# Patient Record
Sex: FEMALE | Race: WHITE | NOT HISPANIC OR LATINO | Employment: OTHER | URBAN - METROPOLITAN AREA
[De-identification: names, ages, dates, MRNs, and addresses within clinical notes are randomized per-mention and may not be internally consistent; named-entity substitution may affect disease eponyms.]

---

## 2020-06-26 ENCOUNTER — APPOINTMENT (OUTPATIENT)
Dept: RADIOLOGY | Facility: MEDICAL CENTER | Age: 85
DRG: 377 | End: 2020-06-26
Attending: HOSPITALIST
Payer: MEDICARE

## 2020-06-26 ENCOUNTER — APPOINTMENT (OUTPATIENT)
Dept: RADIOLOGY | Facility: MEDICAL CENTER | Age: 85
DRG: 377 | End: 2020-06-26
Attending: NURSE PRACTITIONER
Payer: MEDICARE

## 2020-06-26 ENCOUNTER — HOSPITAL ENCOUNTER (INPATIENT)
Facility: MEDICAL CENTER | Age: 85
LOS: 13 days | DRG: 377 | End: 2020-07-09
Attending: EMERGENCY MEDICINE | Admitting: HOSPITALIST
Payer: MEDICARE

## 2020-06-26 DIAGNOSIS — Z99.2 ESRD ON DIALYSIS (HCC): ICD-10-CM

## 2020-06-26 DIAGNOSIS — D64.9 ANEMIA, UNSPECIFIED TYPE: ICD-10-CM

## 2020-06-26 DIAGNOSIS — G45.8 ACUTE CEREBROVASCULAR INSUFFICIENCY WITH TRANSIENT FOCAL NEUROLOGICAL SIGNS AND SYMPTOMS: ICD-10-CM

## 2020-06-26 DIAGNOSIS — K92.1 MELENA: ICD-10-CM

## 2020-06-26 DIAGNOSIS — K92.2 UPPER GI BLEED: ICD-10-CM

## 2020-06-26 DIAGNOSIS — N19 UREMIA: ICD-10-CM

## 2020-06-26 DIAGNOSIS — N18.6 ESRD ON DIALYSIS (HCC): ICD-10-CM

## 2020-06-26 PROBLEM — I73.9 PERIPHERAL ARTERY DISEASE (HCC): Status: ACTIVE | Noted: 2020-06-26

## 2020-06-26 PROBLEM — E11.29 TYPE 2 DIABETES MELLITUS WITH KIDNEY COMPLICATION, WITHOUT LONG-TERM CURRENT USE OF INSULIN (HCC): Status: ACTIVE | Noted: 2020-06-26

## 2020-06-26 PROBLEM — S06.5XAA SUBDURAL HEMATOMA (HCC): Status: ACTIVE | Noted: 2020-06-26

## 2020-06-26 PROBLEM — S81.801A LEG WOUND, RIGHT: Status: ACTIVE | Noted: 2020-06-26

## 2020-06-26 PROBLEM — I10 PRIMARY HYPERTENSION: Status: ACTIVE | Noted: 2020-06-26

## 2020-06-26 PROBLEM — D62 ACUTE BLOOD LOSS ANEMIA: Status: ACTIVE | Noted: 2020-06-26

## 2020-06-26 LAB
ABO + RH BLD: NORMAL
ABO GROUP BLD: NORMAL
ALBUMIN SERPL BCP-MCNC: 2.5 G/DL (ref 3.2–4.9)
ALBUMIN/GLOB SERPL: 0.7 G/DL
ALP SERPL-CCNC: 41 U/L (ref 30–99)
ALT SERPL-CCNC: 13 U/L (ref 2–50)
ANION GAP SERPL CALC-SCNC: 18 MMOL/L (ref 7–16)
ANISOCYTOSIS BLD QL SMEAR: ABNORMAL
APTT PPP: 28 SEC (ref 24.7–36)
AST SERPL-CCNC: 17 U/L (ref 12–45)
BARCODED ABORH UBTYP: 600
BARCODED ABORH UBTYP: 6200
BARCODED ABORH UBTYP: 9500
BARCODED PRD CODE UBPRD: NORMAL
BARCODED UNIT NUM UBUNT: NORMAL
BASOPHILS # BLD AUTO: 0.2 % (ref 0–1.8)
BASOPHILS # BLD: 0.03 K/UL (ref 0–0.12)
BILIRUB SERPL-MCNC: 0.2 MG/DL (ref 0.1–1.5)
BLD GP AB SCN SERPL QL: NORMAL
BUN SERPL-MCNC: 114 MG/DL (ref 8–22)
CALCIUM SERPL-MCNC: 7.2 MG/DL (ref 8.5–10.5)
CHLORIDE SERPL-SCNC: 99 MMOL/L (ref 96–112)
CO2 SERPL-SCNC: 22 MMOL/L (ref 20–33)
COMMENT 1642: NORMAL
COMPONENT P 8504P: NORMAL
COMPONENT R 8504R: NORMAL
CREAT SERPL-MCNC: 6.93 MG/DL (ref 0.5–1.4)
DACRYOCYTES BLD QL SMEAR: NORMAL
EKG IMPRESSION: NORMAL
EOSINOPHIL # BLD AUTO: 0.08 K/UL (ref 0–0.51)
EOSINOPHIL NFR BLD: 0.6 % (ref 0–6.9)
ERYTHROCYTE [DISTWIDTH] IN BLOOD BY AUTOMATED COUNT: 57.6 FL (ref 35.9–50)
GLOBULIN SER CALC-MCNC: 3.6 G/DL (ref 1.9–3.5)
GLUCOSE SERPL-MCNC: 124 MG/DL (ref 65–99)
HBV SURFACE AB SERPL IA-ACNC: <3.5 MIU/ML (ref 0–10)
HBV SURFACE AG SER QL: NORMAL
HCT VFR BLD AUTO: 18.6 % (ref 37–47)
HGB BLD-MCNC: 5.5 G/DL (ref 12–16)
HGB BLD-MCNC: 7.2 G/DL (ref 12–16)
IMM GRANULOCYTES # BLD AUTO: 0.12 K/UL (ref 0–0.11)
IMM GRANULOCYTES NFR BLD AUTO: 0.9 % (ref 0–0.9)
INR PPP: 1.1 (ref 0.87–1.13)
LYMPHOCYTES # BLD AUTO: 2.37 K/UL (ref 1–4.8)
LYMPHOCYTES NFR BLD: 18.6 % (ref 22–41)
MCH RBC QN AUTO: 27 PG (ref 27–33)
MCHC RBC AUTO-ENTMCNC: 29.6 G/DL (ref 33.6–35)
MCV RBC AUTO: 91.2 FL (ref 81.4–97.8)
MICROCYTES BLD QL SMEAR: ABNORMAL
MONOCYTES # BLD AUTO: 0.85 K/UL (ref 0–0.85)
MONOCYTES NFR BLD AUTO: 6.7 % (ref 0–13.4)
MORPHOLOGY BLD-IMP: NORMAL
NEUTROPHILS # BLD AUTO: 9.32 K/UL (ref 2–7.15)
NEUTROPHILS NFR BLD: 73 % (ref 44–72)
NRBC # BLD AUTO: 0 K/UL
NRBC BLD-RTO: 0 /100 WBC
OVALOCYTES BLD QL SMEAR: NORMAL
PHOSPHATE SERPL-MCNC: 4.6 MG/DL (ref 2.5–4.5)
PLATELET # BLD AUTO: 280 K/UL (ref 164–446)
PLATELET BLD QL SMEAR: NORMAL
PMV BLD AUTO: 9.8 FL (ref 9–12.9)
POIKILOCYTOSIS BLD QL SMEAR: NORMAL
POLYCHROMASIA BLD QL SMEAR: NORMAL
POTASSIUM SERPL-SCNC: 5.3 MMOL/L (ref 3.6–5.5)
PRODUCT TYPE UPROD: NORMAL
PROT SERPL-MCNC: 6.1 G/DL (ref 6–8.2)
PROTHROMBIN TIME: 14.6 SEC (ref 12–14.6)
RBC # BLD AUTO: 2.04 M/UL (ref 4.2–5.4)
RBC BLD AUTO: PRESENT
RH BLD: NORMAL
ROULEAUX BLD QL SMEAR: SLIGHT
SODIUM SERPL-SCNC: 139 MMOL/L (ref 135–145)
UNIT STATUS USTAT: NORMAL
WBC # BLD AUTO: 12.8 K/UL (ref 4.8–10.8)

## 2020-06-26 PROCEDURE — 86850 RBC ANTIBODY SCREEN: CPT

## 2020-06-26 PROCEDURE — 700105 HCHG RX REV CODE 258: Performed by: EMERGENCY MEDICINE

## 2020-06-26 PROCEDURE — 85610 PROTHROMBIN TIME: CPT

## 2020-06-26 PROCEDURE — C9113 INJ PANTOPRAZOLE SODIUM, VIA: HCPCS | Performed by: EMERGENCY MEDICINE

## 2020-06-26 PROCEDURE — 700111 HCHG RX REV CODE 636 W/ 250 OVERRIDE (IP): Performed by: HOSPITALIST

## 2020-06-26 PROCEDURE — 30233R1 TRANSFUSION OF NONAUTOLOGOUS PLATELETS INTO PERIPHERAL VEIN, PERCUTANEOUS APPROACH: ICD-10-PCS | Performed by: HOSPITALIST

## 2020-06-26 PROCEDURE — 70450 CT HEAD/BRAIN W/O DYE: CPT

## 2020-06-26 PROCEDURE — 30233N1 TRANSFUSION OF NONAUTOLOGOUS RED BLOOD CELLS INTO PERIPHERAL VEIN, PERCUTANEOUS APPROACH: ICD-10-PCS | Performed by: HOSPITALIST

## 2020-06-26 PROCEDURE — 87340 HEPATITIS B SURFACE AG IA: CPT

## 2020-06-26 PROCEDURE — C9113 INJ PANTOPRAZOLE SODIUM, VIA: HCPCS | Performed by: HOSPITALIST

## 2020-06-26 PROCEDURE — 86901 BLOOD TYPING SEROLOGIC RH(D): CPT

## 2020-06-26 PROCEDURE — 700111 HCHG RX REV CODE 636 W/ 250 OVERRIDE (IP): Performed by: INTERNAL MEDICINE

## 2020-06-26 PROCEDURE — 86923 COMPATIBILITY TEST ELECTRIC: CPT

## 2020-06-26 PROCEDURE — 96365 THER/PROPH/DIAG IV INF INIT: CPT

## 2020-06-26 PROCEDURE — 770022 HCHG ROOM/CARE - ICU (200)

## 2020-06-26 PROCEDURE — 700111 HCHG RX REV CODE 636 W/ 250 OVERRIDE (IP): Performed by: EMERGENCY MEDICINE

## 2020-06-26 PROCEDURE — 85730 THROMBOPLASTIN TIME PARTIAL: CPT

## 2020-06-26 PROCEDURE — 36430 TRANSFUSION BLD/BLD COMPNT: CPT

## 2020-06-26 PROCEDURE — 84100 ASSAY OF PHOSPHORUS: CPT

## 2020-06-26 PROCEDURE — 99291 CRITICAL CARE FIRST HOUR: CPT | Performed by: HOSPITALIST

## 2020-06-26 PROCEDURE — 94760 N-INVAS EAR/PLS OXIMETRY 1: CPT

## 2020-06-26 PROCEDURE — 86900 BLOOD TYPING SEROLOGIC ABO: CPT

## 2020-06-26 PROCEDURE — 85025 COMPLETE CBC W/AUTO DIFF WBC: CPT

## 2020-06-26 PROCEDURE — 86706 HEP B SURFACE ANTIBODY: CPT

## 2020-06-26 PROCEDURE — P9016 RBC LEUKOCYTES REDUCED: HCPCS

## 2020-06-26 PROCEDURE — 96375 TX/PRO/DX INJ NEW DRUG ADDON: CPT

## 2020-06-26 PROCEDURE — 99221 1ST HOSP IP/OBS SF/LOW 40: CPT | Performed by: NURSE PRACTITIONER

## 2020-06-26 PROCEDURE — 99291 CRITICAL CARE FIRST HOUR: CPT | Performed by: INTERNAL MEDICINE

## 2020-06-26 PROCEDURE — 99285 EMERGENCY DEPT VISIT HI MDM: CPT

## 2020-06-26 PROCEDURE — 90935 HEMODIALYSIS ONE EVALUATION: CPT

## 2020-06-26 PROCEDURE — 80053 COMPREHEN METABOLIC PANEL: CPT

## 2020-06-26 PROCEDURE — P9034 PLATELETS, PHERESIS: HCPCS

## 2020-06-26 PROCEDURE — 93005 ELECTROCARDIOGRAM TRACING: CPT | Performed by: EMERGENCY MEDICINE

## 2020-06-26 PROCEDURE — 700105 HCHG RX REV CODE 258: Performed by: HOSPITALIST

## 2020-06-26 PROCEDURE — 85018 HEMOGLOBIN: CPT

## 2020-06-26 PROCEDURE — 5A1D70Z PERFORMANCE OF URINARY FILTRATION, INTERMITTENT, LESS THAN 6 HOURS PER DAY: ICD-10-PCS | Performed by: INTERNAL MEDICINE

## 2020-06-26 PROCEDURE — 71045 X-RAY EXAM CHEST 1 VIEW: CPT

## 2020-06-26 RX ORDER — HEPARIN SODIUM 1000 [USP'U]/ML
3200 INJECTION, SOLUTION INTRAVENOUS; SUBCUTANEOUS
Status: DISCONTINUED | OUTPATIENT
Start: 2020-06-26 | End: 2020-07-07 | Stop reason: DRUGHIGH

## 2020-06-26 RX ORDER — ALPRAZOLAM 0.5 MG/1
0.5 TABLET ORAL 3 TIMES DAILY PRN
COMMUNITY

## 2020-06-26 RX ORDER — HYDRALAZINE HYDROCHLORIDE 20 MG/ML
10-20 INJECTION INTRAMUSCULAR; INTRAVENOUS EVERY 4 HOURS PRN
Status: DISCONTINUED | OUTPATIENT
Start: 2020-06-26 | End: 2020-07-09 | Stop reason: HOSPADM

## 2020-06-26 RX ORDER — ONDANSETRON 4 MG/1
4 TABLET, ORALLY DISINTEGRATING ORAL EVERY 4 HOURS PRN
Status: DISCONTINUED | OUTPATIENT
Start: 2020-06-26 | End: 2020-07-09 | Stop reason: HOSPADM

## 2020-06-26 RX ORDER — SODIUM CHLORIDE 9 MG/ML
INJECTION, SOLUTION INTRAVENOUS CONTINUOUS
Status: DISPENSED | OUTPATIENT
Start: 2020-06-26 | End: 2020-06-27

## 2020-06-26 RX ORDER — HYDROMORPHONE HYDROCHLORIDE 1 MG/ML
.5-1 INJECTION, SOLUTION INTRAMUSCULAR; INTRAVENOUS; SUBCUTANEOUS EVERY 4 HOURS PRN
Status: DISCONTINUED | OUTPATIENT
Start: 2020-06-26 | End: 2020-06-28

## 2020-06-26 RX ORDER — CIPROFLOXACIN 250 MG/1
250 TABLET, FILM COATED ORAL 2 TIMES DAILY
Status: ON HOLD | COMMUNITY
End: 2020-07-09

## 2020-06-26 RX ORDER — ALPRAZOLAM 0.25 MG/1
0.5 TABLET ORAL 3 TIMES DAILY PRN
Status: DISCONTINUED | OUTPATIENT
Start: 2020-06-26 | End: 2020-06-26

## 2020-06-26 RX ORDER — DEXTROSE MONOHYDRATE 25 G/50ML
50 INJECTION, SOLUTION INTRAVENOUS
Status: DISCONTINUED | OUTPATIENT
Start: 2020-06-26 | End: 2020-06-29

## 2020-06-26 RX ORDER — LORAZEPAM 2 MG/ML
.25-.5 INJECTION INTRAMUSCULAR EVERY 4 HOURS PRN
Status: DISCONTINUED | OUTPATIENT
Start: 2020-06-26 | End: 2020-07-09 | Stop reason: HOSPADM

## 2020-06-26 RX ORDER — LORAZEPAM 2 MG/ML
0.5 INJECTION INTRAMUSCULAR ONCE
Status: COMPLETED | OUTPATIENT
Start: 2020-06-26 | End: 2020-06-26

## 2020-06-26 RX ORDER — ONDANSETRON 2 MG/ML
4 INJECTION INTRAMUSCULAR; INTRAVENOUS EVERY 4 HOURS PRN
Status: DISCONTINUED | OUTPATIENT
Start: 2020-06-26 | End: 2020-07-09 | Stop reason: HOSPADM

## 2020-06-26 RX ORDER — ATORVASTATIN CALCIUM 40 MG/1
40 TABLET, FILM COATED ORAL DAILY
COMMUNITY

## 2020-06-26 RX ORDER — AMLODIPINE BESYLATE 5 MG/1
5 TABLET ORAL 2 TIMES DAILY
COMMUNITY

## 2020-06-26 RX ORDER — ACETAMINOPHEN 500 MG
500-1000 TABLET ORAL EVERY 6 HOURS PRN
COMMUNITY

## 2020-06-26 RX ORDER — CLOPIDOGREL BISULFATE 75 MG/1
75 TABLET ORAL DAILY
COMMUNITY

## 2020-06-26 RX ORDER — FERRIC CITRATE 210 MG/1
210 TABLET, COATED ORAL
COMMUNITY

## 2020-06-26 RX ORDER — HYDRALAZINE HYDROCHLORIDE 20 MG/ML
10 INJECTION INTRAMUSCULAR; INTRAVENOUS EVERY 4 HOURS PRN
Status: DISCONTINUED | OUTPATIENT
Start: 2020-06-26 | End: 2020-06-26

## 2020-06-26 RX ADMIN — SODIUM CHLORIDE 30 ML: 9 INJECTION, SOLUTION INTRAVENOUS at 14:47

## 2020-06-26 RX ADMIN — HYDROMORPHONE HYDROCHLORIDE 0.5 MG: 1 INJECTION, SOLUTION INTRAMUSCULAR; INTRAVENOUS; SUBCUTANEOUS at 22:52

## 2020-06-26 RX ADMIN — SODIUM CHLORIDE 80 MG: 9 INJECTION, SOLUTION INTRAVENOUS at 12:23

## 2020-06-26 RX ADMIN — LORAZEPAM 0.5 MG: 2 INJECTION INTRAMUSCULAR; INTRAVENOUS at 11:43

## 2020-06-26 RX ADMIN — LORAZEPAM 0.5 MG: 2 INJECTION INTRAMUSCULAR; INTRAVENOUS at 20:07

## 2020-06-26 RX ADMIN — SODIUM CHLORIDE 8 MG/HR: 9 INJECTION, SOLUTION INTRAVENOUS at 12:52

## 2020-06-26 RX ADMIN — HEPARIN SODIUM 3200 UNITS: 1000 INJECTION, SOLUTION INTRAVENOUS; SUBCUTANEOUS at 21:10

## 2020-06-26 ASSESSMENT — ENCOUNTER SYMPTOMS
STRIDOR: 0
MYALGIAS: 0
EYE REDNESS: 0
HALLUCINATIONS: 0
DIZZINESS: 0
CONSTIPATION: 0
TREMORS: 0
COUGH: 0
FLANK PAIN: 0
SPEECH CHANGE: 1
DIAPHORESIS: 0
HEADACHES: 1
PHOTOPHOBIA: 0
FEVER: 0
SHORTNESS OF BREATH: 0
SPUTUM PRODUCTION: 0
SINUS PAIN: 0
DIARRHEA: 0
TINGLING: 0
ABDOMINAL PAIN: 0
BACK PAIN: 0
DOUBLE VISION: 0
NAUSEA: 0
HEADACHES: 0
EYE DISCHARGE: 0
PND: 0
HEMOPTYSIS: 0
EYE PAIN: 0
WEAKNESS: 1
NECK PAIN: 0
BRUISES/BLEEDS EASILY: 0
CLAUDICATION: 0
PALPITATIONS: 0
HEARTBURN: 0
SORE THROAT: 0
CHILLS: 0
FOCAL WEAKNESS: 1
VOMITING: 0
POLYDIPSIA: 0
DEPRESSION: 0
FALLS: 1
DIZZINESS: 1
NERVOUS/ANXIOUS: 0
BLURRED VISION: 0
WHEEZING: 0
ORTHOPNEA: 0
WEAKNESS: 0
BLOOD IN STOOL: 1

## 2020-06-26 ASSESSMENT — COGNITIVE AND FUNCTIONAL STATUS - GENERAL
WALKING IN HOSPITAL ROOM: A LOT
DRESSING REGULAR UPPER BODY CLOTHING: A LOT
TOILETING: A LOT
DRESSING REGULAR LOWER BODY CLOTHING: A LOT
SUGGESTED CMS G CODE MODIFIER DAILY ACTIVITY: CK
MOVING TO AND FROM BED TO CHAIR: A LOT
MOVING FROM LYING ON BACK TO SITTING ON SIDE OF FLAT BED: A LOT
PERSONAL GROOMING: A LITTLE
TURNING FROM BACK TO SIDE WHILE IN FLAT BAD: A LOT
HELP NEEDED FOR BATHING: A LOT
EATING MEALS: A LITTLE
STANDING UP FROM CHAIR USING ARMS: A LOT
MOBILITY SCORE: 12
DAILY ACTIVITIY SCORE: 14
SUGGESTED CMS G CODE MODIFIER MOBILITY: CL
CLIMB 3 TO 5 STEPS WITH RAILING: A LOT

## 2020-06-26 ASSESSMENT — LIFESTYLE VARIABLES
TOTAL SCORE: 0
AVERAGE NUMBER OF DAYS PER WEEK YOU HAVE A DRINK CONTAINING ALCOHOL: 0
CONSUMPTION TOTAL: NEGATIVE
EVER HAD A DRINK FIRST THING IN THE MORNING TO STEADY YOUR NERVES TO GET RID OF A HANGOVER: NO
HAVE PEOPLE ANNOYED YOU BY CRITICIZING YOUR DRINKING: NO
HAVE YOU EVER FELT YOU SHOULD CUT DOWN ON YOUR DRINKING: NO
ON A TYPICAL DAY WHEN YOU DRINK ALCOHOL HOW MANY DRINKS DO YOU HAVE: 0
DO YOU DRINK ALCOHOL: NO
EVER_SMOKED: NEVER
TOTAL SCORE: 0
HOW MANY TIMES IN THE PAST YEAR HAVE YOU HAD 5 OR MORE DRINKS IN A DAY: 0
TOTAL SCORE: 0
EVER FELT BAD OR GUILTY ABOUT YOUR DRINKING: NO
SUBSTANCE_ABUSE: 0

## 2020-06-26 ASSESSMENT — PATIENT HEALTH QUESTIONNAIRE - PHQ9
1. LITTLE INTEREST OR PLEASURE IN DOING THINGS: NOT AT ALL
2. FEELING DOWN, DEPRESSED, IRRITABLE, OR HOPELESS: NOT AT ALL
2. FEELING DOWN, DEPRESSED, IRRITABLE, OR HOPELESS: NOT AT ALL
1. LITTLE INTEREST OR PLEASURE IN DOING THINGS: NOT AT ALL
SUM OF ALL RESPONSES TO PHQ9 QUESTIONS 1 AND 2: 0
SUM OF ALL RESPONSES TO PHQ9 QUESTIONS 1 AND 2: 0

## 2020-06-26 NOTE — ED NOTES
Med rec completed per pt's family   Allergies reviewed    Pt is on a 10 day course of Cipro that started 4-5 days ago

## 2020-06-26 NOTE — ED NOTES
5.5hgb 18.6 hct critical results called from lab. Provider Leann aware. Awaiting additional orders

## 2020-06-26 NOTE — DISCHARGE PLANNING
Outpatient Dialysis Note    Patient is currently traveling from [place] and during this visit she is currently receiving HD at:     [facility]   [address] [place] [address]    Schedule: Monday, Wednesday, Friday   Time: 3:00pm     Spoke with Simran at facility who confirmed.      Permanent HD Clinic in [place]- Confirmed patient is active at:    [facility]  [address], [address]  [place] [address]    Schedule: Monday, Wednesday, Friday   Time: 3:00pm     Spoke with Adriana at facility who confirmed.    Forwarded records for review.    Betsy Keith- Dialysis Coordinator  [facility] # [phone]

## 2020-06-26 NOTE — PROGRESS NOTES
Triage officer progress note    Discussed with Dr. Noriega, 87 years old female visiting from [place] city, complaining of melena for the last 2 weeks, patient has history of end-stage renal disease on hemodialysis Monday Wednesday Friday, patient started develop weakness she was unable to walk, her hemoglobin was 5.5, patient became having slurred speech and left-sided droop code stroke was called, patient placed in Trendelenburg which improved, patient to start receiving 1 unit of unmatched red blood cells continued by 1 more unit of matched red blood cells, patient will require ICU admission for closer monitoring, GI is being consulted by ER physician as well as critical care and nephrology, hospitalist group called for admission.  Dr. Suarez will be admitting he has been notified and agree  Neurology also was called due to stroke alert, stat CT head is recommended.

## 2020-06-26 NOTE — CARE PLAN
Problem: Communication  Goal: The ability to communicate needs accurately and effectively will improve  Outcome: PROGRESSING AS EXPECTED     Problem: Safety  Goal: Will remain free from falls  Outcome: PROGRESSING AS EXPECTED  Intervention: Implement fall precautions  Flowsheets  Taken 6/26/2020 1400  Environmental Precautions:   Treaded Slipper Socks on Patient   Personal Belongings, Wastebasket, Call Bell etc. in Easy Reach   Transferred to Stronger Side   Report Given to Other Health Care Providers Regarding Fall Risk   Bed in Low Position   Communication Sign for Patients & Families   Mobility Assessed & Appropriate Sign Placed  Taken 6/26/2020 1549  Chair/Bed Strip Alarm: Yes - Alarm On     Problem: Venous Thromboembolism (VTW)/Deep Vein Thrombosis (DVT) Prevention:  Goal: Patient will participate in Venous Thrombosis (VTE)/Deep Vein Thrombosis (DVT)Prevention Measures  Outcome: PROGRESSING AS EXPECTED  Intervention: Ensure patient wears graduated elastic stockings (YVONNE hose) and/or SCDs, if ordered, when in bed or chair (Remove at least once per shift for skin check)  Flowsheets (Taken 6/26/2020 1633)  Mechanical Prophylaxis: SCDs, Sequential Compression Device

## 2020-06-26 NOTE — ED NOTES
Spoke with neurology NP Emilee they are not coming and pt is not a candidate of TPA. Pt is in trendelenburg and symptoms have resolved.

## 2020-06-26 NOTE — PROGRESS NOTES
"Brief Neurology Note-- Stroke Alert  Date of Service 6/26/20    87-year old female with PMHx ESRD on HD who presented to [facility] 6/26/20 for a chief complaint of melanic stools x 2 weeks; on arrival here Hemoglobin 5.5. Awaiting emergent transfusion. At appx 1100, patient nurse witnessed patient to suddenly \"stop speaking\" and with question of some facial droop. Stroke Alert was thus called at 1107. Patient was placed in trendelenburg position, and symptoms are currently improving (**per phone call with patient's nurse Rachel; I did not see the patient in person).  Patient IS NOT a candidate for IV tPA given concern for active bleeding/hemoglobin 5.5 as well as given exceedingly low suspicion for stroke, high suspicion for hemodynamic instability/hypoperfusion given low hemoglobin. Recommend STAT CT head to formally rule out acute intracranial hemorrhage. As was discussed with patient's nurse Rachel, if symptoms do not continue to improve, recommend MRI Brain and formal neurology consultation. Will defer all other medical management to ED/hospitalist at this time.     The plan of care above has been discussed with Dr. Solis. Please call with questions.    HOWARD Coelho.      "

## 2020-06-26 NOTE — PROGRESS NOTES
Assumed care of pt and report received from previous RN. Clarified with Dr Suarez that pt is not going to ICU. VSS, pt aaox4, granddaughter at bedside. Pt brought up with this RN and all belongings on zoll.     1348 Dr Suarez notified of CT results and also that patient is requesting to be DNR/DNI.

## 2020-06-26 NOTE — CONSULTS
"[facility] -  CONSULTATION NOTE               Author: Marlene Nelson M.D. Date & Time: 6/26/2020  2:57 PM       REASON FOR CONSULTATION:   - Inpatient hemodialysis management.    CHIEF COMPLAINT:   -  \"I don't feel too good\"    HISTORY OF PRESENT ILLNESS:    86 yo F PMH ESRD MWF iHD, HTN, anemia of CKD, CKD-MBD, and HLD who presents to ED with CC as above.  She is visiting the [facility] from [place] and due to current pandemic has not been able to go back home and continued to stay in this area with family.  She has OP dialysis at [facility] and has been compliant.  She was doing well until about two weeks ago when she started to notice dark stools.  She was doing ok until about 3 days ago when she also noted orthostatic symptoms upon standing.  It resolved and then today it came back and she had some slurred speech and left arm numbness so she was brought in for evaluation.  She has had a prior stroke and the numbness and slurred speech have been present off and on as a result of that so initially she though nothing of it.  Neurology was consulted and they did not feel that this was CVA related and more hemodynamic related.  Labs in ED showed Hgb 5.5 and serum labs showed  and K+ 5.3.  She was given pRBCs and admitted to ICU.  CT of head showed an acute on chronic epidural bleed with mild midline shift as well.  She was on plavix at home.  No recent F/C/N/V/CP/SOB.  No hematochezia, hematemesis.  No HA, visual changes, or abdominal pain.      REVIEW OF SYSTEMS:    Review of Systems   Constitutional: Positive for malaise/fatigue. Negative for fever.   HENT: Negative for ear pain and sore throat.    Eyes: Negative for pain and redness.   Respiratory: Negative for cough and hemoptysis.    Cardiovascular: Negative for chest pain and leg swelling.   Gastrointestinal: Positive for melena. Negative for abdominal pain and nausea.   Musculoskeletal: Negative for joint pain and myalgias. " "  Skin: Negative for itching and rash.   Neurological: Positive for weakness. Negative for dizziness and headaches.   Endo/Heme/Allergies: Negative for polydipsia. Does not bruise/bleed easily.   Psychiatric/Behavioral: Negative for depression and hallucinations.   All other systems reviewed and are negative.      PAST MEDICAL HISTORY:   - ESRD MWF iHD  - HTN  - Anemia of CKD  - CKD-MBD  - HLD  - CAD  - CVA  - Chronic epidural hematoma  - PAD    PAST SURGICAL HISTORY:   - AVF creation    FAMILY HISTORY:   - Reviewed and non contributory to current illness    SOCIAL HISTORY:   - No tobacco  - No EtOH  - No illicits    HOME MEDICATIONS:   - Reviewed and documented in chart    LABORATORY STUDIES:   - Reviewed and documented in chart    ALLERGIES:  Lexapro    VS:  /82   Pulse 93   Temp 36.6 °C (97.9 °F) (Temporal)   Resp 17   Ht 1.499 m (4' 11\")   Wt 77.6 kg (171 lb)   SpO2 95%   BMI 34.54 kg/m²   Physical Exam  Vitals signs and nursing note reviewed.   Constitutional:       General: She is not in acute distress.     Appearance: She is ill-appearing.   HENT:      Head: Normocephalic and atraumatic.      Right Ear: External ear normal.      Left Ear: External ear normal.      Nose: Nose normal. No congestion.      Mouth/Throat:      Mouth: Mucous membranes are moist.      Pharynx: No oropharyngeal exudate.   Eyes:      General:         Right eye: No discharge.         Left eye: No discharge.      Extraocular Movements: Extraocular movements intact.   Neck:      Musculoskeletal: Neck supple. No muscular tenderness.   Cardiovascular:      Rate and Rhythm: Normal rate and regular rhythm.      Heart sounds: Normal heart sounds.   Pulmonary:      Effort: Pulmonary effort is normal.      Breath sounds: Decreased breath sounds present.   Chest:      Chest wall: No tenderness.   Abdominal:      General: Bowel sounds are normal. There is no distension.      Palpations: Abdomen is soft.   Musculoskeletal:         " General: No swelling or tenderness.   Skin:     General: Skin is warm and dry.      Findings: No rash.   Neurological:      General: No focal deficit present.      Mental Status: She is alert. Mental status is at baseline.      Motor: Weakness present.   Psychiatric:         Mood and Affect: Mood normal.         Behavior: Behavior normal.     LABS:  Recent Results (from the past 24 hour(s))   CBC WITH DIFFERENTIAL    Collection Time: 06/26/20 10:25 AM   Result Value Ref Range    WBC 12.8 (H) 4.8 - 10.8 K/uL    RBC 2.04 (L) 4.20 - 5.40 M/uL    Hemoglobin 5.5 (LL) 12.0 - 16.0 g/dL    Hematocrit 18.6 (L) 37.0 - 47.0 %    MCV 91.2 81.4 - 97.8 fL    MCH 27.0 27.0 - 33.0 pg    MCHC 29.6 (L) 33.6 - 35.0 g/dL    RDW 57.6 (H) 35.9 - 50.0 fL    Platelet Count 280 164 - 446 K/uL    MPV 9.8 9.0 - 12.9 fL    Neutrophils-Polys 73.00 (H) 44.00 - 72.00 %    Lymphocytes 18.60 (L) 22.00 - 41.00 %    Monocytes 6.70 0.00 - 13.40 %    Eosinophils 0.60 0.00 - 6.90 %    Basophils 0.20 0.00 - 1.80 %    Immature Granulocytes 0.90 0.00 - 0.90 %    Nucleated RBC 0.00 /100 WBC    Neutrophils (Absolute) 9.32 (H) 2.00 - 7.15 K/uL    Lymphs (Absolute) 2.37 1.00 - 4.80 K/uL    Monos (Absolute) 0.85 0.00 - 0.85 K/uL    Eos (Absolute) 0.08 0.00 - 0.51 K/uL    Baso (Absolute) 0.03 0.00 - 0.12 K/uL    Immature Granulocytes (abs) 0.12 (H) 0.00 - 0.11 K/uL    NRBC (Absolute) 0.00 K/uL    Anisocytosis 1+     Microcytosis 1+    COMP METABOLIC PANEL    Collection Time: 06/26/20 10:25 AM   Result Value Ref Range    Sodium 139 135 - 145 mmol/L    Potassium 5.3 3.6 - 5.5 mmol/L    Chloride 99 96 - 112 mmol/L    Co2 22 20 - 33 mmol/L    Anion Gap 18.0 (H) 7.0 - 16.0    Glucose 124 (H) 65 - 99 mg/dL    Bun 114 (HH) 8 - 22 mg/dL    Creatinine 6.93 (HH) 0.50 - 1.40 mg/dL    Calcium 7.2 (L) 8.5 - 10.5 mg/dL    AST(SGOT) 17 12 - 45 U/L    ALT(SGPT) 13 2 - 50 U/L    Alkaline Phosphatase 41 30 - 99 U/L    Total Bilirubin 0.2 0.1 - 1.5 mg/dL    Albumin 2.5 (L) 3.2 -  4.9 g/dL    Total Protein 6.1 6.0 - 8.2 g/dL    Globulin 3.6 (H) 1.9 - 3.5 g/dL    A-G Ratio 0.7 g/dL   PROTHROMBIN TIME (INR)    Collection Time: 20 10:25 AM   Result Value Ref Range    PT 14.6 12.0 - 14.6 sec    INR 1.10 0.87 - 1.13   APTT    Collection Time: 20 10:25 AM   Result Value Ref Range    APTT 28.0 24.7 - 36.0 sec   ESTIMATED GFR    Collection Time: 20 10:25 AM   Result Value Ref Range    GFR If  7 (A) >60 mL/min/1.73 m 2    GFR If Non African American 6 (A) >60 mL/min/1.73 m 2   ABO Rh Confirm    Collection Time: 20 10:25 AM   Result Value Ref Range    ABO Rh Confirm A NEG    PHOSPHORUS    Collection Time: 20 10:25 AM   Result Value Ref Range    Phosphorus 4.6 (H) 2.5 - 4.5 mg/dL   PERIPHERAL SMEAR REVIEW    Collection Time: 20 10:25 AM   Result Value Ref Range    Peripheral Smear Review see below    PLATELET ESTIMATE    Collection Time: 20 10:25 AM   Result Value Ref Range    Plt Estimation Normal    MORPHOLOGY    Collection Time: 20 10:25 AM   Result Value Ref Range    RBC Morphology Present     Polychromia 1+     Poikilocytosis 1+     Ovalocytes 1+     Tear Drop Cells 1+     Rouleaux Slight    DIFFERENTIAL COMMENT    Collection Time: 20 10:25 AM   Result Value Ref Range    Comments-Diff see below    EKG    Collection Time: 20 10:28 AM   Result Value Ref Range    Report       [facility] Emergency Dept.    Test Date:  2020  Pt Name:    BECKIE BRITTON               Department: ER  MRN:        [id number]                      Room:        10  Gender:     Female                       Technician: [id number]  :        1932-10-26                   Requested By:DELILAH RAZO  Order #:    [id number]                    Mauricio MD: DELILAH RAZO MD    Measurements  Intervals                                Axis  Rate:       90                           P:          60  TN:         148                          QRS:         22  QRSD:       82                           T:          173  QT:         352  QTc:        431    Interpretive Statements  SINUS RHYTHM  NONSPECIFIC T ABNORMALITIES, LATERAL LEADS  No previous ECG available for comparison  Electronically Signed On 6- 11:48:08 PDT by DELILAH RAZO MD     COD - Adult (Type and Screen)    Collection Time: 06/26/20 10:49 AM   Result Value Ref Range    ABO Grouping Only A     Rh Grouping Only NEG     Antibody Screen-Cod NEG     Component R       R99                 Red Cells, LR       [id number]   issued       06/26/20   14:41      Product Type R99     Dispense Status issued     Unit Number (Barcoded) [id number]     Product Code (Barcoded) G4932U87     Blood Type (Barcoded) 0600    UN-XM'D RBC    Collection Time: 06/26/20 11:12 AM   Result Value Ref Range    Component R       R99                 Red Cells, LR       [id number]   issued       06/26/20   11:13      Product Type R99     Dispense Status issued     Unit Number (Barcoded) [id number]     Product Code (Barcoded) I2328G31     Blood Type (Barcoded) 9500        (click the triangle to expand results)    IMAGING:  DX-CHEST-PORTABLE (1 VIEW)   Final Result      No acute cardiopulmonary abnormality.      CT-HEAD W/O   Final Result      1. Small amount of acute hemorrhage in a chronic 2.1 cm right epidural collection, along the right frontoparietal convexity. Associated 2 mm right to left midline shift.   2. Minimal subacute subdural hemorrhage anterior to the right epidural collection.   3. Hyperdensity in the temporal horn of the right lateral ventricle may represent small amount of intraventricular hemorrhage.   4. No CT evidence of acute infarct.      MR-BRAIN-W/O    (Results Pending)   MR-MRA HEAD-W/O    (Results Pending)   MR-MRA NECK-W/O    (Results Pending)       IMPRESSION:  - ESRD    * Etiology likely 2/2 HTN    * AVF (+thrill/bruit)    * Visitor at [facility], lives in [facility] [place]    *  Etiology unclear, could be related to anti-platelet drug or other cause    * GI on board  - Hyperkalemia    * mild  - Acute epidural hematoma    * Superimposed on chronic epidural bleed, with a small midline shift    * Neuro following  - HTN    * Goal BP < 140/90    * Stable  - Anemia of CKD    * Goal Hgb 10-11    * Stable  - CKD-MBD    * Managed at HD unit  - HLD  - CAD    PLAN:  - 3 hour iHD today  - No to minimal UF  - 145 dialysate sodium to try to leave her SNa at high-normal range  - No dietary protein restrictions  - Dose all meds per ESRD  - Monitor for additional iHD as needed  - Transfuse as needed to maintain Hgb > 7    All prior notes form other doctors and RN staff were reviewed from admission to current day to help me make my clinical decisions    Thank you for the consultation!

## 2020-06-26 NOTE — ED TRIAGE NOTES
Pt arrived via EMS in [place] visiting son for last 3 weeks. Pt has a home health nurse that comes 2 times a week. HHN states she has had black tarry stool for a week. Pt is now unable to ambulate from weakness and has additional complaints of dizziness. States she could with a walker 2 days ago. Pt has hx of DMII gets dialysis MWF and is due today at 1530. Pt alert pwd calm. From [facility]  No past medical history on file.

## 2020-06-26 NOTE — ED PROVIDER NOTES
"ED Provider Note    Scribed for Dez Noriega M.D. by Kaya Waldrop. 6/26/2020  10:27 AM    Primary care provider: None noted.  Means of arrival: EMS  History obtained from: Patient  History limited by: None    CHIEF COMPLAINT  Chief Complaint   Patient presents with   • Melena   • Weakness       HPI  Corine Dela Cruz is a 87 y.o. female, with a history of ESRD on dialysis, who presents to the Emergency Department for melena onset 2 weeks ago. Patient states she thinks she has had bloody stools almost every day since onset. She additionally endorses difficulty walking secondary to generalized weakness and left leg pain. No alleviating or exacerbating factors noted at this time. No additional pain or symptoms noted.     REVIEW OF SYSTEMS  Pertinent positives include melena and generalized weakness and left leg pain.   Pertinent negatives include: No additional pain or symptoms noted at this time.     All other systems reviewed and negative. See HPI for further details.     PAST MEDICAL HISTORY    ESRD on dialysis and venous insufficiency    SURGICAL HISTORY  patient denies any surgical history    SOCIAL HISTORY  None noted.    FAMILY HISTORY  None noted.     CURRENT MEDICATIONS  Patient is unsure of current medications.     ALLERGIES  Allergies   Allergen Reactions   • Lexapro      Pt states she doesn't know what happens when she takes lexapro         PHYSICAL EXAM  VITAL SIGNS: /56   Pulse 88   Temp 36.4 °C (97.6 °F) (Temporal)   Resp 16   Ht 1.499 m (4' 11\")   Wt 77.6 kg (171 lb)   SpO2 98%   BMI 34.54 kg/m²     Nursing note and vitals reviewed.  Constitutional: Well-developed and well-nourished.  Mild distress, somewhat anxious.  HENT: Head is normocephalic and atraumatic. Oropharynx is clear and moist without exudate or erythema.   Eyes: Right pupil equal round and reactive. Pale, palpebral conjunctivae. Chronic left corneal abnormality.  Cardiovascular: Normal rate and regular rhythm. No " murmur heard. Normal radial pulses.  Pulmonary/Chest: Breath sounds normal. No wheezes or rales.   Abdominal: Soft and non-tender. No distention.  Copious melena present on exam.  Obese.  Unable to elicit any abdominal tenderness.  Musculoskeletal: Extremities exhibit normal range of motion without edema or tenderness.   Neurological: Awake, alert and oriented to person, place, and time. No focal deficits noted.  Skin: Skin is warm and dry. No rash.  Wound dressings to bilateral lower extremities.  Psychiatric: Normal mood and affect. Appropriate for clinical situation.      DIAGNOSTIC STUDIES / PROCEDURES    EKG Interpretation  Interpreted by me as below    LABS  Results for orders placed or performed during the hospital encounter of 06/26/20   COMP METABOLIC PANEL   Result Value Ref Range    Sodium 139 135 - 145 mmol/L    Potassium 5.3 3.6 - 5.5 mmol/L    Chloride 99 96 - 112 mmol/L    Co2 22 20 - 33 mmol/L    Anion Gap 18.0 (H) 7.0 - 16.0    Glucose 124 (H) 65 - 99 mg/dL    Bun 114 (HH) 8 - 22 mg/dL    Creatinine 6.93 (HH) 0.50 - 1.40 mg/dL    Calcium 7.2 (L) 8.5 - 10.5 mg/dL    AST(SGOT) 17 12 - 45 U/L    ALT(SGPT) 13 2 - 50 U/L    Alkaline Phosphatase 41 30 - 99 U/L    Total Bilirubin 0.2 0.1 - 1.5 mg/dL    Albumin 2.5 (L) 3.2 - 4.9 g/dL    Total Protein 6.1 6.0 - 8.2 g/dL    Globulin 3.6 (H) 1.9 - 3.5 g/dL    A-G Ratio 0.7 g/dL   PROTHROMBIN TIME (INR)   Result Value Ref Range    PT 14.6 12.0 - 14.6 sec    INR 1.10 0.87 - 1.13   APTT   Result Value Ref Range    APTT 28.0 24.7 - 36.0 sec   ESTIMATED GFR   Result Value Ref Range    GFR If  7 (A) >60 mL/min/1.73 m 2    GFR If Non African American 6 (A) >60 mL/min/1.73 m 2   UN-XM'D RBC   Result Value Ref Range    Component R       R99                 Red Cells, LR       [id number]   issued       06/26/20   11:13      Product Type R99     Dispense Status issued     Unit Number (Barcoded) [id number]     Product Code (Barcoded) G4329A35      Blood Type (Barcoded) 9500    EKG   Result Value Ref Range    Report       [facility] Emergency Dept.    Test Date:  2020  Pt Name:    BECKIE BRITTON               Department: ER  MRN:        [id number]                      Room:       RD 10  Gender:     Female                       Technician: [id number]  :        1932-10-26                   Requested By:DELILAH RAZO  Order #:    [id number]                    Reading MD:    Measurements  Intervals                                Axis  Rate:       90                           P:          60  MA:         148                          QRS:        22  QRSD:       82                           T:          173  QT:         352  QTc:        431    Interpretive Statements  SINUS RHYTHM  NONSPECIFIC T ABNORMALITIES, LATERAL LEADS  No previous ECG available for comparison        All labs reviewed by me.    RADIOLOGY  CT-HEAD W/O    (Results Pending)     The radiologist's interpretation of all radiological studies have been reviewed by me.    COURSE & MEDICAL DECISION MAKING  Nursing notes, VS, PMSFHx reviewed in chart.     No past medical records available to review.     10:27 AM - Patient seen and examined at bedside. I informed the patient the need for labs and radiology to rule out any emergent processes. Currently awaiting results before deciding if intervention is necessary. Patient verbalizes understanding and agreement to this plan of care. Ordered CBC with diff, CMP, PT/INR, APTT, COD, estimated GFR, and EKG to evaluate her symptoms. The differential diagnoses include but are not limited to: Presents today with apparent upper GI bleed. I suspect she will be anemic given her examination.     10:50 AM - Reviewed lab and radiology results at this time, as seen above.      10:57 AM Paged GI and hospitalist.      11:06 AM - Emergently called to patients bedside at this time by nurse for concerns of active stroke. Release for red blood cells ordered.   Patient has strokelike symptoms.  Had some facial droop and weakness.  However when placed on supplemental oxygen and placed in Trendelenburg the symptoms resolved.  Likely due to cerebral hypoperfusion due to her anemia.  Patient will be transfused with a unit of uncrossed matched red blood cells.    11:15 AM - I discussed the patient's case and the above findings with Dr. Dominguez (Hospitalist) who agreed to evaluate patient for hospitalization.     11:16 AM - Paged intensivist and nephrology.      11:20 AM - I discussed the patient's case and the above findings with Dr. Heck () who agreed to consult on the patient.      11:43 AM  - I discussed the patient's case and the above findings with Dr. Rizvi (Pulmonary) who will consult.     11:43 AM  I discussed the patient's case and the above findings with Dr. Heck () who will consult.     I have explained to the patient the risks and benefits of transfusion of blood products.  This includes, as appropriate, the risk of mild allergic reaction, hemolytic reaction, transfusion-associated lung injury, febrile reactions, circulatory or iron overload, and infection.    We discussed possible alternatives and their risks, including directed donation, autologous transfusion, and no transfusion, including IV or oral iron supplementation, as appropriate.  I believe the patient understands the risks and benefits and was able to express understanding.     Patient presents today with melena.  She is having an upper GI bleed.  She has significant anemia with her hemoglobin of 5.5.  On arrival the patient had a large amount of melena present on exam.  I suspect her true hemoglobin is substantially lower.  In the emergency department the patient developed acute neurologic symptoms that resolved with supplemental oxygen and positioning.  Feel likely due to cerebral hypoperfusion.  Patient was emergently treated with packed red blood cells.    CRITICAL CARE  I provided critical  care services, which included medication orders, frequent reevaluations of the patient's condition and response to treatment, ordering and reviewing test results, and discussing the case with various consultants.  The critical care time associated with the care of the patient was 50 minutes. Review chart for interventions. This time is exclusive of any other billable procedures.       DISPOSITION:  Patient will be hospitalized by Dr. Dominguez in critical condition.     FINAL IMPRESSION  1. Upper GI bleed    2. Melena    3. ESRD on dialysis (HCC)    4. Uremia    5. Anemia, unspecified type    6. Acute cerebrovascular insufficiency with transient focal neurological signs and symptoms       The critical care time associated with the care of the patient was 50 minutes.      Kaya FUENTES (Shayyibroma), am scribing for, and in the presence of, Dez Noriega M.D..    Electronically signed by: Kaya Waldrop (Jesus), 6/26/2020    Dez FUENTES M.D. personally performed the services described in this documentation, as scribed by Kaya Waldrop in my presence, and it is both accurate and complete. C.    The note accurately reflects work and decisions made by me.  Dez Noriega M.D.  6/26/2020  11:50 AM

## 2020-06-26 NOTE — H&P
Hospital Medicine History & Physical Note    Date of Service  6/26/2020    Primary Care Physician  No primary care provider on file.    Code Status  DNAR/DNI    Chief Complaint  Chief Complaint   Patient presents with   • Melena   • Weakness       History of Presenting Illness  87 y.o. female who presented 6/26/2020 with past medical history of stroke, end-stage renal disease dialysis Monday Wednesday Friday, peripheral artery disease who comes into the emergency room for having melena.  Patient does not know when the melena exactly started but states that is been present for the past week.  She just came from [place] 3 weeks prior and states that she is having generalized weakness since then.  States that she is been falling for the past 2 weeks.  She started developing dizziness especially when standing 3 days ago.  She also complained about slurred speech, left arm numbness which were her old stroke symptoms.  Patient recently had a angiogram and was placed on aspirin and Plavix for her peripheral artery disease.  Patient also has chronic wounds on her lower extremity that she's on ciprofloxacin.   EKG interpreted by me found normal sinus rhythm, poor R wave progression, ST depressions in the lateral leads  Review of Systems  Review of Systems   Constitutional: Negative for chills, diaphoresis, fever and malaise/fatigue.   HENT: Negative for congestion, ear discharge, ear pain, hearing loss, nosebleeds, sinus pain, sore throat and tinnitus.    Eyes: Negative for blurred vision, double vision, photophobia and pain.   Respiratory: Negative for cough, hemoptysis, sputum production, shortness of breath, wheezing and stridor.    Cardiovascular: Negative for chest pain, palpitations, orthopnea, claudication, leg swelling and PND.   Gastrointestinal: Positive for blood in stool and melena. Negative for abdominal pain, constipation, diarrhea, heartburn, nausea and vomiting.   Genitourinary: Negative for dysuria, flank  pain, frequency, hematuria and urgency.   Musculoskeletal: Positive for falls. Negative for back pain, joint pain, myalgias and neck pain.   Skin: Negative for itching and rash.   Neurological: Positive for dizziness. Negative for tingling, tremors, weakness and headaches.   Endo/Heme/Allergies: Negative for environmental allergies and polydipsia. Does not bruise/bleed easily.   Psychiatric/Behavioral: Negative for depression, hallucinations, substance abuse and suicidal ideas.       Past Medical History  Medical history of diabetes    Surgical History  Surgical history reviewed and not pertinent    Family History  Family history reviewed and not pertinent    Social History   reports that she has never smoked. She has never used smokeless tobacco.    Allergies  Allergies   Allergen Reactions   • Lexapro      Pt states she doesn't know what happens when she takes lexapro         Medications  None       Physical Exam  Temp:  [36.4 °C (97.6 °F)-37 °C (98.6 °F)] 36.9 °C (98.4 °F)  Pulse:  [9-105] 103  Resp:  [11-49] 17  BP: (103-159)/(45-83) 113/56  SpO2:  [94 %-100 %] 95 %    Physical Exam  Vitals signs and nursing note reviewed.   Constitutional:       General: She is not in acute distress.     Appearance: Normal appearance. She is not ill-appearing, toxic-appearing or diaphoretic.   HENT:      Head: Normocephalic and atraumatic.      Nose: No congestion or rhinorrhea.      Mouth/Throat:      Pharynx: No oropharyngeal exudate or posterior oropharyngeal erythema.   Eyes:      General: No scleral icterus.  Neck:      Musculoskeletal: No neck rigidity or muscular tenderness.      Vascular: No carotid bruit.   Cardiovascular:      Rate and Rhythm: Normal rate and regular rhythm.      Pulses: Normal pulses.      Heart sounds: Normal heart sounds. No murmur. No friction rub. No gallop.    Pulmonary:      Effort: Pulmonary effort is normal. No respiratory distress.      Breath sounds: Normal breath sounds. No stridor. No  wheezing or rhonchi.   Abdominal:      General: Abdomen is flat. There is no distension.      Palpations: There is no mass.      Tenderness: There is no abdominal tenderness. There is no left CVA tenderness, guarding or rebound.      Hernia: No hernia is present.   Musculoskeletal: Normal range of motion.         General: No swelling.      Right lower leg: No edema.      Left lower leg: No edema.   Lymphadenopathy:      Cervical: No cervical adenopathy.   Skin:     General: Skin is warm and dry.      Capillary Refill: Capillary refill takes less than 2 seconds.      Coloration: Skin is not jaundiced or pale.      Findings: No bruising or erythema.   Neurological:      Mental Status: She is alert.         Laboratory:  Recent Labs     06/26/20  1025 06/26/20  1900   WBC 12.8*  --    RBC 2.04*  --    HEMOGLOBIN 5.5* 7.2*   HEMATOCRIT 18.6*  --    MCV 91.2  --    MCH 27.0  --    MCHC 29.6*  --    RDW 57.6*  --    PLATELETCT 280  --    MPV 9.8  --      Recent Labs     06/26/20  1025   SODIUM 139   POTASSIUM 5.3   CHLORIDE 99   CO2 22   GLUCOSE 124*   *   CREATININE 6.93*   CALCIUM 7.2*     Recent Labs     06/26/20  1025   ALTSGPT 13   ASTSGOT 17   ALKPHOSPHAT 41   TBILIRUBIN 0.2   GLUCOSE 124*     Recent Labs     06/26/20  1025   APTT 28.0   INR 1.10     No results for input(s): NTPROBNP in the last 72 hours.      No results for input(s): TROPONINT in the last 72 hours.    Imaging:  DX-CHEST-PORTABLE (1 VIEW)   Final Result      No acute cardiopulmonary abnormality.      CT-HEAD W/O   Final Result      1. Small amount of acute hemorrhage in a chronic 2.1 cm right epidural collection, along the right frontoparietal convexity. Associated 2 mm right to left midline shift.   2. Minimal subacute subdural hemorrhage anterior to the right epidural collection.   3. Hyperdensity in the temporal horn of the right lateral ventricle may represent small amount of intraventricular hemorrhage.   4. No CT evidence of acute  infarct.      US-CAROTID DOPPLER BILAT    (Results Pending)   MR-BRAIN-W/O    (Results Pending)   MR-MRA HEAD-W/O    (Results Pending)   EC-ECHOCARDIOGRAM COMPLETE W/O CONT    (Results Pending)         Assessment/Plan:    * Acute GI bleeding  Assessment & Plan  With melena, likely upper GI source  Continuous cardiac monitoring  Patient has been started on IV fluid hydration with lactated ringer  Clear liquid diet  Patient is started on IV Protonix  Monitor H&H every 8 hours, transfuse for hemoglobin less than 7, status post 3 units of transfusion, 1 unit platelets  Coagulation studies within normal limits  We will consult GI in the morning for endoscopic evaluation      Epidural hemorrhage (HCC)  Assessment & Plan  2.3 cm with 2 mm shift  Neurosurgery consulted and recommended no intervention  Neurology consulted and will order MRI of the brain with MRA  Every 4 neurochecks in agreement with neurology and neurosurgery  Keep blood pressure less than 140    Leg wounds  Assessment & Plan  Wound culture sent  On ciprofloxacin    Peripheral artery disease (HCC)  Assessment & Plan  Hold aspirin and Plavix    Type 2 diabetes mellitus with kidney complication, without long-term current use of insulin ([facility])  Assessment & Plan  Start on insulin sliding scale with serial Accu-Checks  Check hemoglobin A1c  Hypoglycemic protocol in place        Primary hypertension  Assessment & Plan  Hold home hypertensive medications in setting of GI bleed  IV PRN medications for blood pressure greater than 150    ESRD (end stage renal disease) (HCC)  Assessment & Plan  Dialysis Monday Wednesday Friday,   nephrology is consulted  Monitor BMP and assess response  Avoid IV contrast/nephrotoxins/NSAIDs  Dose adjust meds for decreased GFR        Acute blood loss anemia  Assessment & Plan  Given 3 units of RBCs, 1 unit of platelets      The patient is critically ill, with high chance of deterioration into heart failure, MI, stroke, and eventually  death if left untreated. The care that has been undertaken is medically  complex. I spent 55 minutes of critical care time, including managing medical   issues, coordination of care, not including doing procedures, with no overlap in critical  care time.

## 2020-06-26 NOTE — CONSULTS
"Chief Complaint   Patient presents with   • Melena   • Weakness       Problem List Items Addressed This Visit     None      Visit Diagnoses     Upper GI bleed        Melena        ESRD on dialysis (HCC)        Uremia        Anemia, unspecified type        Acute cerebrovascular insufficiency with transient focal neurological signs and symptoms        Relevant Medications    amLODIPine (NORVASC) 5 MG Tab    atorvastatin (LIPITOR) 40 MG Tab    hydrALAZINE (APRESOLINE) injection 10 mg      Neurology Consultation     History of present illness:  This is an 87-year old female with PMhx significant for ESRD on HD Monday/Wed/Fri, associated uremia, currently here visiting from [place], who presented to [facility] on 6/26/20 for a chief complaint of a 2-week history of melanic stools and 1-2 day history of LUE weakness. HPI provided by patient's granddaughter at bedside. Sometime around the time she arrived, the patient was started on Plavix, for unclear reasons (?recently had an angiogram of some sort and was started on this for peripheral vascular disease, reportedly). Shortly thereafter, the patient began to complaint of mild lightheadedness, generalized weakness; also with dark tarry stools. Yesterday, patient's granddaughter noted that patient was having mild difficulty using her LUE. Symptoms persisted throughout the day. This morning, patient was working with PT/OT and she was again note to have LUE weakness, mildly worse today. Granddaughter (whom is a nurse) also visualized melanic stools, and decided to bring patient to ED. At time of presentation here, patient was found to have a hemoglobin of 5.5.; emergently transfused in ED. At appx 1100 today (on day of presentation), patient nurse witnessed patient to suddenly \"stop speaking\" and with question of some facial droop. Stroke Alert was thus called at 1107. Patient was placed in trendelenburg position, and symptoms began resolving. Patient then had STAT " CT head that revealed Right posterior/parietal region epidural fluid collection, a chronic finding; with associated mild acute hemorrhage within fluid collection. Mild associated Right to Left MLS (2.1 cm), no significant mass effect. Neurosurgery reportedly reviewed imaging; no surgical intervention indicated at this time.   Currently, patient is sitting up in bed; awake and alert; oriented and appropriate. Denies headache, admits to mild LUE weakness with mild associated numbness. She currently denies dizziness, problem with vision, speech or swallowing; no recent trauma, however patient admits to numerous falls over the past year, does not recall when most recent fall was.     Neurology has been consulted by Dr. Rosales Suarez to further evaluate the findings noted above.     Past medical history:   As noted above.     Past surgical history:   Non contributory.     Family history:   Non contributory.    Social history:   Social History     Socioeconomic History   • Marital status: Unknown     Spouse name: Not on file   • Number of children: Not on file   • Years of education: Not on file   • Highest education level: Not on file   Occupational History   • Not on file   Social Needs   • Financial resource strain: Not on file   • Food insecurity     Worry: Not on file     Inability: Not on file   • Transportation needs     Medical: Not on file     Non-medical: Not on file   Tobacco Use   • Smoking status: Not on file   Substance and Sexual Activity   • Alcohol use: Not on file   • Drug use: Not on file   • Sexual activity: Not on file   Lifestyle   • Physical activity     Days per week: Not on file     Minutes per session: Not on file   • Stress: Not on file   Relationships   • Social connections     Talks on phone: Not on file     Gets together: Not on file     Attends Anabaptism service: Not on file     Active member of club or organization: Not on file     Attends meetings of clubs or organizations: Not on file      Relationship status: Not on file   • Intimate partner violence     Fear of current or ex partner: Not on file     Emotionally abused: Not on file     Physically abused: Not on file     Forced sexual activity: Not on file   Other Topics Concern   • Not on file   Social History Narrative   • Not on file       Current medications:   Current Facility-Administered Medications   Medication Dose   • ondansetron (ZOFRAN) syringe/vial injection 4 mg  4 mg   • ondansetron (ZOFRAN ODT) dispertab 4 mg  4 mg   • pantoprazole (PROTONIX) 80 mg in  mL Infusion  8 mg/hr   • NS infusion     • hydrALAZINE (APRESOLINE) injection 10 mg  10 mg   • ALPRAZolam (XANAX) tablet 0.5 mg  0.5 mg       Medication Allergy:  Allergies   Allergen Reactions   • Lexapro      Pt states she doesn't know what happens when she takes lexapro         Review of systems:   Constitutional: denies fever, night sweats, weight loss.   Eyes: denies acute vision change, eye pain or secretion.   Ears, Nose, Mouth, Throat: denies nasal secretion, nasal bleeding, difficulty swallowing, hearing loss, tinnitus, vertigo, ear pain, acute dental problems, oral ulcers or lesions.   Endocrine: denies recent weight changes, heat or cold intolerance, polyuria, polydypsia, polyphagia,abnormal hair growth.  Cardiovascular: denies new onset of chest pain, palpitations, syncope, or dyspnea of exertion.  Pulmonary: denies shortness of breath, new onset of cough, hemoptysis, wheezing, chest pain or flu-like symptoms.   GI: As noted above; denies nausea, vomiting, diarrhea, GI bleeding, change in appetite, or abdominal pain  : denies dysuria, urinary incontinence, hematuria.  Heme/oncology: denies history of easy bruising or bleeding. No history of cancer, DVTor PE.  Allergy/immunology: denies hives/urticaria, or itching.   Dermatologic: denies new rash, or new skin lesions.  Musculoskeletal:denies joint swelling or pain, muscle pain, neck and back pain.   Neurologic: As  noted above.   Psychiatric: denies symptoms of depression, anxiety, hallucinations, mood swings or changes, suicidal or homicidal thoughts.     Physical examination:   Vitals:    06/26/20 1300 06/26/20 1351 06/26/20 1500 06/26/20 1515   BP: 121/59 140/82 138/83 119/45   Pulse: 95 93 92 83   Resp: 16 17 16 16   Temp:  36.6 °C (97.9 °F) 36.5 °C (97.7 °F) 36.7 °C (98.1 °F)   TempSrc:  Temporal Temporal Temporal   SpO2: 97% 95% 97% 96%   Weight:       Height:         General: Patient in no acute distress, pleasant and cooperative.  HEENT: Normocephalic, no signs of acute trauma.   Neck: supple, no meningeal signs or carotid bruits. There is normal range of motion. No tenderness on exam.   Chest: clear to auscultation. No cough.   CV: RRR, no murmurs.   Skin: no signs of acute rashes or trauma.   Musculoskeletal: joints exhibit full range of motion, without any pain to palpation. There are no signs of joint or muscle swelling. There is no tenderness to deep palpation of muscles.   Psychiatric: No hallucinatory behavior.       NEUROLOGICAL EXAM:   Mental status, orientation: Awake, alert and fully oriented.   Speech and language: speech is clear and fluent. The patient is able to name, repeat and comprehend.   Memory: There is intact recollection of recent and remote events.   Cranial nerve exam: Pupils are 3-4 mm bilaterally and equally reactive to light. Visual fields are intact by confrontation. There is no nystagmus on primary or secondary gaze. Intact full EOM in all directions of gaze. Face appears symmetric. Sensation in the face is intact to light touch. Uvula is midline. Palate elevates symmetrically. Tongue is midline and without any signs of tongue biting or fasciculations. Sternocleidomastoid muscles exhibit is normal strength bilaterally. Shoulder shrug is intact bilaterally.   Motor exam: Strength is 5/5 in RUE, 4/5 to LUE; 3+/5 to BLE. Tone is normal. No abnormal movements were seen on exam.   Sensory exam  Decreased sensation to light touch to LUE; otheriwse reveals normal sense of light touch and pinprick in all extremities.   Deep tendon reflexes:  2+ throughout. Plantar responses are flexor. There is no clonus.   Coordination: shows a normal finger-nose-finger. Normal rapidly alternating movements.   Gait: Not assessed at this time as patient is a fall risk.         ANCILLARY DATA REVIEWED:     Lab Data Review:  Recent Results (from the past 24 hour(s))   CBC WITH DIFFERENTIAL    Collection Time: 06/26/20 10:25 AM   Result Value Ref Range    WBC 12.8 (H) 4.8 - 10.8 K/uL    RBC 2.04 (L) 4.20 - 5.40 M/uL    Hemoglobin 5.5 (LL) 12.0 - 16.0 g/dL    Hematocrit 18.6 (L) 37.0 - 47.0 %    MCV 91.2 81.4 - 97.8 fL    MCH 27.0 27.0 - 33.0 pg    MCHC 29.6 (L) 33.6 - 35.0 g/dL    RDW 57.6 (H) 35.9 - 50.0 fL    Platelet Count 280 164 - 446 K/uL    MPV 9.8 9.0 - 12.9 fL    Neutrophils-Polys 73.00 (H) 44.00 - 72.00 %    Lymphocytes 18.60 (L) 22.00 - 41.00 %    Monocytes 6.70 0.00 - 13.40 %    Eosinophils 0.60 0.00 - 6.90 %    Basophils 0.20 0.00 - 1.80 %    Immature Granulocytes 0.90 0.00 - 0.90 %    Nucleated RBC 0.00 /100 WBC    Neutrophils (Absolute) 9.32 (H) 2.00 - 7.15 K/uL    Lymphs (Absolute) 2.37 1.00 - 4.80 K/uL    Monos (Absolute) 0.85 0.00 - 0.85 K/uL    Eos (Absolute) 0.08 0.00 - 0.51 K/uL    Baso (Absolute) 0.03 0.00 - 0.12 K/uL    Immature Granulocytes (abs) 0.12 (H) 0.00 - 0.11 K/uL    NRBC (Absolute) 0.00 K/uL    Anisocytosis 1+     Microcytosis 1+    COMP METABOLIC PANEL    Collection Time: 06/26/20 10:25 AM   Result Value Ref Range    Sodium 139 135 - 145 mmol/L    Potassium 5.3 3.6 - 5.5 mmol/L    Chloride 99 96 - 112 mmol/L    Co2 22 20 - 33 mmol/L    Anion Gap 18.0 (H) 7.0 - 16.0    Glucose 124 (H) 65 - 99 mg/dL    Bun 114 (HH) 8 - 22 mg/dL    Creatinine 6.93 (HH) 0.50 - 1.40 mg/dL    Calcium 7.2 (L) 8.5 - 10.5 mg/dL    AST(SGOT) 17 12 - 45 U/L    ALT(SGPT) 13 2 - 50 U/L    Alkaline Phosphatase 41 30 - 99 U/L     Total Bilirubin 0.2 0.1 - 1.5 mg/dL    Albumin 2.5 (L) 3.2 - 4.9 g/dL    Total Protein 6.1 6.0 - 8.2 g/dL    Globulin 3.6 (H) 1.9 - 3.5 g/dL    A-G Ratio 0.7 g/dL   PROTHROMBIN TIME (INR)    Collection Time: 20 10:25 AM   Result Value Ref Range    PT 14.6 12.0 - 14.6 sec    INR 1.10 0.87 - 1.13   APTT    Collection Time: 20 10:25 AM   Result Value Ref Range    APTT 28.0 24.7 - 36.0 sec   ESTIMATED GFR    Collection Time: 20 10:25 AM   Result Value Ref Range    GFR If  7 (A) >60 mL/min/1.73 m 2    GFR If Non African American 6 (A) >60 mL/min/1.73 m 2   ABO Rh Confirm    Collection Time: 20 10:25 AM   Result Value Ref Range    ABO Rh Confirm A NEG    PHOSPHORUS    Collection Time: 20 10:25 AM   Result Value Ref Range    Phosphorus 4.6 (H) 2.5 - 4.5 mg/dL   PERIPHERAL SMEAR REVIEW    Collection Time: 20 10:25 AM   Result Value Ref Range    Peripheral Smear Review see below    PLATELET ESTIMATE    Collection Time: 20 10:25 AM   Result Value Ref Range    Plt Estimation Normal    MORPHOLOGY    Collection Time: 20 10:25 AM   Result Value Ref Range    RBC Morphology Present     Polychromia 1+     Poikilocytosis 1+     Ovalocytes 1+     Tear Drop Cells 1+     Rouleaux Slight    DIFFERENTIAL COMMENT    Collection Time: 20 10:25 AM   Result Value Ref Range    Comments-Diff see below    EKG    Collection Time: 20 10:28 AM   Result Value Ref Range    Report       [facility] Emergency Dept[facility]    Test Date:  2020  Pt Name:    BECKIE BRITTON               Department: ER  MRN:        [id number]                      Room:       RD 10  Gender:     Female                       Technician: [id number]  :        1932-10-26                   Requested By:DELILAH RAZO  Order #:    [id number]                    Reading MD: DELILAH RAZO MD    Measurements  Intervals                                Axis  Rate:       90                            P:          60  SD:         148                          QRS:        22  QRSD:       82                           T:          173  QT:         352  QTc:        431    Interpretive Statements  SINUS RHYTHM  NONSPECIFIC T ABNORMALITIES, LATERAL LEADS  No previous ECG available for comparison  Electronically Signed On 6- 11:48:08 PDT by DELILAH RAZO MD     COD - Adult (Type and Screen)    Collection Time: 06/26/20 10:49 AM   Result Value Ref Range    ABO Grouping Only A     Rh Grouping Only NEG     Antibody Screen-Cod NEG     Component R       R99                 Red Cells, LR       [id number]   issued       06/26/20   14:41      Product Type R99     Dispense Status issued     Unit Number (Barcoded) [id number]     Product Code (Barcoded) D5712D08     Blood Type (Barcoded) 0600    UN-XM'D RBC    Collection Time: 06/26/20 11:12 AM   Result Value Ref Range    Component R       R99                 Red Cells, LR       [id number]   issued       06/26/20   11:13      Product Type R99     Dispense Status issued     Unit Number (Barcoded) [id number]     Product Code (Barcoded) B8135Q20     Blood Type (Barcoded) 9500        Labs reviewed by me.       Imaging reviewed by me:     DX-CHEST-PORTABLE (1 VIEW)   Final Result      No acute cardiopulmonary abnormality.      CT-HEAD W/O   Final Result      1. Small amount of acute hemorrhage in a chronic 2.1 cm right epidural collection, along the right frontoparietal convexity. Associated 2 mm right to left midline shift.   2. Minimal subacute subdural hemorrhage anterior to the right epidural collection.   3. Hyperdensity in the temporal horn of the right lateral ventricle may represent small amount of intraventricular hemorrhage.   4. No CT evidence of acute infarct.            ASSESSMENT AND PLAN:  87-year old female with PMhx significant for ESRD on HD Monday/Wed/Fri, associated uremia, currently here visiting from [place], who presented to [facility]  " on 6/26/20 for a chief complaint of a 2-week history of melanic stools and 1-2 day history of LUE weakness; at time of presentation here found to have hemoglobin 5.5, emergently transfused; while in ED patient's nurse witnessed patient to suddenly \"stop speaking\" and with question of some facial droop; STAT CT head revealed Right posterior/parietal region epidural fluid collection, a chronic finding; with associated mild acute hemorrhage within fluid collection. Mild associated Right to Left MLS (2.1 cm), no significant mass effect. Neurosurgery reportedly reviewed imaging; no surgical intervention indicated at this time. Patient currently sitting up in bed; awake/alert/oriented; only notable deficit LUE weakness. Will recommend q4h and PRN neuro assessment, with SBP < 150; low threshold for repeat CT head if symptoms worsen/decline in exam.     Additional Recommendations/Plan:     -q4h and PRN neuro assessment. VS per nursing/unit protocol. SBP <150. Antihypertensives per primary team.   -Avoid all antithrombotics/anticoagulation/blood thinners at this time.   -Telemetry; currently SR. Screen for arrhythmia. Obtain TTE.   -Will obtain MRI Brain wo contrast, MRA head to rule out vascular etiology of event. BL carotid doppler also ordered, pending.   -Maintain strict euthermia, euglycemia, eunamtremia, euvolemia. Current Na is 139.   -PT/OT/SLP eval and treat.   -Will defer all other medical management to primary team and to nephrology, who has also been consulted. Per discussion with Dr. Nelson, recommend gentle dialysis, if possible avoid hyponatremia. Patient also planned for endoscopy per GI; OK to proceed with this from a neurological perspective.   -DVT PPX: SCDs.     The plan of care above has been discussed with Dr. Solis.     Emilee Alvarez, A.P.R.N.  [facility]      "

## 2020-06-26 NOTE — PROGRESS NOTES
Patient presented with sudden left facial droop and left arm flaccidy. Dr Suarez stat paged and rapid response called.

## 2020-06-26 NOTE — ED NOTES
RN to room, noted pt was slurring her words and had left sided facial droop. Stroke alert called, Dr Noriega informed and came to room. Pt put in Trendelenburg for perfusion.

## 2020-06-27 ENCOUNTER — APPOINTMENT (OUTPATIENT)
Dept: RADIOLOGY | Facility: MEDICAL CENTER | Age: 85
DRG: 377 | End: 2020-06-27
Attending: NURSE PRACTITIONER
Payer: MEDICARE

## 2020-06-27 ENCOUNTER — APPOINTMENT (OUTPATIENT)
Dept: CARDIOLOGY | Facility: MEDICAL CENTER | Age: 85
DRG: 377 | End: 2020-06-27
Attending: INTERNAL MEDICINE
Payer: MEDICARE

## 2020-06-27 ENCOUNTER — ANESTHESIA (OUTPATIENT)
Dept: SURGERY | Facility: MEDICAL CENTER | Age: 85
DRG: 377 | End: 2020-06-27
Payer: MEDICARE

## 2020-06-27 ENCOUNTER — ANESTHESIA EVENT (OUTPATIENT)
Dept: SURGERY | Facility: MEDICAL CENTER | Age: 85
DRG: 377 | End: 2020-06-27
Payer: MEDICARE

## 2020-06-27 PROBLEM — S06.4XAA EPIDURAL HEMORRHAGE (HCC): Status: ACTIVE | Noted: 2020-06-26

## 2020-06-27 LAB
ALBUMIN SERPL BCP-MCNC: 2.4 G/DL (ref 3.2–4.9)
ALBUMIN/GLOB SERPL: 0.9 G/DL
ALP SERPL-CCNC: 38 U/L (ref 30–99)
ALT SERPL-CCNC: 11 U/L (ref 2–50)
ANION GAP SERPL CALC-SCNC: 13 MMOL/L (ref 7–16)
AST SERPL-CCNC: 24 U/L (ref 12–45)
BASOPHILS # BLD AUTO: 0.4 % (ref 0–1.8)
BASOPHILS # BLD: 0.04 K/UL (ref 0–0.12)
BILIRUB SERPL-MCNC: 0.2 MG/DL (ref 0.1–1.5)
BUN SERPL-MCNC: 60 MG/DL (ref 8–22)
CALCIUM SERPL-MCNC: 7 MG/DL (ref 8.5–10.5)
CHLORIDE SERPL-SCNC: 107 MMOL/L (ref 96–112)
CHOLEST SERPL-MCNC: 102 MG/DL (ref 100–199)
CO2 SERPL-SCNC: 21 MMOL/L (ref 20–33)
COVID ORDER STATUS COVID19: NORMAL
CREAT SERPL-MCNC: 3.59 MG/DL (ref 0.5–1.4)
EOSINOPHIL # BLD AUTO: 0.04 K/UL (ref 0–0.51)
EOSINOPHIL NFR BLD: 0.4 % (ref 0–6.9)
ERYTHROCYTE [DISTWIDTH] IN BLOOD BY AUTOMATED COUNT: 50.1 FL (ref 35.9–50)
GLOBULIN SER CALC-MCNC: 2.7 G/DL (ref 1.9–3.5)
GLUCOSE BLD-MCNC: 108 MG/DL (ref 65–99)
GLUCOSE BLD-MCNC: 124 MG/DL (ref 65–99)
GLUCOSE BLD-MCNC: 145 MG/DL (ref 65–99)
GLUCOSE SERPL-MCNC: 130 MG/DL (ref 65–99)
HCT VFR BLD AUTO: 18.7 % (ref 37–47)
HDLC SERPL-MCNC: 25 MG/DL
HGB BLD-MCNC: 6 G/DL (ref 12–16)
HGB BLD-MCNC: 6.3 G/DL (ref 12–16)
HGB BLD-MCNC: 7.4 G/DL (ref 12–16)
HGB BLD-MCNC: 7.4 G/DL (ref 12–16)
HGB BLD-MCNC: 8.6 G/DL (ref 12–16)
IMM GRANULOCYTES # BLD AUTO: 0.1 K/UL (ref 0–0.11)
IMM GRANULOCYTES NFR BLD AUTO: 0.9 % (ref 0–0.9)
LDLC SERPL CALC-MCNC: 50 MG/DL
LV EJECT FRACT MOD 2C 99903: 64.39
LV EJECT FRACT MOD 4C 99902: 61.31
LV EJECT FRACT MOD BP 99901: 62.4
LYMPHOCYTES # BLD AUTO: 1.46 K/UL (ref 1–4.8)
LYMPHOCYTES NFR BLD: 13.7 % (ref 22–41)
MAGNESIUM SERPL-MCNC: 1.8 MG/DL (ref 1.5–2.5)
MCH RBC QN AUTO: 27.8 PG (ref 27–33)
MCHC RBC AUTO-ENTMCNC: 32.1 G/DL (ref 33.6–35)
MCV RBC AUTO: 86.8 FL (ref 81.4–97.8)
MONOCYTES # BLD AUTO: 0.73 K/UL (ref 0–0.85)
MONOCYTES NFR BLD AUTO: 6.8 % (ref 0–13.4)
NEUTROPHILS # BLD AUTO: 8.3 K/UL (ref 2–7.15)
NEUTROPHILS NFR BLD: 77.8 % (ref 44–72)
NRBC # BLD AUTO: 0 K/UL
NRBC BLD-RTO: 0 /100 WBC
PLATELET # BLD AUTO: 248 K/UL (ref 164–446)
PMV BLD AUTO: 10 FL (ref 9–12.9)
POTASSIUM SERPL-SCNC: 4.3 MMOL/L (ref 3.6–5.5)
PROT SERPL-MCNC: 5.1 G/DL (ref 6–8.2)
RBC # BLD AUTO: 2.12 M/UL (ref 4.2–5.4)
SARS-COV-2 RNA RESP QL NAA+PROBE: NOTDETECTED
SODIUM SERPL-SCNC: 141 MMOL/L (ref 135–145)
SPECIMEN SOURCE: NORMAL
TRIGL SERPL-MCNC: 137 MG/DL (ref 0–149)
WBC # BLD AUTO: 10.7 K/UL (ref 4.8–10.8)

## 2020-06-27 PROCEDURE — 87186 SC STD MICRODIL/AGAR DIL: CPT | Mod: 91

## 2020-06-27 PROCEDURE — 87205 SMEAR GRAM STAIN: CPT

## 2020-06-27 PROCEDURE — A9270 NON-COVERED ITEM OR SERVICE: HCPCS

## 2020-06-27 PROCEDURE — 70551 MRI BRAIN STEM W/O DYE: CPT

## 2020-06-27 PROCEDURE — 80061 LIPID PANEL: CPT

## 2020-06-27 PROCEDURE — 700102 HCHG RX REV CODE 250 W/ 637 OVERRIDE(OP)

## 2020-06-27 PROCEDURE — 700111 HCHG RX REV CODE 636 W/ 250 OVERRIDE (IP): Performed by: ANESTHESIOLOGY

## 2020-06-27 PROCEDURE — 99232 SBSQ HOSP IP/OBS MODERATE 35: CPT | Performed by: NURSE PRACTITIONER

## 2020-06-27 PROCEDURE — 700105 HCHG RX REV CODE 258: Performed by: INTERNAL MEDICINE

## 2020-06-27 PROCEDURE — 700105 HCHG RX REV CODE 258: Performed by: ANESTHESIOLOGY

## 2020-06-27 PROCEDURE — 92610 EVALUATE SWALLOWING FUNCTION: CPT

## 2020-06-27 PROCEDURE — 700117 HCHG RX CONTRAST REV CODE 255: Performed by: INTERNAL MEDICINE

## 2020-06-27 PROCEDURE — 93306 TTE W/DOPPLER COMPLETE: CPT

## 2020-06-27 PROCEDURE — 160035 HCHG PACU - 1ST 60 MINS PHASE I: Performed by: INTERNAL MEDICINE

## 2020-06-27 PROCEDURE — 700111 HCHG RX REV CODE 636 W/ 250 OVERRIDE (IP): Performed by: INTERNAL MEDICINE

## 2020-06-27 PROCEDURE — P9016 RBC LEUKOCYTES REDUCED: HCPCS

## 2020-06-27 PROCEDURE — 0DJ08ZZ INSPECTION OF UPPER INTESTINAL TRACT, VIA NATURAL OR ARTIFICIAL OPENING ENDOSCOPIC: ICD-10-PCS | Performed by: INTERNAL MEDICINE

## 2020-06-27 PROCEDURE — 87070 CULTURE OTHR SPECIMN AEROBIC: CPT

## 2020-06-27 PROCEDURE — C9113 INJ PANTOPRAZOLE SODIUM, VIA: HCPCS | Performed by: HOSPITALIST

## 2020-06-27 PROCEDURE — 85018 HEMOGLOBIN: CPT | Mod: 91

## 2020-06-27 PROCEDURE — 70544 MR ANGIOGRAPHY HEAD W/O DYE: CPT

## 2020-06-27 PROCEDURE — 86923 COMPATIBILITY TEST ELECTRIC: CPT | Mod: 91

## 2020-06-27 PROCEDURE — 700105 HCHG RX REV CODE 258: Performed by: HOSPITALIST

## 2020-06-27 PROCEDURE — 770022 HCHG ROOM/CARE - ICU (200)

## 2020-06-27 PROCEDURE — 83735 ASSAY OF MAGNESIUM: CPT

## 2020-06-27 PROCEDURE — 99233 SBSQ HOSP IP/OBS HIGH 50: CPT | Performed by: INTERNAL MEDICINE

## 2020-06-27 PROCEDURE — 700102 HCHG RX REV CODE 250 W/ 637 OVERRIDE(OP): Performed by: INTERNAL MEDICINE

## 2020-06-27 PROCEDURE — 700101 HCHG RX REV CODE 250: Performed by: INTERNAL MEDICINE

## 2020-06-27 PROCEDURE — 87077 CULTURE AEROBIC IDENTIFY: CPT

## 2020-06-27 PROCEDURE — 36430 TRANSFUSION BLD/BLD COMPNT: CPT

## 2020-06-27 PROCEDURE — 160202 HCHG ENDO MINUTES - 1ST 30 MINS LEVEL 3: Performed by: INTERNAL MEDICINE

## 2020-06-27 PROCEDURE — 160048 HCHG OR STATISTICAL LEVEL 1-5: Performed by: INTERNAL MEDICINE

## 2020-06-27 PROCEDURE — 80053 COMPREHEN METABOLIC PANEL: CPT

## 2020-06-27 PROCEDURE — 83036 HEMOGLOBIN GLYCOSYLATED A1C: CPT

## 2020-06-27 PROCEDURE — 93880 EXTRACRANIAL BILAT STUDY: CPT

## 2020-06-27 PROCEDURE — 700111 HCHG RX REV CODE 636 W/ 250 OVERRIDE (IP)

## 2020-06-27 PROCEDURE — 700111 HCHG RX REV CODE 636 W/ 250 OVERRIDE (IP): Performed by: HOSPITALIST

## 2020-06-27 PROCEDURE — 82962 GLUCOSE BLOOD TEST: CPT | Mod: 91

## 2020-06-27 PROCEDURE — 700101 HCHG RX REV CODE 250: Performed by: ANESTHESIOLOGY

## 2020-06-27 PROCEDURE — C9803 HOPD COVID-19 SPEC COLLECT: HCPCS | Performed by: INTERNAL MEDICINE

## 2020-06-27 PROCEDURE — U0004 COV-19 TEST NON-CDC HGH THRU: HCPCS

## 2020-06-27 PROCEDURE — 160002 HCHG RECOVERY MINUTES (STAT): Performed by: INTERNAL MEDICINE

## 2020-06-27 PROCEDURE — 160009 HCHG ANES TIME/MIN: Performed by: INTERNAL MEDICINE

## 2020-06-27 PROCEDURE — 93306 TTE W/DOPPLER COMPLETE: CPT | Mod: 26 | Performed by: INTERNAL MEDICINE

## 2020-06-27 PROCEDURE — 85025 COMPLETE CBC W/AUTO DIFF WBC: CPT

## 2020-06-27 PROCEDURE — 500066 HCHG BITE BLOCK, ECT: Performed by: INTERNAL MEDICINE

## 2020-06-27 RX ORDER — ONDANSETRON 2 MG/ML
4 INJECTION INTRAMUSCULAR; INTRAVENOUS
Status: DISCONTINUED | OUTPATIENT
Start: 2020-06-27 | End: 2020-06-27 | Stop reason: HOSPADM

## 2020-06-27 RX ORDER — SODIUM CHLORIDE 9 MG/ML
INJECTION, SOLUTION INTRAVENOUS CONTINUOUS
Status: ACTIVE | OUTPATIENT
Start: 2020-06-27 | End: 2020-06-28

## 2020-06-27 RX ORDER — SODIUM CHLORIDE 9 MG/ML
INJECTION, SOLUTION INTRAVENOUS CONTINUOUS
Status: ACTIVE | OUTPATIENT
Start: 2020-06-27 | End: 2020-06-27

## 2020-06-27 RX ORDER — HYDRALAZINE HYDROCHLORIDE 20 MG/ML
5 INJECTION INTRAMUSCULAR; INTRAVENOUS
Status: DISCONTINUED | OUTPATIENT
Start: 2020-06-27 | End: 2020-06-27 | Stop reason: HOSPADM

## 2020-06-27 RX ORDER — METOPROLOL TARTRATE 1 MG/ML
1 INJECTION, SOLUTION INTRAVENOUS
Status: DISCONTINUED | OUTPATIENT
Start: 2020-06-27 | End: 2020-06-27 | Stop reason: HOSPADM

## 2020-06-27 RX ORDER — SODIUM CHLORIDE, SODIUM LACTATE, POTASSIUM CHLORIDE, CALCIUM CHLORIDE 600; 310; 30; 20 MG/100ML; MG/100ML; MG/100ML; MG/100ML
INJECTION, SOLUTION INTRAVENOUS CONTINUOUS
Status: DISCONTINUED | OUTPATIENT
Start: 2020-06-27 | End: 2020-06-27 | Stop reason: HOSPADM

## 2020-06-27 RX ORDER — OXYCODONE HCL 5 MG/5 ML
5 SOLUTION, ORAL ORAL
Status: COMPLETED | OUTPATIENT
Start: 2020-06-27 | End: 2020-06-27

## 2020-06-27 RX ORDER — OXYCODONE HCL 5 MG/5 ML
SOLUTION, ORAL ORAL
Status: COMPLETED
Start: 2020-06-27 | End: 2020-06-27

## 2020-06-27 RX ORDER — SODIUM CHLORIDE 9 MG/ML
INJECTION, SOLUTION INTRAVENOUS
Status: DISCONTINUED | OUTPATIENT
Start: 2020-06-27 | End: 2020-06-27 | Stop reason: SURG

## 2020-06-27 RX ORDER — LABETALOL HYDROCHLORIDE 5 MG/ML
5 INJECTION, SOLUTION INTRAVENOUS
Status: DISCONTINUED | OUTPATIENT
Start: 2020-06-27 | End: 2020-06-27 | Stop reason: HOSPADM

## 2020-06-27 RX ORDER — OXYCODONE HCL 5 MG/5 ML
10 SOLUTION, ORAL ORAL
Status: COMPLETED | OUTPATIENT
Start: 2020-06-27 | End: 2020-06-27

## 2020-06-27 RX ORDER — LIDOCAINE HYDROCHLORIDE 20 MG/ML
INJECTION, SOLUTION EPIDURAL; INFILTRATION; INTRACAUDAL; PERINEURAL PRN
Status: DISCONTINUED | OUTPATIENT
Start: 2020-06-27 | End: 2020-06-27 | Stop reason: SURG

## 2020-06-27 RX ORDER — HALOPERIDOL 5 MG/ML
1 INJECTION INTRAMUSCULAR
Status: DISCONTINUED | OUTPATIENT
Start: 2020-06-27 | End: 2020-06-27 | Stop reason: HOSPADM

## 2020-06-27 RX ADMIN — FENTANYL CITRATE 50 MCG: 50 INJECTION INTRAMUSCULAR; INTRAVENOUS at 13:08

## 2020-06-27 RX ADMIN — OXYCODONE HYDROCHLORIDE 10 MG: 5 SOLUTION ORAL at 13:08

## 2020-06-27 RX ADMIN — SODIUM CHLORIDE: 9 INJECTION, SOLUTION INTRAVENOUS at 12:35

## 2020-06-27 RX ADMIN — SODIUM CHLORIDE 8 MG/HR: 9 INJECTION, SOLUTION INTRAVENOUS at 00:06

## 2020-06-27 RX ADMIN — LORAZEPAM 0.5 MG: 2 INJECTION INTRAMUSCULAR; INTRAVENOUS at 17:30

## 2020-06-27 RX ADMIN — HYDROMORPHONE HYDROCHLORIDE 1 MG: 1 INJECTION, SOLUTION INTRAMUSCULAR; INTRAVENOUS; SUBCUTANEOUS at 02:52

## 2020-06-27 RX ADMIN — SODIUM CHLORIDE 8 MG/HR: 9 INJECTION, SOLUTION INTRAVENOUS at 12:12

## 2020-06-27 RX ADMIN — LIDOCAINE HYDROCHLORIDE 80 MG: 20 INJECTION, SOLUTION EPIDURAL; INFILTRATION; INTRACAUDAL at 12:38

## 2020-06-27 RX ADMIN — HUMAN ALBUMIN MICROSPHERES AND PERFLUTREN 3 ML: 10; .22 INJECTION, SOLUTION INTRAVENOUS at 17:17

## 2020-06-27 RX ADMIN — Medication 10 MG: at 13:08

## 2020-06-27 RX ADMIN — FENTANYL CITRATE 25 MCG: 50 INJECTION INTRAMUSCULAR; INTRAVENOUS at 12:38

## 2020-06-27 RX ADMIN — LORAZEPAM 0.5 MG: 2 INJECTION INTRAMUSCULAR; INTRAVENOUS at 23:25

## 2020-06-27 RX ADMIN — FENTANYL CITRATE 50 MCG: 50 INJECTION INTRAMUSCULAR; INTRAVENOUS at 13:10

## 2020-06-27 RX ADMIN — SODIUM CHLORIDE: 9 INJECTION, SOLUTION INTRAVENOUS at 23:37

## 2020-06-27 RX ADMIN — HYDROMORPHONE HYDROCHLORIDE 1 MG: 1 INJECTION, SOLUTION INTRAMUSCULAR; INTRAVENOUS; SUBCUTANEOUS at 10:53

## 2020-06-27 RX ADMIN — HYDROMORPHONE HYDROCHLORIDE 1 MG: 1 INJECTION, SOLUTION INTRAMUSCULAR; INTRAVENOUS; SUBCUTANEOUS at 20:12

## 2020-06-27 RX ADMIN — PROPOFOL 40 MG: 10 INJECTION, EMULSION INTRAVENOUS at 12:38

## 2020-06-27 RX ADMIN — POLYETHYLENE GLYCOL 3350, SODIUM SULFATE ANHYDROUS, SODIUM BICARBONATE, SODIUM CHLORIDE, POTASSIUM CHLORIDE 4 L: 236; 22.74; 6.74; 5.86; 2.97 POWDER, FOR SOLUTION ORAL at 19:45

## 2020-06-27 RX ADMIN — LORAZEPAM 0.5 MG: 2 INJECTION INTRAMUSCULAR; INTRAVENOUS at 00:38

## 2020-06-27 ASSESSMENT — ENCOUNTER SYMPTOMS
COUGH: 0
RESPIRATORY NEGATIVE: 1
CONSTITUTIONAL NEGATIVE: 1
ABDOMINAL PAIN: 0
BLOOD IN STOOL: 1
SORE THROAT: 0
NAUSEA: 0
HEADACHES: 0
DOUBLE VISION: 0
VOMITING: 0
HEMOPTYSIS: 0
CHILLS: 0
BLURRED VISION: 0
FEVER: 0
WEAKNESS: 1
DIZZINESS: 0
PSYCHIATRIC NEGATIVE: 1
EYE PAIN: 0

## 2020-06-27 ASSESSMENT — FIBROSIS 4 INDEX: FIB4 SCORE: 2.54

## 2020-06-27 ASSESSMENT — PAIN SCALES - GENERAL: PAIN_LEVEL: 4

## 2020-06-27 NOTE — CONSULTS
PULMONARY AND CRITICAL CARE MEDICINE CONSULTATION    Date of Consultation:  6/26/2020    Requesting Physician:  Noe Allison MD    Consulting Physician:  Juwan Lyle MD    Reason for Consultation: Critical care management in lady with acute gastrointestinal hemorrhage, acute blood loss anemia and acute strokelike symptoms.    Chief Complaint: Melena, left-sided weakness    History of Present Illness:    I was kindly asked to see and evaluate Corine Dela Cruz, a 87 y.o. female for evaluation and management of the above problem.    This lady has a history of ESRD on thrice weekly HD, primary hypertension, type 2 diabetes mellitus and PAD.  She has been taking aspirin and Plavix.  She resides in [place].  She has been in the [facility] for 3 weeks to visit family.  Apparently for the last 2 weeks she has had dark melanotic stools.  She has had progressive weakness and shortness of breath.  She has been compliant with hemodialysis while in the [facility].  She presented to the emergency department with complaints of her melena and was found to have significant acute blood loss anemia with a hemoglobin of 5.5.  While in the emergency department she began to experience left facial droop and left-sided weakness.  A code stroke was called.  Imaging revealed a small amount of acute hemorrhage and a chronic 2.1 cm right epidural collection along the right frontoparietal convexity.  There was 2 mm of right to left midline shift and a minimal subacute subdural hemorrhage anterior to the right epidural collection.  Dr. Jacobson evaluated the patient from neurosurgery.  His impression was that she had a chronic right-sided subdural hematoma with some mass-effect and no surgery was indicated at this time.  She was also seen by neurology.  They recommend an MRI/MRA of the brain when clinically appropriate and strict blood pressure control.  Gastroenterology has evaluated this lady for consideration of  endoscopy.    Medications Prior to Admission:    No current facility-administered medications on file prior to encounter.      Current Outpatient Medications on File Prior to Encounter   Medication Sig Dispense Refill   • clopidogrel (PLAVIX) 75 MG Tab Take 75 mg by mouth every day.     • aspirin EC (ECOTRIN) 81 MG Tablet Delayed Response Take 81 mg by mouth every day.     • ciprofloxacin (CIPRO) 250 MG Tab Take 250 mg by mouth 2 times a day. 10 day course     • ALPRAZolam (XANAX) 0.5 MG Tab Take 0.5 mg by mouth 3 times a day as needed for Sleep.     • acetaminophen (TYLENOL) 500 MG Tab Take 500-1,000 mg by mouth every 6 hours as needed.     • amLODIPine (NORVASC) 5 MG Tab Take 5 mg by mouth 2 Times a Day.     • atorvastatin (LIPITOR) 40 MG Tab Take 40 mg by mouth every day.     • Ferric Citrate (AURYXIA) 1  MG(Fe) Tab Take 210 mg by mouth 3 times a day, with meals.         Current Medications:      Current Facility-Administered Medications:   •  ondansetron (ZOFRAN) syringe/vial injection 4 mg, 4 mg, Intravenous, Q4HRS PRN, Rosales Suarez M.D.  •  ondansetron (ZOFRAN ODT) dispertab 4 mg, 4 mg, Oral, Q4HRS PRN, Rosales Suarez M.D.  •  pantoprazole (PROTONIX) 80 mg in  mL Infusion, 8 mg/hr, Intravenous, Continuous, Rosales Suarez M.D., Last Rate: 25 mL/hr at 06/26/20 1735, 8 mg/hr at 06/26/20 1735  •  NS infusion, , Intravenous, Continuous, Rosales Suarez M.D., Stopped at 06/26/20 1731  •  LORazepam (ATIVAN) injection 0.26-0.5 mg, 0.26-0.5 mg, Intravenous, Q4HRS PRN, Juwan Lyle M.D., 0.5 mg at 06/26/20 2007  •  heparin injection 3,200 Units, 3,200 Units, Intravenous, ACUTE DIALYSIS PRN, Marlene Nelson M.D., 3,200 Units at 06/26/20 2110  •  HYDROmorphone pf (DILAUDID) injection 0.5-1 mg, 0.5-1 mg, Intravenous, Q4HRS PRN, Juwan Lyle M.D.  •  hydrALAZINE (APRESOLINE) injection 10-20 mg, 10-20 mg, Intravenous, Q4HRS PRN, Juwan Lyle M.D.    Allergies:    Lexapro    Past  Surgical History:    No past surgical history on file.    Past Medical History:    No past medical history on file.    Social History:    Social History     Socioeconomic History   • Marital status: Unknown     Spouse name: Not on file   • Number of children: Not on file   • Years of education: Not on file   • Highest education level: Not on file   Occupational History   • Not on file   Social Needs   • Financial resource strain: Not on file   • Food insecurity     Worry: Not on file     Inability: Not on file   • Transportation needs     Medical: Not on file     Non-medical: Not on file   Tobacco Use   • Smoking status: Never Smoker   • Smokeless tobacco: Never Used   Substance and Sexual Activity   • Alcohol use: Not on file   • Drug use: Not on file   • Sexual activity: Not on file   Lifestyle   • Physical activity     Days per week: Not on file     Minutes per session: Not on file   • Stress: Not on file   Relationships   • Social connections     Talks on phone: Not on file     Gets together: Not on file     Attends [employer] service: Not on file     Active member of club or organization: Not on file     Attends meetings of clubs or organizations: Not on file     Relationship status: Not on file   • Intimate partner violence     Fear of current or ex partner: Not on file     Emotionally abused: Not on file     Physically abused: Not on file     Forced sexual activity: Not on file   Other Topics Concern   • Not on file   Social History Narrative   • Not on file       Family History:    No family history on file.    Review of System:    Review of Systems   Constitutional: Negative for chills, diaphoresis and fever.   HENT: Negative for ear discharge, ear pain and nosebleeds.    Eyes: Negative for pain, discharge and redness.   Respiratory: Negative for cough, hemoptysis, shortness of breath and stridor.    Cardiovascular: Positive for leg swelling. Negative for chest pain and palpitations.   Gastrointestinal:  "Negative for abdominal pain, nausea and vomiting.   Genitourinary: Negative for dysuria, hematuria and urgency.   Musculoskeletal: Negative for myalgias and neck pain.   Skin: Negative for rash.   Neurological: Positive for speech change, focal weakness and headaches.   Endo/Heme/Allergies: Does not bruise/bleed easily.   Psychiatric/Behavioral: Negative for hallucinations and suicidal ideas. The patient is not nervous/anxious.        Physical Examination:    /56   Pulse (!) 105   Temp 36.8 °C (98.3 °F) (Temporal)   Resp (!) 49   Ht 1.499 m (4' 11\")   Wt 77.6 kg (171 lb)   SpO2 97%   BMI 34.54 kg/m²   Physical Exam   HENT:   Head: Normocephalic and atraumatic.   Right Ear: External ear normal.   Left Ear: External ear normal.   Nose: Nose normal.   Mouth/Throat: No oropharyngeal exudate.   Eyes: Conjunctivae are normal. Right eye exhibits no discharge. Left eye exhibits no discharge.   Neck: Normal range of motion. Neck supple. No tracheal deviation present.   Cardiovascular: Intact distal pulses. Exam reveals no gallop.   No murmur heard.  Sinus rhythm   Pulmonary/Chest: No stridor. She has no wheezes. She has no rales.   Abdominal: Soft. Bowel sounds are normal. She exhibits no distension. There is no abdominal tenderness. There is no rebound.   Musculoskeletal:      Comments: No clubbing or cyanosis.  She has wounds on both lower legs which are dressed.  I reviewed the photographs.   Neurological:   Awake and alert.  She has a left facial droop.  She is weak in the left arm and left leg.   Skin: She is not diaphoretic.       Laboratory Data:        Recent Labs     06/26/20  1025 06/26/20  1900   WBC 12.8*  --    RBC 2.04*  --    HEMOGLOBIN 5.5* 7.2*   HEMATOCRIT 18.6*  --    MCV 91.2  --    MCH 27.0  --    MCHC 29.6*  --    RDW 57.6*  --    PLATELETCT 280  --    MPV 9.8  --      Recent Labs     06/26/20  1025   SODIUM 139   POTASSIUM 5.3   CHLORIDE 99   CO2 22   GLUCOSE 124*   *   CREATININE " 6.93*   CALCIUM 7.2*                   Imaging:    I personally viewed all the available CXR and CT scan images as well as reviewed the radiology interpretation reports.    DX-CHEST-PORTABLE (1 VIEW)   Final Result      No acute cardiopulmonary abnormality.      CT-HEAD W/O   Final Result      1. Small amount of acute hemorrhage in a chronic 2.1 cm right epidural collection, along the right frontoparietal convexity. Associated 2 mm right to left midline shift.   2. Minimal subacute subdural hemorrhage anterior to the right epidural collection.   3. Hyperdensity in the temporal horn of the right lateral ventricle may represent small amount of intraventricular hemorrhage.   4. No CT evidence of acute infarct.      US-CAROTID DOPPLER BILAT    (Results Pending)   MR-BRAIN-W/O    (Results Pending)   MR-MRA HEAD-W/O    (Results Pending)       Assessment and Plan:    Subdural hematoma (HCC)  Assessment & Plan  CT imaging reveals small amount of acute hemorrhage in a chronic 2.1 cm right epidural collection along the right frontoparietal convexity with associated 2 mm right to left midline shift and minimal subacute subdural hemorrhage anterior to the right epidural collection  Evaluated by Dr. Jacobson and his impression is a chronic right-sided subdural hematoma with some mass-effect - no surgery indicated at this time  Evaluated by neurology and they recommend MRI and MRA of brain when clinically appropriate  Frequent neuro checks  Strict blood pressure control with goal SBP less than 150    Acute GI bleeding  Assessment & Plan  Suspect upper gastrointestinal source  Trend hemoglobin and transfuse PRBCs to keep hemoglobin greater than 7  IV pantoprazole drip  Gastroenterology is following    Leg wounds  Assessment & Plan  Wound care and wound consult    Peripheral artery disease (HCC)  Assessment & Plan  Stop Plavix with acute gastrointestinal hemorrhage    Type 2 diabetes mellitus with kidney complication, without  long-term current use of insulin ([facility])  Assessment & Plan  Check glycohemoglobin  Sliding scale insulin    Primary hypertension  Assessment & Plan  Strict blood pressure control with goal SBP less than 150    ESRD (end stage renal disease) ([facility])  Assessment & Plan  On thrice weekly hemodialysis  Avoid nephrotoxins and renal dose medications    Acute blood loss anemia  Assessment & Plan  Gastrointestinal source of blood loss  Trend hemoglobin and transfuse PRBCs to keep hemoglobin greater than 7    This lady is critically ill in the ICU with acute neurological deficits and an acute gastrointestinal hemorrhage with severe acute blood loss anemia.  I have assessed and reassessed her respiratory status, blood pressure, hemodynamics, cardiovascular status, neurologic status, serial hemoglobins and response to transfusion of blood products.  She is at high risk for worsening respiratory, cardiovascular and CNS system dysfunction.    High risk of deterioration and worsening vital organ dysfunction and death without the above critical care interventions.    Thank you for allowing me to participate in the care of this lady.  I will continue to follow her with great interest.    The patient is critically ill.  Critical care time = 35 minutes in directly providing and coordinating critical care and extensive data review.  No time overlap and excludes procedures.    Discussed with RN.    Juwan Lyle MD  Pulmonary and Critical Care Medicine

## 2020-06-27 NOTE — OR NURSING
Patient awake and alert and tolerating sips of water without issue. VSS. Pt c/o pain in legs unrelated to procedure. Medications given. (See MAR). Pt denies nausea at this time. Pt manisha updated on pt status. Will continue to monitor.

## 2020-06-27 NOTE — CONSULTS
"neurosurgery consultation  6/26/2020 1400  Reason for referral right chronic subdural hematoma  Referring physicianNoe Allison M.D.      CHIEF COMPLAINT           HPI  Corine Dela Cruz is a 87 y.o. female admitted for melena.  She also has end-stage renal disease and is on hemodialysis 3 times a day.  She has weakness with difficulty ambulating, it is unclear how long this is been going on for.  Her hemoglobin was noted to be 5.5 and she is obtaining a transfusion at this time.  She was having some slurred speech and left-sided facial droop.  She was evaluated in the ICU for these issues.    REVIEW OF SYSTEMS  Pertinent positives include melena and generalized weakness and left leg pain.   Pertinent negatives include: No additional pain or symptoms noted at this time.     All other systems reviewed and negative. See HPI for further details.      PAST MEDICAL HISTORY    ESRD on dialysis and venous insufficiency     SURGICAL HISTORY  patient denies any surgical history     SOCIAL HISTORY  None noted.     FAMILY HISTORY  None noted.      CURRENT MEDICATIONS  Patient is unsure of current medications.      ALLERGIES        Allergies   Allergen Reactions   • Lexapro         Pt states she doesn't know what happens when she takes lexapro           PHYSICAL EXAM  VITAL SIGNS: /56   Pulse 88   Temp 36.4 °C (97.6 °F) (Temporal)   Resp 16   Ht 1.499 m (4' 11\")   Wt 77.6 kg (171 lb)   SpO2 98%   BMI 34.54 kg/m²      Nursing note and vitals reviewed.  Constitutional: Well-developed and well-nourished.  Lying in bed comfortably   hENT: No otorrhea no rhinorrhea  Eyes: Right pupil equal round and reactive. Pale, palpebral conjunctivae. Chronic left corneal abnormality.    Musculoskeletal: Extremities exhibit normal range of motion  Neurological:    PHYSICAL EXAMINATION:    Awake, alert   Speech fluent appropriate  In no apparent distress  Affect, mood appropriate  Oriented x 3  Pupils 3 mm midline, reactive.  " Conjugate gaze.  Visual fields full to confrontation  Face symmetric  Tongue midline without fasciculation  Facial sensation intact light touch  Hearing intact to conversation, light finger rub bilaterally  Motor:  Bilateral SCM, shoulder shrug, deltoid, bicep, tricep, , wrist extension, hand intrinsics                IP, thigh adduction, thigh abduction, quadriceps, hamstrings, dorsiflexion,                Plantar flexion, foot inversion, foot eversion, EHL 5/5 no pronator drift  Sensation:  Intact touch face, neck, torso, four extremities  Reflex:  No Lentz's no clonus  Finger-nose-finger, SHIRLENE unremarkable    Skin: Skin is warm and dry. Psychiatric: Normal mood and affect. Appropriate for clinical situation.        DIAGNOSTIC STUDIES / PROCEDURES     EKG Interpretation  Interpreted by me as below     LABS         Results for orders placed or performed during the hospital encounter of 06/26/20   COMP METABOLIC PANEL   Result Value Ref Range     Sodium 139 135 - 145 mmol/L     Potassium 5.3 3.6 - 5.5 mmol/L     Chloride 99 96 - 112 mmol/L     Co2 22 20 - 33 mmol/L     Anion Gap 18.0 (H) 7.0 - 16.0     Glucose 124 (H) 65 - 99 mg/dL     Bun 114 (HH) 8 - 22 mg/dL     Creatinine 6.93 (HH) 0.50 - 1.40 mg/dL     Calcium 7.2 (L) 8.5 - 10.5 mg/dL     AST(SGOT) 17 12 - 45 U/L     ALT(SGPT) 13 2 - 50 U/L     Alkaline Phosphatase 41 30 - 99 U/L     Total Bilirubin 0.2 0.1 - 1.5 mg/dL     Albumin 2.5 (L) 3.2 - 4.9 g/dL     Total Protein 6.1 6.0 - 8.2 g/dL     Globulin 3.6 (H) 1.9 - 3.5 g/dL     A-G Ratio 0.7 g/dL   PROTHROMBIN TIME (INR)   Result Value Ref Range     PT 14.6 12.0 - 14.6 sec     INR 1.10 0.87 - 1.13   APTT   Result Value Ref Range     APTT 28.0 24.7 - 36.0 sec   ESTIMATED GFR   Result Value Ref Range     GFR If  7 (A) >60 mL/min/1.73 m 2     GFR If Non African American 6 (A) >60 mL/min/1.73 m 2   UN-XM'D RBC   Result Value Ref Range     Component R           R99                 Red Cells, LR        [id number]   issued       20   11:13        Product Type R99       Dispense Status issued       Unit Number (Barcoded) [id number]       Product Code (Barcoded) [id number]       Blood Type (Barcoded) 9500     EKG   Result Value Ref Range     Report           [facility]     Test Date:  2020  Pt Name:    BECKIE BRITTON               Department: ER  MRN:        [id number]                      Room:        10  Gender:     Female                       Technician: [id number]  :        1932-10-26                   Requested By:DELILAH RAZO  Order #:    [id number]                    Reading MD:     Measurements  Intervals                                Axis  Rate:       90                           P:          60  OR:         148                          QRS:        22  QRSD:       82                           T:          173  QT:         352  QTc:        431     Interpretive Statements  SINUS RHYTHM  NONSPECIFIC T ABNORMALITIES, LATERAL LEADS  No previous ECG available for comparison           RADIOLOGY  CT HEAD WITHOUT CONTRAST  2020 12:34 PM     HISTORY/REASON FOR EXAM:  Left-sided facial droop     TECHNIQUE/EXAM DESCRIPTION AND NUMBER OF VIEWS:  CT of the head without contrast.     The study was performed on a helical multidetector CT scanner. Contiguous 2.5 mm axial sections were obtained from the skull base through the vertex.     Up to date radiation dose reduction adjustments have been utilized to meet ALARA standards for radiation dose reduction.     COMPARISON:  None available     FINDINGS:  Lateral ventricles are normal in size and symmetric. Hyperdensity in the temporal horn of the right lateral ventricle.  Mild global parenchymal atrophy. Chronic small vessel ischemic changes.  Minimal 2 mm right to left midline shift  Basal cisterns are patent.  There is a chronic epidural collection along the right frontoparietal convexity measuring 2.1 cm in  thickness. Small amount of acute blood products are seen in the inferior aspect of the collection.  Minimal subacute subdural hemorrhage anterior to the right epidural collection, along the right frontal convexity.  Calvaria are intact.  Atherosclerosis.     Visualized orbits are unremarkable.  Visualized mastoid air cells are clear.  No significant sinus disease in the visualized paranasal sinuses.     IMPRESSION:     1. Small amount of acute hemorrhage in a chronic 2.1 cm right epidural collection, along the right frontoparietal convexity. Associated 2 mm right to left midline shift.  2. Minimal subacute subdural hemorrhage anterior to the right epidural collection.  3. Hyperdensity in the temporal horn of the right lateral ventricle may represent small amount of intraventricular hemorrhage.  4. No CT evidence of acute infarct.          Assessment and plan: 87-year-old female with multiple severe medical comorbidities receiving blood transfusion for severe anemia.  Head CT demonstrates a chronic right sided subdural hematoma.  There is some mass-effect, however she has very severe generalized cerebral atrophy.  Given her age and comorbidities as well as good examination, I would recommend no treatment.  She can follow-up in my clinic in 2 weeks with a head CT.  Neurosurgery will sign off at this time.  Please call with any questions.

## 2020-06-27 NOTE — ANESTHESIA TIME REPORT
Anesthesia Start and Stop Event Times     Date Time Event    6/27/2020 1224 Ready for Procedure     1235 Anesthesia Start     1250 Anesthesia Stop        Responsible Staff  06/27/20    Name Role Begin End    Wisam Centeno D.O. Anesth 1235 1250        Preop Diagnosis (Free Text):  Pre-op Diagnosis      melena        Preop Diagnosis (Codes):    Post op Diagnosis  GI bleed      Premium Reason  E. Weekend    Comments:

## 2020-06-27 NOTE — PROGRESS NOTES
"[facility] Progress Note     Hemodialysis treatment ordered today per Dr. Nelson x 2.5 hours.   Treatment initiated at 1822 ended at 2103.      Patient extremely anxious throughout treatment and frequently stating \"I hate dialysis, get me off this machine.\" Pt educated that she may have to resume HD treatment tomorrow if terminated early, pt in agreement to continue treatment if medication can be obtained to \"help her feel better.\"     Medicated per MAR by Primary RN with positive effect.    VSS throughout treatment and post HD.     Net UF 0 mL.      Post tx, CVC flushed with saline then locked with heparin 1000 units/mL per designated amount in each wing then clamped and capped. Aspirate heparin prior to next CVC use.     Report given to Primary Nurse, BITA Hopkins RN.   "

## 2020-06-27 NOTE — ASSESSMENT & PLAN NOTE
Wound culture grew pseudomonas aeruginosa  Was on oral ciprofloxacin and I will change it to IV ampicillin

## 2020-06-27 NOTE — PROGRESS NOTES
Critical Care Progress Note    Date of admission  6/26/2020    Chief Complaint  This lady has a history of ESRD on thrice weekly HD, primary hypertension, type 2 diabetes mellitus and PAD.  She has been taking aspirin and Plavix.  She resides in [place].  She has been in the [facility] for 3 weeks to visit family.  Apparently for the last 2 weeks she has had dark melanotic stools.  She has had progressive weakness and shortness of breath.  She has been compliant with hemodialysis while in the [facility].  She presented to the emergency department with complaints of her melena and was found to have significant acute blood loss anemia with a hemoglobin of 5.5.  While in the emergency department she began to experience left facial droop and left-sided weakness.  A code stroke was called.  Imaging revealed a small amount of acute hemorrhage and a chronic 2.1 cm right epidural collection along the right frontoparietal convexity.  There was 2 mm of right to left midline shift and a minimal subacute subdural hemorrhage anterior to the right epidural collection.  Dr. Jacobson evaluated the patient from neurosurgery.  His impression was that she had a chronic right-sided subdural hematoma with some mass-effect and no surgery was indicated at this time.  She was also seen by neurology.  They recommend an MRI/MRA of the brain when clinically appropriate and strict blood pressure control.  Gastroenterology has evaluated this lady for consideration of endoscopy.    Hospital Course    Initial evaluation revealed acute blood loss anemia with hemoglobin 5.5.  In addition a chronic right subdural hematoma was identified.  7/27- COVID PCR negative.      Interval Problem Update  Reviewed last 24 hour events:              - Received 2 units PRBC, 1 plts. In ED   - 2 uinits PRBC this am.              - Tm: Afebrile              - HR: 80-90s              - SBP: 120s              - Neuro: AOx3, LUE 2/5, LE equal 4/5              - GI: NPO               - UOP: 500              - Iglesias: no              - Lines: 2x PIV, HD catheter              - PPx: No VTE,               - CXR (personally reviewed):               - Antibiotic Day    Hb 5.5->7.2->6-> 2units PRBC infusing    EGD pending    Review of Systems  Review of Systems   Constitutional: Negative.    HENT: Negative.    Eyes: Negative for blurred vision and double vision.   Respiratory: Negative.    Gastrointestinal: Positive for blood in stool. Negative for abdominal pain, nausea and vomiting.   Genitourinary: Negative.    Neurological: Positive for weakness.   Psychiatric/Behavioral: Negative.    All other systems reviewed and are negative.       Vital Signs for last 24 hours   Temp:  [36.4 °C (97.6 °F)-37.1 °C (98.7 °F)] 37 °C (98.6 °F)  Pulse:  [9-105] 89  Resp:  [8-49] 16  BP: ()/(45-83) 121/58  SpO2:  [88 %-100 %] 95 %    Hemodynamic parameters for last 24 hours       Respiratory Information for the last 24 hours       Physical Exam   Physical Exam  Constitutional:       General: She is not in acute distress.  HENT:      Mouth/Throat:      Mouth: Mucous membranes are moist.   Eyes:      Extraocular Movements: Extraocular movements intact.      Pupils: Pupils are equal, round, and reactive to light.   Neck:      Musculoskeletal: Neck supple.   Cardiovascular:      Rate and Rhythm: Regular rhythm.      Heart sounds: No murmur. No friction rub. No gallop.    Pulmonary:      Effort: Pulmonary effort is normal. No respiratory distress.      Breath sounds: Normal breath sounds.   Abdominal:      General: There is no distension.      Palpations: Abdomen is soft.      Tenderness: There is no abdominal tenderness.   Musculoskeletal:         General: No swelling.   Skin:     General: Skin is warm and dry.   Neurological:      General: No focal deficit present.      Mental Status: She is alert.   Psychiatric:         Mood and Affect: Mood normal.         Behavior: Behavior normal.          Medications  Current Facility-Administered Medications   Medication Dose Route Frequency Provider Last Rate Last Dose   • NS infusion   Intravenous Continuous Lilibeth Farfan M.D.   Stopped at 06/27/20 0814   • ondansetron (ZOFRAN) syringe/vial injection 4 mg  4 mg Intravenous Q4HRS PRN Rosales Suarez M.D.       • ondansetron (ZOFRAN ODT) dispertab 4 mg  4 mg Oral Q4HRS PRN Rosales Suarez M.D.       • pantoprazole (PROTONIX) 80 mg in  mL Infusion  8 mg/hr Intravenous Continuous Rosales Suarez M.D. 25 mL/hr at 06/27/20 0813 8 mg/hr at 06/27/20 0813   • LORazepam (ATIVAN) injection 0.26-0.5 mg  0.26-0.5 mg Intravenous Q4HRS PRN Juwan Lyle M.D.   0.5 mg at 06/27/20 0038   • heparin injection 3,200 Units  3,200 Units Intravenous ACUTE DIALYSIS PRN Marlene Nelson M.D.   3,200 Units at 06/26/20 2110   • HYDROmorphone pf (DILAUDID) injection 0.5-1 mg  0.5-1 mg Intravenous Q4HRS PRN Juwan Lyle M.D.   1 mg at 06/27/20 0252   • hydrALAZINE (APRESOLINE) injection 10-20 mg  10-20 mg Intravenous Q4HRS PRN Juwan Lyle M.D.       • insulin regular (HUMULIN R) injection 1-6 Units  1-6 Units Subcutaneous Q6HRS Juwan Lyle M.D.   Stopped at 06/27/20 0600    And   • glucose 4 g chewable tablet 16 g  16 g Oral Q15 MIN PRN Juwan Lyle M.D.        And   • dextrose 50% (D50W) injection 50 mL  50 mL Intravenous Q15 MIN PRN Juwan Lyle M.D.           Fluids    Intake/Output Summary (Last 24 hours) at 6/27/2020 0840  Last data filed at 6/27/2020 0726  Gross per 24 hour   Intake 2693.42 ml   Output 500 ml   Net 2193.42 ml       Laboratory          Recent Labs     06/26/20  1025 06/27/20  0215   SODIUM 139 141   POTASSIUM 5.3 4.3   CHLORIDE 99 107   CO2 22 21   * 60*   CREATININE 6.93* 3.59*   MAGNESIUM  --  1.8   PHOSPHORUS 4.6*  --    CALCIUM 7.2* 7.0*     Recent Labs     06/26/20  1025 06/27/20  0215   ALTSGPT 13 11   ASTSGOT 17 24   ALKPHOSPHAT 41 38    TBILIRUBIN 0.2 0.2   GLUCOSE 124* 130*     Recent Labs     06/26/20  1025 06/27/20  0215   WBC 12.8* 10.7   NEUTSPOLYS 73.00* 77.80*   LYMPHOCYTES 18.60* 13.70*   MONOCYTES 6.70 6.80   EOSINOPHILS 0.60 0.40   BASOPHILS 0.20 0.40   ASTSGOT 17 24   ALTSGPT 13 11   ALKPHOSPHAT 41 38   TBILIRUBIN 0.2 0.2     Recent Labs     06/26/20  1025 06/26/20  1900 06/27/20  0215   RBC 2.04*  --  2.12*   HEMOGLOBIN 5.5* 7.2* 6.0*   HEMATOCRIT 18.6*  --  18.7*   PLATELETCT 280  --  248   PROTHROMBTM 14.6  --   --    APTT 28.0  --   --    INR 1.10  --   --        Imaging  X-Ray:  No film today    Assessment/Plan  * Acute GI bleeding  Assessment & Plan  Suspect upper gastrointestinal source  Trend hemoglobin and transfuse PRBCs to keep hemoglobin greater than 7  IV pantoprazole drip  Gastroenterology is following    Epidural hemorrhage ([facility])  Assessment & Plan  CT imaging reveals small amount of acute hemorrhage in a chronic 2.1 cm right epidural collection along the right frontoparietal convexity with associated 2 mm right to left midline shift and minimal subacute subdural hemorrhage anterior to the right epidural collection  Evaluated by Dr. Jacobson and his impression is a chronic right-sided subdural hematoma with some mass-effect - no surgery indicated at this time  Evaluated by neurology and they recommend MRI and MRA of brain when clinically appropriate  Frequent neuro checks  Strict blood pressure control with goal SBP less than 150    Leg wounds  Assessment & Plan  Wound care and wound consult    Peripheral artery disease ([facility])  Assessment & Plan  Stop Plavix with acute gastrointestinal hemorrhage    Type 2 diabetes mellitus with kidney complication, without long-term current use of insulin ([facility])  Assessment & Plan  Check glycohemoglobin  Sliding scale insulin    Primary hypertension  Assessment & Plan  Strict blood pressure control with goal SBP less than 150    ESRD (end stage renal disease) ([facility])  Assessment & Plan  On  thrice weekly hemodialysis  Avoid nephrotoxins and renal dose medications    Acute blood loss anemia  Assessment & Plan  Gastrointestinal source of blood loss  Trend hemoglobin and transfuse PRBCs to keep hemoglobin greater than 7         VTE:  Not Indicated  Ulcer: PPI  Lines: None    I have performed a physical exam and reviewed and updated ROS and Plan today (6/27/2020). In review of yesterday's note (6/26/2020), there are no changes except as documented above.     Discussed patient condition and risk of morbidity and/or mortality with RN, Pharmacy and Patient     The patient remains critically ill,  I have assessed and reassessed the blood pressure,  Cardiovascular and neurologic status, labs and response to interventions. This patient remains at high risk for worsening CNS death without the above critical care interventions.

## 2020-06-27 NOTE — ASSESSMENT & PLAN NOTE
Given 3 units of RBCs, 1 unit of platelets  Transfuse as needed with target hemoglobin above 7.  GI is following  Globin 7.6 today  Follow CBC in the morning  Enteroscopy: duodenitis, bleeding AVM  Hemoglobin has been stable

## 2020-06-27 NOTE — ANESTHESIA QCDR
2019 [facility] Clinical Data Registry (for Quality Improvement)     Postoperative nausea/vomiting risk protocol (Adult = 18 yrs and Pediatric 3-17 yrs)- (430 and 463)  General inhalation anesthetic (NOT TIVA) with PONV risk factors: No  Provision of anti-emetic therapy with at least 2 different classes of agents: N/A  Patient DID NOT receive anti-emetic therapy and reason is documented in Medical Record: N/A    Multimodal Pain Management- (477)  Non-emergent surgery AND patient age >= 18: No  Use of Multimodal Pain Management, two or more drugs and/or interventions, NOT including systemic opioids:   Exception: Documented allergy to multiple classes of analgesics:     Smoking Abstinence (404)  Patient is current smoker (cigarette, pipe, e-cig, marijuanna): No  Elective Surgery:   Abstinence instructions provided prior to day of surgery:   Patient abstained from smoking on day of surgery:     Pre-Op Beta-Blocker in Isolated CABG (44)  Isolated CABG AND patient age >= 18: No  Beta-blocker admin within 24 hours of surgical incision:   Exception:of medical reason(s) for not administering beta blocker within 24 hours prior to surgical incision (e.g., not  indicated,other medical reason):     PACU assessment of acute postoperative pain prior to Anesthesia Care End- Applies to Patients Age = 18- (ABG7)  Initial PACU pain score is which of the following: < 7/10  Patient unable to report pain score: N/A    Post-anesthetic transfer of care checklist/protocol to PACU/ICU- (426 and 427)  Upon conclusion of case, patient transferred to which of the following locations: PACU/Non-ICU  Use of transfer checklist/protocol: Yes  Exclusion: Service Performed in Patient Hospital Room (and thus did not require transfer): N/A  Unplanned admission to ICU related to anesthesia service up through end of PACU care- (MD51)  Unplanned admission to ICU (not initially anticipated at anesthesia start time): No

## 2020-06-27 NOTE — PROGRESS NOTES
Pt transported to Pre Op via bed, cardiac monitor, 02 3 L oxy mask, ACLS RN, Transport tech. Updated report given to Pre op RN x2, and Dr Heck.

## 2020-06-27 NOTE — PROGRESS NOTES
Pt arrived on unit at 1730 accompanied by RRT RN and CCT on monitor. Pt alert and oriented with noted L side weakness, 2 RN skin check completed with Dahlia Flores, dressings recently done in T8, not redone at this time.

## 2020-06-27 NOTE — ASSESSMENT & PLAN NOTE
Dialysis Monday Wednesday Friday,   nephrology is consulted  Monitor BMP and assess response  Avoid IV contrast/nephrotoxins/NSAIDs  Dose adjust meds for decreased GFR

## 2020-06-27 NOTE — PROGRESS NOTES
Nephrology Daily Progress Note    Date of Service  6/27/2020    HISTORY OF PRESENT ILLNESS:    88 yo F PMH ESRD MWF iHD, HTN, anemia of CKD, CKD-MBD, and HLD who presents to ED with CC as above.  She is visiting the [facility] from [place] and due to current pandemic has not been able to go back home and continued to stay in this area with family.  She has OP dialysis at [facility] and has been compliant.  She was doing well until about two weeks ago when she started to notice dark stools.  She was doing ok until about 3 days ago when she also noted orthostatic symptoms upon standing.  It resolved and then today it came back and she had some slurred speech and left arm numbness so she was brought in for evaluation.  She has had a prior stroke and the numbness and slurred speech have been present off and on as a result of that so initially she though nothing of it.  Neurology was consulted and they did not feel that this was CVA related and more hemodynamic related.  Labs in ED showed Hgb 5.5 and serum labs showed  and K+ 5.3.  She was given pRBCs and admitted to ICU.  CT of head showed an acute on chronic epidural bleed with mild midline shift as well.  She was on plavix at home.  No recent F/C/N/V/CP/SOB.  No hematochezia, hematemesis.  No HA, visual changes, or abdominal pain    Daily nephrology summary  06/26 - consult done, HD done  06/27 - 2u prbc this AM, has been seen and evaluated by neurology and neurosurgery, further imaging planned for today    Review of Systems  Review of Systems   Constitutional: Negative for chills and fever.   HENT: Negative for ear pain and sore throat.    Eyes: Negative for double vision and pain.   Respiratory: Negative for cough and hemoptysis.    Cardiovascular: Negative for chest pain and leg swelling.   Gastrointestinal: Negative for nausea and vomiting.   Neurological: Negative for dizziness and headaches.        Physical Exam  Temp:  [36.5 °C (97.7 °F)-37.1 °C (98.7 °F)]  37 °C (98.6 °F)  Pulse:  [9-105] 89  Resp:  [8-49] 16  BP: ()/(45-83) 121/58  SpO2:  [88 %-100 %] 95 %    Physical Exam  Constitutional:       Appearance: Normal appearance.   HENT:      Head: Normocephalic.      Right Ear: External ear normal.      Left Ear: External ear normal.      Nose: Nose normal.      Mouth/Throat:      Mouth: Mucous membranes are moist.      Comments: Mask in place  Eyes:      Extraocular Movements: Extraocular movements intact.      Conjunctiva/sclera: Conjunctivae normal.   Cardiovascular:      Rate and Rhythm: Normal rate and regular rhythm.      Comments: R chest TDC c/d/d  Pulmonary:      Effort: Pulmonary effort is normal.      Breath sounds: No wheezing.   Abdominal:      Palpations: Abdomen is soft.      Tenderness: There is no abdominal tenderness.   Skin:     Coloration: Skin is pale.      Comments: LLE dressing in place   Neurological:      Mental Status: She is alert and oriented to person, place, and time.      Comments: alert   Psychiatric:         Mood and Affect: Mood normal.         Behavior: Behavior normal.         Fluids    Intake/Output Summary (Last 24 hours) at 6/27/2020 1052  Last data filed at 6/27/2020 0726  Gross per 24 hour   Intake 2693.42 ml   Output 500 ml   Net 2193.42 ml       Laboratory  Recent Labs     06/26/20  1025 06/26/20  1900 06/27/20  0215   WBC 12.8*  --  10.7   RBC 2.04*  --  2.12*   HEMOGLOBIN 5.5* 7.2* 6.0*   HEMATOCRIT 18.6*  --  18.7*   MCV 91.2  --  86.8   MCH 27.0  --  27.8   MCHC 29.6*  --  32.1*   RDW 57.6*  --  50.1*   PLATELETCT 280  --  248   MPV 9.8  --  10.0     Recent Labs     06/26/20  1025 06/27/20  0215   SODIUM 139 141   POTASSIUM 5.3 4.3   CHLORIDE 99 107   CO2 22 21   GLUCOSE 124* 130*   * 60*   CREATININE 6.93* 3.59*   CALCIUM 7.2* 7.0*     Recent Labs     06/26/20  1025   APTT 28.0   INR 1.10     No results for input(s): NTPROBNP in the last 72 hours.  Recent Labs     06/27/20  0215   TRIGLYCERIDE 137   HDL 25*    LDL 50       Imaging  Reviewed     Assessment/Plan  IMPRESSION:  - ESRD    * Etiology likely 2/2 HTN    * R chest TDC     * Visitor at [facility], lives in [place]  - Melena    * Etiology unclear, could be related to anti-platelet drug or other cause    * GI on board  - Hyperkalemia    * correct with HD  - Acute epidural hematoma    * Superimposed on chronic epidural bleed, with a small midline shift    * Neuro following, has been evaluated by neurosurgery  - HTN    * Goal BP < 140/90    * Stable  - Anemia, multifactorial    * Goal Hgb 10-11 in CKD    * transfuse hgb<7    * see melena above  - CKD-MBD    * Managed at HD unit, phos to goal  - HLD  - CAD     PLAN:  - HD qMWF and PRN, though she currently expresses she would prefer to avoid HD if it is not her regular day  - No to minimal UF  - 145 dialysate sodium to try to leave her SNa at high-normal range  - No dietary protein restrictions  - Dose all meds per ESRD  - Monitor for additional iHD as needed  - Transfuse as needed to maintain Hgb > 7  - Do not provide with gadolinium without discussing with nephrology, as HD x 3 days will need to be arranged     All prior notes form other doctors and RN staff were reviewed from admission to current day to help me make my clinical decisions     PMH, PSH, SH, FH, meds, labs, images reviewed

## 2020-06-27 NOTE — ASSESSMENT & PLAN NOTE
Occult anemia  EGD and colonoscopy normal; pill study per GI  Trend hemoglobin and transfuse PRBCs to keep hemoglobin greater than 7

## 2020-06-27 NOTE — ASSESSMENT & PLAN NOTE
With melena, EGD and colonoscopy did not see any active lesions, capsule endoscopy identified a possible source of 50 cm,   Enteroscopy: duodenitis, bleeding AVM  Continuous cardiac monitoring  Continue PPI for now  Monitor H&H every 8 hours, transfuse for hemoglobin less than 7  Plan as above  hGB has been stable with 7.6 today

## 2020-06-27 NOTE — PROGRESS NOTES
"Chief Complaint   Patient presents with   • Melena   • Weakness       Problem List Items Addressed This Visit     None      Visit Diagnoses     Upper GI bleed        Melena        ESRD on dialysis (HCC)        Uremia        Anemia, unspecified type        Acute cerebrovascular insufficiency with transient focal neurological signs and symptoms        Relevant Medications    amLODIPine (NORVASC) 5 MG Tab    atorvastatin (LIPITOR) 40 MG Tab    heparin injection 3,200 Units    hydrALAZINE (APRESOLINE) injection 10-20 mg      Neurology Progress Note    History of present illness:  This is an 87-year old female with PMhx significant for ESRD on HD Monday/Wed/Fri, associated uremia, currently here visiting from [place], who presented to [facility] on 6/26/20 for a chief complaint of a 2-week history of melanic stools and 1-2 day history of LUE weakness. HPI provided by patient's granddaughter at bedside. Sometime around the time she arrived, the patient was started on Plavix, for unclear reasons (?recently had an angiogram of some sort and was started on this for peripheral vascular disease, reportedly). Shortly thereafter, the patient began to complaint of mild lightheadedness, generalized weakness; also with dark tarry stools. Yesterday, patient's granddaughter noted that patient was having mild difficulty using her LUE. Symptoms persisted throughout the day. This morning, patient was working with PT/OT and she was again note to have LUE weakness, mildly worse today. Granddaughter (whom is a nurse) also visualized melanic stools, and decided to bring patient to ED. At time of presentation here, patient was found to have a hemoglobin of 5.5.; emergently transfused in ED. At appx 1100 today (on day of presentation), patient nurse witnessed patient to suddenly \"stop speaking\" and with question of some facial droop. Stroke Alert was thus called at 1107. Patient was placed in trendelenburg position, and symptoms " began resolving. Patient then had STAT CT head that revealed Right posterior/parietal region epidural fluid collection, a chronic finding; with associated mild acute hemorrhage within fluid collection. Mild associated Right to Left MLS (2.1 cm), no significant mass effect. Neurosurgery reportedly reviewed imaging; no surgical intervention indicated at this time.   Currently, patient is sitting up in bed; awake and alert; oriented and appropriate. Denies headache, admits to mild LUE weakness with mild associated numbness. She currently denies dizziness, problem with vision, speech or swallowing; no recent trauma, however patient admits to numerous falls over the past year, does not recall when most recent fall was.     Neurology has been consulted by Dr. Rosales Suarez to further evaluate the findings noted above.     Interval, 6/27/20:  Patient sitting up in bed; drowsy this morning, awaiting endoscopy. Patient transferred to ICU for labile blood pressure, waxing and waning neurological deficits. Still with LUE weakness, unchanged. No additional events overnight.     No changes to HPI as was previously documented.     Past medical history:   As noted above.     Past surgical history:   Non contributory.     Family history:   Non contributory.    Social history:   Social History     Socioeconomic History   • Marital status: Unknown     Spouse name: Not on file   • Number of children: Not on file   • Years of education: Not on file   • Highest education level: Not on file   Occupational History   • Not on file   Social Needs   • Financial resource strain: Not on file   • Food insecurity     Worry: Not on file     Inability: Not on file   • Transportation needs     Medical: Not on file     Non-medical: Not on file   Tobacco Use   • Smoking status: Never Smoker   • Smokeless tobacco: Never Used   Substance and Sexual Activity   • Alcohol use: Not on file   • Drug use: Not on file   • Sexual activity: Not on file    Lifestyle   • Physical activity     Days per week: Not on file     Minutes per session: Not on file   • Stress: Not on file   Relationships   • Social connections     Talks on phone: Not on file     Gets together: Not on file     Attends [employer] service: Not on file     Active member of club or organization: Not on file     Attends meetings of clubs or organizations: Not on file     Relationship status: Not on file   • Intimate partner violence     Fear of current or ex partner: Not on file     Emotionally abused: Not on file     Physically abused: Not on file     Forced sexual activity: Not on file   Other Topics Concern   • Not on file   Social History Narrative   • Not on file       Current medications:   Current Facility-Administered Medications   Medication Dose   • NS infusion     • ondansetron (ZOFRAN) syringe/vial injection 4 mg  4 mg   • ondansetron (ZOFRAN ODT) dispertab 4 mg  4 mg   • pantoprazole (PROTONIX) 80 mg in  mL Infusion  8 mg/hr   • LORazepam (ATIVAN) injection 0.26-0.5 mg  0.26-0.5 mg   • heparin injection 3,200 Units  3,200 Units   • HYDROmorphone pf (DILAUDID) injection 0.5-1 mg  0.5-1 mg   • hydrALAZINE (APRESOLINE) injection 10-20 mg  10-20 mg   • insulin regular (HUMULIN R) injection 1-6 Units  1-6 Units    And   • glucose 4 g chewable tablet 16 g  16 g    And   • dextrose 50% (D50W) injection 50 mL  50 mL       Medication Allergy:  Allergies   Allergen Reactions   • Lexapro      Pt states she doesn't know what happens when she takes lexapro         Review of systems:   Constitutional: denies fever, night sweats, weight loss.   Eyes: denies acute vision change, eye pain or secretion.   Ears, Nose, Mouth, Throat: denies nasal secretion, nasal bleeding, difficulty swallowing, hearing loss, tinnitus, vertigo, ear pain, acute dental problems, oral ulcers or lesions.   Endocrine: denies recent weight changes, heat or cold intolerance, polyuria, polydypsia, polyphagia,abnormal hair  growth.  Cardiovascular: denies new onset of chest pain, palpitations, syncope, or dyspnea of exertion.  Pulmonary: denies shortness of breath, new onset of cough, hemoptysis, wheezing, chest pain or flu-like symptoms.   GI: As noted above; denies nausea, vomiting, diarrhea, GI bleeding, change in appetite, or abdominal pain  : denies dysuria, urinary incontinence, hematuria.  Heme/oncology: denies history of easy bruising or bleeding. No history of cancer, DVTor PE.  Allergy/immunology: denies hives/urticaria, or itching.   Dermatologic: denies new rash, or new skin lesions.  Musculoskeletal:denies joint swelling or pain, muscle pain, neck and back pain.   Neurologic: As noted above.   Psychiatric: denies symptoms of depression, anxiety, hallucinations, mood swings or changes, suicidal or homicidal thoughts.     Physical examination:   Vitals:    06/27/20 0600 06/27/20 0745 06/27/20 0800 06/27/20 0815   BP: 128/57 128/59 122/57 121/58   Pulse: 96 89 86 89   Resp: 18 16 16 16   Temp: 37 °C (98.6 °F)      TempSrc:       SpO2: 97% 88% 94% 95%   Weight:       Height:         General: Patient in no acute distress, pleasant and cooperative.  HEENT: Normocephalic, no signs of acute trauma.   Neck: supple, no meningeal signs or carotid bruits. There is normal range of motion. No tenderness on exam.   Chest: clear to auscultation. No cough.   CV: RRR, no murmurs.   Skin: no signs of acute rashes or trauma.   Musculoskeletal: joints exhibit full range of motion, without any pain to palpation. There are no signs of joint or muscle swelling. There is no tenderness to deep palpation of muscles.   Psychiatric: No hallucinatory behavior.       NEUROLOGICAL EXAM:   Mental status, orientation: Awake, alert and fully oriented.   Speech and language: speech is clear and fluent. The patient is able to name, repeat and comprehend.   Memory: There is intact recollection of recent and remote events.   Cranial nerve exam: Pupils are 3-4  mm bilaterally and equally reactive to light. Visual fields are intact by confrontation. There is no nystagmus on primary or secondary gaze. Intact full EOM in all directions of gaze. Face appears symmetric. Sensation in the face is intact to light touch. Uvula is midline. Palate elevates symmetrically. Tongue is midline and without any signs of tongue biting or fasciculations. Sternocleidomastoid muscles exhibit is normal strength bilaterally. Shoulder shrug is intact bilaterally.   Motor exam: Strength is 5/5 in RUE, 3+/5 to LUE; 3+/5 to BLE. Tone is normal. No abnormal movements were seen on exam.   Sensory exam Decreased sensation to light touch to LUE; otheriwse reveals normal sense of light touch and pinprick in all extremities.   Deep tendon reflexes:  2+ throughout. Plantar responses are flexor. There is no clonus.   Coordination: shows a normal finger-nose-finger. Normal rapidly alternating movements.   Gait: Not assessed at this time as patient is a fall risk.     No significant changes to exam as was documented on 6/26/20.      ANCILLARY DATA REVIEWED:     Lab Data Review:  Recent Results (from the past 24 hour(s))   CBC WITH DIFFERENTIAL    Collection Time: 06/26/20 10:25 AM   Result Value Ref Range    WBC 12.8 (H) 4.8 - 10.8 K/uL    RBC 2.04 (L) 4.20 - 5.40 M/uL    Hemoglobin 5.5 (LL) 12.0 - 16.0 g/dL    Hematocrit 18.6 (L) 37.0 - 47.0 %    MCV 91.2 81.4 - 97.8 fL    MCH 27.0 27.0 - 33.0 pg    MCHC 29.6 (L) 33.6 - 35.0 g/dL    RDW 57.6 (H) 35.9 - 50.0 fL    Platelet Count 280 164 - 446 K/uL    MPV 9.8 9.0 - 12.9 fL    Neutrophils-Polys 73.00 (H) 44.00 - 72.00 %    Lymphocytes 18.60 (L) 22.00 - 41.00 %    Monocytes 6.70 0.00 - 13.40 %    Eosinophils 0.60 0.00 - 6.90 %    Basophils 0.20 0.00 - 1.80 %    Immature Granulocytes 0.90 0.00 - 0.90 %    Nucleated RBC 0.00 /100 WBC    Neutrophils (Absolute) 9.32 (H) 2.00 - 7.15 K/uL    Lymphs (Absolute) 2.37 1.00 - 4.80 K/uL    Monos (Absolute) 0.85 0.00 - 0.85  K/uL    Eos (Absolute) 0.08 0.00 - 0.51 K/uL    Baso (Absolute) 0.03 0.00 - 0.12 K/uL    Immature Granulocytes (abs) 0.12 (H) 0.00 - 0.11 K/uL    NRBC (Absolute) 0.00 K/uL    Anisocytosis 1+     Microcytosis 1+    COMP METABOLIC PANEL    Collection Time: 06/26/20 10:25 AM   Result Value Ref Range    Sodium 139 135 - 145 mmol/L    Potassium 5.3 3.6 - 5.5 mmol/L    Chloride 99 96 - 112 mmol/L    Co2 22 20 - 33 mmol/L    Anion Gap 18.0 (H) 7.0 - 16.0    Glucose 124 (H) 65 - 99 mg/dL    Bun 114 (HH) 8 - 22 mg/dL    Creatinine 6.93 (HH) 0.50 - 1.40 mg/dL    Calcium 7.2 (L) 8.5 - 10.5 mg/dL    AST(SGOT) 17 12 - 45 U/L    ALT(SGPT) 13 2 - 50 U/L    Alkaline Phosphatase 41 30 - 99 U/L    Total Bilirubin 0.2 0.1 - 1.5 mg/dL    Albumin 2.5 (L) 3.2 - 4.9 g/dL    Total Protein 6.1 6.0 - 8.2 g/dL    Globulin 3.6 (H) 1.9 - 3.5 g/dL    A-G Ratio 0.7 g/dL   PROTHROMBIN TIME (INR)    Collection Time: 06/26/20 10:25 AM   Result Value Ref Range    PT 14.6 12.0 - 14.6 sec    INR 1.10 0.87 - 1.13   APTT    Collection Time: 06/26/20 10:25 AM   Result Value Ref Range    APTT 28.0 24.7 - 36.0 sec   ESTIMATED GFR    Collection Time: 06/26/20 10:25 AM   Result Value Ref Range    GFR If  7 (A) >60 mL/min/1.73 m 2    GFR If Non African American 6 (A) >60 mL/min/1.73 m 2   ABO Rh Confirm    Collection Time: 06/26/20 10:25 AM   Result Value Ref Range    ABO Rh Confirm A NEG    PHOSPHORUS    Collection Time: 06/26/20 10:25 AM   Result Value Ref Range    Phosphorus 4.6 (H) 2.5 - 4.5 mg/dL   PERIPHERAL SMEAR REVIEW    Collection Time: 06/26/20 10:25 AM   Result Value Ref Range    Peripheral Smear Review see below    PLATELET ESTIMATE    Collection Time: 06/26/20 10:25 AM   Result Value Ref Range    Plt Estimation Normal    MORPHOLOGY    Collection Time: 06/26/20 10:25 AM   Result Value Ref Range    RBC Morphology Present     Polychromia 1+     Poikilocytosis 1+     Ovalocytes 1+     Tear Drop Cells 1+     Rouleaux Slight     DIFFERENTIAL COMMENT    Collection Time: 20 10:25 AM   Result Value Ref Range    Comments-Diff see below    EKG    Collection Time: 20 10:28 AM   Result Value Ref Range    Report       [facility] Emergency Dept.    Test Date:  2020  Pt Name:    BECKIE BRITTON               Department: ER  MRN:        [id number]                      Room:       [facility]  Gender:     Female                       Technician: [id number]  :        1932-10-26                   Requested By:DELILAH RAZO  Order #:    [id number]                    Reading MD: DELILAH RAZO MD    Measurements  Intervals                                Axis  Rate:       90                           P:          60  AL:         148                          QRS:        22  QRSD:       82                           T:          173  QT:         352  QTc:        431    Interpretive Statements  SINUS RHYTHM  NONSPECIFIC T ABNORMALITIES, LATERAL LEADS  No previous ECG available for comparison  Electronically Signed On 2020 11:48:08 PDT by DELILAH RAZO MD     COD - Adult (Type and Screen)    Collection Time: 20 10:49 AM   Result Value Ref Range    ABO Grouping Only A     Rh Grouping Only NEG     Antibody Screen-Cod NEG     Component R       R99                 Red Cells, LR       [id number]   transfused   20   14:41      Product Type R99     Dispense Status transfused     Unit Number (Barcoded) [id number]     Product Code (Barcoded) W6615V17     Blood Type (Barcoded) 0600     Component R       R33                 Red Blood Cells     [id number]   issued       20   03:42      Product Type Red Blood Cells LR Pheresis     Dispense Status issued     Unit Number (Barcoded) [id number]     Product Code (Barcoded) W3358Y33     Blood Type (Barcoded) 0600     Component R       R66                 Red Blood Cells6    [id number]   issued       20   05:52      Product Type Red Blood Cells  LR Pheresis      Dispense Status issued     Unit Number (Barcoded) [id number]     Product Code (Barcoded) O8894R96     Blood Type (Barcoded) 0600    UN-XM'D RBC    Collection Time: 06/26/20 11:12 AM   Result Value Ref Range    Component R       R99                 Red Cells, LR       [id number]   transfused   06/26/20   11:13      Product Type R99     Dispense Status transfused     Unit Number (Barcoded) [id number]     Product Code (Barcoded) R6455R09     Blood Type (Barcoded) 9500    PLATELETS REQUEST    Collection Time: 06/26/20  2:15 PM   Result Value Ref Range    Component P       PTP                 Plts,Pheresis       [id number]   transfused   06/26/20   18:04      Product Type Platelets  Pheresis LR     Dispense Status transfused     Unit Number (Barcoded) [id number]     Product Code (Barcoded) W4662V30     Blood Type (Barcoded) 6200    HGB    Collection Time: 06/26/20  7:00 PM   Result Value Ref Range    Hemoglobin 7.2 (L) 12.0 - 16.0 g/dL   HEP B SURFACE AB    Collection Time: 06/26/20  7:30 PM   Result Value Ref Range    Hep B Surface Antibody Quant <3.50 0.00 - 10.00 mIU/mL   HEP B SURFACE ANTIGEN    Collection Time: 06/26/20  7:30 PM   Result Value Ref Range    Hepatitis B Surface Antigen Non-Reactive Non-Reactive   ACCU-CHEK GLUCOSE    Collection Time: 06/27/20 12:04 AM   Result Value Ref Range    Glucose - Accu-Ck 108 (H) 65 - 99 mg/dL   CBC with Differential    Collection Time: 06/27/20  2:15 AM   Result Value Ref Range    WBC 10.7 4.8 - 10.8 K/uL    RBC 2.12 (L) 4.20 - 5.40 M/uL    Hemoglobin 6.0 (L) 12.0 - 16.0 g/dL    Hematocrit 18.7 (L) 37.0 - 47.0 %    MCV 86.8 81.4 - 97.8 fL    MCH 27.8 27.0 - 33.0 pg    MCHC 32.1 (L) 33.6 - 35.0 g/dL    RDW 50.1 (H) 35.9 - 50.0 fL    Platelet Count 248 164 - 446 K/uL    MPV 10.0 9.0 - 12.9 fL    Neutrophils-Polys 77.80 (H) 44.00 - 72.00 %    Lymphocytes 13.70 (L) 22.00 - 41.00 %    Monocytes 6.80 0.00 - 13.40 %    Eosinophils 0.40 0.00 - 6.90 %    Basophils  0.40 0.00 - 1.80 %    Immature Granulocytes 0.90 0.00 - 0.90 %    Nucleated RBC 0.00 /100 WBC    Neutrophils (Absolute) 8.30 (H) 2.00 - 7.15 K/uL    Lymphs (Absolute) 1.46 1.00 - 4.80 K/uL    Monos (Absolute) 0.73 0.00 - 0.85 K/uL    Eos (Absolute) 0.04 0.00 - 0.51 K/uL    Baso (Absolute) 0.04 0.00 - 0.12 K/uL    Immature Granulocytes (abs) 0.10 0.00 - 0.11 K/uL    NRBC (Absolute) 0.00 K/uL   Comp Metabolic Panel (CMP)    Collection Time: 06/27/20  2:15 AM   Result Value Ref Range    Sodium 141 135 - 145 mmol/L    Potassium 4.3 3.6 - 5.5 mmol/L    Chloride 107 96 - 112 mmol/L    Co2 21 20 - 33 mmol/L    Anion Gap 13.0 7.0 - 16.0    Glucose 130 (H) 65 - 99 mg/dL    Bun 60 (H) 8 - 22 mg/dL    Creatinine 3.59 (H) 0.50 - 1.40 mg/dL    Calcium 7.0 (L) 8.5 - 10.5 mg/dL    AST(SGOT) 24 12 - 45 U/L    ALT(SGPT) 11 2 - 50 U/L    Alkaline Phosphatase 38 30 - 99 U/L    Total Bilirubin 0.2 0.1 - 1.5 mg/dL    Albumin 2.4 (L) 3.2 - 4.9 g/dL    Total Protein 5.1 (L) 6.0 - 8.2 g/dL    Globulin 2.7 1.9 - 3.5 g/dL    A-G Ratio 0.9 g/dL   Magnesium    Collection Time: 06/27/20  2:15 AM   Result Value Ref Range    Magnesium 1.8 1.5 - 2.5 mg/dL   Lipid Profile    Collection Time: 06/27/20  2:15 AM   Result Value Ref Range    Cholesterol,Tot 102 100 - 199 mg/dL    Triglycerides 137 0 - 149 mg/dL    HDL 25 (A) >=40 mg/dL    LDL 50 <100 mg/dL   ESTIMATED GFR    Collection Time: 06/27/20  2:15 AM   Result Value Ref Range    GFR If  14 (A) >60 mL/min/1.73 m 2    GFR If Non  12 (A) >60 mL/min/1.73 m 2   ACCU-CHEK GLUCOSE    Collection Time: 06/27/20  6:22 AM   Result Value Ref Range    Glucose - Accu-Ck 145 (H) 65 - 99 mg/dL   COVID/SARS CoV-2 PCR    Collection Time: 06/27/20  8:05 AM    Specimen: Nasopharyngeal; Respirate   Result Value Ref Range    COVID Order Status Received    SARS-CoV-2, PCR (In-House)    Collection Time: 06/27/20  8:05 AM   Result Value Ref Range    SARS-CoV-2 Source NP Swab      "SARS-CoV-2 by PCR NotDetected        Labs reviewed by me.       Imaging reviewed by me:     DX-CHEST-PORTABLE (1 VIEW)   Final Result      No acute cardiopulmonary abnormality.      CT-HEAD W/O   Final Result      1. Small amount of acute hemorrhage in a chronic 2.1 cm right epidural collection, along the right frontoparietal convexity. Associated 2 mm right to left midline shift.   2. Minimal subacute subdural hemorrhage anterior to the right epidural collection.   3. Hyperdensity in the temporal horn of the right lateral ventricle may represent small amount of intraventricular hemorrhage.   4. No CT evidence of acute infarct.      US-CAROTID DOPPLER BILAT    (Results Pending)   MR-BRAIN-W/O    (Results Pending)   MR-MRA HEAD-W/O    (Results Pending)   EC-ECHOCARDIOGRAM COMPLETE W/O CONT    (Results Pending)         ASSESSMENT AND PLAN:  87-year old female with PMhx significant for ESRD on HD Monday/Wed/Fri, associated uremia, currently here visiting from [place], who presented to [facility] on 6/26/20 for a chief complaint of a 2-week history of melanic stools and 1-2 day history of LUE weakness; at time of presentation here found to have hemoglobin 5.5, emergently transfused; while in ED patient's nurse witnessed patient to suddenly \"stop speaking\" and with question of some facial droop; STAT CT head revealed Right posterior/parietal region epidural fluid collection, a chronic finding; with associated mild acute hemorrhage within fluid collection. Mild associated Right to Left MLS (2.1 cm), no significant mass effect. Neurosurgery reportedly reviewed imaging; no surgical intervention indicated at this time. Overnight, patient transferred to ICU for labile blood pressure, waxing and waning neurological deficits. Still with LUE weakness, unchanged. Hemoglobin now 6 (from 5.5); No additional events overnight. Awaiting endoscopy, MRI/MRA Brain today.       Additional Recommendations/Plan:     -q4h and PRN " neuro assessment. VS per nursing/unit protocol. SBP <150. Antihypertensives per primary team.   -Avoid all antithrombotics/anticoagulation/blood thinners at this time.   -Telemetry; currently SR. Screen for arrhythmia. Obtain TTE-- pending.   -Will obtain MRI Brain wo contrast, MRA head to rule out vascular etiology of event. BL carotid doppler also ordered, pending. Will follow up with results and make additional recommendations accordingly.    -Maintain strict euthermia, euglycemia, eunamtremia, euvolemia. Current Na is 141.   -PT/OT/SLP eval and treat.   -Will defer all other medical management to primary team and to nephrology, who has also been consulted. Per discussion with Dr. Nelson, recommend gentle dialysis (dialyzed yesterday; planned again for Monday), if possible avoid hyponatremia. Patient also planned for endoscopy per GI; OK to proceed with this from a neurological perspective.   -DVT PPX: SCDs.     Will follow up with results of the above and make additional recommendations accordingly. The plan of care above has been discussed with Dr. Solis.     MARY [email or web address].  [place] [facility]

## 2020-06-27 NOTE — PROCEDURES
DATE OF SERVICE:  06/27/2020    PROCEDURE PERFORMED:  Esophagogastroduodenoscopy.    PHYSICIAN:  Jules Heck MD    ANESTHESIOLOGIST:  Present.    MEDICATION:  Deep sedation.    PREPROCEDURE DIAGNOSIS:  Gastrointestinal bleed.    POSTPROCEDURE DIAGNOSIS:  Normal esophagogastroduodenoscopy.    Procedure risks and benefits reviewed thoroughly with the patient.  Risks   including, but not limited to bleeding, perforation, side effects of   medication were informed.  The patient voiced understanding and agreed to   proceed.    PROCEDURE:  The patient was placed in the left lateral decubitus position.    After sedation was achieved, bite block was inserted in the mouth and a   forward-viewing gastroscope was passed carefully and easily under direct   visualization into the esophagus.  All esophageal views, proximal, middle, and   distal GE junction, retroflex views of the stomach, forward view of stomach,   forward views of the duodenum, duodenal bulb, duodenal sweep, second, and   third portions were all normal.  There was no evidence for any new or old   blood.  Essentially normal esophagogastroduodenoscopy.  Stomach was suctioned,   desufflated, and scope was removed.    COMPLICATIONS:  None.    BLOOD LOSS:  None.    SPECIMENS:  None.    RECOMMENDATIONS:  The patient with profound anemia with a hemoglobin of 5 and   melena with normal EGD and no history of colonoscopy.  Risks and benefits of   the evaluation further with colonoscopy were reviewed with the patient and the   patient's granddaughter, Yasmine, phone number [phone].  Risks and   benefits of the procedure reviewed thoroughly.  Risks including but not   limited to bleeding, perforation, side effects of medication were informed.    The patient and granddaughter voiced understanding and agreed to proceed.    Orders were present for clear liquid diet, bowel prep this evening, n.p.o.   from midnight and then colonoscopy tomorrow.  The orders were in the    electronic medical record.       ____________________________________     Jules MD FLORA Chakraborty / CHARLI    DD:  06/27/2020 12:47:28  DT:  06/27/2020 13:04:45    D#:  [id number]  Job#:  [id number]

## 2020-06-27 NOTE — THERAPY
SLP Contact Note:    Order received for a clinical swallow evaluation. Per RN, pt currently NPO for GI procedure this afternoon so will hold evaluation at this time. SLP to follow as appropriate.      06/27/20 1031   Treatment Variance   Reason For Missed Therapy Medical - Other (Please Comment)  (NPO for procedure)   Total Time Spent   Total Time Spent (Mins) 2   Interdisciplinary Plan of Care Collaboration   IDT Collaboration with  Nursing   Session Information   Date / Session Number note only: 6/27/20

## 2020-06-27 NOTE — ANESTHESIA PREPROCEDURE EVALUATION
Relevant Problems   CARDIAC   (+) Peripheral artery disease (HCC)   (+) Primary hypertension         (+) ESRD (end stage renal disease) (HCC)      ENDO   (+) Type 2 diabetes mellitus with kidney complication, without long-term current use of insulin (HCC)      Other   (+) Acute GI bleeding   (+) Acute blood loss anemia   (+) Epidural hemorrhage (HCC)       Physical Exam    Airway   Mallampati: II  TM distance: >3 FB  Neck ROM: full       Cardiovascular - normal exam  Rhythm: regular  Rate: normal  (-) murmur     Dental - normal exam           Pulmonary - normal exam  Breath sounds clear to auscultation     Abdominal    Neurological - normal exam                 Anesthesia Plan    ASA 3 (Acute GI Bleed)- EMERGENT   ASA physical status 3 criteria: CVA or TIA - history (> 3 months), diabetes - poorly controlled and other (comment)ASA physical status emergent criteria: acute hemorrhage    Plan - general and MAC       Airway plan will be ETT        Induction: intravenous    Postoperative Plan: Postoperative administration of opioids is intended.    Pertinent diagnostic labs and testing reviewed    Informed Consent:    Anesthetic plan and risks discussed with patient.    Use of blood products discussed with: patient whom consented to blood products.

## 2020-06-27 NOTE — PROGRESS NOTES
2 RN skin check complete with RNGeorgette.  Devices in place: glasses, wound dressings.  Skin assess under devices: yes.  Confirmed pressure ulcers found on: BL legs.  New potential pressure ulcers noted on: na. Wound consult placed and wound reported: yes.    Skin issues are as follows: BL ears, elbows pink and blanching. BL heels boggy. BL feet dry and flaky, wound to left big toe. Right forearm skin tear. BL calf wounds (photos in epic). Sacrum pink and blanching.    The following interventions in place: Q2 turns, pressure redistribution mattress, pillows in use for support/positioning, wound care, moisturizer, barrier cream.

## 2020-06-27 NOTE — ASSESSMENT & PLAN NOTE
2.3 cm with 2 mm shift  Neurosurgery consulted and recommended no intervention  Neurology consulted, MRI and MRA resulted, possible recent small CVA

## 2020-06-27 NOTE — ASSESSMENT & PLAN NOTE
reasonable to start patient on ASA (NO plavix) IF repeat CT head in 2 weeks is stable AND when cleared by GI.

## 2020-06-27 NOTE — THERAPY
"Speech Language Pathology   Clinical Swallow Evaluation     Patient Name: Corine Dela Cruz  AGE:  87 y.o., SEX:  female  Medical Record #: [id number]  Today's Date: 6/27/2020     Precautions: Swallow Precautions; L sided weakness    Assessment    87-year old female with PMhx significant for ESRD on HD, associated uremia, currently here visiting from [place], who presented to [facility] on 6/26/20 for a chief complaint of a 2-week history of melanic stools and 1-2 day history of LUE weakness. Patient's granddaughter noted that patient was having mild difficulty using her LUE and brought her to ED. At appx 1100 on day of presentation, patient's nurse witnessed patient to suddenly \"stop speaking\" and with question of some facial droop. Stroke Alert was thus called. Patient was placed in trendelenburg position, and symptoms began resolving. Patient then had STAT CT head that revealed Right posterior/parietal region epidural fluid collection, a chronic finding; with associated mild acute hemorrhage within fluid collection. Mild associated Right to Left MLS (2.1 cm), no significant mass effect. Neurosurgery reportedly reviewed imaging; no surgical intervention indicated at this time.     Pt pleasant and cooperative, eager for PO intake, provides own hx. Pt able to assist in feeding using right hand. She demonstrated consistent s/o aspiration with thin liquids and a throat clear x1 given mildly thick liquids (1/13 trials). All other trials (listed below) were consumed without clinical s/o aspiration.     Plan    1) Initiate a clear liquid (per MD) Liquidised Diet with Mildly Thick Liquids with adherence to swallowing compensatory strategies listed below and posted at bedside.   2) Administer meds crushed in applesauce.    Recommend Speech Therapy 5 times per week until therapy goals are met for the following treatments:  Dysphagia Training and Patient / Family / Caregiver Education.    Discharge recommendations:  " Recommend inpatient transitional care services for continued speech therapy services.      Objective       06/27/20 1600   Prior Living Situation   Prior Services None   Housing / Facility 1 Story House   Steps Into Home 0   Equipment Owned Front-Wheel Walker   Lives with - Patient's Self Care Capacity Adult Children   Comments Daughter assists pt with ADLs.    Prior Level Of Function   Communication Within Functional Limits   Swallow Within Functional Limits   Dentition Edentulous   Dentures Uppers;Lowers   Hearing Within Functional Limits for Evaluation   Hearing Aid None   Vision Within Functional Limits for Evaluation;Wears Corrective Lenses;Reading ;Distance  (donned)   Oral Motor Eval    Is Patient Able to Complete Oral Motor Eval Yes but Impaired   Labial Function   Labial Structure At Rest Moderate  (L droop)   Labial Vowel Production / I /, / U / Minimal   Lingual Function   Lingual Structure At Rest Within Functional Limits   Lingual Protrude Within Functional Limits   Elevate Outside Mouth Within Functional Limits   Lateralization Minimal Left;Minimal Right  (slowed)   Lick Lips (Circular) Minimal  (slowed)   / Pa / 5X's Minimal   / Ta / 5X's Minimal   / Ka / 5X's Minimal   Jaw   Jaw Structure At Rest Within Functional Limits   Bite (Masseter) Within Functional Limits   Chew (Rotary) Within Functional Limits   Velar Function   Velar Structure At Rest Within Functional Limits   / A / Prolonged Within Functional Limits   Laryngeal Function   Voice Quality Within Functional Limits   Volutional Cough Minimal   Excursion Upon Swallow Complete   Max Phonation Time (Seconds) 12   Oral Food Presentation   Ice Chips Within Functional Limits   Single Swallow Mildly Thick (2) - (Nectar Thick)  Minimal   Serial Swallow Mildly Thick (2) - (Nectar Thick) Not Tested   Single Swallow Thin (0) Moderate   Serial Swallow Thin (0) Not Tested   Pureed (4) Within Functional Limits   Self Feeding Needs Assistance   Tracheostomy  "  Tracheostomy  No   Dysphagia Strategies / Recommendations   Strategies / Interventions Recommended (Yes / No) Yes   Compensatory Strategies Strict 1:1 Feeding;Head of Bed 90 Degrees During Eating / Drinking;Single Sips / Bites   Diet / Liquid Recommendation Mildly Thick (2) - (Nectar Thick);Liquidised (3) - (Nectar Thick Full Liquid)  (clear liquid)   Medication Administration    (crushed in applesauce)   Therapy Interventions Dysphagia Therapy By Speech Language Pathologist;Pharyngeal / Laryngeal Exercises;Oral Motor Exercises   Short Term Goals   Short Term Goal # 1 Pt will consume a clear liquid liquidised diet with mildly thick liquids without clinical s/s of aspiration and with cues for carryover of swallowing compensatory strategies.    Short Term Goal # 2 Pt will perform oral motor and pharyngeal/laryngeal exercises with verbal and visual cues and \"fair\" accuracy as judged by SLP.            "

## 2020-06-27 NOTE — ASSESSMENT & PLAN NOTE
Wound care and wound consult  Previously started on Cipro prior to admission, continue for now  Follow up cx results and and obtain collateral hx from family

## 2020-06-27 NOTE — ASSESSMENT & PLAN NOTE
CT imaging reveals small amount of acute hemorrhage in a chronic 2.1 cm right epidural collection along the right frontoparietal convexity with associated 2 mm right to left midline shift and minimal subacute subdural hemorrhage anterior to the right epidural collection  Evaluated by Dr. Jacobson and his impression is a chronic right-sided subdural hematoma with some mass-effect - no surgery indicated at this time  Evaluated by neurology  Frequent neuro checks  Strict blood pressure control with goal SBP less than 150

## 2020-06-27 NOTE — PROGRESS NOTES
Patient off floor to R113 on zoll with belongings. Report given to receiving RN. Updated granddaughter Sánchez

## 2020-06-27 NOTE — ASSESSMENT & PLAN NOTE
EGD negative  Colonoscopy negative  Trend hemoglobin and transfuse PRBCs to keep hemoglobin greater than 7  Gastroenterology  capsule study on 6/29- follow up results

## 2020-06-27 NOTE — CONSULTS
DATE OF SERVICE:  06/26/2020    GASTROENTEROLOGY CONSULTATION    REFERRING PHYSICIAN:  ER physician.    RN and patient's granddaughter were present during the history and physical   and provided history as well as the patient.    HISTORY OF PRESENT ILLNESS:  This is an 87-year-old female who presented to   the hospital with a history of melena for 2 weeks.  With this, she started   developing generalized weakness and shortness of breath on exertion.  She has   a history of end-stage renal disease secondary to hypertension and   diabetic-induced renal injury and is on dialysis.  She presented to the   hospital and was found to have a hemoglobin of 5.  She had some neurologic   changes that reversed with positional changes like Trendelenburg positioning.    She does not report any neurologic symptoms at this time.  She was noted to   have a normocytic anemia with MCV of 91 and a hemoglobin of 5 and was admitted   to the hospital for further evaluation.  Patient states that over a week ago   she started taking Plavix for treatment of peripheral vascular disease and is   due to have therapeutic intervention with stenting, but is yet to do this.    She has never had history of a GI bleed in the past.  She has never had any   history of endoscopic evaluation or colonoscopic evaluation.  She reports no   family history of GI malignancy.  During the evaluation, because of the   neurologic symptoms that have reversed, a CT scan was obtained.  CT scan shows   in the head, a small amount of acute hemorrhage and a chronic 2.1 cm right   epidural collection along the right frontoparietal convexity with a 2 mm   right-to-left midline shift with minimal subacute subdural hemorrhage anterior   to the right epidural collection.  The patient currently is asymptomatic.    She reports no hematemesis and has not had melena today.    PAST MEDICAL HISTORY:  1.  Diabetes.  2.  Hypertension.  3.  End-stage renal disease.  4.  Chronic venous  insufficiency with recent placement on Plavix.    PAST SURGICAL HISTORY:  None reported.    SOCIAL HISTORY:  No nicotine, alcohol or illicit drug use.  She formerly grew   up in [place], [place], and then 2-3 years ago moved to [facility].    She is visiting her granddaughter here in [place].    OUTPATIENT MEDICATIONS:  Plavix.  The list has not been noted.  The patient   has not had any other blood thinners, aspirin, ibuprofen, Motrin, Advil or   Aleve that were asked specifically.    ALLERGIES:  TO LEXAPRO.    PHYSICAL EXAMINATION:  VITAL SIGNS:  Stable.  Temperature is 97.9, blood pressure is 140/82, pulse of   93, respirations 17, 95% on room air.  GENERAL:  Patient is alert and oriented x4, in no acute distress.  HEENT:  PERRLA, EOMI.  Sclerae anicteric.  Oropharynx clear.  NECK:  Supple.  HEART:  Regular rhythm.  LUNGS:  Clear to auscultation.  ABDOMEN:  Soft, nontender, nondistended.  ____ bowel sounds.  EXTREMITIES:  No edema.    LABORATORY DATA:  White blood cell count is 12.8, H and H of 5.5/18.6, MCV of   91, platelet count of 280.  Sodium is 139, potassium 5.3, chloride of 99, BUN   and creatinine of 114/6.93, AST and ALT of 17/13, alk phos 41, bilirubin of   0.2.  INR of 1.1.    IMAGING:  CT scan of the head reveals small amount of acute hemorrhage and a   chronic 2.1 cm right epidural collection along the right frontoparietal   convexity, associated 2 mm right-to-left midline shift and minimal subacute   subdural hemorrhage anterior to the right epidural collection.    IMPRESSION:  This is an 87-year-old female who presents with a 2-week history   of melena, was found to have a marked reduction in her hemoglobin to 5, who   has end-stage renal disease secondary to diabetic and hypertensive etiologies,   who currently is of relatively normal blood pressure, noted to have a   normocytic anemia with a hemoglobin of 5 and also noted to have a CT scan with   acute hemorrhage.  She was previously placed  on Plavix for treatment of   vascular disease.  Patient's melena is likely secondary to an upper   gastrointestinal source, peptic ulcer disease, malignancy and/or even a lower   gastrointestinal source considering she has never had an upper endoscopy or   colonoscopy, a malignancy in the colon is also possible.  1.  Acute hemorrhage and a chronic 2.1 cm right epidural collection is noted.    She is noted to be placed on Plavix.  Does not appear to be in hypertensive   urgency.  A call has been made to the patient's hospitalist for further   evaluation.    2.  Gastrointestinal bleed.  Upper source more likely than lower source, but   she has never had an upper endoscopy or colonoscopy.  Therefore, would   consider EGD and colonoscopy or even EGD first, followed by colonoscopy per   patient's request.  Risks and benefits of esophagogastroduodenoscopy with or   without colonoscopy or esophagogastroduodenoscopy followed by colonoscopy were   reviewed with the patient and the patient's granddaughter.  Patient defers   procedure.  She does not want endoscopic evaluation.  Risks of not doing the   procedure were reviewed as well as risks for further bleeding, risk to her   life.  She said she would consider it.  At this time, we will hold off on   endoscopy per patient's request.  We will allow time and further evaluation   neurologically and follow up with the hospitalist with their workup regarding   the cerebral issue and then further recommendations to follow.  In the   meantime, patient will be continued on a proton pump inhibitor drip, which has   already been initiated.  She does not have signs of chronic liver disease.    Therefore, I do not think that she has GI bleeding etiology related to chronic   liver disease.  Therefore, octreotide is not necessary.  We will keep the   patient n.p.o. for at least 24 hours or until she definitely states that she   does not want endoscopy.  Further recommendations to follow.   We will follow   closely.  The above was reviewed with the patient, the patient's granddaughter   as well as the nurse, who voiced understanding and agreed to proceed.    Patient was scheduled for an endoscopy tomorrow at noon, this has been   canceled, but we will follow closely.       ____________________________________     Jules MD FLORA Chakraborty / CHARLI    DD:  06/26/2020 14:21:38  DT:  06/26/2020 20:08:12    D#:  [id number]  Job#:  [id number]

## 2020-06-27 NOTE — PROGRESS NOTES
Addendum:  After further consideration and discussion with family and hospitalist patient has made decision to proceed with endoscopy.   Risks and benefits were rereviewed.   All questions answered.

## 2020-06-27 NOTE — ANESTHESIA POSTPROCEDURE EVALUATION
Patient: Corine Dela Cruz    Procedure Summary     Date:  06/27/20 Room / Location:  [facility] 07 / SURGERY [facility]    Anesthesia Start:  1235 Anesthesia Stop:  1250    Procedure:  GASTROSCOPY (N/A Mouth) Diagnosis:  (normal)    Surgeon:  Jules Heck M.D. Responsible Provider:  Wisam Centeno D.O.    Anesthesia Type:  general, MAC ASA Status:  3 - Emergent          Final Anesthesia Type: general, MAC  Last vitals  BP   Blood Pressure : 142/63    Temp   37.2 °C (98.9 °F)    Pulse   Pulse: 90   Resp   16    SpO2   98 %      Anesthesia Post Evaluation    Patient location during evaluation: PACU  Patient participation: complete - patient participated  Level of consciousness: awake and alert  Pain score: 4    Airway patency: patent  Anesthetic complications: no  Cardiovascular status: hemodynamically stable  Respiratory status: acceptable  Hydration status: euvolemic    PONV: none           Nurse Pain Score: 4 (NPRS)

## 2020-06-28 ENCOUNTER — ANESTHESIA EVENT (OUTPATIENT)
Dept: SURGERY | Facility: MEDICAL CENTER | Age: 85
DRG: 377 | End: 2020-06-28
Payer: MEDICARE

## 2020-06-28 ENCOUNTER — ANESTHESIA (OUTPATIENT)
Dept: SURGERY | Facility: MEDICAL CENTER | Age: 85
DRG: 377 | End: 2020-06-28
Payer: MEDICARE

## 2020-06-28 DIAGNOSIS — S06.4X0S: ICD-10-CM

## 2020-06-28 LAB
ALBUMIN SERPL BCP-MCNC: 2.4 G/DL (ref 3.2–4.9)
ALBUMIN/GLOB SERPL: 0.8 G/DL
ALP SERPL-CCNC: 40 U/L (ref 30–99)
ALT SERPL-CCNC: 13 U/L (ref 2–50)
ANION GAP SERPL CALC-SCNC: 12 MMOL/L (ref 7–16)
AST SERPL-CCNC: 28 U/L (ref 12–45)
BASOPHILS # BLD AUTO: 0.4 % (ref 0–1.8)
BASOPHILS # BLD: 0.05 K/UL (ref 0–0.12)
BILIRUB SERPL-MCNC: 0.3 MG/DL (ref 0.1–1.5)
BUN SERPL-MCNC: 94 MG/DL (ref 8–22)
CALCIUM SERPL-MCNC: 6.7 MG/DL (ref 8.5–10.5)
CHLORIDE SERPL-SCNC: 106 MMOL/L (ref 96–112)
CO2 SERPL-SCNC: 23 MMOL/L (ref 20–33)
CREAT SERPL-MCNC: 5.22 MG/DL (ref 0.5–1.4)
EOSINOPHIL # BLD AUTO: 0.14 K/UL (ref 0–0.51)
EOSINOPHIL NFR BLD: 1.2 % (ref 0–6.9)
ERYTHROCYTE [DISTWIDTH] IN BLOOD BY AUTOMATED COUNT: 49.9 FL (ref 35.9–50)
GLOBULIN SER CALC-MCNC: 3 G/DL (ref 1.9–3.5)
GLUCOSE BLD-MCNC: 101 MG/DL (ref 65–99)
GLUCOSE BLD-MCNC: 142 MG/DL (ref 65–99)
GLUCOSE BLD-MCNC: 148 MG/DL (ref 65–99)
GLUCOSE BLD-MCNC: 95 MG/DL (ref 65–99)
GLUCOSE BLD-MCNC: 98 MG/DL (ref 65–99)
GLUCOSE SERPL-MCNC: 116 MG/DL (ref 65–99)
GRAM STN SPEC: NORMAL
GRAM STN SPEC: NORMAL
HCT VFR BLD AUTO: 24.9 % (ref 37–47)
HGB BLD-MCNC: 7.3 G/DL (ref 12–16)
HGB BLD-MCNC: 7.6 G/DL (ref 12–16)
HGB BLD-MCNC: 8.2 G/DL (ref 12–16)
HGB BLD-MCNC: 9.2 G/DL (ref 12–16)
IMM GRANULOCYTES # BLD AUTO: 0.19 K/UL (ref 0–0.11)
IMM GRANULOCYTES NFR BLD AUTO: 1.6 % (ref 0–0.9)
LYMPHOCYTES # BLD AUTO: 2.08 K/UL (ref 1–4.8)
LYMPHOCYTES NFR BLD: 17.3 % (ref 22–41)
MAGNESIUM SERPL-MCNC: 1.9 MG/DL (ref 1.5–2.5)
MCH RBC QN AUTO: 29.2 PG (ref 27–33)
MCHC RBC AUTO-ENTMCNC: 32.9 G/DL (ref 33.6–35)
MCV RBC AUTO: 88.6 FL (ref 81.4–97.8)
MONOCYTES # BLD AUTO: 1.02 K/UL (ref 0–0.85)
MONOCYTES NFR BLD AUTO: 8.5 % (ref 0–13.4)
NEUTROPHILS # BLD AUTO: 8.55 K/UL (ref 2–7.15)
NEUTROPHILS NFR BLD: 71 % (ref 44–72)
NRBC # BLD AUTO: 0.02 K/UL
NRBC BLD-RTO: 0.2 /100 WBC
PLATELET # BLD AUTO: 245 K/UL (ref 164–446)
PMV BLD AUTO: 9.8 FL (ref 9–12.9)
POTASSIUM SERPL-SCNC: 5 MMOL/L (ref 3.6–5.5)
PROT SERPL-MCNC: 5.4 G/DL (ref 6–8.2)
RBC # BLD AUTO: 2.81 M/UL (ref 4.2–5.4)
SIGNIFICANT IND 70042: NORMAL
SIGNIFICANT IND 70042: NORMAL
SITE SITE: NORMAL
SITE SITE: NORMAL
SODIUM SERPL-SCNC: 141 MMOL/L (ref 135–145)
SOURCE SOURCE: NORMAL
SOURCE SOURCE: NORMAL
WBC # BLD AUTO: 12 K/UL (ref 4.8–10.8)

## 2020-06-28 PROCEDURE — 160204 HCHG ENDO MINUTES - 1ST 30 MINS LEVEL 5: Performed by: INTERNAL MEDICINE

## 2020-06-28 PROCEDURE — 700102 HCHG RX REV CODE 250 W/ 637 OVERRIDE(OP): Performed by: INTERNAL MEDICINE

## 2020-06-28 PROCEDURE — 700105 HCHG RX REV CODE 258: Performed by: ANESTHESIOLOGY

## 2020-06-28 PROCEDURE — 83735 ASSAY OF MAGNESIUM: CPT

## 2020-06-28 PROCEDURE — 99291 CRITICAL CARE FIRST HOUR: CPT | Performed by: INTERNAL MEDICINE

## 2020-06-28 PROCEDURE — 160048 HCHG OR STATISTICAL LEVEL 1-5: Performed by: INTERNAL MEDICINE

## 2020-06-28 PROCEDURE — 0DJD8ZZ INSPECTION OF LOWER INTESTINAL TRACT, VIA NATURAL OR ARTIFICIAL OPENING ENDOSCOPIC: ICD-10-PCS | Performed by: INTERNAL MEDICINE

## 2020-06-28 PROCEDURE — 160009 HCHG ANES TIME/MIN: Performed by: INTERNAL MEDICINE

## 2020-06-28 PROCEDURE — 91110 GI TRC IMG INTRAL ESOPH-ILE: CPT | Performed by: INTERNAL MEDICINE

## 2020-06-28 PROCEDURE — 700111 HCHG RX REV CODE 636 W/ 250 OVERRIDE (IP): Performed by: ANESTHESIOLOGY

## 2020-06-28 PROCEDURE — 85025 COMPLETE CBC W/AUTO DIFF WBC: CPT

## 2020-06-28 PROCEDURE — 80053 COMPREHEN METABOLIC PANEL: CPT

## 2020-06-28 PROCEDURE — 700111 HCHG RX REV CODE 636 W/ 250 OVERRIDE (IP): Performed by: INTERNAL MEDICINE

## 2020-06-28 PROCEDURE — 99232 SBSQ HOSP IP/OBS MODERATE 35: CPT | Performed by: NURSE PRACTITIONER

## 2020-06-28 PROCEDURE — 160035 HCHG PACU - 1ST 60 MINS PHASE I: Performed by: INTERNAL MEDICINE

## 2020-06-28 PROCEDURE — 0DJ07ZZ INSPECTION OF UPPER INTESTINAL TRACT, VIA NATURAL OR ARTIFICIAL OPENING: ICD-10-PCS | Performed by: INTERNAL MEDICINE

## 2020-06-28 PROCEDURE — 82962 GLUCOSE BLOOD TEST: CPT | Mod: 91

## 2020-06-28 PROCEDURE — A9270 NON-COVERED ITEM OR SERVICE: HCPCS | Performed by: INTERNAL MEDICINE

## 2020-06-28 PROCEDURE — 85018 HEMOGLOBIN: CPT | Mod: 91

## 2020-06-28 PROCEDURE — 160002 HCHG RECOVERY MINUTES (STAT): Performed by: INTERNAL MEDICINE

## 2020-06-28 PROCEDURE — 160209 HCHG ENDO MINUTES - EA ADDL 1 MIN LEVEL 5: Performed by: INTERNAL MEDICINE

## 2020-06-28 PROCEDURE — 770022 HCHG ROOM/CARE - ICU (200)

## 2020-06-28 RX ORDER — HYDRALAZINE HYDROCHLORIDE 20 MG/ML
5 INJECTION INTRAMUSCULAR; INTRAVENOUS
Status: DISCONTINUED | OUTPATIENT
Start: 2020-06-28 | End: 2020-06-28 | Stop reason: HOSPADM

## 2020-06-28 RX ORDER — SODIUM CHLORIDE, SODIUM LACTATE, POTASSIUM CHLORIDE, CALCIUM CHLORIDE 600; 310; 30; 20 MG/100ML; MG/100ML; MG/100ML; MG/100ML
INJECTION, SOLUTION INTRAVENOUS CONTINUOUS
Status: DISCONTINUED | OUTPATIENT
Start: 2020-06-28 | End: 2020-06-28 | Stop reason: HOSPADM

## 2020-06-28 RX ORDER — ACETAMINOPHEN 325 MG/1
650 TABLET ORAL EVERY 6 HOURS
Status: DISCONTINUED | OUTPATIENT
Start: 2020-06-28 | End: 2020-07-09 | Stop reason: HOSPADM

## 2020-06-28 RX ORDER — SIMETHICONE 40MG/0.6ML
40 SUSPENSION, DROPS(FINAL DOSAGE FORM)(ML) ORAL ONCE
Status: DISCONTINUED | OUTPATIENT
Start: 2020-06-29 | End: 2020-06-28

## 2020-06-28 RX ORDER — ATORVASTATIN CALCIUM 40 MG/1
40 TABLET, FILM COATED ORAL EVERY EVENING
Status: DISCONTINUED | OUTPATIENT
Start: 2020-06-28 | End: 2020-07-09 | Stop reason: HOSPADM

## 2020-06-28 RX ORDER — SIMETHICONE 40MG/0.6ML
40 SUSPENSION, DROPS(FINAL DOSAGE FORM)(ML) ORAL ONCE
Status: DISCONTINUED | OUTPATIENT
Start: 2020-06-28 | End: 2020-06-28

## 2020-06-28 RX ORDER — ONDANSETRON 2 MG/ML
4 INJECTION INTRAMUSCULAR; INTRAVENOUS
Status: DISCONTINUED | OUTPATIENT
Start: 2020-06-28 | End: 2020-06-28 | Stop reason: HOSPADM

## 2020-06-28 RX ORDER — SODIUM CHLORIDE 9 MG/ML
INJECTION, SOLUTION INTRAVENOUS
Status: DISCONTINUED | OUTPATIENT
Start: 2020-06-28 | End: 2020-06-28 | Stop reason: SURG

## 2020-06-28 RX ORDER — DIPHENHYDRAMINE HYDROCHLORIDE 50 MG/ML
12.5 INJECTION INTRAMUSCULAR; INTRAVENOUS
Status: DISCONTINUED | OUTPATIENT
Start: 2020-06-28 | End: 2020-06-28 | Stop reason: HOSPADM

## 2020-06-28 RX ORDER — HALOPERIDOL 5 MG/ML
1 INJECTION INTRAMUSCULAR
Status: DISCONTINUED | OUTPATIENT
Start: 2020-06-28 | End: 2020-06-28 | Stop reason: HOSPADM

## 2020-06-28 RX ORDER — SIMETHICONE 40MG/0.6ML
40 SUSPENSION, DROPS(FINAL DOSAGE FORM)(ML) ORAL ONCE
Status: COMPLETED | OUTPATIENT
Start: 2020-06-28 | End: 2020-06-28

## 2020-06-28 RX ORDER — HYDROMORPHONE HYDROCHLORIDE 1 MG/ML
.5-1 INJECTION, SOLUTION INTRAMUSCULAR; INTRAVENOUS; SUBCUTANEOUS
Status: DISCONTINUED | OUTPATIENT
Start: 2020-06-28 | End: 2020-06-30

## 2020-06-28 RX ORDER — LABETALOL HYDROCHLORIDE 5 MG/ML
5 INJECTION, SOLUTION INTRAVENOUS
Status: DISCONTINUED | OUTPATIENT
Start: 2020-06-28 | End: 2020-06-28 | Stop reason: HOSPADM

## 2020-06-28 RX ADMIN — ATORVASTATIN CALCIUM 40 MG: 40 TABLET, FILM COATED ORAL at 16:10

## 2020-06-28 RX ADMIN — SODIUM CHLORIDE: 9 INJECTION, SOLUTION INTRAVENOUS at 10:08

## 2020-06-28 RX ADMIN — HYDROMORPHONE HYDROCHLORIDE 1 MG: 1 INJECTION, SOLUTION INTRAMUSCULAR; INTRAVENOUS; SUBCUTANEOUS at 23:19

## 2020-06-28 RX ADMIN — SIMETHICONE 40 MG: 20 SUSPENSION/ DROPS ORAL at 22:25

## 2020-06-28 RX ADMIN — HYDROMORPHONE HYDROCHLORIDE 1 MG: 1 INJECTION, SOLUTION INTRAMUSCULAR; INTRAVENOUS; SUBCUTANEOUS at 16:00

## 2020-06-28 RX ADMIN — LORAZEPAM 0.5 MG: 2 INJECTION INTRAMUSCULAR; INTRAVENOUS at 04:56

## 2020-06-28 RX ADMIN — HYDROMORPHONE HYDROCHLORIDE 1 MG: 1 INJECTION, SOLUTION INTRAMUSCULAR; INTRAVENOUS; SUBCUTANEOUS at 12:05

## 2020-06-28 RX ADMIN — PROPOFOL 70 MG: 10 INJECTION, EMULSION INTRAVENOUS at 10:18

## 2020-06-28 RX ADMIN — PROPOFOL 30 MG: 10 INJECTION, EMULSION INTRAVENOUS at 10:33

## 2020-06-28 RX ADMIN — HYDROMORPHONE HYDROCHLORIDE 1 MG: 1 INJECTION, SOLUTION INTRAMUSCULAR; INTRAVENOUS; SUBCUTANEOUS at 20:00

## 2020-06-28 RX ADMIN — HYDRALAZINE HYDROCHLORIDE 10 MG: 20 INJECTION INTRAMUSCULAR; INTRAVENOUS at 22:01

## 2020-06-28 RX ADMIN — HYDROMORPHONE HYDROCHLORIDE 1 MG: 1 INJECTION, SOLUTION INTRAMUSCULAR; INTRAVENOUS; SUBCUTANEOUS at 08:00

## 2020-06-28 RX ADMIN — ACETAMINOPHEN 650 MG: 325 TABLET, FILM COATED ORAL at 16:10

## 2020-06-28 RX ADMIN — HYDRALAZINE HYDROCHLORIDE 10 MG: 20 INJECTION INTRAMUSCULAR; INTRAVENOUS at 15:11

## 2020-06-28 RX ADMIN — LORAZEPAM 0.5 MG: 2 INJECTION INTRAMUSCULAR; INTRAVENOUS at 20:20

## 2020-06-28 ASSESSMENT — ENCOUNTER SYMPTOMS
CONSTITUTIONAL NEGATIVE: 1
VOMITING: 0
ABDOMINAL PAIN: 0
DOUBLE VISION: 0
BLOOD IN STOOL: 0
WEAKNESS: 1
RESPIRATORY NEGATIVE: 1
BLURRED VISION: 0
PSYCHIATRIC NEGATIVE: 1
NAUSEA: 0

## 2020-06-28 ASSESSMENT — FIBROSIS 4 INDEX: FIB4 SCORE: 2.76

## 2020-06-28 ASSESSMENT — PAIN SCALES - GENERAL: PAIN_LEVEL: 5

## 2020-06-28 NOTE — PROGRESS NOTES
Critical Care Progress Note    Date of admission  6/26/2020    Chief Complaint  This lady has a history of ESRD on thrice weekly HD, primary hypertension, type 2 diabetes mellitus and PAD.  She has been taking aspirin and Plavix.  She resides in [place].  She has been in the [facility] for 3 weeks to visit family.  Apparently for the last 2 weeks she has had dark melanotic stools.  She has had progressive weakness and shortness of breath.  She has been compliant with hemodialysis while in the [facility].  She presented to the emergency department with complaints of her melena and was found to have significant acute blood loss anemia with a hemoglobin of 5.5.  While in the emergency department she began to experience left facial droop and left-sided weakness.  A code stroke was called.  Imaging revealed a small amount of acute hemorrhage and a chronic 2.1 cm right epidural collection along the right frontoparietal convexity.  There was 2 mm of right to left midline shift and a minimal subacute subdural hemorrhage anterior to the right epidural collection.  Dr. Jacobson evaluated the patient from neurosurgery.  His impression was that she had a chronic right-sided subdural hematoma with some mass-effect and no surgery was indicated at this time.  She was also seen by neurology.  They recommend an MRI/MRA of the brain when clinically appropriate and strict blood pressure control.  Gastroenterology has evaluated this lady for consideration of endoscopy.    Hospital Course    Initial evaluation revealed acute blood loss anemia with hemoglobin 5.5.  In addition a chronic right subdural hematoma was identified.  6/27- COVID PCR negative.  EGD shows no evidence of upper GI bleed.  6/28-colonoscopy reveals no evidence of GI bleed.      Interval Problem Update  Reviewed last 24 hour events:              - Received 1 units PRBC last night   - Tm: Afebrile              - HR: 80-90s              - SBP: 120s              - Neuro:  AOx3, LUE 2/5, LE equal 4/5              - GI: NPO              - UOP: 150ml, creatinine rising.              - Iglesias: no              - Lines: 2x PIV, HD catheter              - PPx: No VTE,               - CXR (personally reviewed):              Hb 6.3->8.2   Cr. 3.59->5.22  Ca 6.7        Review of Systems  Review of Systems   Constitutional: Negative.    HENT: Negative.    Eyes: Negative for blurred vision and double vision.   Respiratory: Negative.    Gastrointestinal: Negative for abdominal pain, blood in stool, nausea and vomiting.   Genitourinary: Negative.    Neurological: Positive for weakness.   Psychiatric/Behavioral: Negative.    All other systems reviewed and are negative.       Vital Signs for last 24 hours   Temp:  [36.3 °C (97.3 °F)-37.1 °C (98.8 °F)] 36.5 °C (97.7 °F)  Pulse:  [70-97] 88  Resp:  [8-20] 16  BP: ()/(45-95) 109/49  SpO2:  [95 %-100 %] 100 %    Hemodynamic parameters for last 24 hours       Respiratory Information for the last 24 hours       Physical Exam   Physical Exam  Constitutional:       General: She is not in acute distress.  HENT:      Mouth/Throat:      Mouth: Mucous membranes are moist.   Eyes:      Extraocular Movements: Extraocular movements intact.      Pupils: Pupils are equal, round, and reactive to light.   Neck:      Musculoskeletal: Neck supple.   Cardiovascular:      Rate and Rhythm: Regular rhythm.      Heart sounds: No murmur. No friction rub. No gallop.    Pulmonary:      Effort: Pulmonary effort is normal. No respiratory distress.      Breath sounds: Normal breath sounds.   Abdominal:      General: There is no distension.      Palpations: Abdomen is soft.      Tenderness: There is no abdominal tenderness.   Musculoskeletal:         General: No swelling.   Skin:     General: Skin is warm and dry.   Neurological:      General: No focal deficit present.      Mental Status: She is alert.   Psychiatric:         Mood and Affect: Mood normal.         Behavior:  Behavior normal.         Medications  Current Facility-Administered Medications   Medication Dose Route Frequency Provider Last Rate Last Dose   • [START ON 6/29/2020] simethicone (MYLICON) 40 MG/0.6ML drops SUSP 40 mg  40 mg Oral Once Jules Heck M.D.       • atorvastatin (LIPITOR) tablet 40 mg  40 mg Oral Q EVENING Kory Aiken M.D.   40 mg at 06/28/20 1610   • HYDROmorphone pf (DILAUDID) injection 0.5-1 mg  0.5-1 mg Intravenous Q2HRS PRN Kory Aiken M.D.   1 mg at 06/28/20 1600   • acetaminophen (TYLENOL) tablet 650 mg  650 mg Oral Q6HRS Kory Aiken M.D.   650 mg at 06/28/20 1610   • ondansetron (ZOFRAN) syringe/vial injection 4 mg  4 mg Intravenous Q4HRS PRN Rosales Suarez M.D.       • ondansetron (ZOFRAN ODT) dispertab 4 mg  4 mg Oral Q4HRS PRN Rosales Suarez M.D.       • LORazepam (ATIVAN) injection 0.26-0.5 mg  0.26-0.5 mg Intravenous Q4HRS PRN Juwan Lyle M.D.   0.5 mg at 06/28/20 0456   • heparin injection 3,200 Units  3,200 Units Intravenous ACUTE DIALYSIS PRN Marlene Nelson M.D.   3,200 Units at 06/26/20 2110   • hydrALAZINE (APRESOLINE) injection 10-20 mg  10-20 mg Intravenous Q4HRS PRN Juwan Lyle M.D.   10 mg at 06/28/20 1511   • insulin regular (HUMULIN R) injection 1-6 Units  1-6 Units Subcutaneous Q6HRS Juwan Lyle M.D.   Stopped at 06/27/20 0600    And   • glucose 4 g chewable tablet 16 g  16 g Oral Q15 MIN PRN Juwan Lyle M.D.        And   • dextrose 50% (D50W) injection 50 mL  50 mL Intravenous Q15 MIN PRN Juwan Lyle M.D.           Fluids    Intake/Output Summary (Last 24 hours) at 6/28/2020 1751  Last data filed at 6/28/2020 1500  Gross per 24 hour   Intake 3207.5 ml   Output 200 ml   Net 3007.5 ml       Laboratory          Recent Labs     06/26/20  1025 06/27/20  0215 06/28/20  0625   SODIUM 139 141 141   POTASSIUM 5.3 4.3 5.0   CHLORIDE 99 107 106   CO2 22 21 23   * 60* 94*   CREATININE 6.93* 3.59* 5.22*   MAGNESIUM   --  1.8 1.9   PHOSPHORUS 4.6*  --   --    CALCIUM 7.2* 7.0* 6.7*     Recent Labs     06/26/20  1025 06/27/20  0215 06/28/20  0625   ALTSGPT 13 11 13   ASTSGOT 17 24 28   ALKPHOSPHAT 41 38 40   TBILIRUBIN 0.2 0.2 0.3   GLUCOSE 124* 130* 116*     Recent Labs     06/26/20  1025 06/27/20  0215 06/28/20  0625   WBC 12.8* 10.7 12.0*   NEUTSPOLYS 73.00* 77.80* 71.00   LYMPHOCYTES 18.60* 13.70* 17.30*   MONOCYTES 6.70 6.80 8.50   EOSINOPHILS 0.60 0.40 1.20   BASOPHILS 0.20 0.40 0.40   ASTSGOT 17 24 28   ALTSGPT 13 11 13   ALKPHOSPHAT 41 38 40   TBILIRUBIN 0.2 0.2 0.3     Recent Labs     06/26/20  1025  06/27/20 0215  06/28/20  0235 06/28/20  0625 06/28/20  1450   RBC 2.04*  --  2.12*  --   --  2.81*  --    HEMOGLOBIN 5.5*   < > 6.0*   < > 7.6* 8.2* 7.3*   HEMATOCRIT 18.6*  --  18.7*  --   --  24.9*  --    PLATELETCT 280  --  248  --   --  245  --    PROTHROMBTM 14.6  --   --   --   --   --   --    APTT 28.0  --   --   --   --   --   --    INR 1.10  --   --   --   --   --   --     < > = values in this interval not displayed.       Imaging  X-Ray:  No film today    Assessment/Plan  * Acute GI bleeding  Assessment & Plan  EGD negative  Colonoscopy negative  Trend hemoglobin and transfuse PRBCs to keep hemoglobin greater than 7  IV pantoprazole drip  Gastroenterology to perform capsule study on 6/29    Epidural hemorrhage (HCC)  Assessment & Plan  CT imaging reveals small amount of acute hemorrhage in a chronic 2.1 cm right epidural collection along the right frontoparietal convexity with associated 2 mm right to left midline shift and minimal subacute subdural hemorrhage anterior to the right epidural collection  Evaluated by Dr. Jacobson and his impression is a chronic right-sided subdural hematoma with some mass-effect - no surgery indicated at this time  Evaluated by neurology and they recommend MRI and MRA today.  Frequent neuro checks  Strict blood pressure control with goal SBP less than 150    Leg wounds  Assessment &  Plan  Wound care and wound consult    Peripheral artery disease ([facility])  Assessment & Plan  Hold Plavix with acute gastrointestinal hemorrhage    Type 2 diabetes mellitus with kidney complication, without long-term current use of insulin ([facility])  Assessment & Plan  Moderate glycemic control with insulin therapy.    Primary hypertension  Assessment & Plan  Strict blood pressure control with goal SBP less than 150    ESRD (end stage renal disease) ([facility])  Assessment & Plan  On thrice weekly hemodialysis  Avoid nephrotoxins and renal dose medications    Acute blood loss anemia  Assessment & Plan  Gastrointestinal source of blood loss  Trend hemoglobin and transfuse PRBCs to keep hemoglobin greater than 7       VTE:  Not Indicated  Ulcer: PPI  Lines: None    I have performed a physical exam and reviewed and updated ROS and Plan today (6/28/2020). In review of yesterday's note (6/27/2020), there are no changes except as documented above.     Discussed patient condition and risk of morbidity and/or mortality with RN, Pharmacy and Patient     The patient remains critically ill,  I have assessed and reassessed the blood pressure,  Cardiovascular and neurologic status, labs and response to interventions.  She is requiring multiple blood transfusions.  This patient remains at high risk for worsening CNS death without the above critical care interventions.    Critical care time 35 minutes

## 2020-06-28 NOTE — ANESTHESIA TIME REPORT
Anesthesia Start and Stop Event Times     Date Time Event    6/28/2020 1004 Ready for Procedure     1008 Anesthesia Start     1054 Anesthesia Stop        Responsible Staff  06/28/20    Name Role Begin End    Yana Pina M.D. Anesth 1008 1054        Preop Diagnosis (Free Text):  Pre-op Diagnosis     gi bleed        Preop Diagnosis (Codes):    Post op Diagnosis  GIB (gastrointestinal bleeding)      Premium Reason  E. Weekend    Comments:

## 2020-06-28 NOTE — PROCEDURES
DATE OF SERVICE:  06/28/2020    PROCEDURE PERFORMED:  Colonoscopy.    PHYSICIAN:  Jules Heck MD    ANESTHESIOLOGIST:  Yana Pina MD    MEDICATIONS:  Deep sedation.    CONSENT:  Procedure risks and benefits had been reviewed thoroughly with the   patient as well as the patient's granddaughter, Yasmine.  Risks including but   not limited to bleeding, perforation, side effects of medication were   informed.  Patient and granddaughter voiced understanding and agreed to   proceed.    PREPROCEDURE DIAGNOSIS:  Occult gastrointestinal bleed.    POSTPROCEDURE DIAGNOSIS:  Normal colonoscopy.    DESCRIPTION OF PROCEDURE:  Patient was placed in a left lateral decubitus   position.  Rectal examination revealed no palpable abnormalities and a   colonoscope was introduced into the rectum, advanced to cecum in a well   prepped colon.  Appendiceal orifice and ileocecal valve were well visualized.    Attempts at intubating the terminal ileum were met with resistance.  It did   appreciate villi and greenish clear fluid.  There was no evidence for any   active bleeding, but a clear visualization of the terminal ileum was not   appreciated.  Upon retraction through the colon, no polyps, masses, or lesions   were appreciated.  No diverticulum was appreciated.  Retroflex view of the   rectum was normal.  There was no evidence for any new or old blood.  The   rectum was suctioned, desufflated, and scope was removed.    COMPLICATIONS:  None.    BLOOD LOSS:  None.    SPECIMENS:  None.    RECOMMENDATIONS:  Normal EGD on 06/27/2020, normal colonoscopy 06/28/2020.    Patient has an occult GI bleed with profound anemia, hemoglobin of 5, and a   history of Plavix use for peripheral vascular disease treatment, an etiology   for small bowel source is raised.  Consideration for capsule endoscopy was   reviewed with the patient as well as the patient's granddaughter.  Risks and   benefits were reviewed.  They opted to proceed with  evaluation.  Patient will   have clear liquids today, n.p.o. after midnight and I did speak with Renny, the   registered nurse, and the endoscopy department who will schedule the   procedure for tomorrow morning, Monday.  Orders were present in electronic   medical record.       ____________________________________     Jules MD FLORA Chakraborty / CHARLI    DD:  06/28/2020 11:03:22  DT:  06/28/2020 11:36:15    D#:  [id number]  Job#:  [id number]

## 2020-06-28 NOTE — PROGRESS NOTES
Patient seen and examined.  Hgb stable since blood transfusion.   Occult anemia. EGD negative yesterday.   Risks and benefits of colonoscopy discussed with patient and grand daughter.   All question answered.

## 2020-06-28 NOTE — PROGRESS NOTES
1400 Call placed to  ext [phone] for HGB lab drawn not completed yet.  1430 Message left on ext [phone] for lab draw HGB still pending draw.

## 2020-06-28 NOTE — PROGRESS NOTES
Patient's granddaughter took 2 rings and 1 earring of patient's home. Patient agreeable and understandable with same.

## 2020-06-28 NOTE — PROGRESS NOTES
1715 Pt transported to MRI via ami , cardiac monitor, 02 4 L oxy mask, ACLS RN, Transport tech. Pt tolerated transport well.    1730 Pt transferred to MRI machine . Scan comp;leted at 1750. Pt tolerated well.     1805 Pt arrived back to R 113. Tolerated scan well.

## 2020-06-28 NOTE — OR NURSING
VSS.  Oximetry > 94% on 4L oxymask.  Denies nausea; abd soft.  Expresses frustration of current status - not knowing origin of blood loss.  Validation / comfort / support measures admin.

## 2020-06-28 NOTE — CARE PLAN
Problem: Communication  Goal: The ability to communicate needs accurately and effectively will improve  Outcome: PROGRESSING AS EXPECTED     Problem: Safety  Goal: Will remain free from injury  Outcome: PROGRESSING AS EXPECTED  Goal: Will remain free from falls  Outcome: PROGRESSING AS EXPECTED     Problem: Pain Management  Goal: Pain level will decrease to patient's comfort goal  Outcome: PROGRESSING AS EXPECTED     Problem: Psychosocial Needs:  Goal: Level of anxiety will decrease  Outcome: PROGRESSING AS EXPECTED     Problem: Skin Integrity  Goal: Risk for impaired skin integrity will decrease  Outcome: PROGRESSING AS EXPECTED

## 2020-06-28 NOTE — ANESTHESIA PREPROCEDURE EVALUATION
86yo F with acute severe anemia from GIB of unknown source, recently started on Plavix for some PVD, and had stroke alert called yesterday for AMS which resolved in [place] position, but CT scan showed acute on chronic epidural hematoma    EGD yesterday negative. Here for colonscopy    Relevant Problems   CARDIAC   (+) Peripheral artery disease (HCC)   (+) Primary hypertension         (+) ESRD (end stage renal disease) (HCC)      ENDO   (+) Type 2 diabetes mellitus with kidney complication, without long-term current use of insulin (HCC)      Other   (+) Acute GI bleeding       Physical Exam    Airway   Mallampati: III  TM distance: >3 FB  Neck ROM: full       Cardiovascular - normal exam  Rhythm: regular  Rate: normal  (-) murmur     Dental - normal exam           Pulmonary - normal exam  Breath sounds clear to auscultation     Abdominal   (+) obese     Neurological - normal exam               Anesthesia Plan    ASA 3- EMERGENT   ASA physical status 3 criteria: CVA or TIA - history (> 3 months)ASA physical status emergent criteria: acute hemorrhage    Plan - general       Airway plan will be natural airway        Induction: intravenous    Postoperative Plan: Postoperative administration of opioids is intended.    Pertinent diagnostic labs and testing reviewed    Informed Consent:    Anesthetic plan and risks discussed with patient.    Use of blood products discussed with: patient whom consented to blood products.

## 2020-06-28 NOTE — ANESTHESIA POSTPROCEDURE EVALUATION
Patient: Corine Dela Cruz    Procedure Summary     Date:  06/28/20 Room / Location:  [facility] 07 / SURGERY [facility]    Anesthesia Start:  1008 Anesthesia Stop:  1054    Procedure:  COLONOSCOPY (N/A Anus) Diagnosis:  (normal)    Surgeon:  Jules Heck M.D. Responsible Provider:  Yana Pina M.D.    Anesthesia Type:  general ASA Status:  3 - Emergent          Final Anesthesia Type: general  Last vitals  BP   Blood Pressure : 134/46    Temp   36.6 °C (97.9 °F)    Pulse   Pulse: 83   Resp   18    SpO2   100 %      Anesthesia Post Evaluation    Patient location during evaluation: PACU  Patient participation: complete - patient participated  Level of consciousness: awake and alert  Pain score: 5    Airway patency: patent  Anesthetic complications: no  Cardiovascular status: hemodynamically stable  Respiratory status: acceptable  Hydration status: euvolemic    PONV: none

## 2020-06-28 NOTE — PROGRESS NOTES
"Chief Complaint   Patient presents with   • Melena   • Weakness       Problem List Items Addressed This Visit     None      Visit Diagnoses     Upper GI bleed        Melena        ESRD on dialysis (HCC)        Uremia        Anemia, unspecified type        Acute cerebrovascular insufficiency with transient focal neurological signs and symptoms        Relevant Medications    amLODIPine (NORVASC) 5 MG Tab    atorvastatin (LIPITOR) 40 MG Tab    heparin injection 3,200 Units    hydrALAZINE (APRESOLINE) injection 10-20 mg      Neurology Progress Note    History of present illness:  This is an 87-year old female with PMhx significant for ESRD on HD Monday/Wed/Fri, associated uremia, currently here visiting from [place], who presented to [facility] on 6/26/20 for a chief complaint of a 2-week history of melanic stools and 1-2 day history of LUE weakness. HPI provided by patient's granddaughter at bedside. Sometime around the time she arrived, the patient was started on Plavix, for unclear reasons (?recently had an angiogram of some sort and was started on this for peripheral vascular disease, reportedly). Shortly thereafter, the patient began to complaint of mild lightheadedness, generalized weakness; also with dark tarry stools. Yesterday, patient's granddaughter noted that patient was having mild difficulty using her LUE. Symptoms persisted throughout the day. This morning, patient was working with PT/OT and she was again note to have LUE weakness, mildly worse today. Granddaughter (whom is a nurse) also visualized melanic stools, and decided to bring patient to ED. At time of presentation here, patient was found to have a hemoglobin of 5.5.; emergently transfused in ED. At appx 1100 today (on day of presentation), patient nurse witnessed patient to suddenly \"stop speaking\" and with question of some facial droop. Stroke Alert was thus called at 1107. Patient was placed in trendelenburg position, and symptoms " began resolving. Patient then had STAT CT head that revealed Right posterior/parietal region epidural fluid collection, a chronic finding; with associated mild acute hemorrhage within fluid collection. Mild associated Right to Left MLS (2.1 cm), no significant mass effect. Neurosurgery reportedly reviewed imaging; no surgical intervention indicated at this time.   Currently, patient is sitting up in bed; awake and alert; oriented and appropriate. Denies headache, admits to mild LUE weakness with mild associated numbness. She currently denies dizziness, problem with vision, speech or swallowing; no recent trauma, however patient admits to numerous falls over the past year, does not recall when most recent fall was.     Neurology has been consulted by Dr. Rosales Suarez to further evaluate the findings noted above.     Interval, 6/28/20:  Patient sitting up in bed; agitated, furstrated regarding her situation and wants to go home. Still with LUE weakness. No events overnight per nursing.     No changes to HPI as was previously documented.     Past medical history:   As noted above.     Past surgical history:   Non contributory.     Family history:   Non contributory.    Social history:   Social History     Socioeconomic History   • Marital status: Unknown     Spouse name: Not on file   • Number of children: Not on file   • Years of education: Not on file   • Highest education level: Not on file   Occupational History   • Not on file   Social Needs   • Financial resource strain: Not on file   • Food insecurity     Worry: Not on file     Inability: Not on file   • Transportation needs     Medical: Not on file     Non-medical: Not on file   Tobacco Use   • Smoking status: Never Smoker   • Smokeless tobacco: Never Used   Substance and Sexual Activity   • Alcohol use: Not on file   • Drug use: Not on file   • Sexual activity: Not on file   Lifestyle   • Physical activity     Days per week: Not on file     Minutes per  session: Not on file   • Stress: Not on file   Relationships   • Social connections     Talks on phone: Not on file     Gets together: Not on file     Attends Tenri[employer] service: Not on file     Active member of club or organization: Not on file     Attends meetings of clubs or organizations: Not on file     Relationship status: Not on file   • Intimate partner violence     Fear of current or ex partner: Not on file     Emotionally abused: Not on file     Physically abused: Not on file     Forced sexual activity: Not on file   Other Topics Concern   • Not on file   Social History Narrative   • Not on file       Current medications:   Current Facility-Administered Medications   Medication Dose   • simethicone (MYLICON) 40 MG/0.6ML drops SUSP 40 mg  40 mg   • ondansetron (ZOFRAN) syringe/vial injection 4 mg  4 mg   • ondansetron (ZOFRAN ODT) dispertab 4 mg  4 mg   • LORazepam (ATIVAN) injection 0.26-0.5 mg  0.26-0.5 mg   • heparin injection 3,200 Units  3,200 Units   • HYDROmorphone pf (DILAUDID) injection 0.5-1 mg  0.5-1 mg   • hydrALAZINE (APRESOLINE) injection 10-20 mg  10-20 mg   • insulin regular (HUMULIN R) injection 1-6 Units  1-6 Units    And   • glucose 4 g chewable tablet 16 g  16 g    And   • dextrose 50% (D50W) injection 50 mL  50 mL       Medication Allergy:  Allergies   Allergen Reactions   • Lexapro      Pt states she doesn't know what happens when she takes lexapro         Review of systems:   Constitutional: denies fever, night sweats, weight loss.   Eyes: denies acute vision change, eye pain or secretion.   Ears, Nose, Mouth, Throat: denies nasal secretion, nasal bleeding, difficulty swallowing, hearing loss, tinnitus, vertigo, ear pain, acute dental problems, oral ulcers or lesions.   Endocrine: denies recent weight changes, heat or cold intolerance, polyuria, polydypsia, polyphagia,abnormal hair growth.  Cardiovascular: denies new onset of chest pain, palpitations, syncope, or dyspnea of  exertion.  Pulmonary: denies shortness of breath, new onset of cough, hemoptysis, wheezing, chest pain or flu-like symptoms.   GI: As noted above; denies nausea, vomiting, diarrhea, GI bleeding, change in appetite, or abdominal pain  : denies dysuria, urinary incontinence, hematuria.  Heme/oncology: denies history of easy bruising or bleeding. No history of cancer, DVTor PE.  Allergy/immunology: denies hives/urticaria, or itching.   Dermatologic: denies new rash, or new skin lesions.  Musculoskeletal:denies joint swelling or pain, muscle pain, neck and back pain.   Neurologic: As noted above.   Psychiatric: denies symptoms of depression, anxiety, hallucinations, mood swings or changes, suicidal or homicidal thoughts.     Physical examination:   Vitals:    06/28/20 1100 06/28/20 1115 06/28/20 1130 06/28/20 1200   BP: 152/67 149/51 134/46 134/46   Pulse: 91 87 81 83   Resp: 17 14 12 18   Temp:    36.6 °C (97.9 °F)   TempSrc:    Temporal   SpO2: 95% 100% 99% 100%   Weight:       Height:         General: Patient in no acute distress, cooperative.  HEENT: Normocephalic, no signs of acute trauma.   Neck: supple, no meningeal signs or carotid bruits. There is normal range of motion. No tenderness on exam.   Chest: clear to auscultation. No cough.   CV: RRR, no murmurs.   Skin: no signs of acute rashes or trauma.   Musculoskeletal: joints exhibit full range of motion, without any pain to palpation. There are no signs of joint or muscle swelling. There is no tenderness to deep palpation of muscles.   Psychiatric: No hallucinatory behavior.       NEUROLOGICAL EXAM:   Mental status, orientation: Awake, alert and fully oriented.   Speech and language: speech is clear and fluent. The patient is able to name, repeat and comprehend.   Memory: There is intact recollection of recent and remote events.   Cranial nerve exam: Pupils are 3-4 mm bilaterally and equally reactive to light. Visual fields are intact by confrontation. There  is no nystagmus on primary or secondary gaze. Intact full EOM in all directions of gaze. Face appears symmetric. Sensation in the face is intact to light touch. Uvula is midline. Palate elevates symmetrically. Tongue is midline and without any signs of tongue biting or fasciculations. Sternocleidomastoid muscles exhibit is normal strength bilaterally. Shoulder shrug is intact bilaterally.   Motor exam: Strength is 5/5 in RUE, 3/5 to LUE; 3+/5 to BLE. Tone is normal. No abnormal movements were seen on exam.   Sensory exam Decreased sensation to light touch to LUE; otheriwse reveals normal sense of light touch and pinprick in all extremities.   Deep tendon reflexes:  2+ throughout. Plantar responses are flexor. There is no clonus.   Coordination: shows a normal finger-nose-finger. Normal rapidly alternating movements.   Gait: Not assessed at this time as patient is a fall risk.     No significant changes to exam as was documented on 6/27/20.      ANCILLARY DATA REVIEWED:     Lab Data Review:  Recent Results (from the past 24 hour(s))   HGB (Hemoglobin) for 48 hours    Collection Time: 06/27/20  2:19 PM   Result Value Ref Range    Hemoglobin 7.4 (L) 12.0 - 16.0 g/dL   EC-ECHOCARDIOGRAM COMPLETE W/ CONT    Collection Time: 06/27/20  5:10 PM   Result Value Ref Range    Eject.Frac. MOD BP 62.4     Eject.Frac. MOD 4C 61.31     Eject.Frac. MOD 2C 64.39    HGB (Hemoglobin) for 48 hours    Collection Time: 06/27/20  6:00 PM   Result Value Ref Range    Hemoglobin 8.6 (L) 12.0 - 16.0 g/dL   HGB (Hemoglobin) for 48 hours    Collection Time: 06/27/20  9:51 PM   Result Value Ref Range    Hemoglobin 6.3 (L) 12.0 - 16.0 g/dL   GRAM STAIN    Collection Time: 06/27/20 11:35 PM    Specimen: Wound   Result Value Ref Range    Significant Indicator .     Source WND     Site RIGHT LEG     Gram Stain Result Moderate WBCs.  No organisms seen.      ACCU-CHEK GLUCOSE    Collection Time: 06/27/20 11:41 PM   Result Value Ref Range    Glucose -  Accu-Ck 95 65 - 99 mg/dL   GRAM STAIN    Collection Time: 06/27/20 11:55 PM    Specimen: Wound   Result Value Ref Range    Significant Indicator .     Source WND     Site LEFT LEG     Gram Stain Result No organisms seen.    HGB (Hemoglobin) for 48 hours    Collection Time: 06/28/20  2:35 AM   Result Value Ref Range    Hemoglobin 7.6 (L) 12.0 - 16.0 g/dL   Comp Metabolic Panel    Collection Time: 06/28/20  6:25 AM   Result Value Ref Range    Sodium 141 135 - 145 mmol/L    Potassium 5.0 3.6 - 5.5 mmol/L    Chloride 106 96 - 112 mmol/L    Co2 23 20 - 33 mmol/L    Anion Gap 12.0 7.0 - 16.0    Glucose 116 (H) 65 - 99 mg/dL    Bun 94 (HH) 8 - 22 mg/dL    Creatinine 5.22 (HH) 0.50 - 1.40 mg/dL    Calcium 6.7 (LL) 8.5 - 10.5 mg/dL    AST(SGOT) 28 12 - 45 U/L    ALT(SGPT) 13 2 - 50 U/L    Alkaline Phosphatase 40 30 - 99 U/L    Total Bilirubin 0.3 0.1 - 1.5 mg/dL    Albumin 2.4 (L) 3.2 - 4.9 g/dL    Total Protein 5.4 (L) 6.0 - 8.2 g/dL    Globulin 3.0 1.9 - 3.5 g/dL    A-G Ratio 0.8 g/dL   CBC WITH DIFFERENTIAL    Collection Time: 06/28/20  6:25 AM   Result Value Ref Range    WBC 12.0 (H) 4.8 - 10.8 K/uL    RBC 2.81 (L) 4.20 - 5.40 M/uL    Hemoglobin 8.2 (L) 12.0 - 16.0 g/dL    Hematocrit 24.9 (L) 37.0 - 47.0 %    MCV 88.6 81.4 - 97.8 fL    MCH 29.2 27.0 - 33.0 pg    MCHC 32.9 (L) 33.6 - 35.0 g/dL    RDW 49.9 35.9 - 50.0 fL    Platelet Count 245 164 - 446 K/uL    MPV 9.8 9.0 - 12.9 fL    Neutrophils-Polys 71.00 44.00 - 72.00 %    Lymphocytes 17.30 (L) 22.00 - 41.00 %    Monocytes 8.50 0.00 - 13.40 %    Eosinophils 1.20 0.00 - 6.90 %    Basophils 0.40 0.00 - 1.80 %    Immature Granulocytes 1.60 (H) 0.00 - 0.90 %    Nucleated RBC 0.20 /100 WBC    Neutrophils (Absolute) 8.55 (H) 2.00 - 7.15 K/uL    Lymphs (Absolute) 2.08 1.00 - 4.80 K/uL    Monos (Absolute) 1.02 (H) 0.00 - 0.85 K/uL    Eos (Absolute) 0.14 0.00 - 0.51 K/uL    Baso (Absolute) 0.05 0.00 - 0.12 K/uL    Immature Granulocytes (abs) 0.19 (H) 0.00 - 0.11 K/uL    NRBC  (Absolute) 0.02 K/uL   MAGNESIUM    Collection Time: 06/28/20  6:25 AM   Result Value Ref Range    Magnesium 1.9 1.5 - 2.5 mg/dL   ESTIMATED GFR    Collection Time: 06/28/20  6:25 AM   Result Value Ref Range    GFR If  9 (A) >60 mL/min/1.73 m 2    GFR If Non  8 (A) >60 mL/min/1.73 m 2   ACCU-CHEK GLUCOSE    Collection Time: 06/28/20  6:29 AM   Result Value Ref Range    Glucose - Accu-Ck 98 65 - 99 mg/dL       Labs reviewed by me.       Imaging reviewed by me:     MR-BRAIN-W/O   Final Result      1.  Mild cerebral atrophy. Age-appropriate.   2.  Moderate-large lentiform extra-axial fluid collection over the right posterior frontal-parietal convexity consistent with a chronic or late subacute epidural hematoma or loculated subdural hematoma. Possible minimal component of more recent    hemorrhage where there is linear bandlike hyperdensity on CT.   3.  Patchy and punctate areas of bright diffusion signal in the underlying right parietal lobe which may represent acute or recent subacute arterial or venous infarction. No acute parenchymal hemorrhage.   4.  Minimal supratentorial white matter disease most consistent with microvascular ischemic change.   5.  Old lacunar infarct left lateral thalamus.      MR-MRA HEAD-W/O   Final Result      1.  Intracranial atherosclerotic cerebrovascular stenotic disease involving the posterior cerebral arteries and probably the middle cerebral arteries beyond the genu. No alexx occlusion.   2.  Normal variant dominant caliber right anterior cerebral artery A1 segment with a hypoplastic left A1 segment.   3.  No evidence of aneurysm about [place].      EC-ECHOCARDIOGRAM COMPLETE W/ CONT   Final Result      US-CAROTID DOPPLER BILAT   Final Result      DX-CHEST-PORTABLE (1 VIEW)   Final Result      No acute cardiopulmonary abnormality.      CT-HEAD W/O   Final Result      1. Small amount of acute hemorrhage in a chronic 2.1 cm right epidural  "collection, along the right frontoparietal convexity. Associated 2 mm right to left midline shift.   2. Minimal subacute subdural hemorrhage anterior to the right epidural collection.   3. Hyperdensity in the temporal horn of the right lateral ventricle may represent small amount of intraventricular hemorrhage.   4. No CT evidence of acute infarct.            ASSESSMENT AND PLAN:  87-year old female with PMhx significant for ESRD on HD Monday/Wed/Fri, associated uremia, currently here visiting from [place], who presented to [facility] on 6/26/20 for a chief complaint of a 2-week history of melanic stools and 1-2 day history of LUE weakness; at time of presentation here found to have hemoglobin 5.5, emergently transfused; while in ED patient's nurse witnessed patient to suddenly \"stop speaking\" and with question of some facial droop; STAT CT head revealed Right posterior/parietal region epidural fluid collection, a chronic finding; with associated mild acute hemorrhage within fluid collection. Mild associated Right to Left MLS (2.1 cm), no significant mass effect. Neurosurgery reportedly reviewed imaging; no surgical intervention indicated at this time. Patient was ultimately transferred to ICU for labile blood pressure, waxing and waning neurological deficits. Still with LUE weakness, unchanged. Hemoglobin now somewhat stable at 8.2. Patient had negative endoscopy yesterday; colonoscopy planned for this morning. Additionally, MRI Brain has revealed/further confirmed chronic-appearing Right parietal epidural fluid collection with very mild/resolving hemorrhage within the fluid collection; also noted patchy area of restricted diffusion, likely ischemia to same area, Right parietal/occipital region.   Feel it very likely that this area of restricted diffusion is related to same area of epidural fluid collection (likely an acute on chronic process); reasonable to start patient on ASA (NO plavix) IF repeat CT " head in 2 weeks is stable AND when cleared by GI.      Additional Recommendations/Plan:     -q4h and PRN neuro assessment. VS per nursing/unit protocol. SBP <150. Antihypertensives per primary team.   -Continue to void all antithrombotics/anticoagulation/blood thinners at this time.   -As was noted above, recommend repeat CT head in 2 weeks (7/12/20); if stable, and OK with GI, start ASA 81 mg PO q day.    -Note BL Carotid doppler with 50-69% stenosis BL; consider outpatient vascular follow up; this also supports initiation of ASA when appropriate.   -Telemetry; currently SR. Screen for arrhythmia. Note TTE with normal EF, moderately dilated Left atrium. If cardiac work up here is unrevealing, will recommend outpatient holter/loop/zio patch to formally rule out cardiac arrhythmia/Afib.   -Maintain strict euthermia, euglycemia, eunamtremia, euvolemia. Current Na is 141.   -PT/OT/SLP eval and treat.   -Will defer all other medical management to primary team and to nephrology, who has also been consulted. Per discussion with Dr. Nelson, continue gentle dialysis (dialyzed yesterday; planned again for Monday), if possible avoid hyponatremia. Patient also planned for colonoscopy per GI; OK to proceed with this from a neurological perspective.   -DVT PPX: SCDs.     The plan of care above has been discussed with Dr. Solis. Other than the above, no further recommendations from a neurological perspective. Will place referral to outpatient neurology for follow up after discharge. Please call with questions.     Emilee Alvarez, [email or web address].  [facility]

## 2020-06-28 NOTE — ANESTHESIA QCDR
2019 [facility] Clinical Data Registry (for Quality Improvement)     Postoperative nausea/vomiting risk protocol (Adult = 18 yrs and Pediatric 3-17 yrs)- (430 and 463)  General inhalation anesthetic (NOT TIVA) with PONV risk factors: No  Provision of anti-emetic therapy with at least 2 different classes of agents: N/A  Patient DID NOT receive anti-emetic therapy and reason is documented in Medical Record: N/A    Multimodal Pain Management- (477)  Non-emergent surgery AND patient age >= 18: No  Use of Multimodal Pain Management, two or more drugs and/or interventions, NOT including systemic opioids:   Exception: Documented allergy to multiple classes of analgesics:     Smoking Abstinence (404)  Patient is current smoker (cigarette, pipe, e-cig, marijuanna): No  Elective Surgery:   Abstinence instructions provided prior to day of surgery:   Patient abstained from smoking on day of surgery:     Pre-Op Beta-Blocker in Isolated CABG (44)  Isolated CABG AND patient age >= 18: No  Beta-blocker admin within 24 hours of surgical incision:   Exception:of medical reason(s) for not administering beta blocker within 24 hours prior to surgical incision (e.g., not  indicated,other medical reason):     PACU assessment of acute postoperative pain prior to Anesthesia Care End- Applies to Patients Age = 18- (ABG7)  Initial PACU pain score is which of the following: < 7/10  Patient unable to report pain score: N/A    Post-anesthetic transfer of care checklist/protocol to PACU/ICU- (426 and 427)  Upon conclusion of case, patient transferred to which of the following locations: PACU/Non-ICU  Use of transfer checklist/protocol: Yes  Exclusion: Service Performed in Patient Hospital Room (and thus did not require transfer): N/A  Unplanned admission to ICU related to anesthesia service up through end of PACU care- (MD51)  Unplanned admission to ICU (not initially anticipated at anesthesia start time): No

## 2020-06-28 NOTE — CARE PLAN
Problem: Respiratory:  Goal: Respiratory status will improve  Outcome: PROGRESSING AS EXPECTED     Problem: Pain Management  Goal: Pain level will decrease to patient's comfort goal  Outcome: PROGRESSING SLOWER THAN EXPECTED

## 2020-06-28 NOTE — PROGRESS NOTES
Patient not available for interview/exam, in endoscopy. No urgent need for HD based on chart review and discussion with intensivist. Note Ca low, will check MBD labs with renal panel in AM. Will utilize 3Ca bath tomorrow if needed.

## 2020-06-28 NOTE — PROGRESS NOTES
0945 Pt transported to Pre Op holding via bed, ACLS RN, Transport tech, cardiac monitor, 02 4 l oxy mask. Report given to Ann-Marie VILLEGAS and Anesthesia MD.

## 2020-06-28 NOTE — PROGRESS NOTES
"1230 RN x2 unable to draw HGB lab on Pt. Stat  called at ext [phone] to draw lab.  1300 F/U call placed to ext [phone] to have HGB lab drawn.   1330 F/U call placed to ext [phone] to have HGB lab drawn. Tech states they will draw lab shortly, currently drawing \"stat\" lab orders.   "

## 2020-06-29 ENCOUNTER — APPOINTMENT (OUTPATIENT)
Dept: RADIOLOGY | Facility: MEDICAL CENTER | Age: 85
DRG: 377 | End: 2020-06-29
Attending: PSYCHIATRY & NEUROLOGY
Payer: MEDICARE

## 2020-06-29 LAB
25(OH)D3 SERPL-MCNC: 19 NG/ML (ref 30–100)
ALBUMIN SERPL BCP-MCNC: 2.4 G/DL (ref 3.2–4.9)
ALBUMIN/GLOB SERPL: 0.8 G/DL
ALP SERPL-CCNC: 41 U/L (ref 30–99)
ALT SERPL-CCNC: 11 U/L (ref 2–50)
ANION GAP SERPL CALC-SCNC: 12 MMOL/L (ref 7–16)
AST SERPL-CCNC: 21 U/L (ref 12–45)
BASOPHILS # BLD AUTO: 0.3 % (ref 0–1.8)
BASOPHILS # BLD: 0.04 K/UL (ref 0–0.12)
BILIRUB SERPL-MCNC: 0.2 MG/DL (ref 0.1–1.5)
BUN SERPL-MCNC: 95 MG/DL (ref 8–22)
CALCIUM SERPL-MCNC: 6.8 MG/DL (ref 8.5–10.5)
CHLORIDE SERPL-SCNC: 107 MMOL/L (ref 96–112)
CO2 SERPL-SCNC: 23 MMOL/L (ref 20–33)
CREAT SERPL-MCNC: 5.49 MG/DL (ref 0.5–1.4)
EOSINOPHIL # BLD AUTO: 0.2 K/UL (ref 0–0.51)
EOSINOPHIL NFR BLD: 1.7 % (ref 0–6.9)
ERYTHROCYTE [DISTWIDTH] IN BLOOD BY AUTOMATED COUNT: 51.2 FL (ref 35.9–50)
GLOBULIN SER CALC-MCNC: 3 G/DL (ref 1.9–3.5)
GLUCOSE BLD-MCNC: 100 MG/DL (ref 65–99)
GLUCOSE SERPL-MCNC: 113 MG/DL (ref 65–99)
HCT VFR BLD AUTO: 23.5 % (ref 37–47)
HGB BLD-MCNC: 7.4 G/DL (ref 12–16)
IMM GRANULOCYTES # BLD AUTO: 0.22 K/UL (ref 0–0.11)
IMM GRANULOCYTES NFR BLD AUTO: 1.9 % (ref 0–0.9)
INR PPP: 1.05 (ref 0.87–1.13)
IRON SATN MFR SERPL: 25 % (ref 15–55)
IRON SERPL-MCNC: 33 UG/DL (ref 40–170)
LACTATE BLD-SCNC: 1 MMOL/L (ref 0.5–2)
LACTATE BLD-SCNC: 1.2 MMOL/L (ref 0.5–2)
LYMPHOCYTES # BLD AUTO: 1.75 K/UL (ref 1–4.8)
LYMPHOCYTES NFR BLD: 14.9 % (ref 22–41)
MAGNESIUM SERPL-MCNC: 2 MG/DL (ref 1.5–2.5)
MCH RBC QN AUTO: 28.9 PG (ref 27–33)
MCHC RBC AUTO-ENTMCNC: 31.5 G/DL (ref 33.6–35)
MCV RBC AUTO: 91.8 FL (ref 81.4–97.8)
MONOCYTES # BLD AUTO: 0.79 K/UL (ref 0–0.85)
MONOCYTES NFR BLD AUTO: 6.7 % (ref 0–13.4)
NEUTROPHILS # BLD AUTO: 8.71 K/UL (ref 2–7.15)
NEUTROPHILS NFR BLD: 74.5 % (ref 44–72)
NRBC # BLD AUTO: 0 K/UL
NRBC BLD-RTO: 0 /100 WBC
PHOSPHATE SERPL-MCNC: 5.8 MG/DL (ref 2.5–4.5)
PLATELET # BLD AUTO: 255 K/UL (ref 164–446)
PMV BLD AUTO: 9.8 FL (ref 9–12.9)
POTASSIUM SERPL-SCNC: 5.4 MMOL/L (ref 3.6–5.5)
PROT SERPL-MCNC: 5.4 G/DL (ref 6–8.2)
PROTHROMBIN TIME: 14 SEC (ref 12–14.6)
PTH-INTACT SERPL-MCNC: 196 PG/ML (ref 14–72)
RBC # BLD AUTO: 2.56 M/UL (ref 4.2–5.4)
SODIUM SERPL-SCNC: 142 MMOL/L (ref 135–145)
TIBC SERPL-MCNC: 130 UG/DL (ref 250–450)
UIBC SERPL-MCNC: 97 UG/DL (ref 110–370)
WBC # BLD AUTO: 11.7 K/UL (ref 4.8–10.8)

## 2020-06-29 PROCEDURE — A9270 NON-COVERED ITEM OR SERVICE: HCPCS | Performed by: INTERNAL MEDICINE

## 2020-06-29 PROCEDURE — 83550 IRON BINDING TEST: CPT

## 2020-06-29 PROCEDURE — 36415 COLL VENOUS BLD VENIPUNCTURE: CPT

## 2020-06-29 PROCEDURE — 84100 ASSAY OF PHOSPHORUS: CPT

## 2020-06-29 PROCEDURE — 83605 ASSAY OF LACTIC ACID: CPT

## 2020-06-29 PROCEDURE — 160048 HCHG OR STATISTICAL LEVEL 1-5: Performed by: INTERNAL MEDICINE

## 2020-06-29 PROCEDURE — 92526 ORAL FUNCTION THERAPY: CPT

## 2020-06-29 PROCEDURE — 83540 ASSAY OF IRON: CPT

## 2020-06-29 PROCEDURE — 99233 SBSQ HOSP IP/OBS HIGH 50: CPT | Performed by: HOSPITALIST

## 2020-06-29 PROCEDURE — 85025 COMPLETE CBC W/AUTO DIFF WBC: CPT

## 2020-06-29 PROCEDURE — 700111 HCHG RX REV CODE 636 W/ 250 OVERRIDE (IP): Performed by: INTERNAL MEDICINE

## 2020-06-29 PROCEDURE — 90935 HEMODIALYSIS ONE EVALUATION: CPT

## 2020-06-29 PROCEDURE — 83735 ASSAY OF MAGNESIUM: CPT

## 2020-06-29 PROCEDURE — 770006 HCHG ROOM/CARE - MED/SURG/GYN SEMI*

## 2020-06-29 PROCEDURE — 83970 ASSAY OF PARATHORMONE: CPT

## 2020-06-29 PROCEDURE — 85610 PROTHROMBIN TIME: CPT

## 2020-06-29 PROCEDURE — 80053 COMPREHEN METABOLIC PANEL: CPT

## 2020-06-29 PROCEDURE — 99233 SBSQ HOSP IP/OBS HIGH 50: CPT | Performed by: PSYCHIATRY & NEUROLOGY

## 2020-06-29 PROCEDURE — 82962 GLUCOSE BLOOD TEST: CPT

## 2020-06-29 PROCEDURE — 97162 PT EVAL MOD COMPLEX 30 MIN: CPT

## 2020-06-29 PROCEDURE — 5A1D70Z PERFORMANCE OF URINARY FILTRATION, INTERMITTENT, LESS THAN 6 HOURS PER DAY: ICD-10-PCS | Performed by: INTERNAL MEDICINE

## 2020-06-29 PROCEDURE — 97165 OT EVAL LOW COMPLEX 30 MIN: CPT

## 2020-06-29 PROCEDURE — 700102 HCHG RX REV CODE 250 W/ 637 OVERRIDE(OP): Performed by: INTERNAL MEDICINE

## 2020-06-29 PROCEDURE — 70450 CT HEAD/BRAIN W/O DYE: CPT

## 2020-06-29 PROCEDURE — 82306 VITAMIN D 25 HYDROXY: CPT

## 2020-06-29 RX ORDER — AMLODIPINE BESYLATE 5 MG/1
5 TABLET ORAL ONCE
Status: DISPENSED | OUTPATIENT
Start: 2020-06-29 | End: 2020-06-30

## 2020-06-29 RX ORDER — ALPRAZOLAM 0.5 MG/1
0.5 TABLET ORAL 3 TIMES DAILY PRN
Status: DISCONTINUED | OUTPATIENT
Start: 2020-06-29 | End: 2020-06-30

## 2020-06-29 RX ORDER — AMLODIPINE BESYLATE 10 MG/1
10 TABLET ORAL
Status: DISCONTINUED | OUTPATIENT
Start: 2020-06-30 | End: 2020-07-01

## 2020-06-29 RX ORDER — ERGOCALCIFEROL 1.25 MG/1
50000 CAPSULE ORAL
Status: DISCONTINUED | OUTPATIENT
Start: 2020-06-29 | End: 2020-07-09 | Stop reason: HOSPADM

## 2020-06-29 RX ORDER — CALCIUM ACETATE 667 MG/1
667 TABLET ORAL
Status: DISCONTINUED | OUTPATIENT
Start: 2020-06-29 | End: 2020-07-09 | Stop reason: HOSPADM

## 2020-06-29 RX ORDER — AMLODIPINE BESYLATE 5 MG/1
5 TABLET ORAL
Status: DISCONTINUED | OUTPATIENT
Start: 2020-06-29 | End: 2020-06-29

## 2020-06-29 RX ADMIN — ACETAMINOPHEN 650 MG: 325 TABLET, FILM COATED ORAL at 05:54

## 2020-06-29 RX ADMIN — HYDROMORPHONE HYDROCHLORIDE 1 MG: 1 INJECTION, SOLUTION INTRAMUSCULAR; INTRAVENOUS; SUBCUTANEOUS at 22:09

## 2020-06-29 RX ADMIN — HYDROMORPHONE HYDROCHLORIDE 0.5 MG: 1 INJECTION, SOLUTION INTRAMUSCULAR; INTRAVENOUS; SUBCUTANEOUS at 12:58

## 2020-06-29 RX ADMIN — HYDROMORPHONE HYDROCHLORIDE 1 MG: 1 INJECTION, SOLUTION INTRAMUSCULAR; INTRAVENOUS; SUBCUTANEOUS at 01:32

## 2020-06-29 RX ADMIN — ATORVASTATIN CALCIUM 40 MG: 40 TABLET, FILM COATED ORAL at 17:53

## 2020-06-29 RX ADMIN — HYDROMORPHONE HYDROCHLORIDE 1 MG: 1 INJECTION, SOLUTION INTRAMUSCULAR; INTRAVENOUS; SUBCUTANEOUS at 18:52

## 2020-06-29 RX ADMIN — Medication 667 MG: at 17:53

## 2020-06-29 RX ADMIN — LORAZEPAM 0.5 MG: 2 INJECTION INTRAMUSCULAR; INTRAVENOUS at 05:48

## 2020-06-29 RX ADMIN — HYDROMORPHONE HYDROCHLORIDE 1 MG: 1 INJECTION, SOLUTION INTRAMUSCULAR; INTRAVENOUS; SUBCUTANEOUS at 04:23

## 2020-06-29 RX ADMIN — HEPARIN SODIUM 3200 UNITS: 1000 INJECTION, SOLUTION INTRAVENOUS; SUBCUTANEOUS at 09:00

## 2020-06-29 RX ADMIN — HYDROMORPHONE HYDROCHLORIDE 1 MG: 1 INJECTION, SOLUTION INTRAMUSCULAR; INTRAVENOUS; SUBCUTANEOUS at 15:21

## 2020-06-29 RX ADMIN — ACETAMINOPHEN 650 MG: 325 TABLET, FILM COATED ORAL at 11:59

## 2020-06-29 RX ADMIN — ACETAMINOPHEN 650 MG: 325 TABLET, FILM COATED ORAL at 17:53

## 2020-06-29 RX ADMIN — ERGOCALCIFEROL 50000 UNITS: 1.25 CAPSULE ORAL at 11:59

## 2020-06-29 ASSESSMENT — ENCOUNTER SYMPTOMS
HEADACHES: 0
BLURRED VISION: 0
MUSCULOSKELETAL NEGATIVE: 1
NAUSEA: 0
RESPIRATORY NEGATIVE: 1
NERVOUS/ANXIOUS: 1
DEPRESSION: 0
BLOOD IN STOOL: 0
NECK PAIN: 0
MEMORY LOSS: 1
COUGH: 0
EYES NEGATIVE: 1
WEAKNESS: 1
BRUISES/BLEEDS EASILY: 0
FOCAL WEAKNESS: 0
POLYDIPSIA: 0
WEAKNESS: 0
FEVER: 0
ABDOMINAL PAIN: 0
PSYCHIATRIC NEGATIVE: 1
DIZZINESS: 0
BACK PAIN: 0
CARDIOVASCULAR NEGATIVE: 1
PALPITATIONS: 0
CONSTITUTIONAL NEGATIVE: 1
CHILLS: 0
DOUBLE VISION: 0
HEARTBURN: 0
NEUROLOGICAL NEGATIVE: 1
VOMITING: 0

## 2020-06-29 ASSESSMENT — COGNITIVE AND FUNCTIONAL STATUS - GENERAL
CLIMB 3 TO 5 STEPS WITH RAILING: TOTAL
MOBILITY SCORE: 6
DAILY ACTIVITIY SCORE: 13
DRESSING REGULAR UPPER BODY CLOTHING: A LOT
EATING MEALS: A LITTLE
TOILETING: A LOT
DRESSING REGULAR LOWER BODY CLOTHING: A LOT
HELP NEEDED FOR BATHING: A LOT
MOVING TO AND FROM BED TO CHAIR: UNABLE
TURNING FROM BACK TO SIDE WHILE IN FLAT BAD: UNABLE
MOVING FROM LYING ON BACK TO SITTING ON SIDE OF FLAT BED: UNABLE
SUGGESTED CMS G CODE MODIFIER DAILY ACTIVITY: CL
PERSONAL GROOMING: A LOT
STANDING UP FROM CHAIR USING ARMS: TOTAL
WALKING IN HOSPITAL ROOM: TOTAL
SUGGESTED CMS G CODE MODIFIER MOBILITY: CN

## 2020-06-29 ASSESSMENT — GAIT ASSESSMENTS: GAIT LEVEL OF ASSIST: UNABLE TO PARTICIPATE

## 2020-06-29 NOTE — PROGRESS NOTES
[facility] Medicine Daily Progress Note    Date of Service  6/29/2020    Chief Complaint  87 y.o. female admitted 6/26/2020 with melena and weakness, patient has a history of CVA, end-stage renal disease on hemodialysis Monday Wednesday Friday, peripheral arterial disease, the patient apparently is visiting from [place], she has 2 sons in town, she also complained of left-sided weakness, speech difficulty apparently and also some residual deficits from her previous CVA, the patient does have some chronic lower extremity wounds that she is being treated with antibiotics for, the patient on presentation was imaged and was found to have a right-sided chronic epidural hematoma with some mild acute hemorrhage within the lesion, not a surgical candidate.    Hospital Course    Patient admitted with Severe blood loss anemia, chronic right-sided subdural hematoma without intervention indicated at this time, chronic end-stage renal disease on hemodialysis  Interval Problem Update  Patient seen and examined today.    Patient tolerating treatment and therapies.  All Data, Medication data reviewed.  Case discussed with nursing as available.  Plan of Care reviewed with patient and notified of changes.  6/29 patient appears fairly confused, we discussed the findings of her capsule endoscopy, need for additional push endoscopy, she is somewhat resistant to the idea to undergo another procedure, the patient is otherwise afebrile, heart rate in the 80s to 100s, blood pressure is stabilized, patient has stable left upper extremity weakness and bilateral lower extremity weakness, patient with significant anxiety overall, seen by nephrology, gastroenterology, intensive care physicians, stabilized for transfer out of ICU  Consultants/Specialty  Nephrology, gastroenterology, intensivist    Code Status  DNR/DNI    Disposition  TBD    Review of Systems  Review of Systems   Constitutional: Negative.  Negative for chills and fever.   HENT:  Positive for hearing loss.    Eyes: Negative.    Respiratory: Negative.  Negative for cough.    Cardiovascular: Negative.  Negative for chest pain and palpitations.   Gastrointestinal: Positive for melena. Negative for abdominal pain, heartburn, nausea and vomiting.   Genitourinary: Negative.  Negative for dysuria and frequency.   Musculoskeletal: Negative.  Negative for back pain and neck pain.   Skin: Negative.  Negative for itching and rash.   Neurological: Negative.  Negative for dizziness, focal weakness, weakness and headaches.   Endo/Heme/Allergies: Negative.  Negative for polydipsia. Does not bruise/bleed easily.   Psychiatric/Behavioral: Positive for memory loss. Negative for depression. The patient is nervous/anxious.         Physical Exam  Temp:  [36.3 °C (97.3 °F)-37.2 °C (98.9 °F)] 36.7 °C (98 °F)  Pulse:  [] 91  Resp:  [8-29] 17  BP: (100-164)/(49-96) 148/67  SpO2:  [91 %-100 %] 91 %    Physical Exam  Vitals signs and nursing note reviewed.   Constitutional:       Appearance: She is well-developed. She is not diaphoretic.   HENT:      Head: Normocephalic and atraumatic.      Nose: Nose normal.   Eyes:      Conjunctiva/sclera: Conjunctivae normal.      Pupils: Pupils are equal, round, and reactive to light.   Neck:      Musculoskeletal: Normal range of motion and neck supple.      Thyroid: No thyromegaly.      Vascular: No JVD.   Cardiovascular:      Rate and Rhythm: Normal rate and regular rhythm.      Heart sounds: Normal heart sounds. No friction rub. No gallop.    Pulmonary:      Effort: Pulmonary effort is normal.      Breath sounds: Normal breath sounds. No wheezing or rales.   Abdominal:      General: Bowel sounds are normal. There is no distension.      Palpations: Abdomen is soft. There is no mass.      Tenderness: There is no abdominal tenderness. There is no guarding or rebound.   Musculoskeletal: Normal range of motion.         General: No tenderness.   Lymphadenopathy:      Cervical:  No cervical adenopathy.   Skin:     General: Skin is warm and dry.      Coloration: Skin is pale.   Neurological:      Mental Status: She is alert. Mental status is at baseline. She is disoriented.      Cranial Nerves: No cranial nerve deficit.      Motor: Weakness present.      Coordination: Coordination abnormal.   Psychiatric:         Behavior: Behavior normal.         Fluids    Intake/Output Summary (Last 24 hours) at 6/29/2020 1539  Last data filed at 6/29/2020 1300  Gross per 24 hour   Intake 540 ml   Output 1400 ml   Net -860 ml       Laboratory  Recent Labs     06/27/20  0215  06/28/20  0625 06/28/20  1450 06/28/20  1846 06/29/20  0532   WBC 10.7  --  12.0*  --   --  11.7*   RBC 2.12*  --  2.81*  --   --  2.56*   HEMOGLOBIN 6.0*   < > 8.2* 7.3* 9.2* 7.4*   HEMATOCRIT 18.7*  --  24.9*  --   --  23.5*   MCV 86.8  --  88.6  --   --  91.8   MCH 27.8  --  29.2  --   --  28.9   MCHC 32.1*  --  32.9*  --   --  31.5*   RDW 50.1*  --  49.9  --   --  51.2*   PLATELETCT 248  --  245  --   --  255   MPV 10.0  --  9.8  --   --  9.8    < > = values in this interval not displayed.     Recent Labs     06/27/20 0215 06/28/20  0625 06/29/20  0532   SODIUM 141 141 142   POTASSIUM 4.3 5.0 5.4   CHLORIDE 107 106 107   CO2 21 23 23   GLUCOSE 130* 116* 113*   BUN 60* 94* 95*   CREATININE 3.59* 5.22* 5.49*   CALCIUM 7.0* 6.7* 6.8*             Recent Labs     06/27/20 0215   TRIGLYCERIDE 137   HDL 25*   LDL 50       Imaging  CT-HEAD W/O   Final Result      No significant interval change.      MR-BRAIN-W/O   Final Result      1.  Mild cerebral atrophy. Age-appropriate.   2.  Moderate-large lentiform extra-axial fluid collection over the right posterior frontal-parietal convexity consistent with a chronic or late subacute epidural hematoma or loculated subdural hematoma. Possible minimal component of more recent    hemorrhage where there is linear bandlike hyperdensity on CT.   3.  Patchy and punctate areas of bright diffusion  signal in the underlying right parietal lobe which may represent acute or recent subacute arterial or venous infarction. No acute parenchymal hemorrhage.   4.  Minimal supratentorial white matter disease most consistent with microvascular ischemic change.   5.  Old lacunar infarct left lateral thalamus.      MR-MRA HEAD-W/O   Final Result      1.  Intracranial atherosclerotic cerebrovascular stenotic disease involving the posterior cerebral arteries and probably the middle cerebral arteries beyond the genu. No alexx occlusion.   2.  Normal variant dominant caliber right anterior cerebral artery A1 segment with a hypoplastic left A1 segment.   3.  No evidence of aneurysm about [place].      EC-ECHOCARDIOGRAM COMPLETE W/ CONT   Final Result      US-CAROTID DOPPLER BILAT   Final Result      DX-CHEST-PORTABLE (1 VIEW)   Final Result      No acute cardiopulmonary abnormality.      CT-HEAD W/O   Final Result      1. Small amount of acute hemorrhage in a chronic 2.1 cm right epidural collection, along the right frontoparietal convexity. Associated 2 mm right to left midline shift.   2. Minimal subacute subdural hemorrhage anterior to the right epidural collection.   3. Hyperdensity in the temporal horn of the right lateral ventricle may represent small amount of intraventricular hemorrhage.   4. No CT evidence of acute infarct.           Assessment/Plan  * Acute GI bleeding  Assessment & Plan  With melena, EGD and colonoscopy did not see any active lesions, capsule endoscopy identified a possible source of 50 cm, plan for push endoscopy  Continuous cardiac monitoring  Continue PPI for now  Monitor H&H every 8 hours, transfuse for hemoglobin less than 7  Gastroenterology is following    Epidural hemorrhage (HCC)  Assessment & Plan  2.3 cm with 2 mm shift  Neurosurgery consulted and recommended no intervention  Neurology consulted, MRI and MRA resulted, possible recent small CVA      Leg wounds  Assessment &  Plan  Wound culture sent  On ciprofloxacin    Peripheral artery disease (HCC)  Assessment & Plan  Hold aspirin and Plavix    Type 2 diabetes mellitus with kidney complication, without long-term current use of insulin ([facility])  Assessment & Plan  Start on insulin sliding scale with serial Accu-Checks  Check hemoglobin A1c  Hypoglycemic protocol in place        Primary hypertension  Assessment & Plan  Restart medication    ESRD (end stage renal disease) ([facility])  Assessment & Plan  Dialysis Monday Wednesday Friday,   nephrology is consulted  Monitor BMP and assess response  Avoid IV contrast/nephrotoxins/NSAIDs  Dose adjust meds for decreased GFR        Acute blood loss anemia  Assessment & Plan  Given 3 units of RBCs, 1 unit of platelets  Follow closely, additional transfusion as indicated     Plan  Await additional endoscopy as per gastroenterology, push endoscopy for 6/30  Continue current medication regimen, adjust as indicated  Hold antiplatelet therapy until gastroenterology approves and GI bleeding has been resolving  Overall significant debility and 87-year-old  Might need rehab, question if back, [place]  See orders  Acute complex high risk  A.m. labs  VTE prophylaxis: SCD    I have performed a physical exam and reviewed and updated ROS and Plan today . In review of yesterday's note , there are no changes except as documented above.        Please note that this dictation was created using voice recognition software. I have made every reasonable attempt to correct obvious errors, but I expect that there are errors of grammar and possibly context that I did not discover before finalizing the note.

## 2020-06-29 NOTE — PROGRESS NOTES
GI - Capsule Endoscopy    Please see report for full details.   There appears to be a bleeding source at ~50 minutes distal to the pylorus, which is possibly reachable by push enteroscopy.  Will plan on proceeding in AM with push enteroscopy.  Orders placed in chart.

## 2020-06-29 NOTE — THERAPY
Occupational Therapy   Initial Evaluation     Patient Name: Corine Dela Cruz  Age:  87 y.o., Sex:  female  Medical Record #: [id number]  Today's Date: 6/29/2020     Precautions  Precautions: Fall Risk, Swallow Precautions ( See Comments)(L-sided deficits, L visual inattention)  Comments: L sided weakness with report of right UE weakness    Assessment  Patient is 87 y.o. female with a diagnosis of GIB.  Pt currently limited by decreased functional mobility, activity tolerance, cognition, sensation, strength, AROM, coordination, balance, and pain which are affecting pt's ability to complete ADLs/IADLs at baseline. Pt would benefit from OT services in the acute care setting to maximize functional recovery.      Plan    Recommend Occupational Therapy 3 times per week until therapy goals are met for the following treatments:  Neuro Re-Education / Balance, Self Care/Activities of Daily Living and Therapeutic Activities.    Discharge recommendations:  Recommend post-acute placement for additional occupational therapy services prior to discharge home.             06/29/20 0918   Prior Living Situation   Prior Services None   Housing / Facility [address] [facility]   Steps Into Home 0   Equipment Owned Front-Wheel Walker   Lives with - Patient's Self Care Capacity Adult Children  (DTR assists with ADLs)   Prior Level of ADL Function   Self Feeding Unable To Determine At This Time   Dressing Unable To Determine At This Time   ADL Assessment   Grooming Moderate Assist   Upper Body Dressing Maximal Assist   Lower Body Dressing Maximal Assist   Toileting Maximal Assist   Functional Mobility   Sit to Stand Unable to Participate   Short Term Goals   Short Term Goal # 1 supervised with UB dressing   Short Term Goal # 2 min A with LB dressing   Short Term Goal # 3 min A with ADL txfs   Session Information   Priority 2

## 2020-06-29 NOTE — WOUND TEAM
[facility]  Inpatient Services  Initial Wound and Skin Care Evaluation    Admission Date: 6/26/2020     Last order of IP CONSULT TO WOUND CARE was found on 6/26/2020 from Hospital Encounter on 6/26/2020       HPI, PMH, SH: Reviewed    Unit where seen by Wound Team: R113/00     WOUND CONSULT/FOLLOW UP RELATED TO:  Posterior BLE      Self Report / Pain Level:  C/o pain with wound cleanser, used NS       OBJECTIVE:  On LISETTE bed, drsgs to BLE, heels floated off pillows, silicone O2 with grey foam    WOUND TYPE, LOCATION, CHARACTERISTICS (Pressure Injuries: location, stage, POA or date identified)  Wound 06/26/20 Venous Ulcer Leg Right (Active)       06/26/20 1653   Site Assessment Red;Pink    Periwound Assessment Hemosiderin Staining    Margins Defined edges    Closure Secondary intention    Drainage Amount Scant    Drainage Description Serous    Treatments Cleansed    Wound Cleansing Normal Saline Irrigation    Dressing Cleansing/Solutions Not Applicable    Dressing Options Hydrofera Blue Ready;Mepilex;Tubigrip    Dressing Changed Changed    Dressing Status Intact    Dressing Change/Treatment Frequency Every 72 hrs, and As Needed    NEXT Dressing Change/Treatment Date 07/01/20    NEXT Weekly Photo (Inpatient Only) 07/03/20    Non-staged Wound Description Full thickness    Wound Length (cm) 10 cm 06/28/20 1700   Wound Width (cm) 3.5 cm 06/28/20 1700   Wound Surface Area (cm^2) 35 cm^2 06/28/20 1700   Tunneling (cm) 0 cm 06/28/20 1700   Undermining (cm) 0 cm 06/28/20 1700   Shape irregular    Wound Odor None    Pulses 1+;DP    Exposed Structures None    Number of days: 2       Wound 06/26/20 Venous Ulcer Leg Left (Active)      06/26/20 1656   Site Assessment Red;Pink;Purple    Periwound Assessment Hemosiderin Staining    Margins Defined edges    Closure Secondary intention    Drainage Amount Scant    Drainage Description Serous    Treatments Cleansed    Wound Cleansing Normal Saline Irrigation    Periwound  Protectant Not Applicable    Dressing Cleansing/Solutions Not Applicable    Dressing Options Hydrofera Blue Ready;Silicone Adhesive Foam;Tubigrip    Dressing Changed Changed    Dressing Status Intact    Dressing Change/Treatment Frequency Every 72 hrs, and As Needed    NEXT Dressing Change/Treatment Date 07/01/20    NEXT Weekly Photo (Inpatient Only) 07/03/20    Non-staged Wound Description Full thickness    Wound Length (cm) 5 cm 06/28/20 1700   Wound Width (cm) 4 cm 06/28/20 1700   Wound Surface Area (cm^2) 20 cm^2 06/28/20 1700   Tunneling (cm) 0 cm 06/28/20 1700   Undermining (cm) 0 cm 06/28/20 1700   Shape irregular    Wound Odor None    Pulses 1+;DP    Exposed Structures None    Number of days: 2          Vascular:    FRANCISCO:   No results found.      Lab Values:    Lab Results   Component Value Date/Time    WBC 12.0 (H) 06/28/2020 06:25 AM    RBC 2.81 (L) 06/28/2020 06:25 AM    HEMOGLOBIN 7.3 (L) 06/28/2020 02:50 PM    HEMATOCRIT 24.9 (L) 06/28/2020 06:25 AM            Culture Results show:  No results found for this or any previous visit (from the past 720 hour(s)).      INTERVENTIONS BY WOUND TEAM: removed drsgs, cleaned with wound cleanser, pt c/o pain, cleaned with NS, dried. Applied hydrofera blue to wound beds. Cover RLE with mepilex, LLE with ad foam. Tubi- F tubed to unit for RN to apply.   Interdisciplinary consultation: Patient, Bedside RN    EVALUATION: pt has stasis ulcers posterior BLE. She has hemosiderin staining with flaky skin.     Goals: Steady decrease in wound area and depth weekly.    NURSING PLAN OF CARE ORDERS (X):    Dressing changes: See Dressing Care orders: x  Skin care: See Skin Care orders:   Rectal tube care: See Rectal Tube Care orders:   Other orders:    RSKIN:   CURRENTLY IN PLACE (X), APPLIED THIS VISIT (A), ORDERED (O):   Q shift Russ:  x  Q shift pressure point assessments:  x  Pressure redistribution mattress            Low Airloss   x       Bariatric LISETTE          Bariatric foam           Heel float boots     Heel Silicone dressing        Float Heels off Bed with Pillows   x            Barrier wipes         Barrier Cream         Barrier paste          Sacral silicone dressing         Silicone O2 tubing  x       Anchorfast         Cannula fixation Device (Tender )          Gray Foam Ear protectors           Trach with Optifoam split foam                 Waffle cushion        Waffle Overlay         Rectal tube or BMS    Purwick/Condom Cath          Antifungal tx      Interdry          Reposition q 2 hours     x   Up to chair        Ambulate      PT/OT        Dietician        Diabetes Education      PO  x   TF     TPN     NPO   # days   Other        WOUND TEAM PLAN OF CARE   Dressing changes by wound team:          Follow up 1-2 times weekly:               Follow up 3 times weekly:                NPWT change 3 times weekly:     Follow up as needed: x      Other (explain):     Anticipated discharge plans:  LTACH:        SNF/Rehab:                  Home Care:           Outpatient Wound Center:            Self Care:            Other: Wound care clinic in [place] when she goes home

## 2020-06-29 NOTE — PROGRESS NOTES
Wisam from Lab called with critical result of BUN 95, Crea 5.5, and Ca 6.8 at 0640. Critical lab result read back to Wisam. Dr. Farfan notified via [employer].

## 2020-06-29 NOTE — CARE PLAN
Problem: Communication  Goal: The ability to communicate needs accurately and effectively will improve  Outcome: MET     Problem: Safety  Goal: Will remain free from injury  Outcome: MET  Goal: Will remain free from falls  Outcome: MET     Problem: Infection  Goal: Will remain free from infection  Outcome: MET     Problem: Bowel/Gastric:  Goal: Normal bowel function is maintained or improved  Outcome: PROGRESSING AS EXPECTED     Problem: Knowledge Deficit  Goal: Knowledge of disease process/condition, treatment plan, diagnostic tests, and medications will improve  Outcome: MET  Goal: Knowledge of the prescribed therapeutic regimen will improve  Outcome: MET     Problem: Fluid Volume:  Goal: Will maintain balanced intake and output  Outcome: MET     Problem: Urinary Elimination:  Goal: Ability to reestablish a normal urinary elimination pattern will improve  Outcome: MET     Problem: Pain Management  Goal: Pain level will decrease to patient's comfort goal  Outcome: PROGRESSING AS EXPECTED     Problem: Respiratory:  Goal: Respiratory status will improve  Outcome: MET     Problem: Psychosocial Needs:  Goal: Level of anxiety will decrease  Outcome: PROGRESSING AS EXPECTED  Intervention: Identify and develop with patient strategies to cope with anxiety triggers  Note: Emotional support offered and provided       Problem: Skin Integrity  Goal: Risk for impaired skin integrity will decrease  Outcome: PROGRESSING AS EXPECTED

## 2020-06-29 NOTE — PROGRESS NOTES
HD treatment today per routine order.Patient with anxiety prior to start of treatment,medicated for it.Patient slept mostly and tolerated treatment well.Net UF removed 1000 ml.CVC locked with heparin.Report given to primary Rn.

## 2020-06-29 NOTE — PROGRESS NOTES
Nephrology Daily Progress Note    Date of Service  6/29/2020    HISTORY OF PRESENT ILLNESS:    86 yo F PMH ESRD MWF iHD, HTN, anemia of CKD, CKD-MBD, and HLD who presents to ED with CC as above.  She is visiting [facility] from [place] and due to current pandemic has not been able to go back home and continued to stay in this area with family.  She has OP dialysis at [facility] and has been compliant.  She was doing well until about two weeks ago when she started to notice dark stools.  She was doing ok until about 3 days ago when she also noted orthostatic symptoms upon standing.  It resolved and then today it came back and she had some slurred speech and left arm numbness so she was brought in for evaluation.  She has had a prior stroke and the numbness and slurred speech have been present off and on as a result of that so initially she though nothing of it.  Neurology was consulted and they did not feel that this was CVA related and more hemodynamic related.  Labs in ED showed Hgb 5.5 and serum labs showed  and K+ 5.3.  She was given pRBCs and admitted to ICU.  CT of head showed an acute on chronic epidural bleed with mild midline shift as well.  She was on plavix at home.  No recent F/C/N/V/CP/SOB.  No hematochezia, hematemesis.  No HA, visual changes, or abdominal pain    Daily nephrology summary  06/26 - consult done, HD done  06/27 - 2u prbc this AM, has been seen and evaluated by neurology and neurosurgery, further imaging planned for today  06/28 - not seen, patient in procedures  06/29 - seen on HD, tolerating with anxiolytic, somnolent and provides minimal responses to questions, 3Ca bath today, EGD and colonoscopy normal, capsule endoscopy    Review of Systems  Review of Systems   Unable to perform ROS: Mental status change        Physical Exam  Temp:  [36.3 °C (97.3 °F)-37.1 °C (98.8 °F)] 36.9 °C (98.5 °F)  Pulse:  [] 87  Resp:  [8-21] 10  BP: (109-167)/(46-96) 123/58  SpO2:  [95 %-100 %]  97 %    Physical Exam  Constitutional:       General: She is not in acute distress.     Appearance: She is ill-appearing.      Comments: somnolent   HENT:      Head: Normocephalic.      Right Ear: External ear normal.      Left Ear: External ear normal.      Nose: Nose normal.      Mouth/Throat:      Mouth: Mucous membranes are moist.   Eyes:      Extraocular Movements: Extraocular movements intact.      Conjunctiva/sclera: Conjunctivae normal.   Cardiovascular:      Rate and Rhythm: Normal rate and regular rhythm.      Comments: R chest TDC c/d/d  Pulmonary:      Effort: Pulmonary effort is normal.      Breath sounds: No wheezing.   Abdominal:      Palpations: Abdomen is soft.      Tenderness: There is no abdominal tenderness.   Skin:     Coloration: Skin is pale.      Comments: LE dressing in place   Neurological:      Mental Status: She is lethargic and confused.   Psychiatric:         Mood and Affect: Mood normal.         Behavior: Behavior normal.         Fluids    Intake/Output Summary (Last 24 hours) at 6/29/2020 0826  Last data filed at 6/29/2020 0600  Gross per 24 hour   Intake 1136 ml   Output 200 ml   Net 936 ml       Laboratory  Recent Labs     06/27/20  0215  06/28/20  0625 06/28/20  1450 06/28/20  1846 06/29/20  0532   WBC 10.7  --  12.0*  --   --  11.7*   RBC 2.12*  --  2.81*  --   --  2.56*   HEMOGLOBIN 6.0*   < > 8.2* 7.3* 9.2* 7.4*   HEMATOCRIT 18.7*  --  24.9*  --   --  23.5*   MCV 86.8  --  88.6  --   --  91.8   MCH 27.8  --  29.2  --   --  28.9   MCHC 32.1*  --  32.9*  --   --  31.5*   RDW 50.1*  --  49.9  --   --  51.2*   PLATELETCT 248  --  245  --   --  255   MPV 10.0  --  9.8  --   --  9.8    < > = values in this interval not displayed.     Recent Labs     06/27/20 0215 06/28/20  0625 06/29/20  0532   SODIUM 141 141 142   POTASSIUM 4.3 5.0 5.4   CHLORIDE 107 106 107   CO2 21 23 23   GLUCOSE 130* 116* 113*   BUN 60* 94* 95*   CREATININE 3.59* 5.22* 5.49*   CALCIUM 7.0* 6.7* 6.8*     Recent  Labs     06/26/20  1025   APTT 28.0   INR 1.10     No results for input(s): NTPROBNP in the last 72 hours.  Recent Labs     06/27/20  0215   TRIGLYCERIDE 137   HDL 25*   LDL 50       Imaging  Reviewed     Assessment/Plan  IMPRESSION:  - ESRD    * Etiology likely 2/2 HTN    * R chest TDC     * Visitor at [facility], lives in [place]  - Melena    * Etiology unclear, could be related to anti-platelet drug or other cause    * EGD and colonoscopy normal, undergoing capsule endoscopy  - Hyperkalemia    * correct with HD  - Acute epidural hematoma    * Superimposed on chronic epidural bleed, with a small midline shift    * Neuro following, has been evaluated by neurosurgery  - HTN    * Goal BP < 140/90    * Stable  - Anemia, multifactorial    * Goal Hgb 10-11 in CKD    * transfuse hgb<7    * see melena above  - CKD-MBD    * Managed at HD unit, phos now above goal, Ca low, vitamin D low, PTH appropriate  - HLD  - CAD     PLAN:  - HD qMWF and PRN, though she currently expresses she would prefer to avoid HD if it is not her regular day  - UF as tolerated  - 145 dialysate sodium to try to leave her SNa at high-normal range  - No dietary protein restrictions  - Dose all meds per ESRD  - Monitor for additional iHD as needed  - Transfuse as needed to maintain Hgb > 7  - Do not provide with gadolinium without discussing with nephrology, as HD x 3 days will need to be arranged  - Start calcium acetated TID with meals and ergocalciferol     All prior notes form other doctors and RN staff were reviewed from admission to current day to help me make my clinical decisions     PMH, PSH, SH, FH, meds, labs, images reviewed

## 2020-06-29 NOTE — THERAPY
Physical Therapy   Initial Evaluation     Patient Name: Corine Dela Cruz  Age:  87 y.o., Sex:  female  Medical Record #: [id number]  Today's Date: 6/29/2020     Precautions: (P) Fall Risk, Swallow Precautions ( See Comments)(L-sided deficits, L visual inattention)    Assessment  Patient is 87 y.o. female presenting acutely 2/2 melena and weakness. Found with R sided chronic epidural with mild acute hemorrhage. Pt having difficulty communicating PLOF and living hx however reports she was ambulatory with 4WW prior to admission. Pt requiring physical assist to initiate mobility as she did not initiate with cueing. She has L visual inattention and significant weakness of LUE. Pt with posterior-lateral lean toward L in static sitting. She is aware of lateral lean when therapist cues her and is able to correct to midline briefly. Pt with slight oxygen desat to low 80s sitting EOB recovering independently.  Additional factors influencing patient status / progress PMH of stroke, ESRD with HD, PAD.     Plan    Recommend Physical Therapy 4 times per week until therapy goals are met for the following treatments:  Bed Mobility, Gait Training, Neuro Re-Education / Balance, Therapeutic Activities and Therapeutic Exercises    Discharge recommendations:  Recommend post-acute placement for continued physical therapy services prior to discharge home.        Objective     06/29/20 1031   Prior Living Situation   Prior Services None   Housing / Facility 1 Story House   Steps Into Home 0   Equipment Owned 4-Wheel Walker   Lives with - Patient's Self Care Capacity Unable To Determine At This Time   Comments Pt states she lives with people but unable to specify relation   Prior Level of Functional Mobility   Bed Mobility Unable To Determine At This Time   Transfer Status Unable To Determine At This Time   Ambulation Independent   Assistive Devices Used 4-Wheel Walker   Comments Pt stating she walked with 4WW prior, unable to determine distance  pt was able to mob previously. Unable to determinre if pt needed any assistance for bed mob/transfers 2/2 confusion   History of Falls   History of Falls Yes   Date of Last Fall (per chart review, multiple falls in past 2 wks)   Cognition    Cognition / Consciousness X   Level of Consciousness Alert  (intermittently confusion with questions regarding PLOF)   Ability To Follow Commands 1 Step  (approx 50% of the time)   Initiation Impaired   Comments Pt A&O x4 however has difficulty conveying PLOF/living situation, inattention to L weakness and L visual deficits.    Strength Lower Body   Lower Body Strength  X   Gross Strength Generalized Weakness, Equal Bilaterally   Comments MMT 3/5 BLE   Strength Upper Body   Upper Body Strength  X   Comments LUE grossly impaired, no volitional mvt of LUE noted througout evaluation   Neurological Concerns   Neurological Concerns Yes   Comments LUE weakness and visual deficit   Balance Assessment   Sitting Balance (Static) Poor   Sitting Balance (Dynamic) Poor -   Weight Shift Sitting Absent   Comments L posterio-lateral lean, able to correct with VC   Gait Analysis   Gait Level Of Assist Unable to Participate   Weight Bearing Status No restrictions   Bed Mobility    Supine to Sit Maximal Assist  (of 2)   Sit to Supine Maximal Assist   Scooting Maximal Assist   Rolling Maximum Assist to Lt.;Moderate Assist to Rt.   Functional Mobility   Sit to Stand Unable to Participate   Short Term Goals    Short Term Goal # 1 Pt will improve bed mob to perform supine<>sit with min A within 6 visits to increase OOB activity/tolerance.   Short Term Goal # 2 Pt will perform STS with min A within 6 visits to initiate gait.   Anticipated Discharge Equipment   DC Equipment Unable To Determine At This Time

## 2020-06-29 NOTE — PROGRESS NOTES
SLP RN notified RN of pt c/o regarding heaviness in RUE. Notified Dr. Felder. Assessment completed. STAT CT ordered to check for changes. Pt Ox4 and VSS.

## 2020-06-29 NOTE — CARE PLAN
Problem: Nutritional:  Goal: Achieve adequate nutritional intake  Description: Patient's diet will advance past clears and consume ~50% of meals  Outcome: NOT MET

## 2020-06-29 NOTE — PROGRESS NOTES
Report called to Igor VILLEGAS for [id number]. All questions answered. All belongings, meds and chart packed up for transfer including dentures and cell phone.

## 2020-06-29 NOTE — PROGRESS NOTES
Critical Care Progress Note    Date of admission  6/26/2020    Chief Complaint  This lady has a history of ESRD on thrice weekly HD, primary hypertension, type 2 diabetes mellitus and PAD.  She has been taking aspirin and Plavix.  She resides in [place].  She has been in the [facility] for 3 weeks to visit family.  Apparently for the last 2 weeks she has had dark melanotic stools.  She has had progressive weakness and shortness of breath.  She has been compliant with hemodialysis while in the [facility].  She presented to the emergency department with complaints of her melena and was found to have significant acute blood loss anemia with a hemoglobin of 5.5.  While in the emergency department she began to experience left facial droop and left-sided weakness.  A code stroke was called.  Imaging revealed a small amount of acute hemorrhage and a chronic 2.1 cm right epidural collection along the right frontoparietal convexity.  There was 2 mm of right to left midline shift and a minimal subacute subdural hemorrhage anterior to the right epidural collection.  Dr. Jacobson evaluated the patient from neurosurgery.  His impression was that she had a chronic right-sided subdural hematoma with some mass-effect and no surgery was indicated at this time.  She was also seen by neurology.  They recommend an MRI/MRA of the brain when clinically appropriate and strict blood pressure control.  Gastroenterology has evaluated this lady for consideration of endoscopy.    Hospital Course    Initial evaluation revealed acute blood loss anemia with hemoglobin 5.5.  In addition a chronic right subdural hematoma was identified.  6/27- COVID PCR negative.  EGD shows no evidence of upper GI bleed.  6/28-colonoscopy reveals no evidence of GI bleed.      Interval Problem Update  Reviewed last 24 hour events:              - No new issues   - Tm: Afebrile              - HR: 80-100s              - SBP: 110-130s              - Neuro: GCS 15 AOx3,  LUE 2/5, LE equal 4/5              - GI: NPO              - UOP: 150ml, creatinine rising.              - Iglesias: no              - Lines: 2x PIV, HD catheter              - PPx: No VTE,               - CXR (personally reviewed):    - HD per neph                 Review of Systems  Review of Systems   Constitutional: Negative.    HENT: Negative.    Eyes: Negative for blurred vision and double vision.   Respiratory: Negative.    Gastrointestinal: Negative for abdominal pain, blood in stool, nausea and vomiting.   Genitourinary: Negative.    Neurological: Positive for weakness.   Psychiatric/Behavioral: Negative.    All other systems reviewed and are negative.       Vital Signs for last 24 hours   Temp:  [36.3 °C (97.3 °F)-37.1 °C (98.8 °F)] 36.9 °C (98.5 °F)  Pulse:  [] 87  Resp:  [8-21] 10  BP: (109-167)/(46-96) 123/58  SpO2:  [95 %-100 %] 97 %    Hemodynamic parameters for last 24 hours       Respiratory Information for the last 24 hours       Physical Exam   Physical Exam  Constitutional:       General: She is not in acute distress.  HENT:      Mouth/Throat:      Mouth: Mucous membranes are moist.   Eyes:      Extraocular Movements: Extraocular movements intact.      Pupils: Pupils are equal, round, and reactive to light.   Neck:      Musculoskeletal: Neck supple.   Cardiovascular:      Rate and Rhythm: Regular rhythm.      Heart sounds: No murmur. No friction rub. No gallop.    Pulmonary:      Effort: Pulmonary effort is normal. No respiratory distress.      Breath sounds: Normal breath sounds.   Abdominal:      General: There is no distension.      Palpations: Abdomen is soft.      Tenderness: There is no abdominal tenderness.   Musculoskeletal:         General: No swelling.   Skin:     General: Skin is warm and dry.   Neurological:      General: No focal deficit present.      Mental Status: She is alert.   Psychiatric:         Mood and Affect: Mood normal.         Behavior: Behavior normal.          Medications  Current Facility-Administered Medications   Medication Dose Route Frequency Provider Last Rate Last Dose   • atorvastatin (LIPITOR) tablet 40 mg  40 mg Oral Q EVENING Kory Aiken M.D.   40 mg at 06/28/20 1610   • HYDROmorphone pf (DILAUDID) injection 0.5-1 mg  0.5-1 mg Intravenous Q2HRS PRN Kory Aiken M.D.   1 mg at 06/29/20 0423   • acetaminophen (TYLENOL) tablet 650 mg  650 mg Oral Q6HRS Kory Aiken M.D.   650 mg at 06/29/20 0554   • ondansetron (ZOFRAN) syringe/vial injection 4 mg  4 mg Intravenous Q4HRS PRN Rosales Suarez M.D.       • ondansetron (ZOFRAN ODT) dispertab 4 mg  4 mg Oral Q4HRS PRN Rosales Suarez M.D.       • LORazepam (ATIVAN) injection 0.26-0.5 mg  0.26-0.5 mg Intravenous Q4HRS PRN Juwan Lyle M.D.   0.5 mg at 06/29/20 0548   • heparin injection 3,200 Units  3,200 Units Intravenous ACUTE DIALYSIS PRN Marlene Nelson M.D.   3,200 Units at 06/26/20 2110   • hydrALAZINE (APRESOLINE) injection 10-20 mg  10-20 mg Intravenous Q4HRS PRN Juwan Lyle M.D.   10 mg at 06/28/20 2201   • insulin regular (HUMULIN R) injection 1-6 Units  1-6 Units Subcutaneous Q6HRS Juwan Lyle M.D.   Stopped at 06/27/20 0600    And   • glucose 4 g chewable tablet 16 g  16 g Oral Q15 MIN PRN Juwan Lyle M.D.        And   • dextrose 50% (D50W) injection 50 mL  50 mL Intravenous Q15 MIN PRN Juwan Lyle M.D.           Fluids    Intake/Output Summary (Last 24 hours) at 6/29/2020 0724  Last data filed at 6/29/2020 0600  Gross per 24 hour   Intake 1136 ml   Output 200 ml   Net 936 ml       Laboratory          Recent Labs     06/26/20  1025 06/27/20  0215 06/28/20  0625 06/29/20  0532   SODIUM 139 141 141 142   POTASSIUM 5.3 4.3 5.0 5.4   CHLORIDE 99 107 106 107   CO2 22 21 23 23   * 60* 94* 95*   CREATININE 6.93* 3.59* 5.22* 5.49*   MAGNESIUM  --  1.8 1.9 2.0   PHOSPHORUS 4.6*  --   --  5.8*   CALCIUM 7.2* 7.0* 6.7* 6.8*     Recent Labs      06/27/20  0215 06/28/20  0625 06/29/20  0532   ALTSGPT 11 13 11   ASTSGOT 24 28 21   ALKPHOSPHAT 38 40 41   TBILIRUBIN 0.2 0.3 0.2   GLUCOSE 130* 116* 113*     Recent Labs     06/27/20  0215 06/28/20  0625 06/29/20  0532   WBC 10.7 12.0* 11.7*   NEUTSPOLYS 77.80* 71.00 74.50*   LYMPHOCYTES 13.70* 17.30* 14.90*   MONOCYTES 6.80 8.50 6.70   EOSINOPHILS 0.40 1.20 1.70   BASOPHILS 0.40 0.40 0.30   ASTSGOT 24 28 21   ALTSGPT 11 13 11   ALKPHOSPHAT 38 40 41   TBILIRUBIN 0.2 0.3 0.2     Recent Labs     06/26/20  1025  06/27/20  0215  06/28/20  0625 06/28/20  1450 06/28/20  1846 06/29/20  0532   RBC 2.04*  --  2.12*  --  2.81*  --   --  2.56*   HEMOGLOBIN 5.5*   < > 6.0*   < > 8.2* 7.3* 9.2* 7.4*   HEMATOCRIT 18.6*  --  18.7*  --  24.9*  --   --  23.5*   PLATELETCT 280  --  248  --  245  --   --  255   PROTHROMBTM 14.6  --   --   --   --   --   --   --    APTT 28.0  --   --   --   --   --   --   --    INR 1.10  --   --   --   --   --   --   --     < > = values in this interval not displayed.       Imaging  X-Ray:  No film today    Assessment/Plan  * Acute GI bleeding  Assessment & Plan  EGD negative  Colonoscopy negative  Trend hemoglobin and transfuse PRBCs to keep hemoglobin greater than 7  IV pantoprazole drip  Gastroenterology to perform capsule study on 6/29    Epidural hemorrhage (HCC)  Assessment & Plan  CT imaging reveals small amount of acute hemorrhage in a chronic 2.1 cm right epidural collection along the right frontoparietal convexity with associated 2 mm right to left midline shift and minimal subacute subdural hemorrhage anterior to the right epidural collection  Evaluated by Dr. Jacobson and his impression is a chronic right-sided subdural hematoma with some mass-effect - no surgery indicated at this time  Evaluated by neurology and they recommend MRI and MRA today.  Frequent neuro checks  Strict blood pressure control with goal SBP less than 150    Leg wounds  Assessment & Plan  Wound care and wound  consult    Peripheral artery disease ([facility])  Assessment & Plan  Hold Plavix with acute gastrointestinal hemorrhage    Type 2 diabetes mellitus with kidney complication, without long-term current use of insulin ([facility])  Assessment & Plan  Moderate glycemic control with insulin therapy.    Primary hypertension  Assessment & Plan  Strict blood pressure control with goal SBP less than 150    ESRD (end stage renal disease) ([facility])  Assessment & Plan  On thrice weekly hemodialysis  Avoid nephrotoxins and renal dose medications    Acute blood loss anemia  Assessment & Plan  Gastrointestinal source of blood loss  Trend hemoglobin and transfuse PRBCs to keep hemoglobin greater than 7       VTE:  Not Indicated  Ulcer: PPI  Lines: None    I have performed a physical exam and reviewed and updated ROS and Plan today (6/29/2020). In review of yesterday's note (6/28/2020), there are no changes except as documented above.     Discussed patient condition and risk of morbidity and/or mortality with RN, Pharmacy and Patient     transfer to the neuro floor

## 2020-06-29 NOTE — THERAPY
"Speech Language Pathology  Daily Treatment     Patient Name: Corine Dela Cruz  Age:  87 y.o., Sex:  female  Medical Record #: [id number]  Today's Date: 6/29/2020     Precautions  Precautions: Fall Risk, Swallow Precautions ( See Comments)(L-sided deficits, L visual inattention)  Comments: L sided weakness with report of right UE weakness    Assessment    Patient sleeping but easily woke to name. She was ready for breakfast and did not realize she had slept through dialysis this morning. She needed feeding assistance due to UE edema and having difficulty holding utensils. She coughed on the third spoonful of clear liquid thin broth that was sent on her tray. All the other spoonfuls were tolerated without difficulty. She sounded like she had phlegm in her throat and was attempting to clear it. She consumed bites of jello and sips of thin water from the straw without difficulty. She started to report \"heaviness\" in her right arm and burning with her left leg. This appeared to resolve when she was repositioned. RN aware of patient complaint.     Plan  1) Clear liquids (as per MD/GI recs).  2) pills whole as tolerated (RN reporting no difficulty with pills and water this morning)    Continue current treatment plan.    Discharge recommendations:  Recommend inpatient transitional care services for continued speech therapy services.          Objective       06/29/20 1020   Dysphagia    Dysphagia X   Positioning / Behavior Modification Modulate Rate or Bite Size   Diet / Liquid Recommendation Thin (0)  (Clear liquids (as per GI))   Nutritional Liquid Intake Rating Scale Non thickened beverages   Nutritional Food Intake Rating Scale Total oral diet of a single consistency   Skilled Intervention Verbal Cueing   Recommended Route of Medication Administration   Medication Administration  Whole with Liquid Wash  (per RN patient tolerating without signs of difficulty)   Short Term Goals   Short Term Goal # 1 Pt will consume a " clear liquid liquidised diet with mildly thick liquids without clinical s/s of aspiration and with cues for carryover of swallowing compensatory strategies.    Goal Outcome # 1 Goal met, new goal added   Short Term Goal # 1 B  Patient will consume clear liquid diet (thins) without signs of aspiration, given feeding assistance

## 2020-06-29 NOTE — PROGRESS NOTES
Patient on unite from [facility] ICU.  Patient was transferred to bed  From [facility] via slide board.  Patients complains of pain of 9/10 in her head. Will check MAR and medicate appropriately.  Patients vitals taken. Patient is resting  Comfortably in bed. Hourly rounding  In place.

## 2020-06-29 NOTE — PROGRESS NOTES
Pt transport to CT scan tolerated well. VSS throughout transport and all LDA's remain intact. Settled into room and call light in reach, bed alarm set

## 2020-06-30 ENCOUNTER — ANESTHESIA EVENT (OUTPATIENT)
Dept: SURGERY | Facility: MEDICAL CENTER | Age: 85
DRG: 377 | End: 2020-06-30
Payer: MEDICARE

## 2020-06-30 ENCOUNTER — ANESTHESIA (OUTPATIENT)
Dept: SURGERY | Facility: MEDICAL CENTER | Age: 85
DRG: 377 | End: 2020-06-30
Payer: MEDICARE

## 2020-06-30 PROBLEM — G93.40 ENCEPHALOPATHY ACUTE: Status: ACTIVE | Noted: 2020-06-30

## 2020-06-30 LAB
ALBUMIN SERPL BCP-MCNC: 2.9 G/DL (ref 3.2–4.9)
ALBUMIN/GLOB SERPL: 0.9 G/DL
ALP SERPL-CCNC: 48 U/L (ref 30–99)
ALT SERPL-CCNC: 13 U/L (ref 2–50)
ANION GAP SERPL CALC-SCNC: 15 MMOL/L (ref 7–16)
APPEARANCE UR: CLEAR
AST SERPL-CCNC: 22 U/L (ref 12–45)
BACTERIA #/AREA URNS HPF: NEGATIVE /HPF
BACTERIA WND AEROBE CULT: ABNORMAL
BASOPHILS # BLD AUTO: 0.4 % (ref 0–1.8)
BASOPHILS # BLD: 0.06 K/UL (ref 0–0.12)
BILIRUB SERPL-MCNC: 0.2 MG/DL (ref 0.1–1.5)
BILIRUB UR QL STRIP.AUTO: NEGATIVE
BUN SERPL-MCNC: 50 MG/DL (ref 8–22)
CALCIUM SERPL-MCNC: 7.9 MG/DL (ref 8.5–10.5)
CHLORIDE SERPL-SCNC: 102 MMOL/L (ref 96–112)
CO2 SERPL-SCNC: 24 MMOL/L (ref 20–33)
COLOR UR: YELLOW
CREAT SERPL-MCNC: 4.18 MG/DL (ref 0.5–1.4)
EOSINOPHIL # BLD AUTO: 0.13 K/UL (ref 0–0.51)
EOSINOPHIL NFR BLD: 0.9 % (ref 0–6.9)
EPI CELLS #/AREA URNS HPF: NEGATIVE /HPF
ERYTHROCYTE [DISTWIDTH] IN BLOOD BY AUTOMATED COUNT: 50.6 FL (ref 35.9–50)
GLOBULIN SER CALC-MCNC: 3.3 G/DL (ref 1.9–3.5)
GLUCOSE SERPL-MCNC: 104 MG/DL (ref 65–99)
GLUCOSE UR STRIP.AUTO-MCNC: 100 MG/DL
GRAM STN SPEC: ABNORMAL
GRAM STN SPEC: ABNORMAL
HCT VFR BLD AUTO: 24.5 % (ref 37–47)
HGB BLD-MCNC: 7.7 G/DL (ref 12–16)
HYALINE CASTS #/AREA URNS LPF: ABNORMAL /LPF
IMM GRANULOCYTES # BLD AUTO: 0.17 K/UL (ref 0–0.11)
IMM GRANULOCYTES NFR BLD AUTO: 1.2 % (ref 0–0.9)
KETONES UR STRIP.AUTO-MCNC: NEGATIVE MG/DL
LACTATE BLD-SCNC: 1.1 MMOL/L (ref 0.5–2)
LEUKOCYTE ESTERASE UR QL STRIP.AUTO: NEGATIVE
LYMPHOCYTES # BLD AUTO: 2.66 K/UL (ref 1–4.8)
LYMPHOCYTES NFR BLD: 19.3 % (ref 22–41)
MAGNESIUM SERPL-MCNC: 2 MG/DL (ref 1.5–2.5)
MCH RBC QN AUTO: 28.9 PG (ref 27–33)
MCHC RBC AUTO-ENTMCNC: 31.4 G/DL (ref 33.6–35)
MCV RBC AUTO: 92.1 FL (ref 81.4–97.8)
MICRO URNS: ABNORMAL
MONOCYTES # BLD AUTO: 0.93 K/UL (ref 0–0.85)
MONOCYTES NFR BLD AUTO: 6.8 % (ref 0–13.4)
NEUTROPHILS # BLD AUTO: 9.81 K/UL (ref 2–7.15)
NEUTROPHILS NFR BLD: 71.4 % (ref 44–72)
NITRITE UR QL STRIP.AUTO: NEGATIVE
NRBC # BLD AUTO: 0 K/UL
NRBC BLD-RTO: 0 /100 WBC
PH UR STRIP.AUTO: >=9 [PH] (ref 5–8)
PHOSPHATE SERPL-MCNC: 4.8 MG/DL (ref 2.5–4.5)
PLATELET # BLD AUTO: 309 K/UL (ref 164–446)
PMV BLD AUTO: 9.5 FL (ref 9–12.9)
POTASSIUM SERPL-SCNC: 4.8 MMOL/L (ref 3.6–5.5)
PROT SERPL-MCNC: 6.2 G/DL (ref 6–8.2)
PROT UR QL SSA: 30 MG/DL
RBC # BLD AUTO: 2.66 M/UL (ref 4.2–5.4)
RBC # URNS HPF: ABNORMAL /HPF
RBC UR QL AUTO: ABNORMAL
SIGNIFICANT IND 70042: ABNORMAL
SIGNIFICANT IND 70042: ABNORMAL
SITE SITE: ABNORMAL
SITE SITE: ABNORMAL
SODIUM SERPL-SCNC: 141 MMOL/L (ref 135–145)
SOURCE SOURCE: ABNORMAL
SOURCE SOURCE: ABNORMAL
SP GR UR STRIP.AUTO: 1.01
TEST NAME 95000: NORMAL
UROBILINOGEN UR STRIP.AUTO-MCNC: 0.2 MG/DL
WBC # BLD AUTO: 13.8 K/UL (ref 4.8–10.8)
WBC #/AREA URNS HPF: ABNORMAL /HPF

## 2020-06-30 PROCEDURE — 700102 HCHG RX REV CODE 250 W/ 637 OVERRIDE(OP): Performed by: INTERNAL MEDICINE

## 2020-06-30 PROCEDURE — 700105 HCHG RX REV CODE 258: Performed by: INTERNAL MEDICINE

## 2020-06-30 PROCEDURE — 770006 HCHG ROOM/CARE - MED/SURG/GYN SEMI*

## 2020-06-30 PROCEDURE — 84100 ASSAY OF PHOSPHORUS: CPT

## 2020-06-30 PROCEDURE — 87040 BLOOD CULTURE FOR BACTERIA: CPT | Mod: 91

## 2020-06-30 PROCEDURE — 87077 CULTURE AEROBIC IDENTIFY: CPT | Mod: 91

## 2020-06-30 PROCEDURE — 700105 HCHG RX REV CODE 258

## 2020-06-30 PROCEDURE — 99233 SBSQ HOSP IP/OBS HIGH 50: CPT | Performed by: INTERNAL MEDICINE

## 2020-06-30 PROCEDURE — 87186 SC STD MICRODIL/AGAR DIL: CPT

## 2020-06-30 PROCEDURE — 700111 HCHG RX REV CODE 636 W/ 250 OVERRIDE (IP): Performed by: INTERNAL MEDICINE

## 2020-06-30 PROCEDURE — A9270 NON-COVERED ITEM OR SERVICE: HCPCS | Performed by: INTERNAL MEDICINE

## 2020-06-30 PROCEDURE — 85025 COMPLETE CBC W/AUTO DIFF WBC: CPT

## 2020-06-30 PROCEDURE — 36415 COLL VENOUS BLD VENIPUNCTURE: CPT

## 2020-06-30 PROCEDURE — 51798 US URINE CAPACITY MEASURE: CPT

## 2020-06-30 PROCEDURE — 80053 COMPREHEN METABOLIC PANEL: CPT

## 2020-06-30 PROCEDURE — 81001 URINALYSIS AUTO W/SCOPE: CPT

## 2020-06-30 PROCEDURE — 700101 HCHG RX REV CODE 250: Performed by: INTERNAL MEDICINE

## 2020-06-30 PROCEDURE — 83605 ASSAY OF LACTIC ACID: CPT

## 2020-06-30 PROCEDURE — 83735 ASSAY OF MAGNESIUM: CPT

## 2020-06-30 RX ORDER — SODIUM CHLORIDE 9 MG/ML
INJECTION, SOLUTION INTRAVENOUS
Status: COMPLETED
Start: 2020-06-30 | End: 2020-06-30

## 2020-06-30 RX ORDER — LIDOCAINE 50 MG/G
1 PATCH TOPICAL EVERY 24 HOURS
Status: DISCONTINUED | OUTPATIENT
Start: 2020-06-30 | End: 2020-07-09 | Stop reason: HOSPADM

## 2020-06-30 RX ORDER — HALOPERIDOL 5 MG/ML
1-2 INJECTION INTRAMUSCULAR EVERY 4 HOURS PRN
Status: DISCONTINUED | OUTPATIENT
Start: 2020-06-30 | End: 2020-07-09 | Stop reason: HOSPADM

## 2020-06-30 RX ORDER — SODIUM CHLORIDE, SODIUM LACTATE, POTASSIUM CHLORIDE, CALCIUM CHLORIDE 600; 310; 30; 20 MG/100ML; MG/100ML; MG/100ML; MG/100ML
INJECTION, SOLUTION INTRAVENOUS CONTINUOUS
Status: DISCONTINUED | OUTPATIENT
Start: 2020-06-30 | End: 2020-06-30

## 2020-06-30 RX ADMIN — LORAZEPAM 0.5 MG: 2 INJECTION INTRAMUSCULAR; INTRAVENOUS at 18:17

## 2020-06-30 RX ADMIN — HYDROMORPHONE HYDROCHLORIDE 1 MG: 1 INJECTION, SOLUTION INTRAMUSCULAR; INTRAVENOUS; SUBCUTANEOUS at 03:06

## 2020-06-30 RX ADMIN — PIPERACILLIN SODIUM AND TAZOBACTAM SODIUM 3.38 G: 3; .375 INJECTION, POWDER, FOR SOLUTION INTRAVENOUS at 13:51

## 2020-06-30 RX ADMIN — HYDROMORPHONE HYDROCHLORIDE 1 MG: 1 INJECTION, SOLUTION INTRAMUSCULAR; INTRAVENOUS; SUBCUTANEOUS at 05:13

## 2020-06-30 RX ADMIN — LORAZEPAM 0.5 MG: 2 INJECTION INTRAMUSCULAR; INTRAVENOUS at 02:26

## 2020-06-30 RX ADMIN — SODIUM CHLORIDE 500 ML: 9 INJECTION, SOLUTION INTRAVENOUS at 11:18

## 2020-06-30 RX ADMIN — Medication 667 MG: at 21:26

## 2020-06-30 RX ADMIN — LIDOCAINE 1 PATCH: 50 PATCH TOPICAL at 15:38

## 2020-06-30 RX ADMIN — PIPERACILLIN AND TAZOBACTAM 3.38 G: 3; .375 INJECTION, POWDER, LYOPHILIZED, FOR SOLUTION INTRAVENOUS; PARENTERAL at 11:17

## 2020-06-30 RX ADMIN — ACETAMINOPHEN 650 MG: 325 TABLET, FILM COATED ORAL at 15:34

## 2020-06-30 RX ADMIN — SODIUM CHLORIDE, POTASSIUM CHLORIDE, SODIUM LACTATE AND CALCIUM CHLORIDE: 600; 310; 30; 20 INJECTION, SOLUTION INTRAVENOUS at 09:21

## 2020-06-30 RX ADMIN — LORAZEPAM 0.5 MG: 2 INJECTION INTRAMUSCULAR; INTRAVENOUS at 11:14

## 2020-06-30 RX ADMIN — HALOPERIDOL LACTATE 1 MG: 5 INJECTION, SOLUTION INTRAMUSCULAR at 12:22

## 2020-06-30 RX ADMIN — ACETAMINOPHEN 650 MG: 325 TABLET, FILM COATED ORAL at 21:26

## 2020-06-30 RX ADMIN — ATORVASTATIN CALCIUM 40 MG: 40 TABLET, FILM COATED ORAL at 16:54

## 2020-06-30 ASSESSMENT — ENCOUNTER SYMPTOMS
RESPIRATORY NEGATIVE: 1
GASTROINTESTINAL NEGATIVE: 1
CARDIOVASCULAR NEGATIVE: 1

## 2020-06-30 NOTE — PROGRESS NOTES
2 RN Skin Check    2 RN skin check completed with BAKARI Avila  Devices in place: samaria hose.  Skin assessed under devices: yes.  Confirmed pressure ulcers found on: NONE.  New potential pressure ulcers noted on NONE. Wound consult placed No.  The following interventions in place Pillows and Mepilex. Patient has a skin tear on the Left forearm that is covered with Mepitel dressing.  Patient has various scattered bruising on bilateral upper extremities.  Bruising noted on her R upper thigh.   Some moisture was noted to be under her pannus, interdry placed under pannus.  Patient does have chronic venous ulcers on backs of bilateral calfs already being followed by wound team.  Skin is noted to be dry and flaky on feet and calfs. Mepilex is covering the venous ulcers on her calves. Pillows in place for positioning.

## 2020-06-30 NOTE — PROGRESS NOTES
Gastroenterology Progress Note     Author: Nathan Rich M.D.   Date & Time Created: 6/30/2020 10:00 AM    Chief Complaint:  GI bleed    Interval History:  Capsule endoscopy demonstrating fresh blood present ~1 hour distal to pylorus.  Push enteroscopy planned for today for further evaluation.    Review of Systems:  Review of Systems   Respiratory: Negative.    Cardiovascular: Negative.    Gastrointestinal: Negative.        Physical Exam:  Physical Exam  Pulmonary:      Effort: Pulmonary effort is normal.   Abdominal:      General: Abdomen is flat.   Neurological:      Comments: Patient oriented to place and name.  She was somewhat confused about the precise date, though was able to identify the month and year correctly.  Initially, she was unable to describe (or even identify) the reason for proceeding with push enteroscopy today.           Labs:          Recent Labs     06/28/20 0625 06/29/20  0532 06/30/20  0159   SODIUM 141 142 141   POTASSIUM 5.0 5.4 4.8   CHLORIDE 106 107 102   CO2 23 23 24   BUN 94* 95* 50*   CREATININE 5.22* 5.49* 4.18*   MAGNESIUM 1.9 2.0 2.0   PHOSPHORUS  --  5.8* 4.8*   CALCIUM 6.7* 6.8* 7.9*     Recent Labs     06/28/20 0625 06/29/20  0532 06/30/20  0159   ALTSGPT 13 11 13   ASTSGOT 28 21 22   ALKPHOSPHAT 40 41 48   TBILIRUBIN 0.3 0.2 0.2   GLUCOSE 116* 113* 104*     Recent Labs     06/28/20 0625 06/28/20  1846 06/29/20  0532 06/29/20  1716 06/30/20  0159   RBC 2.81*  --   --  2.56*  --  2.66*   HEMOGLOBIN 8.2*   < > 9.2* 7.4*  --  7.7*   HEMATOCRIT 24.9*  --   --  23.5*  --  24.5*   PLATELETCT 245  --   --  255  --  309   PROTHROMBTM  --   --   --   --  14.0  --    INR  --   --   --   --  1.05  --    IRON  --   --   --  33*  --   --    TOTIRONBC  --   --   --  130*  --   --     < > = values in this interval not displayed.     Recent Labs     06/28/20 0625 06/29/20  0532 06/30/20  0159   WBC 12.0* 11.7* 13.8*   NEUTSPOLYS 71.00 74.50* 71.40   LYMPHOCYTES 17.30* 14.90* 19.30*    MONOCYTES 8.50 6.70 6.80   EOSINOPHILS 1.20 1.70 0.90   BASOPHILS 0.40 0.30 0.40   ASTSGOT 28 21 22   ALTSGPT 13 11 13   ALKPHOSPHAT 40 41 48   TBILIRUBIN 0.3 0.2 0.2     Hemodynamics:  Temp (24hrs), Av.1 °C (98.7 °F), Min:36.7 °C (98 °F), Max:37.8 °C (100.1 °F)  Temperature: 37.8 °C (100.1 °F)  Pulse  Av.8  Min: 9  Max: 111   Blood Pressure : 155/66     Respiratory:    Respiration: 16, Pulse Oximetry: 94 %        RUL Breath Sounds: Clear, RML Breath Sounds: Diminished, RLL Breath Sounds: Diminished, JORDI Breath Sounds: Clear, LLL Breath Sounds: Diminished  Fluids:    Intake/Output Summary (Last 24 hours) at 2020 1000  Last data filed at 2020 0400  Gross per 24 hour   Intake 50 ml   Output 400 ml   Net -350 ml        GI/Nutrition:  Orders Placed This Encounter   Procedures   • Diet NPO     Standing Status:   Standing     Number of Occurrences:   8     Order Specific Question:   Restrict to:     Answer:   Strict [1]     Medical Decision Making, by Problem:  Active Hospital Problems    Diagnosis   • *Acute GI bleeding [K92.2]   • Epidural hemorrhage ([facility]) [S06.4X9A]   • Acute blood loss anemia [D62]   • ESRD (end stage renal disease) ([facility]) [N18.6]   • Primary hypertension [I10]   • Type 2 diabetes mellitus with kidney complication, without long-term current use of insulin ([facility]) [E11.29]   • Peripheral artery disease ([facility]) [I73.9]   • Leg wounds [S81.801A]       Plan:  Push enteroscopy will be cancelled for now, though will revisit whether patient is agreeable to proceeding tomorrow.  Initially, she was refusing to proceed with the endoscopy today, but was initially unable to describe or identify the reason for push enteroscopy today.  Therefore, minimal competency for declining the procedure was strongly in question.  I contacted the patient's son Kory Dela Cruz, and discussed the case.  He told me that his mother had expressed her desire yesterday to proceed with the push enteroscopy and had  "discussed it with his daughter, who is an RN here at [facility].  He told me that on occasion his mother may agree to something and then disagree at a later date.  After discussing the clinical circumstances, Mr. Dela Cruz stated that he wished his mother to proceed with the push enteroscopy.  I then re-interviewed Mrs. Dela Cruz who again stated that she did not wish to proceed.  On this occasion, however, she was able to correctly  identify \"bleeding\" as the reason for the push enteroscopy.  I informed her that without the push enteroscopy that she could well have ongoing intermittent bleeding.  She continues to refuse the procedure.  Therefore, given the patient's adequate competency at this time, with understanding of the risks of proceeding or not proceeding with push enteroscopy, will cancel for today.  I called Mr. Kory Dela Cruz again and informed him of his mother's wishes.  Will follow.  Transfuse PRN.      Quality-Core Measures  "

## 2020-06-30 NOTE — CARE PLAN
Problem: Pain Management  Goal: Pain level will decrease to patient's comfort goal  Outcome: NOT MET  Note: Patient offered PRN tylenol and refuses. No narcotics will be ordered due to mentation. Lidocaine patch ordered. Will place once available.     Problem: Psychosocial Needs:  Goal: Level of anxiety will decrease  Outcome: NOT MET  Note: Patient given PRN ativan and haldol for agitation. Neither have worked. No other meds ordered.

## 2020-06-30 NOTE — ASSESSMENT & PLAN NOTE
Likely multifactorial  Treating infection with IV antibiotic  Improving and alert oriented x4  We will try to avoid benzos and narcotic as much as possible  improving

## 2020-06-30 NOTE — PROGRESS NOTES
Nephrology Daily Progress Note    Date of Service  6/30/2020    HISTORY OF PRESENT ILLNESS:    88 yo F PMH ESRD MWF iHD, HTN, anemia of CKD, CKD-MBD, and HLD who presents to ED with CC as above.  She is visiting the [facility] from [place] and due to current pandemic has not been able to go back home and continued to stay in this area with family.  She has OP dialysis at [facility] and has been compliant.  She was doing well until about two weeks ago when she started to notice dark stools.  She was doing ok until about 3 days ago when she also noted orthostatic symptoms upon standing.  It resolved and then today it came back and she had some slurred speech and left arm numbness so she was brought in for evaluation.  She has had a prior stroke and the numbness and slurred speech have been present off and on as a result of that so initially she though nothing of it.  Neurology was consulted and they did not feel that this was CVA related and more hemodynamic related.  Labs in ED showed Hgb 5.5 and serum labs showed  and K+ 5.3.  She was given pRBCs and admitted to ICU.  CT of head showed an acute on chronic epidural bleed with mild midline shift as well.  She was on plavix at home.  No recent F/C/N/V/CP/SOB.  No hematochezia, hematemesis.  No HA, visual changes, or abdominal pain    Daily nephrology summary  06/26 - consult done, HD done  06/27 - 2u prbc this AM, has been seen and evaluated by neurology and neurosurgery, further imaging planned for today  06/28 - not seen, patient in procedures  06/29 - seen on HD, tolerating with anxiolytic, somnolent and provides minimal responses to questions, 3Ca bath today, EGD and colonoscopy normal, capsule endoscopy  06/30 - transferred to floor, capsule endoscopy with fresh blood, push enteroscopy deferred, some concern for UTI but no urine cx and UA without bacteria, WBC increased, +fever, +AMS    Review of Systems  Review of Systems   Unable to perform ROS: Mental  status change        Physical Exam  Temp:  [36.7 °C (98 °F)-37.8 °C (100.1 °F)] 37.8 °C (100.1 °F)  Pulse:  [] 105  Resp:  [12-18] 16  BP: (133-155)/(54-72) 155/66  SpO2:  [91 %-96 %] 94 %    Physical Exam  Constitutional:       General: She is not in acute distress.     Appearance: She is ill-appearing.      Comments: somnolent   HENT:      Head: Normocephalic.      Right Ear: External ear normal.      Left Ear: External ear normal.      Nose: Nose normal.      Mouth/Throat:      Mouth: Mucous membranes are moist.   Eyes:      Extraocular Movements: Extraocular movements intact.      Conjunctiva/sclera: Conjunctivae normal.   Cardiovascular:      Rate and Rhythm: Normal rate and regular rhythm.      Comments: R chest TDC c/d/d  Pulmonary:      Effort: Pulmonary effort is normal.      Breath sounds: No wheezing.   Abdominal:      Palpations: Abdomen is soft.      Tenderness: There is no abdominal tenderness.   Skin:     Coloration: Skin is pale.      Comments: LE dressing in place   Neurological:      Mental Status: She is lethargic and confused.   Psychiatric:         Mood and Affect: Mood normal.         Behavior: Behavior normal.         Fluids    Intake/Output Summary (Last 24 hours) at 6/30/2020 1137  Last data filed at 6/30/2020 0400  Gross per 24 hour   Intake 50 ml   Output 400 ml   Net -350 ml       Laboratory  Recent Labs     06/28/20  0625  06/28/20  1846 06/29/20  0532 06/30/20  0159   WBC 12.0*  --   --  11.7* 13.8*   RBC 2.81*  --   --  2.56* 2.66*   HEMOGLOBIN 8.2*   < > 9.2* 7.4* 7.7*   HEMATOCRIT 24.9*  --   --  23.5* 24.5*   MCV 88.6  --   --  91.8 92.1   MCH 29.2  --   --  28.9 28.9   MCHC 32.9*  --   --  31.5* 31.4*   RDW 49.9  --   --  51.2* 50.6*   PLATELETCT 245  --   --  255 309   MPV 9.8  --   --  9.8 9.5    < > = values in this interval not displayed.     Recent Labs     06/28/20  0625 06/29/20  0532 06/30/20  0159   SODIUM 141 142 141   POTASSIUM 5.0 5.4 4.8   CHLORIDE 106 107 102    CO2 23 23 24   GLUCOSE 116* 113* 104*   BUN 94* 95* 50*   CREATININE 5.22* 5.49* 4.18*   CALCIUM 6.7* 6.8* 7.9*     Recent Labs     06/29/20  1716   INR 1.05     No results for input(s): NTPROBNP in the last 72 hours.        Imaging  Reviewed     Assessment/Plan  IMPRESSION:  - ESRD    * Etiology likely 2/2 HTN    * R chest TDC     * Visitor at [facility], lives in [place]  - Melena    * Etiology unclear, could be related to anti-platelet drug or other cause    * EGD and colonoscopy normal, undergoing capsule endoscopy  - Hyperkalemia    * correct with HD  - Acute epidural hematoma    * Superimposed on chronic epidural bleed, with a small midline shift    * Neuro following, has been evaluated by neurosurgery  - HTN    * Goal BP < 140/90    * Stable  - Anemia, multifactorial    * Goal Hgb 10-11 in CKD    * transfuse hgb<7    * see melena above  - CKD-MBD    * Managed at HD unit, phos now above goal, Ca low, vitamin D low, PTH appropriate  - HLD  - CAD  - Leukocytosis with low grade fever     PLAN:  - HD qMWF and PRN, though she currently expresses she would prefer to avoid HD if it is not her regular day  - UF as tolerated  - 145 dialysate sodium to try to leave her SNa at high-normal range  - HD nurse to draw blood cultures from TDC, abx per primary service  - No dietary protein restrictions  - Dose all meds per ESRD  - Monitor for additional iHD as needed  - Transfuse as needed to maintain Hgb > 7, ferritin pending  - Do not provide with gadolinium without discussing with nephrology, as HD x 3 days will need to be arranged  - calcium acetate TID with meals and ergocalciferol     All prior notes form other doctors and RN staff were reviewed from admission to current day to help me make my clinical decisions     PMH, PSH, SH, FH, meds, labs, images reviewed

## 2020-06-30 NOTE — PROGRESS NOTES
[facility] Medicine Daily Progress Note    Date of Service  6/30/2020    Chief Complaint  87 y.o. female admitted 6/26/2020 with melena and weakness, patient has a history of CVA, end-stage renal disease on hemodialysis Monday Wednesday Friday, peripheral arterial disease, the patient apparently is visiting from [place], she has 2 sons in town, she also complained of left-sided weakness, speech difficulty apparently and also some residual deficits from her previous CVA, the patient does have some chronic lower extremity wounds that she is being treated with antibiotics for, the patient on presentation was imaged and was found to have a right-sided chronic epidural hematoma with some mild acute hemorrhage within the lesion, not a surgical candidate.    Hospital Course    Patient admitted with Severe blood loss anemia, chronic right-sided subdural hematoma without intervention indicated at this time, chronic end-stage renal disease on hemodialysis  Interval Problem Update  Patient seen and examined today.    Patient tolerating treatment and therapies.  All Data, Medication data reviewed.  Case discussed with nursing as available.  Plan of Care reviewed with patient and notified of changes.  6/29 patient appears fairly confused, we discussed the findings of her capsule endoscopy, need for additional push endoscopy, she is somewhat resistant to the idea to undergo another procedure, the patient is otherwise afebrile, heart rate in the 80s to 100s, blood pressure is stabilized, patient has stable left upper extremity weakness and bilateral lower extremity weakness, patient with significant anxiety overall, seen by nephrology, gastroenterology, intensive care physicians, stabilized for transfer out of ICU    6/30: Initially the patient plan to have Shoe endoscopy today but due to her worsening confusion it was canceled.  She had low-grade temperature 100.1 and tachycardia.  WBC was 13.8, urinalysis was negative for  infection.  I saw and examined the patient today.  Consultants/Specialty  Nephrology, gastroenterology, intensivist    Code Status  DNR/DNI    Disposition  TBD    Review of Systems  Review of Systems   Unable to perform ROS: Mental acuity   Gastrointestinal: Positive for melena.        Physical Exam  Temp:  [36.7 °C (98 °F)-37.2 °C (98.9 °F)] 36.8 °C (98.3 °F)  Pulse:  [] 92  Resp:  [10-29] 16  BP: (114-162)/(54-71) 133/54  SpO2:  [91 %-100 %] 92 %    Physical Exam  Vitals signs and nursing note reviewed.   Constitutional:       Appearance: She is well-developed. She is not diaphoretic.   HENT:      Head: Normocephalic and atraumatic.      Nose: Nose normal.   Eyes:      Conjunctiva/sclera: Conjunctivae normal.      Pupils: Pupils are equal, round, and reactive to light.   Neck:      Musculoskeletal: Normal range of motion and neck supple.      Thyroid: No thyromegaly.      Vascular: No JVD.   Cardiovascular:      Rate and Rhythm: Normal rate and regular rhythm.      Heart sounds: Normal heart sounds. No friction rub. No gallop.    Pulmonary:      Effort: Pulmonary effort is normal.      Breath sounds: Normal breath sounds. No wheezing or rales.   Abdominal:      General: Bowel sounds are normal. There is no distension.      Palpations: Abdomen is soft. There is no mass.      Tenderness: There is no abdominal tenderness. There is no guarding or rebound.   Musculoskeletal: Normal range of motion.         General: No tenderness.   Lymphadenopathy:      Cervical: No cervical adenopathy.   Skin:     General: Skin is warm and dry.      Coloration: Skin is pale.   Neurological:      Mental Status: She is alert. She is disoriented.         Fluids    Intake/Output Summary (Last 24 hours) at 6/30/2020 0745  Last data filed at 6/30/2020 0400  Gross per 24 hour   Intake 150 ml   Output 1400 ml   Net -1250 ml       Laboratory  Recent Labs     06/28/20  0625  06/28/20  1846 06/29/20  0532 06/30/20  0159   WBC 12.0*  --    --  11.7* 13.8*   RBC 2.81*  --   --  2.56* 2.66*   HEMOGLOBIN 8.2*   < > 9.2* 7.4* 7.7*   HEMATOCRIT 24.9*  --   --  23.5* 24.5*   MCV 88.6  --   --  91.8 92.1   MCH 29.2  --   --  28.9 28.9   MCHC 32.9*  --   --  31.5* 31.4*   RDW 49.9  --   --  51.2* 50.6*   PLATELETCT 245  --   --  255 309   MPV 9.8  --   --  9.8 9.5    < > = values in this interval not displayed.     Recent Labs     06/28/20  0625 06/29/20  0532 06/30/20  0159   SODIUM 141 142 141   POTASSIUM 5.0 5.4 4.8   CHLORIDE 106 107 102   CO2 23 23 24   GLUCOSE 116* 113* 104*   BUN 94* 95* 50*   CREATININE 5.22* 5.49* 4.18*   CALCIUM 6.7* 6.8* 7.9*     Recent Labs     06/29/20  1716   INR 1.05               Imaging  CT-HEAD W/O   Final Result      No significant interval change.      MR-BRAIN-W/O   Final Result      1.  Mild cerebral atrophy. Age-appropriate.   2.  Moderate-large lentiform extra-axial fluid collection over the right posterior frontal-parietal convexity consistent with a chronic or late subacute epidural hematoma or loculated subdural hematoma. Possible minimal component of more recent    hemorrhage where there is linear bandlike hyperdensity on CT.   3.  Patchy and punctate areas of bright diffusion signal in the underlying right parietal lobe which may represent acute or recent subacute arterial or venous infarction. No acute parenchymal hemorrhage.   4.  Minimal supratentorial white matter disease most consistent with microvascular ischemic change.   5.  Old lacunar infarct left lateral thalamus.      MR-MRA HEAD-W/O   Final Result      1.  Intracranial atherosclerotic cerebrovascular stenotic disease involving the posterior cerebral arteries and probably the middle cerebral arteries beyond the genu. No alexx occlusion.   2.  Normal variant dominant caliber right anterior cerebral artery A1 segment with a hypoplastic left A1 segment.   3.  No evidence of aneurysm about [place] of [place].      EC-ECHOCARDIOGRAM COMPLETE W/ CONT   Final  Result      US-CAROTID DOPPLER BILAT   Final Result      DX-CHEST-PORTABLE (1 VIEW)   Final Result      No acute cardiopulmonary abnormality.      CT-HEAD W/O   Final Result      1. Small amount of acute hemorrhage in a chronic 2.1 cm right epidural collection, along the right frontoparietal convexity. Associated 2 mm right to left midline shift.   2. Minimal subacute subdural hemorrhage anterior to the right epidural collection.   3. Hyperdensity in the temporal horn of the right lateral ventricle may represent small amount of intraventricular hemorrhage.   4. No CT evidence of acute infarct.           Assessment/Plan  * Acute GI bleeding  Assessment & Plan  With melena, EGD and colonoscopy did not see any active lesions, capsule endoscopy identified a possible source of 50 cm,   plan for push endoscopy when stable  Continuous cardiac monitoring  Continue PPI for now  Monitor H&H every 8 hours, transfuse for hemoglobin less than 7  Gastroenterology is following    Epidural hemorrhage (HCC)  Assessment & Plan  2.3 cm with 2 mm shift  Neurosurgery consulted and recommended no intervention  Neurology consulted, MRI and MRA resulted, possible recent small CVA      Leg wounds  Assessment & Plan  Wound culture grew pseudomonas aeruginosa  Was on oral ciprofloxacin and I will change it to IV Zosyn      Peripheral artery disease ([facility])  Assessment & Plan  Hold aspirin and Plavix    Type 2 diabetes mellitus with kidney complication, without long-term current use of insulin ([facility])  Assessment & Plan  Start on insulin sliding scale with serial Accu-Checks  Check hemoglobin A1c  Hypoglycemic protocol in place        Primary hypertension  Assessment & Plan  Restart medication    ESRD (end stage renal disease) ([facility])  Assessment & Plan  Dialysis Monday Wednesday Friday,   nephrology is consulted  Monitor BMP and assess response  Avoid IV contrast/nephrotoxins/NSAIDs  Dose adjust meds for decreased GFR        Acute blood loss  anemia  Assessment & Plan  Given 3 units of RBCs, 1 unit of platelets  Hemoglobin 7.7 today.  Transfuse as needed with target hemoglobin above 7.  GI is following  Due to capsule endoscopy once the patient is more stable  Follow CBC in the morning    Encephalopathy acute  Assessment & Plan  Likely multifactorial  Treating infection with IV antibiotic  We will try to avoid benzos and narcotic as much as possible       VTE prophylaxis: SCD

## 2020-06-30 NOTE — PROGRESS NOTES
IF patient does need some rehab, patient's family prefers it to be local so she may regain strength before going home to [place].

## 2020-06-30 NOTE — PROGRESS NOTES
Arrived at bedside at 1315, Pt was moved to T309, Pt was confused and agitated, screaming at this RN, floor RN at bedside for safety precaution, aseptically collected 4 bottles of blood cultures from R chest HD cath, flushed with NS, locked with heparin, clamped and capped each port afterwards. Blood culture specimens handed to primary RN.

## 2020-06-30 NOTE — ANESTHESIA PREPROCEDURE EVALUATION
Relevant Problems   CARDIAC   (+) Peripheral artery disease (HCC)   (+) Primary hypertension         (+) ESRD (end stage renal disease) (HCC)      ENDO   (+) Type 2 diabetes mellitus with kidney complication, without long-term current use of insulin (HCC)   CVA    Physical Exam    Airway   Mallampati: III  TM distance: <3 FB  Neck ROM: full       Cardiovascular   Rhythm: regular  Rate: normal     Dental    Pulmonary   Breath sounds clear to auscultation     Abdominal    Neurological - normal exam                 Anesthesia Plan    ASA 3   ASA physical status 3 criteria: CVA or TIA - history (> 3 months)    Plan - general       Airway plan will be ETT        Induction: intravenous      Pertinent diagnostic labs and testing reviewed    Informed Consent:    Anesthetic plan and risks discussed with patient.

## 2020-06-30 NOTE — THERAPY
Missed Therapy     Patient Name: Corine Dela Cruz  Age:  87 y.o., Sex:  female  Medical Record #: [id number]  Today's Date: 6/30/2020    Pt going for endoscopy this morning. Upon return, pt is very confused, agitated, and crying out. Pt given Ativan and Haldol w/ no effects. Pt not appropriate for therapy at this time. Will continue to follow and monitor for appropriateness.

## 2020-06-30 NOTE — OR NURSING
Pt is canceled for her procedure, she is expressing a desire to not have this done.  She is slightly confused as to why the procedure is to be done, Dr. Rich and Dr. Marquez spoke with her son Kory who consented to have this done, but pt is still not  agreeable  Doctors are not comfortable proceeding, she will be rescheduled

## 2020-07-01 PROBLEM — R78.81 BACTEREMIA: Status: ACTIVE | Noted: 2020-07-01

## 2020-07-01 LAB
ABO GROUP BLD: NORMAL
ALBUMIN SERPL BCP-MCNC: 2.5 G/DL (ref 3.2–4.9)
ALBUMIN/GLOB SERPL: 0.8 G/DL
ALP SERPL-CCNC: 45 U/L (ref 30–99)
ALT SERPL-CCNC: 12 U/L (ref 2–50)
ANION GAP SERPL CALC-SCNC: 14 MMOL/L (ref 7–16)
AST SERPL-CCNC: 21 U/L (ref 12–45)
BASOPHILS # BLD AUTO: 0.5 % (ref 0–1.8)
BASOPHILS # BLD: 0.04 K/UL (ref 0–0.12)
BILIRUB SERPL-MCNC: 0.2 MG/DL (ref 0.1–1.5)
BLD GP AB SCN SERPL QL: NORMAL
BUN SERPL-MCNC: 63 MG/DL (ref 8–22)
CALCIUM SERPL-MCNC: 7.2 MG/DL (ref 8.5–10.5)
CHLORIDE SERPL-SCNC: 103 MMOL/L (ref 96–112)
CO2 SERPL-SCNC: 22 MMOL/L (ref 20–33)
CREAT SERPL-MCNC: 5.76 MG/DL (ref 0.5–1.4)
EOSINOPHIL # BLD AUTO: 0.14 K/UL (ref 0–0.51)
EOSINOPHIL NFR BLD: 1.6 % (ref 0–6.9)
ERYTHROCYTE [DISTWIDTH] IN BLOOD BY AUTOMATED COUNT: 51.8 FL (ref 35.9–50)
FERRITIN SERPL-MCNC: 2759 NG/ML (ref 10–291)
GLOBULIN SER CALC-MCNC: 3.1 G/DL (ref 1.9–3.5)
GLUCOSE SERPL-MCNC: 86 MG/DL (ref 65–99)
HCT VFR BLD AUTO: 20.2 % (ref 37–47)
HGB BLD-MCNC: 6.4 G/DL (ref 12–16)
IMM GRANULOCYTES # BLD AUTO: 0.08 K/UL (ref 0–0.11)
IMM GRANULOCYTES NFR BLD AUTO: 0.9 % (ref 0–0.9)
IRON SATN MFR SERPL: 22 % (ref 15–55)
IRON SERPL-MCNC: 24 UG/DL (ref 40–170)
LYMPHOCYTES # BLD AUTO: 1.38 K/UL (ref 1–4.8)
LYMPHOCYTES NFR BLD: 16.3 % (ref 22–41)
MAGNESIUM SERPL-MCNC: 2 MG/DL (ref 1.5–2.5)
MCH RBC QN AUTO: 29.4 PG (ref 27–33)
MCHC RBC AUTO-ENTMCNC: 31.7 G/DL (ref 33.6–35)
MCV RBC AUTO: 92.7 FL (ref 81.4–97.8)
MONOCYTES # BLD AUTO: 0.59 K/UL (ref 0–0.85)
MONOCYTES NFR BLD AUTO: 6.9 % (ref 0–13.4)
NEUTROPHILS # BLD AUTO: 6.26 K/UL (ref 2–7.15)
NEUTROPHILS NFR BLD: 73.8 % (ref 44–72)
NRBC # BLD AUTO: 0 K/UL
NRBC BLD-RTO: 0 /100 WBC
PHOSPHATE SERPL-MCNC: 5.6 MG/DL (ref 2.5–4.5)
PLATELET # BLD AUTO: 248 K/UL (ref 164–446)
PMV BLD AUTO: 9.6 FL (ref 9–12.9)
POTASSIUM SERPL-SCNC: 4.7 MMOL/L (ref 3.6–5.5)
PROT SERPL-MCNC: 5.6 G/DL (ref 6–8.2)
RBC # BLD AUTO: 2.18 M/UL (ref 4.2–5.4)
RH BLD: NORMAL
SODIUM SERPL-SCNC: 139 MMOL/L (ref 135–145)
TIBC SERPL-MCNC: 108 UG/DL (ref 250–450)
UIBC SERPL-MCNC: 84 UG/DL (ref 110–370)
WBC # BLD AUTO: 8.5 K/UL (ref 4.8–10.8)

## 2020-07-01 PROCEDURE — 99233 SBSQ HOSP IP/OBS HIGH 50: CPT | Performed by: INTERNAL MEDICINE

## 2020-07-01 PROCEDURE — 84100 ASSAY OF PHOSPHORUS: CPT

## 2020-07-01 PROCEDURE — 36430 TRANSFUSION BLD/BLD COMPNT: CPT

## 2020-07-01 PROCEDURE — A9270 NON-COVERED ITEM OR SERVICE: HCPCS | Performed by: INTERNAL MEDICINE

## 2020-07-01 PROCEDURE — 770001 HCHG ROOM/CARE - MED/SURG/GYN PRIV*

## 2020-07-01 PROCEDURE — 700105 HCHG RX REV CODE 258: Performed by: INTERNAL MEDICINE

## 2020-07-01 PROCEDURE — 302146: Performed by: INTERNAL MEDICINE

## 2020-07-01 PROCEDURE — P9016 RBC LEUKOCYTES REDUCED: HCPCS

## 2020-07-01 PROCEDURE — 86901 BLOOD TYPING SEROLOGIC RH(D): CPT

## 2020-07-01 PROCEDURE — 700102 HCHG RX REV CODE 250 W/ 637 OVERRIDE(OP): Performed by: INTERNAL MEDICINE

## 2020-07-01 PROCEDURE — 82728 ASSAY OF FERRITIN: CPT

## 2020-07-01 PROCEDURE — 85025 COMPLETE CBC W/AUTO DIFF WBC: CPT

## 2020-07-01 PROCEDURE — 700105 HCHG RX REV CODE 258

## 2020-07-01 PROCEDURE — 700111 HCHG RX REV CODE 636 W/ 250 OVERRIDE (IP): Performed by: INTERNAL MEDICINE

## 2020-07-01 PROCEDURE — 86850 RBC ANTIBODY SCREEN: CPT

## 2020-07-01 PROCEDURE — 700102 HCHG RX REV CODE 250 W/ 637 OVERRIDE(OP): Performed by: HOSPITALIST

## 2020-07-01 PROCEDURE — 86900 BLOOD TYPING SEROLOGIC ABO: CPT

## 2020-07-01 PROCEDURE — 36415 COLL VENOUS BLD VENIPUNCTURE: CPT

## 2020-07-01 PROCEDURE — A9270 NON-COVERED ITEM OR SERVICE: HCPCS | Performed by: HOSPITALIST

## 2020-07-01 PROCEDURE — 90935 HEMODIALYSIS ONE EVALUATION: CPT

## 2020-07-01 PROCEDURE — 86923 COMPATIBILITY TEST ELECTRIC: CPT

## 2020-07-01 PROCEDURE — 83540 ASSAY OF IRON: CPT

## 2020-07-01 PROCEDURE — 80053 COMPREHEN METABOLIC PANEL: CPT

## 2020-07-01 PROCEDURE — 700111 HCHG RX REV CODE 636 W/ 250 OVERRIDE (IP)

## 2020-07-01 PROCEDURE — 83550 IRON BINDING TEST: CPT

## 2020-07-01 PROCEDURE — 700101 HCHG RX REV CODE 250: Performed by: INTERNAL MEDICINE

## 2020-07-01 PROCEDURE — 5A1D70Z PERFORMANCE OF URINARY FILTRATION, INTERMITTENT, LESS THAN 6 HOURS PER DAY: ICD-10-PCS | Performed by: INTERNAL MEDICINE

## 2020-07-01 PROCEDURE — 83735 ASSAY OF MAGNESIUM: CPT

## 2020-07-01 PROCEDURE — 99223 1ST HOSP IP/OBS HIGH 75: CPT | Performed by: INTERNAL MEDICINE

## 2020-07-01 RX ORDER — HEPARIN SODIUM 1000 [USP'U]/ML
INJECTION, SOLUTION INTRAVENOUS; SUBCUTANEOUS
Status: COMPLETED
Start: 2020-07-01 | End: 2020-07-01

## 2020-07-01 RX ORDER — FUROSEMIDE 10 MG/ML
80 INJECTION INTRAMUSCULAR; INTRAVENOUS ONCE
Status: DISCONTINUED | OUTPATIENT
Start: 2020-07-01 | End: 2020-07-01

## 2020-07-01 RX ORDER — FUROSEMIDE 10 MG/ML
80 INJECTION INTRAMUSCULAR; INTRAVENOUS
Status: DISCONTINUED | OUTPATIENT
Start: 2020-07-01 | End: 2020-07-09

## 2020-07-01 RX ORDER — TRAMADOL HYDROCHLORIDE 50 MG/1
50 TABLET ORAL EVERY 6 HOURS PRN
Status: DISCONTINUED | OUTPATIENT
Start: 2020-07-01 | End: 2020-07-01

## 2020-07-01 RX ORDER — MORPHINE SULFATE 4 MG/ML
2 INJECTION, SOLUTION INTRAMUSCULAR; INTRAVENOUS ONCE
Status: COMPLETED | OUTPATIENT
Start: 2020-07-01 | End: 2020-07-01

## 2020-07-01 RX ORDER — SODIUM CHLORIDE 9 MG/ML
INJECTION, SOLUTION INTRAVENOUS
Status: COMPLETED
Start: 2020-07-01 | End: 2020-07-01

## 2020-07-01 RX ADMIN — LORAZEPAM 0.5 MG: 2 INJECTION INTRAMUSCULAR; INTRAVENOUS at 14:45

## 2020-07-01 RX ADMIN — TRAMADOL HYDROCHLORIDE 50 MG: 50 TABLET, FILM COATED ORAL at 09:56

## 2020-07-01 RX ADMIN — Medication 667 MG: at 11:56

## 2020-07-01 RX ADMIN — PIPERACILLIN SODIUM AND TAZOBACTAM SODIUM 3.38 G: 3; .375 INJECTION, POWDER, FOR SOLUTION INTRAVENOUS at 18:25

## 2020-07-01 RX ADMIN — HEPARIN SODIUM 3200 UNITS: 1000 INJECTION, SOLUTION INTRAVENOUS; SUBCUTANEOUS at 17:36

## 2020-07-01 RX ADMIN — SODIUM CHLORIDE 1000 ML: 9 INJECTION, SOLUTION INTRAVENOUS at 11:49

## 2020-07-01 RX ADMIN — ACETAMINOPHEN 650 MG: 325 TABLET, FILM COATED ORAL at 03:56

## 2020-07-01 RX ADMIN — AMLODIPINE BESYLATE 10 MG: 10 TABLET ORAL at 07:47

## 2020-07-01 RX ADMIN — HALOPERIDOL LACTATE 2 MG: 5 INJECTION, SOLUTION INTRAMUSCULAR at 04:44

## 2020-07-01 RX ADMIN — ACETAMINOPHEN 650 MG: 325 TABLET, FILM COATED ORAL at 08:54

## 2020-07-01 RX ADMIN — ATORVASTATIN CALCIUM 40 MG: 40 TABLET, FILM COATED ORAL at 18:25

## 2020-07-01 RX ADMIN — Medication 667 MG: at 08:54

## 2020-07-01 RX ADMIN — PIPERACILLIN SODIUM AND TAZOBACTAM SODIUM 3.38 G: 3; .375 INJECTION, POWDER, FOR SOLUTION INTRAVENOUS at 00:16

## 2020-07-01 RX ADMIN — HALOPERIDOL LACTATE 2 MG: 5 INJECTION, SOLUTION INTRAMUSCULAR at 08:55

## 2020-07-01 RX ADMIN — MORPHINE SULFATE 2 MG: 4 INJECTION INTRAVENOUS at 14:45

## 2020-07-01 RX ADMIN — LORAZEPAM 0.5 MG: 2 INJECTION INTRAMUSCULAR; INTRAVENOUS at 20:43

## 2020-07-01 RX ADMIN — HALOPERIDOL LACTATE 2 MG: 5 INJECTION, SOLUTION INTRAMUSCULAR at 00:17

## 2020-07-01 RX ADMIN — LORAZEPAM 0.5 MG: 2 INJECTION INTRAMUSCULAR; INTRAVENOUS at 03:55

## 2020-07-01 RX ADMIN — LIDOCAINE 1 PATCH: 50 PATCH TOPICAL at 18:26

## 2020-07-01 RX ADMIN — ACETAMINOPHEN 650 MG: 325 TABLET, FILM COATED ORAL at 18:37

## 2020-07-01 RX ADMIN — Medication 667 MG: at 18:25

## 2020-07-01 ASSESSMENT — ENCOUNTER SYMPTOMS
NAUSEA: 0
INSOMNIA: 0
BLURRED VISION: 0
FOCAL WEAKNESS: 0
STRIDOR: 0
HEADACHES: 0
SPUTUM PRODUCTION: 0
DIARRHEA: 0
SEIZURES: 0
EYE PAIN: 0
DIZZINESS: 0
CHILLS: 0
HEARTBURN: 0
NECK PAIN: 0
BACK PAIN: 0
COUGH: 0
EYE DISCHARGE: 0
WEIGHT LOSS: 0
ABDOMINAL PAIN: 0
FEVER: 0
EYE REDNESS: 0
SHORTNESS OF BREATH: 0
MYALGIAS: 0
VOMITING: 0
ORTHOPNEA: 0
MEMORY LOSS: 0
WEAKNESS: 1
PALPITATIONS: 0

## 2020-07-01 NOTE — PROGRESS NOTES
Spoke to a couple of the patient's family members while the patient had called them on her cellphone(did not catch full names, someone named Kory and a daughter in law).  Apparently the patient's power of  is her daughter named Elena Garcia.  Updated emergency contacts with daughter's name and phone number ([phone]).  Family members stated that they would have patient's children discuss their wishes for the patient and then proceed with contacting the day team.  Passed this information on to gIor VILLEGAS.

## 2020-07-01 NOTE — DIETARY
Nutrition Services: Update   Day 5 of admit; clears x 5 days - inadequate nutrition.  Pt downgraded to a clear liquid, 2 gm sodium, 1000 mL fluid restriction diet 2/2 fluid overload.  Pt will be getting juice on meal trays to adhere to current diet restriction.  MD(s) aware of current nutrition status - will potentially be re-evaluated in the AM pending clinical picture.  Pt also being followed by GI - endoscopy was cancelled 6/30 d/t pt confusion.  Palliative care consulted.    RD follows nutrition POC.

## 2020-07-01 NOTE — PROGRESS NOTES
Nephrology Daily Progress Note    Date of Service  7/1/2020    HISTORY OF PRESENT ILLNESS:    86 yo F PMH ESRD MWF iHD, HTN, anemia of CKD, CKD-MBD, and HLD who presents to ED with CC as above.  She is visiting the [facility] from [place] and due to current pandemic has not been able to go back home and continued to stay in this area with family.  She has OP dialysis at [facility] and has been compliant.  She was doing well until about two weeks ago when she started to notice dark stools.  She was doing ok until about 3 days ago when she also noted orthostatic symptoms upon standing.  It resolved and then today it came back and she had some slurred speech and left arm numbness so she was brought in for evaluation.  She has had a prior stroke and the numbness and slurred speech have been present off and on as a result of that so initially she though nothing of it.  Neurology was consulted and they did not feel that this was CVA related and more hemodynamic related.  Labs in ED showed Hgb 5.5 and serum labs showed  and K+ 5.3.  She was given pRBCs and admitted to ICU.  CT of head showed an acute on chronic epidural bleed with mild midline shift as well.  She was on plavix at home.  No recent F/C/N/V/CP/SOB.  No hematochezia, hematemesis.  No HA, visual changes, or abdominal pain    Daily nephrology summary  06/26 - consult done, HD done  06/27 - 2u prbc this AM, has been seen and evaluated by neurology and neurosurgery, further imaging planned for today  06/28 - not seen, patient in procedures  06/29 - seen on HD, tolerating with anxiolytic, somnolent and provides minimal responses to questions, 3Ca bath today, EGD and colonoscopy normal, capsule endoscopy  06/30 - transferred to floor, capsule endoscopy with fresh blood, push enteroscopy deferred, some concern for UTI but no urine cx and UA without bacteria, WBC increased, +fever, +AMS  07/01 - patient is agitated this AM, initially declined HD, +edema, +hbg  decreased and receiving prbc    Review of Systems  Review of Systems   Constitutional: Negative for chills and fever.   Respiratory: Negative for cough and shortness of breath.    Cardiovascular: Positive for leg swelling. Negative for chest pain.   Gastrointestinal: Negative for nausea and vomiting.   Musculoskeletal: Negative for back pain.        BLE discomfort   Neurological: Positive for weakness. Negative for dizziness.   Psychiatric/Behavioral: Negative for memory loss. The patient does not have insomnia.         Physical Exam  Temp:  [36.2 °C (97.2 °F)-37.4 °C (99.3 °F)] 36.9 °C (98.5 °F)  Pulse:  [18-99] 96  Resp:  [17-19] 18  BP: (109-150)/(43-94) 124/51  SpO2:  [92 %-100 %] 100 %    Physical Exam  Constitutional:       General: She is not in acute distress.     Appearance: She is ill-appearing.   HENT:      Head: Normocephalic.      Right Ear: External ear normal.      Left Ear: External ear normal.      Nose: Nose normal.      Mouth/Throat:      Mouth: Mucous membranes are moist.      Pharynx: Oropharynx is clear.   Eyes:      Extraocular Movements: Extraocular movements intact.      Conjunctiva/sclera: Conjunctivae normal.   Cardiovascular:      Rate and Rhythm: Normal rate and regular rhythm.      Comments: R chest TDC c/d/d  Pulmonary:      Effort: Pulmonary effort is normal.      Breath sounds: No wheezing.   Abdominal:      Palpations: Abdomen is soft.      Tenderness: There is no abdominal tenderness.   Musculoskeletal:      Right lower leg: Edema present.      Left lower leg: Edema present.      Comments: anasarca   Skin:     Coloration: Skin is pale.      Comments: LE dressing in place   Neurological:      Mental Status: She is alert and oriented to person, place, and time. She is confused.   Psychiatric:         Attention and Perception: Attention normal.         Behavior: Behavior is agitated. Behavior is not aggressive.         Fluids    Intake/Output Summary (Last 24 hours) at 7/1/2020  1216  Last data filed at 7/1/2020 0500  Gross per 24 hour   Intake 50 ml   Output 350 ml   Net -300 ml       Laboratory  Recent Labs     06/29/20  0532 06/30/20  0159 07/01/20  0653   WBC 11.7* 13.8* 8.5   RBC 2.56* 2.66* 2.18*   HEMOGLOBIN 7.4* 7.7* 6.4*   HEMATOCRIT 23.5* 24.5* 20.2*   MCV 91.8 92.1 92.7   MCH 28.9 28.9 29.4   MCHC 31.5* 31.4* 31.7*   RDW 51.2* 50.6* 51.8*   PLATELETCT 255 309 248   MPV 9.8 9.5 9.6     Recent Labs     06/29/20  0532 06/30/20  0159 07/01/20  0653   SODIUM 142 141 139   POTASSIUM 5.4 4.8 4.7   CHLORIDE 107 102 103   CO2 23 24 22   GLUCOSE 113* 104* 86   BUN 95* 50* 63*   CREATININE 5.49* 4.18* 5.76*   CALCIUM 6.8* 7.9* 7.2*     Recent Labs     06/29/20  1716   INR 1.05     No results for input(s): NTPROBNP in the last 72 hours.        Imaging  Reviewed     Assessment/Plan  IMPRESSION:  - ESRD    * Etiology likely 2/2 HTN    * R chest TDC     * Visitor at [facility], lives in [place]  - Melena    * EGD and colonoscopy normal, capsule endoscopy with fresh blood 6/30  - Hyperkalemia    * correct with HD  - Acute epidural hematoma    * Superimposed on chronic epidural bleed, with a small midline shift    * Neuro following, has been evaluated by neurosurgery  - HTN    * Goal BP < 140/90    * Stable  - Anemia, multifactorial    * Goal Hgb 10-11 in CKD, ferritin significantly elevated    * transfuse hgb<7    * see melena above  - CKD-MBD    * Managed at HD unit, phos now above goal, Ca low, vitamin D low, PTH appropriate  - HLD  - CAD  - Leukocytosis with low grade fever  - Bacteremia  - Edema, anasarca     PLAN:  - HD today and then remove TDC, plan for temp line in 48 hours--patient is agreeable to this plan after long discussion  - Abx per primary service  - UF as tolerated--will have HD x 4 hours for maximum UF  - Lasix 80mg BID  - 140-145 dialysate sodium to try to leave her SNa at high-normal range  - HD nurse to draw blood cultures from TDC, abx per primary service  - No dietary  protein restrictions  - Dose all meds per ESRD  - Monitor for additional iHD as needed  - Transfuse as needed to maintain Hgb > 7  - Do not provide with gadolinium without discussing with nephrology, as HD x 3 days will need to be arranged  - calcium acetate TID with meals   - ergocalciferol  - salt and fluid restrictions     All prior notes form other doctors and RN staff were reviewed from admission to current day to help me make my clinical decisions     PMH, PSH, SH, FH, meds, labs, images reviewed

## 2020-07-01 NOTE — ASSESSMENT & PLAN NOTE
From hemodialysis catheter  Removed HD catheter  Nephrology following  ID following on ampicillin  Repeat blood culture negative to date  Have a new hemodialysis tunnel place in the right femoral area yesterday

## 2020-07-01 NOTE — CONSULTS
Reason for PC Consult: Advance Care Planning    Consulted by:   Dr. Noe    Assessment:  General:   87 year old female admitted for GI bleed on 6/26/20. Pt has a history of dialysis dependent ESRD, stroke, PAD, chronic lower extremity wounds and diabetes. Pt presented to [facility] ED with melena.    Social:   Pt lives in [place] with two daughters, she frequently visits her two sons in [place]. Pt arrived to [place] 3 weeks ago, her planned return to [place] was today, 7/1 until her admission.     Dyspnea: No, 100% on RA  Last BM: 07/01/20    Pain: Yes, Pt reports significant pain  Depression: No   Dementia: No     Spiritual:  Is Tenriism or spirituality important for coping with this illness? Yes, Family declined visit from   Has a  or spiritual provider visit been requested? No    Palliative Performance Scale: 30%    Advance Directive: None on File   DPOA: None on File, JAMARI Dela Cruz  POLST: None on File    Code Status: DNR/DNI    Outcome:  PC RN attempted to visit pt at bedside, pt is down in dialysis.    Received call from Kory. Discussed his understanding of clinical picture, he verbalized good insight into pt's status. Discussed pt's uncontrolled pain, benefits vs burdens of treatment, quality vs quantity of life and pt's verbalized wishes with the clinical team, ie wanting comfort focused care.     Discussed comfort focused care vs continued aggressive treatment. Discussed hospice in detail, philosophy goals and support provided. Discussed hospice at home vs SNF. Kory and his wife Anastasiia verbalized understanding. They will call the rest of the family to discuss this information and will call PC RN back with their decision on GOC.    Ensured they have contact number, encouraged them to call with any questions or concerns.     Active listening, education, validation, normalization, therapeutic touch, and emotional support provided throughout encounter.    Updated:   Dr. Noe    Plan:    Awaiting family's decisions regarding continued care vs comfort focused care vs home with hospice.       Thank you for allowing Palliative Care to participate in this patient's care. Please feel free to call [phone] with any questions or concerns.

## 2020-07-01 NOTE — PROGRESS NOTES
Gastroenterology Progress Note     Author: Lola Hamilton M.D.   Date & Time Created: 7/1/2020 3:12 PM    Chief Complaint:  Acute GI bleeding with normal EGD and Colonoscopy, Capsule endoscopy shows fresh blood distal to Pylorus 50 cm, For possible Push enteroscopy.     Interval History:  - Patient agitated yelling this morning, Medicated with Morphine and Ativan. Patient alert and oriented x3, with further assessment of her capacity/Competency, she seems to have Capacity to me, Patient agrees to have push enteroscopy today however there is a discussion among family members and primary team to decide either continued care vs comfort focused care vs home with hospice. Patient received dialysis today, Palliative care seeing patient.       Review of Systems:  Review of Systems   Constitutional: Negative for fever.   Respiratory: Negative for cough.    Cardiovascular: Negative for chest pain.   Gastrointestinal: Negative for abdominal pain, heartburn, nausea and vomiting.   Genitourinary: Negative for dysuria.       Physical Exam:  Physical Exam   Constitutional: She is oriented to person, place, and time. She appears well-developed.   Neck: Normal range of motion. No thyromegaly present.   Cardiovascular: Normal rate.   Pulmonary/Chest: Effort normal.   Abdominal: She exhibits distension. There is no abdominal tenderness.   Neurological: She is oriented to person, place, and time.       Labs:          Recent Labs     06/29/20  0532 06/30/20  0159 07/01/20  0653   SODIUM 142 141 139   POTASSIUM 5.4 4.8 4.7   CHLORIDE 107 102 103   CO2 23 24 22   BUN 95* 50* 63*   CREATININE 5.49* 4.18* 5.76*   MAGNESIUM 2.0 2.0 2.0   PHOSPHORUS 5.8* 4.8* 5.6*   CALCIUM 6.8* 7.9* 7.2*     Recent Labs     06/29/20  0532 06/30/20  0159 07/01/20  0653   ALTSGPT 11 13 12   ASTSGOT 21 22 21   ALKPHOSPHAT 41 48 45   TBILIRUBIN 0.2 0.2 0.2   GLUCOSE 113* 104* 86     Recent Labs     06/29/20  0532 06/29/20  1716 06/30/20  0159  07/01/20  0653   RBC 2.56*  --  2.66* 2.18*   HEMOGLOBIN 7.4*  --  7.7* 6.4*   HEMATOCRIT 23.5*  --  24.5* 20.2*   PLATELETCT 255  --  309 248   PROTHROMBTM  --  14.0  --   --    INR  --  1.05  --   --    IRON 33*  --   --  24*   FERRITIN  --   --   --  2759.0*   TOTIRONBC 130*  --   --  108*     Recent Labs     06/29/20  0532 06/30/20  0159 07/01/20  0653   WBC 11.7* 13.8* 8.5   NEUTSPOLYS 74.50* 71.40 73.80*   LYMPHOCYTES 14.90* 19.30* 16.30*   MONOCYTES 6.70 6.80 6.90   EOSINOPHILS 1.70 0.90 1.60   BASOPHILS 0.30 0.40 0.50   ASTSGOT 21 22 21   ALTSGPT 11 13 12   ALKPHOSPHAT 41 48 45   TBILIRUBIN 0.2 0.2 0.2       Imaging:  CT head 6/29/2020 reviewed.   MRI brain 6/27/2020 reviewed.     Medical decision making:     Acute GI bleeding with normal EGD and Colonoscopy, Capsule endoscopy shows fresh blood distal to Pylorus 50 cm, For possible Push enteroscopy.  however there is a discussion among family members and primary team to decide either continued care vs comfort focused care vs home with hospice. Patient received dialysis today, Palliative care seeing patient. Will wait till final decision of family members and patient on if to proceed with push enteroscopy.   - Transfuse to keep HGB > 7       Quality-Core Measures SCDs

## 2020-07-01 NOTE — CARE PLAN
Problem: Pain Management  Goal: Pain level will decrease to patient's comfort goal  Outcome: PROGRESSING SLOWER THAN EXPECTED  Flowsheets (Taken 7/1/2020 1122)  Non Verbal Scale:   Crying   Grimacing   Moaning   Restlessness   Tense Body Language  Pain Rating Scale (NPRS): 10  Note:  Pain is not well controlled with current medications.  Patient would benefit from a palliative care consult.      Problem: Skin Integrity  Goal: Risk for impaired skin integrity will decrease  Outcome: PROGRESSING SLOWER THAN EXPECTED  Note:  Patients  skin is fragile and is weeping from  upper extremities. Patient would  benefit from a low air loss mattress.

## 2020-07-01 NOTE — THERAPY
Missed Therapy     Patient Name: Corine Dela Cruz  Age:  87 y.o., Sex:  female  Medical Record #: [id number]  Today's Date: 7/1/2020    Discussed missed therapy with RN, per RN pt not appropriate as she is screaming out at this time. As well low Hgb of 6.4 and receiving blood transfusion. Will follow up as able.

## 2020-07-01 NOTE — PROGRESS NOTES
[facility] Medicine Daily Progress Note    Date of Service  7/1/2020    Chief Complaint  87 y.o. female admitted 6/26/2020 with melena and weakness, patient has a history of CVA, end-stage renal disease on hemodialysis Monday Wednesday Friday, peripheral arterial disease, the patient apparently is visiting from [place], she has 2 sons in town, she also complained of left-sided weakness, speech difficulty apparently and also some residual deficits from her previous CVA, the patient does have some chronic lower extremity wounds that she is being treated with antibiotics for, the patient on presentation was imaged and was found to have a right-sided chronic epidural hematoma with some mild acute hemorrhage within the lesion, not a surgical candidate.    Hospital Course    Patient admitted with Severe blood loss anemia, chronic right-sided subdural hematoma without intervention indicated at this time, chronic end-stage renal disease on hemodialysis  Interval Problem Update  Patient seen and examined today.    Patient tolerating treatment and therapies.  All Data, Medication data reviewed.  Case discussed with nursing as available.  Plan of Care reviewed with patient and notified of changes.  6/29 patient appears fairly confused, we discussed the findings of her capsule endoscopy, need for additional push endoscopy, she is somewhat resistant to the idea to undergo another procedure, the patient is otherwise afebrile, heart rate in the 80s to 100s, blood pressure is stabilized, patient has stable left upper extremity weakness and bilateral lower extremity weakness, patient with significant anxiety overall, seen by nephrology, gastroenterology, intensive care physicians, stabilized for transfer out of ICU    6/30: Initially the patient plan to have capsule endoscopy today but due to her worsening confusion it was canceled.  She had low-grade temperature 100.1 and tachycardia.  WBC was 13.8, urinalysis was negative for  infection.  I saw and examined the patient today.    7/1: Her hemoglobin was 6.4 this morning and I will order 1 unit of packed blood cell.  Yesterday her endoscopy was canceled due to confusion.  The patient is alert and oriented x4 this morning.  I saw and examined the patient today.\  I had a very long time discussion with daughter Elena about her medical condition.  I updated her that her hemodialysis catheter is infected and I am going to consult vascular surgeon for that and also infectious disease consult.  He is planning to have enteroscopy by GI.  At the same time I told her that she is in a lot of pain and asking for pain medication and we do not want to give her narcotics due to risks of getting respiratory distress.  I asked her what is her opinion about pain medication and she was not able to give me the opinion.  She stated that treat her everything we can medically to have her with her current condition.  I also explained to her that due to her age and multiple medical comorbidity she will be benefit from palliative care and possibly hospice care who also can help manage her pain.  She finally said that palliative team can contact her brother at  [phone].  I put a consult for palliative care today.            Consultants/Specialty  Nephrology, gastroenterology, intensivist    Code Status  DNR/DNI    Disposition  TBD    Review of Systems  Review of Systems   Constitutional: Negative for chills, fever and weight loss.   HENT: Negative for congestion and nosebleeds.    Eyes: Negative for blurred vision, pain, discharge and redness.   Respiratory: Negative for cough, sputum production, shortness of breath and stridor.    Cardiovascular: Negative for chest pain, palpitations and orthopnea.   Gastrointestinal: Positive for melena. Negative for abdominal pain, diarrhea, heartburn, nausea and vomiting.   Genitourinary: Negative for dysuria, frequency and urgency.   Musculoskeletal: Positive for joint pain.  Negative for back pain, myalgias and neck pain.   Skin: Negative for itching and rash.   Neurological: Negative for dizziness, focal weakness, seizures and headaches.        Physical Exam  Temp:  [36.2 °C (97.2 °F)-37.8 °C (100.1 °F)] 36.2 °C (97.2 °F)  Pulse:  [] 18  Resp:  [16-18] 18  BP: (120-155)/(58-72) 132/66  SpO2:  [92 %-96 %] 92 %    Physical Exam  Vitals signs and nursing note reviewed.   Constitutional:       Appearance: She is well-developed. She is not diaphoretic.   HENT:      Head: Normocephalic and atraumatic.      Nose: Nose normal.   Eyes:      Conjunctiva/sclera: Conjunctivae normal.      Pupils: Pupils are equal, round, and reactive to light.   Neck:      Musculoskeletal: Normal range of motion and neck supple.      Thyroid: No thyromegaly.      Vascular: No JVD.   Cardiovascular:      Rate and Rhythm: Normal rate and regular rhythm.      Heart sounds: Normal heart sounds. No friction rub. No gallop.    Pulmonary:      Effort: Pulmonary effort is normal.      Breath sounds: Normal breath sounds. No wheezing or rales.   Abdominal:      General: Bowel sounds are normal. There is no distension.      Palpations: Abdomen is soft. There is no mass.      Tenderness: There is no abdominal tenderness.   Musculoskeletal: Normal range of motion.         General: Tenderness present.   Lymphadenopathy:      Cervical: No cervical adenopathy.   Skin:     General: Skin is warm and dry.      Coloration: Skin is pale.   Neurological:      Mental Status: She is alert. She is disoriented.         Fluids    Intake/Output Summary (Last 24 hours) at 7/1/2020 0746  Last data filed at 7/1/2020 0500  Gross per 24 hour   Intake 50 ml   Output 350 ml   Net -300 ml       Laboratory  Recent Labs     06/29/20  0532 06/30/20  0159 07/01/20  0653   WBC 11.7* 13.8* 8.5   RBC 2.56* 2.66* 2.18*   HEMOGLOBIN 7.4* 7.7* 6.4*   HEMATOCRIT 23.5* 24.5* 20.2*   MCV 91.8 92.1 92.7   MCH 28.9 28.9 29.4   MCHC 31.5* 31.4* 31.7*   RDW  51.2* 50.6* 51.8*   PLATELETCT 255 309 248   MPV 9.8 9.5 9.6     Recent Labs     06/29/20  0532 06/30/20  0159   SODIUM 142 141   POTASSIUM 5.4 4.8   CHLORIDE 107 102   CO2 23 24   GLUCOSE 113* 104*   BUN 95* 50*   CREATININE 5.49* 4.18*   CALCIUM 6.8* 7.9*     Recent Labs     06/29/20  1716   INR 1.05               Imaging  CT-HEAD W/O   Final Result      No significant interval change.      MR-BRAIN-W/O   Final Result      1.  Mild cerebral atrophy. Age-appropriate.   2.  Moderate-large lentiform extra-axial fluid collection over the right posterior frontal-parietal convexity consistent with a chronic or late subacute epidural hematoma or loculated subdural hematoma. Possible minimal component of more recent    hemorrhage where there is linear bandlike hyperdensity on CT.   3.  Patchy and punctate areas of bright diffusion signal in the underlying right parietal lobe which may represent acute or recent subacute arterial or venous infarction. No acute parenchymal hemorrhage.   4.  Minimal supratentorial white matter disease most consistent with microvascular ischemic change.   5.  Old lacunar infarct left lateral thalamus.      MR-MRA HEAD-W/O   Final Result      1.  Intracranial atherosclerotic cerebrovascular stenotic disease involving the posterior cerebral arteries and probably the middle cerebral arteries beyond the genu. No alexx occlusion.   2.  Normal variant dominant caliber right anterior cerebral artery A1 segment with a hypoplastic left A1 segment.   3.  No evidence of aneurysm about [place].      EC-ECHOCARDIOGRAM COMPLETE W/ CONT   Final Result      US-CAROTID DOPPLER BILAT   Final Result      DX-CHEST-PORTABLE (1 VIEW)   Final Result      No acute cardiopulmonary abnormality.      CT-HEAD W/O   Final Result      1. Small amount of acute hemorrhage in a chronic 2.1 cm right epidural collection, along the right frontoparietal convexity. Associated 2 mm right to left midline shift.   2. Minimal  subacute subdural hemorrhage anterior to the right epidural collection.   3. Hyperdensity in the temporal horn of the right lateral ventricle may represent small amount of intraventricular hemorrhage.   4. No CT evidence of acute infarct.           Assessment/Plan  * Acute GI bleeding  Assessment & Plan  With melena, EGD and colonoscopy did not see any active lesions, capsule endoscopy identified a possible source of 50 cm,   plan for push endoscopy when stable  Continuous cardiac monitoring  Continue PPI for now  Monitor H&H every 8 hours, transfuse for hemoglobin less than 7  Gastroenterology is following    Epidural hemorrhage (HCC)  Assessment & Plan  2.3 cm with 2 mm shift  Neurosurgery consulted and recommended no intervention  Neurology consulted, MRI and MRA resulted, possible recent small CVA      Leg wounds  Assessment & Plan  Wound culture grew pseudomonas aeruginosa  Was on oral ciprofloxacin and I will change it to IV Zosyn      Peripheral artery disease ([facility])  Assessment & Plan  Hold aspirin and Plavix    Type 2 diabetes mellitus with kidney complication, without long-term current use of insulin ([facility])  Assessment & Plan  Start on insulin sliding scale with serial Accu-Checks  Check hemoglobin A1c  Hypoglycemic protocol in place        Primary hypertension  Assessment & Plan  Restart medication    ESRD (end stage renal disease) ([facility])  Assessment & Plan  Dialysis Monday Wednesday Friday,   nephrology is consulted  Monitor BMP and assess response  Avoid IV contrast/nephrotoxins/NSAIDs  Dose adjust meds for decreased GFR        Acute blood loss anemia  Assessment & Plan  Given 3 units of RBCs, 1 unit of platelets  Hemoglobin 6.4 today.    To transfuse 1 unit of blood today  Transfuse as needed with target hemoglobin above 7.  GI is following  To have endoscopy  Follow CBC in the morning    Encephalopathy acute  Assessment & Plan  Likely multifactorial  Treating infection with IV antibiotic  Improving and  alert oriented x4  We will try to avoid benzos and narcotic as much as possible     I spent a total of 33 minutes of non face to face time performing additional research, reviewing old medical records, provided on going management by following up on labs, placing orders, discussing plan of care with other healthcare providers and nursing staff. Start time: 1000. End time: 1033     Billing code [id number]  VTE prophylaxis: SCD

## 2020-07-01 NOTE — PROGRESS NOTES
"Palliative Care   Received call from patient's son Michael asking for RN Ingrid.  Explained she was gone for the day but I can get her a message and asked if there was anything I might be able to help with. Michael expressed he had spoken to Ingrid about comfort care.  He spoke with his siblings and reports  his sister who spoke with their mom directly and BAKARI Costa was present \"and my mom chooses to live instead of die so it's hard to go against what she is saying.\"  Expressed that I would pass this along to Ingrid and offered to call RN or connect him.  I provided T3 phone number and transferred phone call.  Updated Ignrid VILLEGAS.     Manisha Garcia, RADHA.P.R.N.  Palliative Care Nurse Practitioner  [phone]      "

## 2020-07-01 NOTE — PROGRESS NOTES
Sulma from Lab called with critical result of gram positive cocci blood cultures at 0530. Critical lab result read back to Sulma.   Dr. Duran notified of critical lab result at 0545.  Critical lab result read back by Dr. Duran.

## 2020-07-01 NOTE — PROGRESS NOTES
Patient to dialysis.  Cedrick Wright RN accompanied patient as patient still had a blood product transfusion running.

## 2020-07-01 NOTE — CONSULTS
"DATE OF SERVICE:  07/01/2020    INFECTIOUS DISEASE CONSULTATION    REASON FOR CONSULT:  Bacteremia.    CONSULTING PHYSICIAN:  Kelvin Noe MD.    HISTORY OF PRESENT ILLNESS:  This is an 87-year-old female who was originally   admitted to the hospital on 06/26/2020 due to gastrointestinal bleed.  She has   known end-stage renal disease, on hemodialysis, stroke, peripheral vascular   disease, and went to the ED due to the development of melena.  Please note,   the entirety of the history had to be obtained from the medical record as the   patient is not currently oriented giving any history.  She was not sure how   long she had been having melena prior to admission.  Per the admitting   physician, it started about a week prior to admission.  She had been in [place] 3 weeks prior to that and had been feeling weak since that time.  She had   sustained multiple falls over the subsequent 2 weeks, primarily due to   dizziness whenever she stood up.  She had also complains about slurred speech,   left arm numbness, which were consistent with her prior stroke.  She had   recently undergone angiogram and was placed on aspirin and Plavix due to   peripheral vascular disease as well as being placed on ciprofloxacin for   \"chronic wounds on her lower extremities due to finding of Pseudomonas.\"    Since admission, she has been seen and evaluated by GI, had undergone capsule   endoscopy, which showed fresh blood distal to the pylorus and underwent push   enteroscopy yesterday.  She has also undergone colonoscopy on 06/28/2020 which   was normal.  Her hemoglobin was as low as 5.  She was also seen by   neurosurgery because she was found to have a right subdural hematoma.  Given   her age and multiple comorbidities, no surgical treatment was recommended.    She has been seen and evaluated by neurology because of her history of stroke   and her worsening of her prior hemiparesis, which was thought to be primarily   due actually to " "her subdural hematoma.  MRI/MRA brain were ordered as well.    She has had continued problems with pain and currently her only complaint is   of generalized pain and discomfort as well as \"not being able to breathe.\"    She did develop a low-grade fever and leukocytosis on 06/30/2020.  Blood   cultures were done, they were showing strep species.  Infectious disease is   consulted for antibiotic recommendations and management.  Her antibiotics have   already been switched over to Zosyn.    REVIEW OF SYSTEMS:  Limited due to patient's altered mental status, primarily   complaints of needing \"help with everything,\"  \"my legs, arms, they hurt so   bad and I can't breathe anymore.\"  Unable to obtain any other review of   systems.    ALLERGIES:  SHE IS ALLERGIC TO LEXAPRO WITH UNKNOWN REACTION.    PAST MEDICAL HISTORY:  End-stage renal disease, on hemodialysis; stroke;   diabetes.    FAMILY HISTORY:  Unknown due to the patient's altered mental status, unable to   obtain due to patient's altered mental status.    SOCIAL HISTORY:  By report, she is a nonsmoker.  She does have the recent   travel to [place].    PHYSICAL EXAMINATION:  GENERAL:  She is chronically ill-appearing female, in moderate distress due to   pain.  VITAL SIGNS:  T-max of 100.1, current temperature 98.5, blood pressure 124/51,   pulse 96, respiratory rate 18, oxygen saturation 100% on room air.  She   weighs 84 kilos.  HEENT:  Normocephalic, atraumatic.  Pupils are reactive.  Extraocular   movements intact. Edentulous  NECK:  Supple.  There is no JVD or stridor.  CARDIOVASCULAR:  Currently, regular rate and rhythm.  She has had episodes of   Tachycardia. Ectopy  CHEST:  Clear to auscultation bilaterally.  There is no wheezing or rhonchi.Unlabored  She has a right upper chest hemodialysis catheter in place.  It is nontender   without erythema or drainage.  ABDOMEN:  Soft, nontender, nondistended.  There is no rebound or guarding.    EXTREMITIES:  Show no " cyanosis or clubbing.  Lower extremities are dressed.    She has extensive bruising.  Pictures were reviewed in media.  She has eschar   on the distal tip of her right great toe with violaceous ischemic appearing   changes in multiple digits, particularly involving the great toe, second and   third toe.  She has chronic stasis changes of bilateral lower extremities.    She has a large, sharply demarcated ulceration on her right lower extremity   with some slough.  No active drainage.    The wound on her right lower   extremity measures 10 x 3.5 cm.  There is no tunneling, no undermining, no   odor, no active drainage with sharply demarcated edges.  NEUROLOGIC:  She is awake.  She is moving all extremities, not giving any   additional history.  PSYCH: Unable to assess    LABORATORY DATA:  White blood cell count of 8.5 down from 13.8, H and H of 6.4   and 20, platelets of 248.  Sodium 139, potassium 4.7, chloride 103,   bicarbonate 22, glucose 86, BUN 63, creatinine 5.76, albumin at 2.5.  Lactic   acid was normal at 1.1.  UA showed trace protein, 2-5 WBCs, 2-5 RBCs.  Her   hepatitis B surface antibody was nonreactive, antigen was negative.  COVID was   negative.  Ferritin was elevated at 2759.  Blood cultures x2 are positive for   strep species.  Wound culture was contaminated with skin naina with   pseudomonas, Stenotrophomonas maltophilia.    IMAGING:  CT scan of the head done on 06/29/2020 showed a loculated subdural   hematoma with a possible component of epidural hemorrhage along the right   frontoparietal convexity.  Chest x-ray on 06/26/2020 showed no cardiopulmonary   disease.  MRI of the brain on 06/27/2020 showed mild cerebral atrophy,   moderate to large extra-axial fluid collection on the right posterior   frontoparietal convexity consistent with chronic or late subacute epidural   hematoma versus loculated subdural hematoma, some microvascular ischemia and   an old lacunar infarct in the left lateral  thalamus.  Carotid Dopplers showed   50-69% stenosis of bilateral carotid arteries.  Echocardiogram was remarkable   for calcified valves and ejection fraction of 60%.  EKG showed QTc of 431.    ASSESSMENT AND PLAN:  An 87-year-old female with end-stage renal disease,   peripheral vascular disease who was admitted for gastrointestinal bleed with   associated weakness, dizziness and falls and development of intracranial   hemorrhage, unclear if subdural or epidural by MRI.  For gastrointestinal   bleed, she is continuing to have evidence of blood loss with low hemoglobin.    She is status post multiple surgical procedures with GI.  She was recommended   for capsule endoscopy; however, she refused the procedure.  She has also been   seen and evaluated by neurosurgery for intracranial hemorrhage.  She is not   currently a surgical candidate.  This is being managed conservatively.  No new   stroke was seen, now she is showing she developed a low-grade fever and   leukocytosis.  Blood cultures are positive for strep species.  It is likely   this is a gastrointestinal source with Strep viridans or enterococcus, so do   agree with continuing the Zosyn.  She is certainly at risk for her   hemodialysis catheter to be infected.  She has already undergone a   transthoracic echocardiogram and is high risk for endocarditis; however, she   is also a high risk due to her GI bleed, current mental status for ANABELL.  We   will continue to follow.  If she does have persistently positive blood   cultures, would consider getting a ANABELL.  She has evidence on her lower   extremity of chronic stasis ulceration.  There is no evidence of active   infection, so do not add additional antimicrobial therapy for this wound.    Continue with wound care offloading, would recommend vascular studies as she   does appear to have ischemic changes in her right lower extremity.  She is   diabetic continue to attempt to control blood sugars.  Given her  multiple   comorbidities and serious medical conditions.  I recommend palliative care   evaluation.  Further recommendations per culture results and clinical course.    Thank you and we will follow with you.       ____________________________________     MD ALEXIS TENORIO / CHARLI    DD:  07/01/2020 12:35:24  DT:  07/01/2020 16:35:49    D#:  [id number]  Job#:  [id number]

## 2020-07-01 NOTE — PROGRESS NOTES
Patient is yelling out in pain almost constantly.  Have talked with MD and  MD  Has not ordered anything more for pain.  Attempts have been made to reposition patient and help make more comfortable but nothing has helped the patient.  A palliative consult was place this morning by Dr. Noe

## 2020-07-01 NOTE — PALLIATIVE CARE
Palliative Care   Received call from charge RN, appreciate updates.    Called Kory ([phone]), no answer left msg to return call.    Called Daughter Elena ([phone]), introduced self and role of PC. She confirms pt does have an AD and Kory would have a copy of it. Elena states Kory should be the person to discuss GOC with.     Active listening, education, validation, normalization, therapeutic touch, and emotional support provided.     Updated:   PC Team    Plan:   Awaiting call back from Kory to discuss GOC.       Thank you for allowing Palliative Care to participate in this patient's care. Please feel free to call [phone] with any questions or concerns.

## 2020-07-01 NOTE — PROGRESS NOTES
Charge RN, Adriana to place call to palliative care to have them see the patient while in dialysis.

## 2020-07-02 ENCOUNTER — ANESTHESIA (OUTPATIENT)
Dept: SURGERY | Facility: MEDICAL CENTER | Age: 85
DRG: 377 | End: 2020-07-02
Payer: MEDICARE

## 2020-07-02 ENCOUNTER — ANESTHESIA EVENT (OUTPATIENT)
Dept: SURGERY | Facility: MEDICAL CENTER | Age: 85
DRG: 377 | End: 2020-07-02
Payer: MEDICARE

## 2020-07-02 LAB
ALBUMIN SERPL BCP-MCNC: 2.5 G/DL (ref 3.2–4.9)
ALBUMIN/GLOB SERPL: 0.8 G/DL
ALP SERPL-CCNC: 49 U/L (ref 30–99)
ALT SERPL-CCNC: 14 U/L (ref 2–50)
ANION GAP SERPL CALC-SCNC: 17 MMOL/L (ref 7–16)
AST SERPL-CCNC: 25 U/L (ref 12–45)
BASOPHILS # BLD AUTO: 0.2 % (ref 0–1.8)
BASOPHILS # BLD: 0.02 K/UL (ref 0–0.12)
BILIRUB SERPL-MCNC: 0.3 MG/DL (ref 0.1–1.5)
BUN SERPL-MCNC: 26 MG/DL (ref 8–22)
CALCIUM SERPL-MCNC: 7.6 MG/DL (ref 8.5–10.5)
CHLORIDE SERPL-SCNC: 98 MMOL/L (ref 96–112)
CO2 SERPL-SCNC: 21 MMOL/L (ref 20–33)
CREAT SERPL-MCNC: 3.49 MG/DL (ref 0.5–1.4)
EOSINOPHIL # BLD AUTO: 0.04 K/UL (ref 0–0.51)
EOSINOPHIL NFR BLD: 0.4 % (ref 0–6.9)
ERYTHROCYTE [DISTWIDTH] IN BLOOD BY AUTOMATED COUNT: 49.6 FL (ref 35.9–50)
GLOBULIN SER CALC-MCNC: 3.3 G/DL (ref 1.9–3.5)
GLUCOSE SERPL-MCNC: 81 MG/DL (ref 65–99)
HCT VFR BLD AUTO: 21.4 % (ref 37–47)
HGB BLD-MCNC: 6.6 G/DL (ref 12–16)
IMM GRANULOCYTES # BLD AUTO: 0.08 K/UL (ref 0–0.11)
IMM GRANULOCYTES NFR BLD AUTO: 0.8 % (ref 0–0.9)
INR PPP: 1.15 (ref 0.87–1.13)
LACTATE BLD-SCNC: 1 MMOL/L (ref 0.5–2)
LACTATE BLD-SCNC: 1 MMOL/L (ref 0.5–2)
LYMPHOCYTES # BLD AUTO: 0.95 K/UL (ref 1–4.8)
LYMPHOCYTES NFR BLD: 9.3 % (ref 22–41)
MAGNESIUM SERPL-MCNC: 1.9 MG/DL (ref 1.5–2.5)
MCH RBC QN AUTO: 28.4 PG (ref 27–33)
MCHC RBC AUTO-ENTMCNC: 30.8 G/DL (ref 33.6–35)
MCV RBC AUTO: 92.2 FL (ref 81.4–97.8)
MONOCYTES # BLD AUTO: 0.76 K/UL (ref 0–0.85)
MONOCYTES NFR BLD AUTO: 7.5 % (ref 0–13.4)
NEUTROPHILS # BLD AUTO: 8.34 K/UL (ref 2–7.15)
NEUTROPHILS NFR BLD: 81.8 % (ref 44–72)
NRBC # BLD AUTO: 0 K/UL
NRBC BLD-RTO: 0 /100 WBC
PHOSPHATE SERPL-MCNC: 3.7 MG/DL (ref 2.5–4.5)
PLATELET # BLD AUTO: 263 K/UL (ref 164–446)
PMV BLD AUTO: 9.4 FL (ref 9–12.9)
POTASSIUM SERPL-SCNC: 4.5 MMOL/L (ref 3.6–5.5)
PROT SERPL-MCNC: 5.8 G/DL (ref 6–8.2)
PROTHROMBIN TIME: 15 SEC (ref 12–14.6)
RBC # BLD AUTO: 2.32 M/UL (ref 4.2–5.4)
SODIUM SERPL-SCNC: 136 MMOL/L (ref 135–145)
WBC # BLD AUTO: 10.2 K/UL (ref 4.8–10.8)

## 2020-07-02 PROCEDURE — 85610 PROTHROMBIN TIME: CPT

## 2020-07-02 PROCEDURE — 500066 HCHG BITE BLOCK, ECT: Performed by: INTERNAL MEDICINE

## 2020-07-02 PROCEDURE — 84100 ASSAY OF PHOSPHORUS: CPT

## 2020-07-02 PROCEDURE — 160035 HCHG PACU - 1ST 60 MINS PHASE I: Performed by: INTERNAL MEDICINE

## 2020-07-02 PROCEDURE — 36430 TRANSFUSION BLD/BLD COMPNT: CPT

## 2020-07-02 PROCEDURE — 160208 HCHG ENDO MINUTES - EA ADDL 1 MIN LEVEL 4: Performed by: INTERNAL MEDICINE

## 2020-07-02 PROCEDURE — 83605 ASSAY OF LACTIC ACID: CPT

## 2020-07-02 PROCEDURE — 700102 HCHG RX REV CODE 250 W/ 637 OVERRIDE(OP): Performed by: INTERNAL MEDICINE

## 2020-07-02 PROCEDURE — 160002 HCHG RECOVERY MINUTES (STAT): Performed by: INTERNAL MEDICINE

## 2020-07-02 PROCEDURE — 501838 HCHG SUTURE GENERAL: Performed by: INTERNAL MEDICINE

## 2020-07-02 PROCEDURE — 700111 HCHG RX REV CODE 636 W/ 250 OVERRIDE (IP): Performed by: ANESTHESIOLOGY

## 2020-07-02 PROCEDURE — 36415 COLL VENOUS BLD VENIPUNCTURE: CPT

## 2020-07-02 PROCEDURE — 700105 HCHG RX REV CODE 258: Performed by: ANESTHESIOLOGY

## 2020-07-02 PROCEDURE — 0W3P8ZZ CONTROL BLEEDING IN GASTROINTESTINAL TRACT, VIA NATURAL OR ARTIFICIAL OPENING ENDOSCOPIC: ICD-10-PCS | Performed by: INTERNAL MEDICINE

## 2020-07-02 PROCEDURE — 99233 SBSQ HOSP IP/OBS HIGH 50: CPT | Performed by: INTERNAL MEDICINE

## 2020-07-02 PROCEDURE — 160048 HCHG OR STATISTICAL LEVEL 1-5: Performed by: INTERNAL MEDICINE

## 2020-07-02 PROCEDURE — 86923 COMPATIBILITY TEST ELECTRIC: CPT

## 2020-07-02 PROCEDURE — 700101 HCHG RX REV CODE 250: Performed by: ANESTHESIOLOGY

## 2020-07-02 PROCEDURE — 700105 HCHG RX REV CODE 258: Performed by: INTERNAL MEDICINE

## 2020-07-02 PROCEDURE — 700105 HCHG RX REV CODE 258

## 2020-07-02 PROCEDURE — A9270 NON-COVERED ITEM OR SERVICE: HCPCS | Performed by: INTERNAL MEDICINE

## 2020-07-02 PROCEDURE — 700111 HCHG RX REV CODE 636 W/ 250 OVERRIDE (IP): Performed by: INTERNAL MEDICINE

## 2020-07-02 PROCEDURE — 87040 BLOOD CULTURE FOR BACTERIA: CPT | Mod: 91

## 2020-07-02 PROCEDURE — 160203 HCHG ENDO MINUTES - 1ST 30 MINS LEVEL 4: Performed by: INTERNAL MEDICINE

## 2020-07-02 PROCEDURE — P9016 RBC LEUKOCYTES REDUCED: HCPCS

## 2020-07-02 PROCEDURE — 83735 ASSAY OF MAGNESIUM: CPT

## 2020-07-02 PROCEDURE — 80053 COMPREHEN METABOLIC PANEL: CPT

## 2020-07-02 PROCEDURE — 503078 HCHG PROBE ERBE: Performed by: INTERNAL MEDICINE

## 2020-07-02 PROCEDURE — 85025 COMPLETE CBC W/AUTO DIFF WBC: CPT

## 2020-07-02 PROCEDURE — 770001 HCHG ROOM/CARE - MED/SURG/GYN PRIV*

## 2020-07-02 PROCEDURE — 160009 HCHG ANES TIME/MIN: Performed by: INTERNAL MEDICINE

## 2020-07-02 RX ORDER — IPRATROPIUM BROMIDE AND ALBUTEROL SULFATE 2.5; .5 MG/3ML; MG/3ML
3 SOLUTION RESPIRATORY (INHALATION)
Status: DISCONTINUED | OUTPATIENT
Start: 2020-07-02 | End: 2020-07-02 | Stop reason: HOSPADM

## 2020-07-02 RX ORDER — ONDANSETRON 2 MG/ML
4 INJECTION INTRAMUSCULAR; INTRAVENOUS
Status: DISCONTINUED | OUTPATIENT
Start: 2020-07-02 | End: 2020-07-02 | Stop reason: HOSPADM

## 2020-07-02 RX ORDER — LIDOCAINE HYDROCHLORIDE 20 MG/ML
INJECTION, SOLUTION EPIDURAL; INFILTRATION; INTRACAUDAL; PERINEURAL PRN
Status: DISCONTINUED | OUTPATIENT
Start: 2020-07-02 | End: 2020-07-02 | Stop reason: SURG

## 2020-07-02 RX ORDER — SODIUM CHLORIDE 9 MG/ML
INJECTION, SOLUTION INTRAVENOUS
Status: DISCONTINUED | OUTPATIENT
Start: 2020-07-02 | End: 2020-07-02 | Stop reason: SURG

## 2020-07-02 RX ORDER — SODIUM CHLORIDE 9 MG/ML
INJECTION, SOLUTION INTRAVENOUS
Status: COMPLETED
Start: 2020-07-02 | End: 2020-07-02

## 2020-07-02 RX ORDER — SODIUM CHLORIDE, SODIUM LACTATE, POTASSIUM CHLORIDE, CALCIUM CHLORIDE 600; 310; 30; 20 MG/100ML; MG/100ML; MG/100ML; MG/100ML
INJECTION, SOLUTION INTRAVENOUS CONTINUOUS
Status: DISCONTINUED | OUTPATIENT
Start: 2020-07-02 | End: 2020-07-02 | Stop reason: HOSPADM

## 2020-07-02 RX ORDER — OMEPRAZOLE 20 MG/1
20 CAPSULE, DELAYED RELEASE ORAL DAILY
Status: DISCONTINUED | OUTPATIENT
Start: 2020-07-02 | End: 2020-07-09 | Stop reason: HOSPADM

## 2020-07-02 RX ADMIN — ACETAMINOPHEN 650 MG: 325 TABLET, FILM COATED ORAL at 14:56

## 2020-07-02 RX ADMIN — SODIUM CHLORIDE 500 ML: 9 INJECTION, SOLUTION INTRAVENOUS at 12:15

## 2020-07-02 RX ADMIN — PROPOFOL 50 MCG/KG/MIN: 10 INJECTION, EMULSION INTRAVENOUS at 12:59

## 2020-07-02 RX ADMIN — OMEPRAZOLE 20 MG: 20 CAPSULE, DELAYED RELEASE ORAL at 14:56

## 2020-07-02 RX ADMIN — Medication 667 MG: at 16:26

## 2020-07-02 RX ADMIN — PROPOFOL 50 MG: 10 INJECTION, EMULSION INTRAVENOUS at 12:57

## 2020-07-02 RX ADMIN — PIPERACILLIN AND TAZOBACTAM 3.38 G: 3; .375 INJECTION, POWDER, LYOPHILIZED, FOR SOLUTION INTRAVENOUS; PARENTERAL at 16:27

## 2020-07-02 RX ADMIN — ATORVASTATIN CALCIUM 40 MG: 40 TABLET, FILM COATED ORAL at 16:26

## 2020-07-02 RX ADMIN — FUROSEMIDE 80 MG: 10 INJECTION, SOLUTION INTRAMUSCULAR; INTRAVENOUS at 06:06

## 2020-07-02 RX ADMIN — ACETAMINOPHEN 650 MG: 325 TABLET, FILM COATED ORAL at 06:05

## 2020-07-02 RX ADMIN — FUROSEMIDE 80 MG: 10 INJECTION, SOLUTION INTRAMUSCULAR; INTRAVENOUS at 16:26

## 2020-07-02 RX ADMIN — LIDOCAINE HYDROCHLORIDE 40 MG: 20 INJECTION, SOLUTION EPIDURAL; INFILTRATION; INTRACAUDAL at 12:57

## 2020-07-02 RX ADMIN — PIPERACILLIN SODIUM AND TAZOBACTAM SODIUM 3.38 G: 3; .375 INJECTION, POWDER, FOR SOLUTION INTRAVENOUS at 02:38

## 2020-07-02 RX ADMIN — SODIUM CHLORIDE: 900 INJECTION INTRAVENOUS at 12:43

## 2020-07-02 ASSESSMENT — ENCOUNTER SYMPTOMS
EYE DISCHARGE: 0
FEVER: 0
COUGH: 0
EYE PAIN: 0
SEIZURES: 0
SPUTUM PRODUCTION: 0
WEIGHT LOSS: 0
DIZZINESS: 0
ORTHOPNEA: 0
ABDOMINAL PAIN: 0
MYALGIAS: 0
CHILLS: 0
WEAKNESS: 1
VOMITING: 0
NECK PAIN: 0
FOCAL WEAKNESS: 0
MEMORY LOSS: 0
NAUSEA: 0
SHORTNESS OF BREATH: 0
DIARRHEA: 0
STRIDOR: 0
HEADACHES: 0
BACK PAIN: 0
INSOMNIA: 0
PALPITATIONS: 0
EYE REDNESS: 0

## 2020-07-02 NOTE — THERAPY
Missed Therapy     Patient Name: Corine Dela Cruz  Age:  87 y.o., Sex:  female  Medical Record #: [id number]  Today's Date: 7/2/2020    OT tx attempted, pt off the floor for procedure. Will see post procedure as appropriate.        07/02/20 1250   Treatment Variance   Reason For Missed Therapy   (off the floor for procedure)   Total Time Spent   Total Time Spent (Mins) 5

## 2020-07-02 NOTE — ANESTHESIA QCDR
2019 [facility] Clinical Data Registry (for Quality Improvement)     Postoperative nausea/vomiting risk protocol (Adult = 18 yrs and Pediatric 3-17 yrs)- (430 and 463)  General inhalation anesthetic (NOT TIVA) with PONV risk factors: No  Provision of anti-emetic therapy with at least 2 different classes of agents: N/A  Patient DID NOT receive anti-emetic therapy and reason is documented in Medical Record: N/A    Multimodal Pain Management- (477)  Non-emergent surgery AND patient age >= 18: Yes  Use of Multimodal Pain Management, two or more drugs and/or interventions, NOT including systemic opioids: No  Exception: Documented allergy to multiple classes of analgesics: No       Smoking Abstinence (404)  Patient is current smoker (cigarette, pipe, e-cig, marijuanna): No  Elective Surgery:   Abstinence instructions provided prior to day of surgery:   Patient abstained from smoking on day of surgery:     Pre-Op Beta-Blocker in Isolated CABG (44)  Isolated CABG AND patient age >= 18: No  Beta-blocker admin within 24 hours of surgical incision:   Exception:of medical reason(s) for not administering beta blocker within 24 hours prior to surgical incision (e.g., not  indicated,other medical reason):     PACU assessment of acute postoperative pain prior to Anesthesia Care End- Applies to Patients Age = 18- (ABG7)  Initial PACU pain score is which of the following: < 7/10  Patient unable to report pain score: N/A    Post-anesthetic transfer of care checklist/protocol to PACU/ICU- (426 and 427)  Upon conclusion of case, patient transferred to which of the following locations: PACU/Non-ICU  Use of transfer checklist/protocol: Yes  Exclusion: Service Performed in Patient Hospital Room (and thus did not require transfer): N/A  Unplanned admission to ICU related to anesthesia service up through end of PACU care- (MD51)  Unplanned admission to ICU (not initially anticipated at anesthesia start time): No

## 2020-07-02 NOTE — CARE PLAN
Problem: Venous Thromboembolism (VTW)/Deep Vein Thrombosis (DVT) Prevention:  Goal: Patient will participate in Venous Thrombosis (VTE)/Deep Vein Thrombosis (DVT)Prevention Measures  Outcome: PROGRESSING AS EXPECTED     Problem: Pain Management  Goal: Pain level will decrease to patient's comfort goal  7/1/2020 3699 by Shaylee Herron R.N.  Outcome: PROGRESSING AS EXPECTED

## 2020-07-02 NOTE — PALLIATIVE CARE
Palliative Care follow-up  Follow up call to Kory, he states pt's two daughters spoke with pt on the phone and pt herself told them that she didn't want to die and wanted to keep going and live. Family does not want to go against her wish and have requested to continue with aggressive treatment at this time.    Kory verbalized understanding that pt's wishes can change based on day, time and circumstances. If pt changes her mind at anytime, and expresses that to family, they will be in support of her choices.     Requested a copy of pt's AD, provided fax number. Kory will fax a copy to PC RN.     Active listening, education, validation, normalization, therapeutic touch, and emotional support provided.     Plan:   Continue GOC discussion as clinical picture unfolds.       Thank you for allowing Palliative Care to participate in this patient's care. Please feel free to call [phone] with any questions or concerns.

## 2020-07-02 NOTE — PROGRESS NOTES
Accompanied transport to same day surgery for scheduled procedure 2/2 blood transfusion in process.

## 2020-07-02 NOTE — CARE PLAN
Assumed day shift care at start of shift   Sleeping on initial assessment, returned when patient was awake and c/o bilateral knee/leg pain on and off - patient repositioned, ble's elevated - wc  Npo for procedure this afternoon  Iv abx a/o, afebrile, vss  Hgb 6.6 - 1unit of PRBC's ordered and begun prior to procedure - no s&s of adverse transfusion reaction  No s&s of active bleeding  Skin fragile, multiple skin tears on Ue's - dressing c/d/I  Repostioned this am  and incontinent care provided  Q 2 turns and repostioning ordered and followed  Incontinent care prn - purewick in place  No needs at this time, [facility]    Fall precautions/hourly rounding maintained, call light within reach and functioning, all items within reach.  Patient encouraged to call for assistance, poc reviewed with patient, ?'s/concerns answered.   Bed alarm active, granddaughter at bedside.       Problem: Pain Management  Goal: Pain level will decrease to patient's comfort goal  Outcome: PROGRESSING AS EXPECTED     Problem: Psychosocial Needs:  Goal: Level of anxiety will decrease  Outcome: PROGRESSING AS EXPECTED     Problem: Mobility  Goal: Risk for activity intolerance will decrease  Outcome: PROGRESSING SLOWER THAN EXPECTED

## 2020-07-02 NOTE — OR NURSING
1328  RECEIVED PATIENT FROM OR.  REPORT FROM DR. PICKENS.  NO AIRWAY IN PLACE.  RESPIRATIONS ARE EVEN AND UNLABORED.  NO BLEEDING NOTED.        1423  TRANSFERR[place] [address] [facility], ON 02 2 LITERS PER NASAL CANNULA, WITH TRANSPORT.  REPORT TO JACQUIE VILLEGAS.  BLOOD STILL RUNNING.  PATIENT STATES SHE IS READY TO GO BACK TO HER ROOM.

## 2020-07-02 NOTE — OP REPORT
DATE OF SERVICE:  07/02/2020    PROCEDURES:  Upper endoscopy with push enteroscopy, and hemostasis.    INDICATION FOR PROCEDURE:  Ongoing GI bleeding, anemia, and positive capsule   endoscopy result with blood identified distal to the pylorus approximately 1   hour.    CONSENT:  Informed consent was obtained directly from the patient after   benefits, risks, and possible alternatives were discussed.  The patient's   competency and capacity were determined to be satisfactory prior to obtaining   this consent.    MEDICATIONS:  The patient received a propofol-based regimen from anesthesia,   please see their notes for full details.    PROCEDURE DESCRIPTION:  The patient was placed in left lateral decubitus   position and provided with supplemental oxygen via face mask.  When ready, the   pediatric colonoscope was inserted into the patient's mouth and advanced   easily and carefully to the esophagus and subsequently to the stomach,   duodenum, and finally deeply into the jejunum.    FINDINGS:  The esophagus appeared normal including views of GE junction.  In   the stomach, there were some AVMs in the cardia, which were actively oozing a   small amount of blood.  In the distal stomach, a medium-sized hiatal hernia   was noted, best seen on retroflexed view.  There were no Olman ulcers or   erosions associated with the hiatal hernia.  Views of the angulus were normal.    There was some nonerosive gastropathy present in the distal stomach, but no   lesions were noted in that area either.  In the duodenal bulb, there were   several small erosions without stigmata of recent bleeding, there was no blood   associated with these lesions.  The scope was advanced further deeply into   the jejunum.  A small wisps of blood in a scattered fashion were identified   throughout the examination in the jejunum, however, no bleeding lesion was   identified.  The scope was advanced as far as it could possibly be advanced.    The scope  was then withdrawn slowly and again the small intestine was again   very carefully inspected, no bleeding lesions were identified, other than   potentially the erosive changes in the duodenal bulb.  The scope was then   placed back into the proximal stomach, and APC was used to completely   cauterize the actively oozing small AVMs that had previously been identified.    After treatment, all bleeding ceased.  The scope was then withdrawn after   excess air was removed from the gastric lumen.    COMPLICATIONS:  No complications during or the immediate postoperative period.    IMPRESSION:  1. 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  2. Medium-sized hiatal hernia.  3. Nonspecific distal gastropathy.  4. Small duodenal erosions in the bulb.  5. Otherwise, normal push enteroscopy.    RECOMMENDATION:  The patient will be placed back on a proton pump inhibitor.    We will watch her hemoglobin and transfuse if necessary.  Anticoagulants   should be avoided.  We will advance her diet as tolerated.             ____________________________________     CATRACHO GARCIA MD    WP / CHALRI    DD:  07/02/2020 13:41:46  DT:  07/02/2020 14:01:31    D#:  [id number]  Job#:  [id number]

## 2020-07-02 NOTE — PROGRESS NOTES
Nephrology Daily Progress Note    Date of Service  7/2/2020    HISTORY OF PRESENT ILLNESS:    86 yo F PMH ESRD MWF iHD, HTN, anemia of CKD, CKD-MBD, and HLD who presents to ED with CC as above.  She is visiting the [facility] from [place] and due to current pandemic has not been able to go back home and continued to stay in this area with family.  She has OP dialysis at [facility] and has been compliant.  She was doing well until about two weeks ago when she started to notice dark stools.  She was doing ok until about 3 days ago when she also noted orthostatic symptoms upon standing.  It resolved and then today it came back and she had some slurred speech and left arm numbness so she was brought in for evaluation.  She has had a prior stroke and the numbness and slurred speech have been present off and on as a result of that so initially she though nothing of it.  Neurology was consulted and they did not feel that this was CVA related and more hemodynamic related.  Labs in ED showed Hgb 5.5 and serum labs showed  and K+ 5.3.  She was given pRBCs and admitted to ICU.  CT of head showed an acute on chronic epidural bleed with mild midline shift as well.  She was on plavix at home.  No recent F/C/N/V/CP/SOB.  No hematochezia, hematemesis.  No HA, visual changes, or abdominal pain    Daily nephrology summary  06/26 - consult done, HD done  06/27 - 2u prbc this AM, has been seen and evaluated by neurology and neurosurgery, further imaging planned for today  06/28 - not seen, patient in procedures  06/29 - seen on HD, tolerating with anxiolytic, somnolent and provides minimal responses to questions, 3Ca bath today, EGD and colonoscopy normal, capsule endoscopy  06/30 - transferred to floor, capsule endoscopy with fresh blood, push enteroscopy deferred, some concern for UTI but no urine cx and UA without bacteria, WBC increased, +fever, +AMS  07/01 - patient is agitated this AM, initially declined HD, +edema, +hbg  decreased and receiving prbc  07/02 - patient sleepy this afternoon post procedure, erosive duodenitis and oozing 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   Respiratory: Negative for cough and shortness of breath.    Cardiovascular: Positive for leg swelling (improved). Negative for chest pain.   Gastrointestinal: Negative for nausea and vomiting.   Musculoskeletal: Negative for back pain.        BLE discomfort   Neurological: Positive for weakness. Negative for dizziness.   Psychiatric/Behavioral: Negative for memory loss. The patient does not have insomnia.         Physical Exam  Temp:  [35.6 °C (96 °F)-37.6 °C (99.6 °F)] 37.2 °C (99 °F)  Pulse:  [83-98] 86  Resp:  [14-18] 16  BP: (103-144)/(42-77) 135/47  SpO2:  [95 %-100 %] 98 %    Physical Exam  Constitutional:       General: She is not in acute distress.     Appearance: She is ill-appearing.   HENT:      Head: Normocephalic.      Right Ear: External ear normal.      Left Ear: External ear normal.      Nose: Nose normal.      Mouth/Throat:      Mouth: Mucous membranes are moist.      Pharynx: Oropharynx is clear.   Eyes:      Extraocular Movements: Extraocular movements intact.      Conjunctiva/sclera: Conjunctivae normal.   Cardiovascular:      Rate and Rhythm: Normal rate and regular rhythm.      Comments: R chest TDC c/d/d  Pulmonary:      Effort: Pulmonary effort is normal.      Breath sounds: No wheezing.   Abdominal:      Palpations: Abdomen is soft.      Tenderness: There is no abdominal tenderness.   Musculoskeletal:      Right lower leg: Edema (improved) present.      Left lower leg: Edema (improved) present.      Comments: anasarca   Skin:     Coloration: Skin is pale.      Comments: LE dressing in place   Neurological:      Mental Status: She is alert and oriented to person, place, and time. She  is confused.   Psychiatric:         Attention and Perception: Attention normal.         Behavior: Behavior is agitated. Behavior is not aggressive.         Fluids    Intake/Output Summary (Last 24 hours) at 7/2/2020 1541  Last data filed at 7/2/2020 1332  Gross per 24 hour   Intake 720 ml   Output 4500 ml   Net -3780 ml       Laboratory  Recent Labs     06/30/20  0159 07/01/20  0653 07/02/20  0603   WBC 13.8* 8.5 10.2   RBC 2.66* 2.18* 2.32*   HEMOGLOBIN 7.7* 6.4* 6.6*   HEMATOCRIT 24.5* 20.2* 21.4*   MCV 92.1 92.7 92.2   MCH 28.9 29.4 28.4   MCHC 31.4* 31.7* 30.8*   RDW 50.6* 51.8* 49.6   PLATELETCT 309 248 263   MPV 9.5 9.6 9.4     Recent Labs     06/30/20  0159 07/01/20  0653 07/02/20  0603   SODIUM 141 139 136   POTASSIUM 4.8 4.7 4.5   CHLORIDE 102 103 98   CO2 24 22 21   GLUCOSE 104* 86 81   BUN 50* 63* 26*   CREATININE 4.18* 5.76* 3.49*   CALCIUM 7.9* 7.2* 7.6*     Recent Labs     06/29/20  1716 07/02/20  1158   INR 1.05 1.15*     No results for input(s): NTPROBNP in the last 72 hours.        Imaging  Reviewed     Assessment/Plan  IMPRESSION:  - ESRD    * Etiology likely 2/2 HTN    * R chest TDC     * Visitor at [facility], lives in [place]  - Melena    * EGD and colonoscopy normal, capsule endoscopy with fresh blood 6/30  - Hyperkalemia    * correct with HD  - Acute epidural hematoma    * Superimposed on chronic epidural bleed, with a small midline shift    * Neuro following, has been evaluated by neurosurgery  - HTN    * Goal BP < 140/90    * Stable  - Anemia, multifactorial    * Goal Hgb 10-11 in CKD, ferritin significantly elevated    * transfuse hgb<7    * see melena above  - CKD-MBD    * Managed at HD unit, phos now above goal, Ca low, vitamin D low, PTH appropriate  - HLD  - CAD  - Leukocytosis with low grade fever  - Bacteremia--probable GI source  - Edema, anasarca--improving     PLAN:  - HD tomorrow. ID recommends replacing TDC once blood cultures clear. As TDC has not yet been removed, plan for HD  tomorrow and then pull line for line holiday and plan for replacement line Monday.   - Abx per primary service  - UF as tolerated--will have HD x 4 hours for maximum UF  - Lasix 80mg BID  - 140-145 dialysate sodium to try to leave her SNa at high-normal range  - HD nurse to draw blood cultures from TDC, abx per primary service  - No dietary protein restrictions  - Dose all meds per ESRD  - Monitor for additional iHD as needed  - Transfuse as needed to maintain Hgb > 7  - Do not provide with gadolinium without discussing with nephrology, as HD x 3 days will need to be arranged  - calcium acetate TID with meals   - ergocalciferol  - salt and fluid restrictions  - follow GI recs     All prior notes form other doctors and RN staff were reviewed from admission to current day to help me make my clinical decisions     PMH, PSH, SH, FH, meds, labs, images reviewed

## 2020-07-02 NOTE — ANESTHESIA PREPROCEDURE EVALUATION
Last HD yesterday    Relevant Problems   CARDIAC   (+) Peripheral artery disease (HCC)   (+) Primary hypertension         (+) ESRD (end stage renal disease) (HCC)      ENDO   (+) Type 2 diabetes mellitus with kidney complication, without long-term current use of insulin (HCC)      Other   (+) Acute GI bleeding   (+) Acute blood loss anemia   (+) Bacteremia   (+) Encephalopathy acute   (+) Epidural hemorrhage (HCC)       Physical Exam    Airway   Mallampati: II  TM distance: >3 FB  Neck ROM: full       Cardiovascular - normal exam  Rhythm: regular  Rate: normal  (-) murmur     Dental - normal exam  (+) upper dentures, lower dentures           Pulmonary - normal exam  Breath sounds clear to auscultation     Abdominal    Neurological - normal exam                 Anesthesia Plan    ASA 3   ASA physical status 3 criteria: ESRD undergoing regularly scheduled dialysis and CVA or TIA - history (> 3 months)    Plan - general       Airway plan will be natural airway        Induction: intravenous      Pertinent diagnostic labs and testing reviewed    Informed Consent:    Anesthetic plan and risks discussed with patient.

## 2020-07-02 NOTE — THERAPY
Missed Therapy     Patient Name: Corine Dela Cruz  Age:  87 y.o., Sex:  female  Medical Record #: [id number]  Today's Date: 7/2/2020    Discussed missed therapy with RN    Per RN, pt not appropriate for therapy today. Pts HgB continues to be low and pt is agitated when awake. Pt possibly going for EGD today. Will continue to follow and monitor for appropriateness.

## 2020-07-02 NOTE — THERAPY
Missed Therapy     Patient Name: Corine Dela Cruz  Age:  87 y.o., Sex:  female  Medical Record #: [id number]  Today's Date: 7/2/2020    Discussed missed therapy with BAKARI Joseph.    Pt is currently NPO for a procedure. SLP will see as pt as appropriate to participate in dysphagia tx.

## 2020-07-02 NOTE — PROGRESS NOTES
Hemodialysis ordered by Dr. Gross. Treatment started at 1347 and ended at 1747. Pt c/o severe pain. Dr. Gross called and will get in touch with Dr. Noe.  Primary RN gave pain med and Ativan. Pt rested a little and started to yelling out names. This RN had to keep reoriented pt. Pt stable, vss, no c/o post tx. See flow sheets for details. Net UF 4.0 L. Reported to BITA Sinha RN.

## 2020-07-02 NOTE — OR NURSING
1227 Pt from Ortho floor.  Pt is alert and oriented and able to sign her consent.  Blood is infusing on iv pump from floor.  Report given to OR RN and anaesthesiologist.

## 2020-07-02 NOTE — WOUND TEAM
Wound team to follow-up on patient for bilateral LEs, patient going down to dialysis.  Went down to dialysis room to assess wounds and reapply wound dressings.  Patient refused both.  Will try again later in the day or later in the week.

## 2020-07-02 NOTE — ANESTHESIA POSTPROCEDURE EVALUATION
Patient: Corien Dela Cruz    Procedure Summary     Date:  07/02/20 Room / Location:  [facility] ROOM 26 / SURGERY SAME DAY [facility]    Anesthesia Start:   Anesthesia Stop:      Procedure:  GASTROSCOPY, WITH PUSH ENTEROSCOPY (N/A Esophagus) Diagnosis:  (gi bleed)    Surgeon:  Nathan Rich M.D. Responsible Provider:      Anesthesia Type:  general ASA Status:  3          Final Anesthesia Type: general  Last vitals  BP   Blood Pressure : 105/46    Temp   37.1 °C (98.7 °F)    Pulse   Pulse: 83   Resp   16    SpO2   100 %      Anesthesia Post Evaluation    Patient location during evaluation: PACU  Patient participation: complete - patient participated  Level of consciousness: awake and alert    Airway patency: patent  Anesthetic complications: no  Cardiovascular status: hemodynamically stable  Respiratory status: acceptable  Hydration status: euvolemic    PONV: none           Nurse Pain Score: 4 (NPRS)

## 2020-07-02 NOTE — PALLIATIVE CARE
Palliative Care follow-up  Received paperwork from Michael, it's pt's financial POA not for medical. Updated him, offered to assist pt in completing an AD, he will call his sisters and confirm she did not do a medical POA and will call PC RN back.       Plan:   Continue GOC discussion with pt and family.      Thank you for allowing Palliative Care to participate in this patient's care. Please feel free to call [phone] with any questions or concerns.

## 2020-07-02 NOTE — PROGRESS NOTES
[facility] Medicine Daily Progress Note    Date of Service  7/2/2020    Chief Complaint  87 y.o. female admitted 6/26/2020 with melena and weakness, patient has a history of CVA, end-stage renal disease on hemodialysis Monday Wednesday Friday, peripheral arterial disease, the patient apparently is visiting from [place], she has 2 sons in town, she also complained of left-sided weakness, speech difficulty apparently and also some residual deficits from her previous CVA, the patient does have some chronic lower extremity wounds that she is being treated with antibiotics for, the patient on presentation was imaged and was found to have a right-sided chronic epidural hematoma with some mild acute hemorrhage within the lesion, not a surgical candidate.    Hospital Course    Patient admitted with Severe blood loss anemia, chronic right-sided subdural hematoma without intervention indicated at this time, chronic end-stage renal disease on hemodialysis  Interval Problem Update  Patient seen and examined today.    Patient tolerating treatment and therapies.  All Data, Medication data reviewed.  Case discussed with nursing as available.  Plan of Care reviewed with patient and notified of changes.  6/29 patient appears fairly confused, we discussed the findings of her capsule endoscopy, need for additional push endoscopy, she is somewhat resistant to the idea to undergo another procedure, the patient is otherwise afebrile, heart rate in the 80s to 100s, blood pressure is stabilized, patient has stable left upper extremity weakness and bilateral lower extremity weakness, patient with significant anxiety overall, seen by nephrology, gastroenterology, intensive care physicians, stabilized for transfer out of ICU    6/30: Initially the patient plan to have capsule endoscopy today but due to her worsening confusion it was canceled.  She had low-grade temperature 100.1 and tachycardia.  WBC was 13.8, urinalysis was negative for  infection.  I saw and examined the patient today.    7/1: Her hemoglobin was 6.4 this morning and I will order 1 unit of packed blood cell.  Yesterday her endoscopy was canceled due to confusion.  The patient is alert and oriented x4 this morning.  I saw and examined the patient today.\  I had a very long time discussion with daughter Elena about her medical condition.  I updated her that her hemodialysis catheter is infected and I am going to consult vascular surgeon for that and also infectious disease consult.  He is planning to have enteroscopy by GI.  At the same time I told her that she is in a lot of pain and asking for pain medication and we do not want to give her narcotics due to risks of getting respiratory distress.  I asked her what is her opinion about pain medication and she was not able to give me the opinion.  She stated that treat her everything we can medically to have her with her current condition.  I also explained to her that due to her age and multiple medical comorbidity she will be benefit from palliative care and possibly hospice care who also can help manage her pain.  She finally said that palliative team can contact her brother at  [phone].  I put a consult for palliative care today.    7/2 the patient stated that her pain is better today.  She still wanted to be treated medically for her medical condition.  Palliative team is following.  Plan Bertha is planning to have enteroscopy today.            Consultants/Specialty  Nephrology, gastroenterology, intensivist    Code Status  DNR/DNI    Disposition  TBD    Review of Systems  Review of Systems   Constitutional: Negative for chills and weight loss.   HENT: Negative for congestion and nosebleeds.    Eyes: Negative for pain, discharge and redness.   Respiratory: Negative for sputum production, shortness of breath and stridor.    Cardiovascular: Negative for palpitations and orthopnea.   Gastrointestinal: Positive for melena. Negative  for abdominal pain, diarrhea, nausea and vomiting.   Genitourinary: Negative for dysuria, frequency and urgency.   Musculoskeletal: Positive for joint pain. Negative for back pain, myalgias and neck pain.   Skin: Negative for itching and rash.   Neurological: Negative for focal weakness, seizures and headaches.        Physical Exam  Temp:  [35.6 °C (96 °F)-37.6 °C (99.6 °F)] 36.2 °C (97.1 °F)  Pulse:  [91-99] 94  Resp:  [15-18] 15  BP: (103-151)/(46-94) 103/52  SpO2:  [96 %-100 %] 96 %    Physical Exam  Vitals signs and nursing note reviewed.   Constitutional:       Appearance: She is well-developed. She is not diaphoretic.   HENT:      Head: Normocephalic and atraumatic.      Nose: Nose normal.   Eyes:      Conjunctiva/sclera: Conjunctivae normal.      Pupils: Pupils are equal, round, and reactive to light.   Neck:      Musculoskeletal: Normal range of motion and neck supple.      Thyroid: No thyromegaly.      Vascular: No JVD.   Cardiovascular:      Rate and Rhythm: Normal rate.      Heart sounds: Normal heart sounds. No friction rub.   Pulmonary:      Effort: Pulmonary effort is normal.      Breath sounds: Normal breath sounds. No wheezing.   Abdominal:      General: Bowel sounds are normal. There is no distension.      Palpations: Abdomen is soft.   Musculoskeletal: Normal range of motion.         General: Tenderness present.   Lymphadenopathy:      Cervical: No cervical adenopathy.   Skin:     General: Skin is warm and dry.      Coloration: Skin is pale.   Neurological:      Mental Status: She is alert. She is disoriented.         Fluids    Intake/Output Summary (Last 24 hours) at 7/2/2020 0832  Last data filed at 7/2/2020 0400  Gross per 24 hour   Intake 922.08 ml   Output 4500 ml   Net -3577.92 ml       Laboratory  Recent Labs     06/30/20  0159 07/01/20  0653 07/02/20  0603   WBC 13.8* 8.5 10.2   RBC 2.66* 2.18* 2.32*   HEMOGLOBIN 7.7* 6.4* 6.6*   HEMATOCRIT 24.5* 20.2* 21.4*   MCV 92.1 92.7 92.2   MCH 28.9  29.4 28.4   MCHC 31.4* 31.7* 30.8*   RDW 50.6* 51.8* 49.6   PLATELETCT 309 248 263   MPV 9.5 9.6 9.4     Recent Labs     06/30/20  0159 07/01/20  0653 07/02/20  0603   SODIUM 141 139 136   POTASSIUM 4.8 4.7 4.5   CHLORIDE 102 103 98   CO2 24 22 21   GLUCOSE 104* 86 81   BUN 50* 63* 26*   CREATININE 4.18* 5.76* 3.49*   CALCIUM 7.9* 7.2* 7.6*     Recent Labs     06/29/20  1716   INR 1.05               Imaging  CT-HEAD W/O   Final Result      No significant interval change.      MR-BRAIN-W/O   Final Result      1.  Mild cerebral atrophy. Age-appropriate.   2.  Moderate-large lentiform extra-axial fluid collection over the right posterior frontal-parietal convexity consistent with a chronic or late subacute epidural hematoma or loculated subdural hematoma. Possible minimal component of more recent    hemorrhage where there is linear bandlike hyperdensity on CT.   3.  Patchy and punctate areas of bright diffusion signal in the underlying right parietal lobe which may represent acute or recent subacute arterial or venous infarction. No acute parenchymal hemorrhage.   4.  Minimal supratentorial white matter disease most consistent with microvascular ischemic change.   5.  Old lacunar infarct left lateral thalamus.      MR-MRA HEAD-W/O   Final Result      1.  Intracranial atherosclerotic cerebrovascular stenotic disease involving the posterior cerebral arteries and probably the middle cerebral arteries beyond the genu. No alexx occlusion.   2.  Normal variant dominant caliber right anterior cerebral artery A1 segment with a hypoplastic left A1 segment.   3.  No evidence of aneurysm about [place].      EC-ECHOCARDIOGRAM COMPLETE W/ CONT   Final Result      US-CAROTID DOPPLER BILAT   Final Result      DX-CHEST-PORTABLE (1 VIEW)   Final Result      No acute cardiopulmonary abnormality.      CT-HEAD W/O   Final Result      1. Small amount of acute hemorrhage in a chronic 2.1 cm right epidural collection, along the  right frontoparietal convexity. Associated 2 mm right to left midline shift.   2. Minimal subacute subdural hemorrhage anterior to the right epidural collection.   3. Hyperdensity in the temporal horn of the right lateral ventricle may represent small amount of intraventricular hemorrhage.   4. No CT evidence of acute infarct.           Assessment/Plan  * Acute GI bleeding  Assessment & Plan  With melena, EGD and colonoscopy did not see any active lesions, capsule endoscopy identified a possible source of 50 cm,   plan for push endoscopy when stable  Continuous cardiac monitoring  Continue PPI for now  Monitor H&H every 8 hours, transfuse for hemoglobin less than 7  Gastroenterology is following    Epidural hemorrhage (HCC)  Assessment & Plan  2.3 cm with 2 mm shift  Neurosurgery consulted and recommended no intervention  Neurology consulted, MRI and MRA resulted, possible recent small CVA      Leg wounds  Assessment & Plan  Wound culture grew pseudomonas aeruginosa  Was on oral ciprofloxacin and I will change it to IV Zosyn      Peripheral artery disease ([facility])  Assessment & Plan  Hold aspirin and Plavix    Type 2 diabetes mellitus with kidney complication, without long-term current use of insulin ([facility])  Assessment & Plan  Start on insulin sliding scale with serial Accu-Checks  Check hemoglobin A1c  Hypoglycemic protocol in place        Primary hypertension  Assessment & Plan  Restart medication    ESRD (end stage renal disease) ([facility])  Assessment & Plan  Dialysis Monday Wednesday Friday,   nephrology is consulted  Monitor BMP and assess response  Avoid IV contrast/nephrotoxins/NSAIDs  Dose adjust meds for decreased GFR        Acute blood loss anemia  Assessment & Plan  Given 3 units of RBCs, 1 unit of platelets  Hemoglobin 6.6 today.    To transfuse 1 unit of blood today  Transfuse as needed with target hemoglobin above 7.  GI is following  To have endoscopy  Follow CBC in the morning    Bacteremia  Assessment &  Plan  From hemodialysis catheter  Nephrology following  Hemodialysis catheter need to be removed    Encephalopathy acute  Assessment & Plan  Likely multifactorial  Treating infection with IV antibiotic  Improving and alert oriented x4  We will try to avoid benzos and narcotic as much as possible       VTE prophylaxis: SCD

## 2020-07-02 NOTE — OR SURGEON
Immediate Post OP Note    PreOp Diagnosis: GI bleed, blood seen on capsule endoscopy in jejunum    PostOp Diagnosis: actively oozing AVMs in gastric cardia, treated with APC.  Deep intubation of jejunum revealed occasional scattered tiny wisps of blood, but no bleeding lesion identified.  Also: hiatal hernia without associated ghulam ulcers/erosions.  Erosive duodenitis.      Procedure(s):  GASTROSCOPY, WITH PUSH ENTEROSCOPY - Wound Class: Clean Contaminated    Surgeon(s):  Nathan Rich M.D.    Anesthesiologist/Type of Anesthesia:  Anesthesiologist: Hilary Zarco M.D./General    Surgical Staff:  Endoscopy Technician: Emilee Santana  Endoscopy Nurse: Brian Pinzon R.N.    Specimens removed if any:  * No specimens in log *    Estimated Blood Loss: minimal    Findings: as above.    Complications: none    Plan:  Continue PPI  Trend Hgb, transfuse PRN  Full liquid diet.          7/2/2020 1:30 PM Nathan Rich M.D.

## 2020-07-02 NOTE — PROGRESS NOTES
Patient wishes to proceed with push enteroscopy.  Patient deemed competent based on interview today.

## 2020-07-02 NOTE — PALLIATIVE CARE
"Palliative Care follow-up  PC RN visited pt at bedside, introduce self and role of PC. Discussed pt's conflicting prior expressed wishes, pt states she does want the procedure. Pt feels that \"something needs to be done\".     Pt reports her current pain level is the same as at home. Pt does have chronic pain in the lower extremity wounds. Pt states she will take tylenol or toradol for the pain. Pt feels she is capable of getting better, willingness to proceed with treatment and not wanting hospice at this point.    Discussed AD and POLST, pt appears fatigued from conversation, closed her eyes and stopped engaging in discussion. PC RN asked if she wanted PC RN to come back, pt shook head no.    Spoke with pt's granddaughter with the above, she will talk to pt about AD and assist if pt agreeable.    Active listening, education, validation, normalization, therapeutic touch, and emotional support provided.     Updated:   PC Tean    Plan:   Continue GOC discussion as clinical picture unfolds, complete AD if pt is agreeable.       Thank you for allowing Palliative Care to participate in this patient's care. Please feel free to call [phone] with any questions or concerns.  "

## 2020-07-02 NOTE — WOUND TEAM
Wound team to follow-up with patient for bilateral LEs, patient down for procedure.  Wound team will follow-up at a later date.

## 2020-07-02 NOTE — ANESTHESIA TIME REPORT
Anesthesia Start and Stop Event Times     Date Time Event    7/2/2020 1238 Ready for Procedure     1243 Anesthesia Start     1332 Anesthesia Stop        Responsible Staff  07/02/20    Name Role Begin End    Hilary Zarco M.D. Anesth 1243 1332        Preop Diagnosis (Free Text):  Pre-op Diagnosis     gi bleed        Preop Diagnosis (Codes):    Post op Diagnosis  GI bleed      Premium Reason  Non-Premium    Comments:

## 2020-07-02 NOTE — PROGRESS NOTES
Infectious Disease Progress Note    Author: Arabella Lockhart M.D. Date & Time of service: 2020  12:41 PM    Chief Complaint:  Sepsis, strep    Interval History:  87-year-old female with ESRD, DM, PVD, CVA admitted 2020 for gastrointestinal bleed with associated weakness, dizziness and falls resulting in intracranial hemorrhage. Developed fever and leukocytosis during hospitalization and found to have above  7/2 AF (99.6) WBC 10.2 remains obtunded-pain better controlled. Seen by Palliative-now agreeable to GI procedure    Labs Reviewed, Medications Reviewed, Radiology Reviewed and Wound Reviewed.    Review of Systems:  Review of Systems   Unable to perform ROS: Mental status change       Hemodynamics:  Temp (24hrs), Av.4 °C (97.6 °F), Min:35.6 °C (96 °F), Max:37.6 °C (99.6 °F)  Temperature: 37.1 °C (98.8 °F)  Pulse  Av.9  Min: 9  Max: 111   Blood Pressure : 132/71       Physical Exam:  Physical Exam  Vitals signs and nursing note reviewed.   Constitutional:       General: She is not in acute distress.     Appearance: She is ill-appearing. She is not toxic-appearing or diaphoretic.   HENT:      Nose: No rhinorrhea.      Mouth/Throat:      Pharynx: No posterior oropharyngeal erythema.   Eyes:      General:         Right eye: No discharge.         Left eye: No discharge.   Cardiovascular:      Rate and Rhythm: Normal rate.      Comments: Right chest HD cath-no erythema  Pulmonary:      Effort: Pulmonary effort is normal. No respiratory distress.      Breath sounds: No stridor. No wheezing.   Abdominal:      General: There is no distension.      Palpations: Abdomen is soft.      Tenderness: There is no abdominal tenderness. There is no guarding.   Skin:     Coloration: Skin is not jaundiced.      Findings: Bruising present.   Neurological:      Comments: Obtunded         Meds:    Current Facility-Administered Medications:   •  [COMPLETED] piperacillin-tazobactam **AND** piperacillin-tazobactam  •   [MAR Hold] furosemide  •  [MAR Hold] haloperidol lactate  •  [MAR Hold] lidocaine  •  [MAR Hold] vitamin D (Ergocalciferol)  •  [MAR Hold] calcium acetate  •  [MAR Hold] atorvastatin  •  [MAR Hold] acetaminophen  •  [MAR Hold] ondansetron  •  [MAR Hold] ondansetron  •  [MAR Hold] LORazepam  •  [MAR Hold] heparin  •  [MAR Hold] hydrALAZINE    Labs:  Recent Labs     06/30/20  0159 07/01/20  0653 07/02/20  0603   WBC 13.8* 8.5 10.2   RBC 2.66* 2.18* 2.32*   HEMOGLOBIN 7.7* 6.4* 6.6*   HEMATOCRIT 24.5* 20.2* 21.4*   MCV 92.1 92.7 92.2   MCH 28.9 29.4 28.4   RDW 50.6* 51.8* 49.6   PLATELETCT 309 248 263   MPV 9.5 9.6 9.4   NEUTSPOLYS 71.40 73.80* 81.80*   LYMPHOCYTES 19.30* 16.30* 9.30*   MONOCYTES 6.80 6.90 7.50   EOSINOPHILS 0.90 1.60 0.40   BASOPHILS 0.40 0.50 0.20     Recent Labs     06/30/20  0159 07/01/20  0653 07/02/20  0603   SODIUM 141 139 136   POTASSIUM 4.8 4.7 4.5   CHLORIDE 102 103 98   CO2 24 22 21   GLUCOSE 104* 86 81   BUN 50* 63* 26*     Recent Labs     06/30/20  0159 07/01/20  0653 07/02/20  0603   ALBUMIN 2.9* 2.5* 2.5*   TBILIRUBIN 0.2 0.2 0.3   ALKPHOSPHAT 48 45 49   TOTPROTEIN 6.2 5.6* 5.8*   ALTSGPT 13 12 14   ASTSGOT 22 21 25   CREATININE 4.18* 5.76* 3.49*       Imaging:  Ct-head W/o    Result Date: 6/29/2020 6/29/2020 11:28 AM HISTORY/REASON FOR EXAM:  Stroke, follow up. Intracranial hemorrhage TECHNIQUE/EXAM DESCRIPTION AND NUMBER OF VIEWS: CT of the head without contrast. The study was performed on a helical multidetector CT scanner. Contiguous 2.5 mm axial sections were obtained from the skull base through the vertex. Up to date radiation dose reduction adjustments have been utilized to meet ALARA standards for radiation dose reduction. COMPARISON:  CT head 6/26/2020. Brain MRI 6/27/2020 FINDINGS: Redemonstrated is a predominantly low density extra axial fluid collection along the right frontoparietal convexity, probably loculated subdural hematoma although a component of epidural hemorrhage  is not excluded. There is a small amount of hyperdensity  along the inferior aspect of the collection which could represent small amount of acute hemorrhage. There is no significant change in appearance. The collection measures up to 22 mm in thickness. There is stable mass effect with sulcal effacement and slight right to left midline shift. No herniation. Generalized age-related atrophy. Chronic small left thalamic lacunar infarct. No acute osseous abnormality. Probable prior partial right mastoidectomy.     No significant interval change.    Ct-head W/o    Result Date: 6/26/2020   CT HEAD WITHOUT CONTRAST 6/26/2020 12:34 PM HISTORY/REASON FOR EXAM:  Left-sided facial droop TECHNIQUE/EXAM DESCRIPTION AND NUMBER OF VIEWS: CT of the head without contrast. The study was performed on a helical multidetector CT scanner. Contiguous 2.5 mm axial sections were obtained from the skull base through the vertex. Up to date radiation dose reduction adjustments have been utilized to meet ALARA standards for radiation dose reduction. COMPARISON:  None available FINDINGS: Lateral ventricles are normal in size and symmetric. Hyperdensity in the temporal horn of the right lateral ventricle. Mild global parenchymal atrophy. Chronic small vessel ischemic changes. Minimal 2 mm right to left midline shift Basal cisterns are patent. There is a chronic epidural collection along the right frontoparietal convexity measuring 2.1 cm in thickness. Small amount of acute blood products are seen in the inferior aspect of the collection. Minimal subacute subdural hemorrhage anterior to the right epidural collection, along the right frontal convexity. Calvaria are intact. Atherosclerosis. Visualized orbits are unremarkable. Visualized mastoid air cells are clear. No significant sinus disease in the visualized paranasal sinuses.     1. Small amount of acute hemorrhage in a chronic 2.1 cm right epidural collection, along the right frontoparietal  convexity. Associated 2 mm right to left midline shift. 2. Minimal subacute subdural hemorrhage anterior to the right epidural collection. 3. Hyperdensity in the temporal horn of the right lateral ventricle may represent small amount of intraventricular hemorrhage. 4. No CT evidence of acute infarct.    Dx-chest-portable (1 View)    Result Date: 6/26/2020 6/26/2020 1:03 PM HISTORY/REASON FOR EXAM:  Shortness of Breath. TECHNIQUE/EXAM DESCRIPTION AND NUMBER OF VIEWS: Single portable view of the chest. COMPARISON: None. FINDINGS: LUNGS: Clear. No effusions. PNEUMOTHORAX: None. LINES AND TUBES: Right IJ central catheter is in the distal SVC. MEDIASTINUM: No cardiomegaly. Atherosclerosis. MUSCULOSKELETAL STRUCTURES: No acute displaced fracture.     No acute cardiopulmonary abnormality.    Mr-brain-w/o    Result Date: 6/27/2020 6/27/2020 5:18 PM HISTORY/REASON FOR EXAM:  Syncope, recurrent. Dizziness, left arm numbness and slurred speech. Left-sided facial droop. History of prior stroke. Recurrent syncope. End-stage renal disease. Hypertension. TECHNIQUE/EXAM DESCRIPTION: MRI of the brain without contrast. T1 sagittal, T2 fast spin-echo axial, T1 coronal, FLAIR coronal, Diffusion weighted and Apparent Diffusion Coefficient (ADC map) axial images were obtained of the whole brain. Additional FLAIR axial images were obtained. The study was performed on a nubelo Signa 1.5 Starla MRI scanner. COMPARISON:  Head CT 6/26/2020 FINDINGS: The calvaria are unremarkable. There is a moderate-large T2 hyperintense extra-axial fluid collection over the right posterior frontal-parietal convexity. There is intermediate-slightly hyperintense FLAIR signal and T1 signal is slightly greater than CSF. The collection is somewhat lentiform. This could represent a late subacute or chronic epidural hematoma or loculated subdural hematoma. There is gradient echo hypointensity hemosiderin staining along the deep aspect of the collection.  Corresponding to the bandlike hyperdense linear focus toward the anterior aspect of the collection seen on CT, there is corresponding gradient echo hypointensity and this may be a more recent bandlike focus of hemorrhage. There is local mass effect with effacement of the right trigone and anterior displacement of the posterior body right lateral ventricle. There is minimal right to left shift of midline structures of about 1.6 mm. The underlying brain parenchyma in the right parietal lobe shows hazy and punctate areas of restricted diffusion which may represent associated arterial infarction or venous infarction due to compression of cortical veins. There is underlying mild cerebral atrophy with prominence of sulcal markings and mild ventriculomegaly in the left hemisphere. Age-appropriate. There is an old lacunar infarct in the thalamus Elsewhere in the cerebral hemispheres, there is a pattern of minimal supratentorial white matter disease with a few rare foci of T2 and FLAIR hyperintensity in the subcortical and/or deep white matter consistent with small vessel ischemic change versus demyelination or gliosis. The brainstem and cerebellum are unremarkable. The major vessels including the dural venous sinuses appear intact. Paranasal sinuses show mild mucosal thickening in the sphenoid sinus and minimal mucosal thickening scattered among the ethmoid air cells. The mastoids are clear. There has been cataract surgery. The gaze is divergent.     1.  Mild cerebral atrophy. Age-appropriate. 2.  Moderate-large lentiform extra-axial fluid collection over the right posterior frontal-parietal convexity consistent with a chronic or late subacute epidural hematoma or loculated subdural hematoma. Possible minimal component of more recent hemorrhage where there is linear bandlike hyperdensity on CT. 3.  Patchy and punctate areas of bright diffusion signal in the underlying right parietal lobe which may represent acute or recent  subacute arterial or venous infarction. No acute parenchymal hemorrhage. 4.  Minimal supratentorial white matter disease most consistent with microvascular ischemic change. 5.  Old lacunar infarct left lateral thalamus.    Mr-mra Head-w/o    Result Date: 6/27/2020 6/27/2020 5:18 PM HISTORY/REASON FOR EXAM:  Dizziness, non-specific. Syncope, recurrent. Dizziness, left arm numbness and slurred speech. Left-sided facial droop. History of prior stroke. Recurrent syncope. End-stage renal disease. Hypertension. TECHNIQUE/EXAM DESCRIPTION: MRA of the Head ([place]) without contrast. The study was performed on a [employer] Signa 1.5 Starla MRI scanner. MRA of the [place] was performed with 3D time-of-flight technique with axial acquisition. Additional MRA of the internal carotid and vertebrobasilar arteries at the level of the skull base and craniocervical junction was also performed with 3D time-of-flight technique with axial acquisition. Images are reviewed at the PACS or [place] workstations as axial source images as well as maximum intensity ray projection (MIP) multiplanar 3D reconstructions. COMPARISON:  MRI brain from today's date FINDINGS: The posterior circulation shows patent distal vertebral arteries. The left vertebral artery is minimally dominant. The vertebrobasilar confluence is intact. The basilar artery is widely patent with uniform caliber and robust flow signal. There is multifocal irregularity of the posterior cerebral arteries, right greater than left with a gap-like defect in the right P2 segment consistent with moderate to high-grade stenosis. Similar lesser findings of the left posterior cerebral artery. These findings are consistent with intracranial atherosclerotic cerebrovascular disease. No evidence of aneurysm in the posterior circulation. The anterior circulation shows the carotid siphons to be patent. There is a normal variant dominant caliber right anterior cerebral artery A1  segment with a markedly hypoplastic left A1 segment. The proximal middle cerebral arteries are intact. The M2 and M3/sylvian branches beyond the genu show some attenuation of caliber and interruption of flow signal suggesting intracranial atherosclerotic stenotic disease. No evidence of aneurysm about the anterior circulation.     1.  Intracranial atherosclerotic cerebrovascular stenotic disease involving the posterior cerebral arteries and probably the middle cerebral arteries beyond the genu. No alexx occlusion. 2.  Normal variant dominant caliber right anterior cerebral artery A1 segment with a hypoplastic left A1 segment. 3.  No evidence of aneurysm about [place].    Us-carotid Doppler Bilat    Result Date: 2020   Carotid Duplex  Report  Vascular Laboratory  CONCLUSIONS  Moderate atherosclerosis. Velocities are consistent with 50-69% stenosis of  the internal carotid arteries bilaterally.  BECKIE BRITTON  Exam Date:     2020 09:01  Room #:     Inpatient  Priority:     Routine  Ht (in):             Wt (lb):  Ordering Physician:        LAWRENCE SHEFFILED  Referring Physician:       [id number] Cobb  Sonographer:               MARGO Cason RDCS  Study Type:                Complete Bilateral  Technical Quality:         Adequate  Age:    87    Gender:     F  MRN:    [id number]  :    10/26/1932      BSA:  Indications:     Dizziness and giddiness  CPT Codes:       31749  ICD Codes:       R42  History:  Limitations:     Body habitus  Right Brachial BP:              /  Left Brachial BP:             /  RIGHT  Plaque          Plaque         Velocity (cm/s)  Characteristics Composition    Syst/Diast                                 128  / 32      Distal ICA                                 121  / 35      Mid ICA                                 78   / 28      Prox ICA                                 62   / 10      Distal CCA                                      /         Mid CCA                                  14   / 4       Prox CCA                                 381  /         ECA  Waveform:   Triphasic                         SCA  LEFT  Plaque         Plaque          Velocity (cm/s)  CharacteristicsComposition     Syst/Diast                                 155  / 30      Distal ICA                                 166  / 30      Mid ICA  Irregular      Heterogeneo     122  / 26      Prox ICA                 us  Irregular      Heterogeneo     71   / 16      Distal CCA                 us                                      /         Mid CCA                                 96   / 17      Prox CCA  Irregular      Heterogeneo     336  /         ECA                 us  Waveform:   Triphasic                         SCA          5.4          ICA/CCA       1.3        Antegrade      Vertebral   Antegrade         78   / 16     cm/s        63    / 12  FINDINGS  Right carotid.  Plaque of the bifurcation extending into the internal carotid. Velocities  are consistent with 50-69% stenosis of the internal carotid artery.    Left carotid.  Plaque of the bifurcation extending into the internal carotid. Velocities  are consistent with 50-69% stenosis of the internal carotid artery.  Plaque is irregular on the surface and heterogeneous with mixed acoustic  densities.  Bilateral subclavian and vertebral artery waveforms are antegrade and  waveforms are normal in character and velocity.  Liam Duong MD  (Electronically Signed)  Final Date:      27 June 2020                   10:28    Ec-echocardiogram Complete W/ Cont    Result Date: 6/27/2020  Transthoracic Echo Report Echocardiography Laboratory CONCLUSIONS No prior study is available for comparison. Normal left ventricular systolic function. Left ventricular ejection fraction is visually estimated to be 60%. The right ventricle was normal in size and function. Moderately dilated left atrium. Mild aortic stenosis. Mild tricuspid regurgitation.  BECKIE BRITTON Exam Date:         2020                    15:19 Exam Location:     Inpatient Priority:          Routine Ordering Physician:        LAURI ROWLEY Referring Physician: Sonographer:               FERNANDO Frazier Age:    87     Gender:    F MRN:    [id number] :    10/26/1932 BSA:    1.73   Ht (in):    59     Wt (lb):    171 Exam Type:     Complete, Contrast Indications:     Transient cerebral ischemic attack, unspecified ICD Codes:       G45.9 CPT Codes:       00917,  BP:   114    /   56     HR:   85 Technical Quality:       Fair MEASUREMENTS  (Male / Female) Normal Values 2D ECHO LV Diastolic Diameter PLAX        4.9 cm                4.2 - 5.9 / 3.9 - 5.3 cm LV Systolic Diameter PLAX         2.5 cm                2.1 - 4.0 cm IVS Diastolic Thickness           0.92 cm               LVPW Diastolic Thickness          0.87 cm               LVOT Diameter                     2.1 cm                LV Ejection Fraction MOD BP       62.4 %                >= 55  % LV Ejection Fraction MOD 4C       61.3 %                LV Ejection Fraction MOD 2C       64.4 %                IVC Diameter                      1.6 cm                DOPPLER AV Peak Velocity                  2.1 m/s               AV Peak Gradient                  17.8 mmHg             AV Mean Gradient                  11.1 mmHg             LVOT Peak Velocity                0.96 m/s              AV Area Cont Eq vti               1.7 cm2               Mitral E Point Velocity           1.3 m/s               Mitral E to A Ratio               0.93                  Mitral A Duration                 108 ms                MV Pressure Half Time             55.6 ms               MV Area PHT                       4 cm2                 MV Deceleration Time              192 ms                TR Peak Velocity                  244 cm/s              PV Peak Velocity                   1 m/s                 PV Peak Gradient                  4 mmHg                RVOT Peak Velocity                0.75 m/s              * Indicates values subject to auto-interpretation LV EF:        % FINDINGS Left Ventricle Normal left ventricular chamber size. Normal left ventricular wall thickness. Normal left ventricular systolic function. Left ventricular ejection fraction is visually estimated to be 60%. Normal regional wall motion. Grade II diastolic dysfunction. Contrast was used to enhance visualization of the endocardial border. 1 mL of contrast was administered. Existing IV was used. Located at the left antecubital Right Ventricle The right ventricle was normal in size and function. Right Atrium The right atrium is normal in size.  Normal inferior vena cava size and inspiratory collapse. Left Atrium The left atrium is normal in size.  Moderately dilated left atrium. Left atrial volume index is 43 mL/sq m. Mitral Valve Structurally normal mitral valve without significant stenosis. Trace mitral regurgitation. Aortic Valve Tricuspid aortic valve. Calcified aortic valve leaflets. Mild aortic stenosis. Aortic valve area calculated from the continuity equation is 1.7 cm2. Vmax is 2.2 m/s. Transvalvular gradients are - Peak: 19 mmHg, Mean: 11 mmHg. Dimensionless index is 0.52. No aortic insufficiency. Tricuspid Valve Structurally normal tricuspid valve without significant stenosis. Mild tricuspid regurgitation. Estimated right ventricular systolic pressure  is 30 mmHg. Right atrial pressure is estimated to be 3 mmHg. Pulmonic Valve The pulmonic valve is not well visualized. No pulmonic stenosis. Trace pulmonic insufficiency. Pericardium Normal pericardium without effusion. Aorta The aortic root is normal.  Ascending aorta diameter is 3.1 cm. Ricco Portillo MD (Electronically Signed) Final Date:     27 June 2020                 17:47      Micro:  Results     Procedure Component Value Units Date/Time    Blood  Culture [[id number]]     Order Status:  Sent Specimen:  Blood from Peripheral     Blood Culture [[id number]]     Order Status:  Sent Specimen:  Blood from Peripheral     Blood Culture [[id number]]  (Abnormal) Collected:  06/30/20 1340    Order Status:  Completed Specimen:  Blood Updated:  07/02/20 1115     Significant Indicator POS     Source BLD     Site HD Cath     Culture Result Growth detected by Bactec instrument. 07/01/2020  05:28      Group D Enterococcus species  Combination therapy with ampicillin, penicillin, or  vancomycin (for susceptible strains) plus an aminoglycoside  is usually indicated for serious enterococcal infections,  such as endocarditis unless high-level resistance to both  gentamicin and streptomycin is documented; such combinations  are predicted to result in synergistic killing of the  Enterococcus.      Narrative:       CALL  [place]  131 tel. [phone],  CALLED  131 tel. [phone] 07/01/2020, 05:30, RB PERF. RESULTS CALLED TO: RN  [id number]  HD Cath    Blood Culture [[id number]]  (Abnormal) Collected:  06/30/20 1340    Order Status:  Completed Specimen:  Blood Updated:  07/02/20 1113     Significant Indicator POS     Source BLD     Site HD Cath     Culture Result Growth detected by Bactec instrument. 07/01/2020  05:31      Group D Enterococcus species  Combination therapy with ampicillin, penicillin, or  vancomycin (for susceptible strains) plus an aminoglycoside  is usually indicated for serious enterococcal infections,  such as endocarditis unless high-level resistance to both  gentamicin and streptomycin is documented; such combinations  are predicted to result in synergistic killing of the  Enterococcus.      Narrative:       HD Cath    Blood Culture [[id number]]     Order Status:  Canceled Specimen:  Blood from Peripheral     Blood Culture [[id number]]     Order Status:  Canceled Specimen:  Blood from Peripheral     Urinalysis [[id number]]     Order Status:  No result Specimen:  Urine      CULTURE WOUND W/ GRAM STAIN [[id number]]  (Abnormal)  (Susceptibility) Collected:  06/27/20 2355    Order Status:  Completed Specimen:  Wound from Exudate Updated:  06/30/20 1321     Significant Indicator POS     Source WND     Site LEFT LEG     Culture Result Rare growth mixed skin naina.     Gram Stain Result No organisms seen.     Culture Result Pseudomonas aeruginosa  Light growth  P.aeruginosa may develop resistance during prolonged therapy  with all antibiotics. Isolates that are initially susceptible  may become resistant within three to four days after  initiation of therapy. Testing of repeat isolates may be  warranted.        Stenotrophomonas maltophilia  Rare growth      Narrative:       Left leg  Left leg    Susceptibility     Pseudomonas aeruginosa (1)     Antibiotic Interpretation Microscan Method Status    Ceftazidime Sensitive 4 mcg/mL MELISSA Final    Ciprofloxacin Sensitive <=1 mcg/mL MELISSA Final    Cefepime Sensitive <=2 mcg/mL MELISSA Final    Gentamicin Sensitive <=4 mcg/mL MELISSA Final    Amikacin Sensitive <=16 mcg/mL MELISSA Final    Meropenem Sensitive <=1 mcg/mL MELISSA Final    Pip/Tazobactam Sensitive <=16 mcg/mL MELISSA Final    Tobramycin Sensitive <=4 mcg/mL MELISSA Final          Stenotrophomonas maltophilia (2)     Antibiotic Interpretation Microscan Method Status    Ceftazidime Resistant >16 mcg/mL MELISSA Final    Trimeth/Sulfa Sensitive <=2/38 mcg/mL MELISSA Final    Levofloxacin Sensitive <=2 mcg/mL MELISSA Final                   CULTURE WOUND W/ GRAM STAIN [[id number]]  (Abnormal) Collected:  06/27/20 2335    Order Status:  Completed Specimen:  Wound from Exudate Updated:  06/30/20 0912     Significant Indicator POS     Source WND     Site RIGHT LEG     Culture Result -     Gram Stain Result Moderate WBCs.  No organisms seen.       Culture Result Pseudomonas aeruginosa  Light growth  See previous culture for sensitivity report.      Narrative:       Right leg  Right leg    Urinalysis [[id number]]  (Abnormal) Collected:   06/30/20 0824    Order Status:  Completed Specimen:  Urine, Cath Updated:  06/30/20 0856     Color Yellow     Character Clear     Specific Gravity 1.014     Ph >=9.0     Glucose 100 mg/dL      Ketones Negative mg/dL      Bilirubin Negative     Urobilinogen, Urine 0.2     Nitrite Negative     Leukocyte Esterase Negative     Occult Blood Trace     Micro Urine Req Microscopic    Narrative:       Collected By:[id number] NATIVIDAD SUNSHINE  If not done within the last 24 hours    Blood Culture [[id number]]     Order Status:  No result Specimen:  Blood from Peripheral     Blood Culture [[id number]]     Order Status:  No result Specimen:  Blood from Peripheral     Blood Culture [[id number]]     Order Status:  Canceled Specimen:  Blood from Peripheral     Blood Culture [[id number]]     Order Status:  Canceled Specimen:  Blood from Peripheral     GRAM STAIN [[id number]] Collected:  06/27/20 2355    Order Status:  Completed Specimen:  Wound Updated:  06/28/20 0927     Significant Indicator .     Source WND     Site LEFT LEG     Gram Stain Result No organisms seen.    Narrative:       Left leg  Left leg    GRAM STAIN [[id number]] Collected:  06/27/20 2335    Order Status:  Completed Specimen:  Wound Updated:  06/28/20 0926     Significant Indicator .     Source WND     Site RIGHT LEG     Gram Stain Result Moderate WBCs.  No organisms seen.      Narrative:       Right leg  Right leg    SARS-CoV-2, PCR (In-House) [[id number]] Collected:  06/27/20 0805    Order Status:  Completed Updated:  06/27/20 0915     SARS-CoV-2 Source NP Swab     SARS-CoV-2 by PCR NotDetected     Comment: [facility] providers: PLEASE REFER TO DE-ESCALATION AND RETESTING PROTOCOL  on [email or web address]  **The [employer] GeneXpert Xpress SARS-CoV-2 Test has been made available for  use under the Emergency Use Authorization (EUA) only.         Narrative:       Collected By:MARAH Weiner this morning  Expected Turn around time?->Rush (Cepheid 2-4 hours)     COVID/SARS CoV-2 PCR [[id number]] Collected:  06/27/20 0805    Order Status:  Completed Specimen:  Respirate from Nasopharyngeal Updated:  06/27/20 0813     COVID Order Status Received    Narrative:       Collected By:MARAH ZENDEJAS  Procedure this morning  Expected Turn around time?->Rush (Cepheid 2-4 hours)    URINALYSIS [[id number]] Collected:  06/26/20 1215    Order Status:  Canceled Specimen:  Urine     URINALYSIS [[id number]] Collected:  06/26/20 0000    Order Status:  Canceled           Assessment:  Active Hospital Problems    Diagnosis   • *Acute GI bleeding [K92.2]   • Epidural hemorrhage (HCC) [S06.4X9A]   • Acute blood loss anemia [D62]   • ESRD (end stage renal disease) ([facility]) [N18.6]   • Type 2 diabetes mellitus with kidney complication, without long-term current use of insulin ([facility]) [E11.29]   • Peripheral artery disease ([facility]) [I73.9]   • Leg wounds [S81.801A]   • Encephalopathy acute [G93.40]       Plan:  Enterococcal sepsis  GI source most likely-HD cath infection also possible  Fever resolved  Leukocytosis resolved  AMS persistent  BCxs + enterococcus 6/30  Repeat Bcxs every 48 hours until neg  Replace HD cath once blood cxs are neg-  TTE with calcified valves;  ANABELL if persistently +blood cxs if feasible  Continue Zosyn    GI bleed  Continued blood loss .    S/p EGD and colonoscopy without source  Plan for enteroscopy today     Intracranial hemorrhage  Mult falls PTA  Not deemed a surgical candidate.  Management is conservatively.    No new CVA seen by MRI     ESRD  Dose adjust abx    Chronic stasis ulceration  No evidence of active infection, so do not add additional antimicrobial therapy for this wound.    Wound swab showed colonization with pseudomonas and stenotrophomonas  Continue with wound care   Recommend vascular studies     Diabetes  Keep BS under 150 to help control current infection    Discussed with internal medicine.

## 2020-07-03 ENCOUNTER — APPOINTMENT (OUTPATIENT)
Dept: RADIOLOGY | Facility: MEDICAL CENTER | Age: 85
DRG: 377 | End: 2020-07-03
Attending: INTERNAL MEDICINE
Payer: MEDICARE

## 2020-07-03 LAB
ALBUMIN SERPL BCP-MCNC: 2.6 G/DL (ref 3.2–4.9)
ALBUMIN/GLOB SERPL: 0.7 G/DL
ALP SERPL-CCNC: 53 U/L (ref 30–99)
ALT SERPL-CCNC: 13 U/L (ref 2–50)
ANION GAP SERPL CALC-SCNC: 10 MMOL/L (ref 7–16)
AST SERPL-CCNC: 25 U/L (ref 12–45)
BACTERIA BLD CULT: ABNORMAL
BARCODED ABORH UBTYP: 600
BARCODED PRD CODE UBPRD: NORMAL
BARCODED UNIT NUM UBUNT: NORMAL
BASOPHILS # BLD AUTO: 0.2 % (ref 0–1.8)
BASOPHILS # BLD AUTO: 0.2 % (ref 0–1.8)
BASOPHILS # BLD: 0.02 K/UL (ref 0–0.12)
BASOPHILS # BLD: 0.02 K/UL (ref 0–0.12)
BILIRUB SERPL-MCNC: 0.2 MG/DL (ref 0.1–1.5)
BUN SERPL-MCNC: 21 MG/DL (ref 8–22)
CALCIUM SERPL-MCNC: 7.4 MG/DL (ref 8.5–10.5)
CHLORIDE SERPL-SCNC: 100 MMOL/L (ref 96–112)
CO2 SERPL-SCNC: 28 MMOL/L (ref 20–33)
COMPONENT R 8504R: NORMAL
CREAT SERPL-MCNC: 2.96 MG/DL (ref 0.5–1.4)
EOSINOPHIL # BLD AUTO: 0.04 K/UL (ref 0–0.51)
EOSINOPHIL # BLD AUTO: 0.08 K/UL (ref 0–0.51)
EOSINOPHIL NFR BLD: 0.4 % (ref 0–6.9)
EOSINOPHIL NFR BLD: 0.7 % (ref 0–6.9)
ERYTHROCYTE [DISTWIDTH] IN BLOOD BY AUTOMATED COUNT: 49.6 FL (ref 35.9–50)
ERYTHROCYTE [DISTWIDTH] IN BLOOD BY AUTOMATED COUNT: 49.8 FL (ref 35.9–50)
GLOBULIN SER CALC-MCNC: 3.7 G/DL (ref 1.9–3.5)
GLUCOSE SERPL-MCNC: 121 MG/DL (ref 65–99)
HCT VFR BLD AUTO: 19.5 % (ref 37–47)
HCT VFR BLD AUTO: 22.7 % (ref 37–47)
HCT VFR BLD AUTO: 22.8 % (ref 37–47)
HGB BLD-MCNC: 6.1 G/DL (ref 12–16)
HGB BLD-MCNC: 7.1 G/DL (ref 12–16)
HGB BLD-MCNC: 7.1 G/DL (ref 12–16)
IMM GRANULOCYTES # BLD AUTO: 0.06 K/UL (ref 0–0.11)
IMM GRANULOCYTES # BLD AUTO: 0.1 K/UL (ref 0–0.11)
IMM GRANULOCYTES NFR BLD AUTO: 0.5 % (ref 0–0.9)
IMM GRANULOCYTES NFR BLD AUTO: 1 % (ref 0–0.9)
LACTATE BLD-SCNC: 0.9 MMOL/L (ref 0.5–2)
LYMPHOCYTES # BLD AUTO: 0.96 K/UL (ref 1–4.8)
LYMPHOCYTES # BLD AUTO: 0.97 K/UL (ref 1–4.8)
LYMPHOCYTES NFR BLD: 8.1 % (ref 22–41)
LYMPHOCYTES NFR BLD: 9.4 % (ref 22–41)
MAGNESIUM SERPL-MCNC: 1.8 MG/DL (ref 1.5–2.5)
MCH RBC QN AUTO: 28.8 PG (ref 27–33)
MCH RBC QN AUTO: 29 PG (ref 27–33)
MCHC RBC AUTO-ENTMCNC: 31.3 G/DL (ref 33.6–35)
MCHC RBC AUTO-ENTMCNC: 31.3 G/DL (ref 33.6–35)
MCV RBC AUTO: 92 FL (ref 81.4–97.8)
MCV RBC AUTO: 92.7 FL (ref 81.4–97.8)
MONOCYTES # BLD AUTO: 0.67 K/UL (ref 0–0.85)
MONOCYTES # BLD AUTO: 0.75 K/UL (ref 0–0.85)
MONOCYTES NFR BLD AUTO: 6.3 % (ref 0–13.4)
MONOCYTES NFR BLD AUTO: 6.6 % (ref 0–13.4)
NEUTROPHILS # BLD AUTO: 10.03 K/UL (ref 2–7.15)
NEUTROPHILS # BLD AUTO: 8.43 K/UL (ref 2–7.15)
NEUTROPHILS NFR BLD: 82.4 % (ref 44–72)
NEUTROPHILS NFR BLD: 84.2 % (ref 44–72)
NRBC # BLD AUTO: 0 K/UL
NRBC # BLD AUTO: 0 K/UL
NRBC BLD-RTO: 0 /100 WBC
NRBC BLD-RTO: 0 /100 WBC
PLATELET # BLD AUTO: 246 K/UL (ref 164–446)
PLATELET # BLD AUTO: 268 K/UL (ref 164–446)
PMV BLD AUTO: 9.2 FL (ref 9–12.9)
PMV BLD AUTO: 9.4 FL (ref 9–12.9)
POTASSIUM SERPL-SCNC: 3.6 MMOL/L (ref 3.6–5.5)
PRODUCT TYPE UPROD: NORMAL
PROT SERPL-MCNC: 6.3 G/DL (ref 6–8.2)
RBC # BLD AUTO: 2.12 M/UL (ref 4.2–5.4)
RBC # BLD AUTO: 2.45 M/UL (ref 4.2–5.4)
SIGNIFICANT IND 70042: ABNORMAL
SIGNIFICANT IND 70042: ABNORMAL
SITE SITE: ABNORMAL
SITE SITE: ABNORMAL
SODIUM SERPL-SCNC: 138 MMOL/L (ref 135–145)
SOURCE SOURCE: ABNORMAL
SOURCE SOURCE: ABNORMAL
UNIT STATUS USTAT: NORMAL
WBC # BLD AUTO: 10.2 K/UL (ref 4.8–10.8)
WBC # BLD AUTO: 11.9 K/UL (ref 4.8–10.8)

## 2020-07-03 PROCEDURE — 85025 COMPLETE CBC W/AUTO DIFF WBC: CPT

## 2020-07-03 PROCEDURE — 700111 HCHG RX REV CODE 636 W/ 250 OVERRIDE (IP): Performed by: INTERNAL MEDICINE

## 2020-07-03 PROCEDURE — 36589 REMOVAL TUNNELED CV CATH: CPT

## 2020-07-03 PROCEDURE — 86923 COMPATIBILITY TEST ELECTRIC: CPT

## 2020-07-03 PROCEDURE — 83735 ASSAY OF MAGNESIUM: CPT

## 2020-07-03 PROCEDURE — 87071 CULTURE AEROBIC QUANT OTHER: CPT

## 2020-07-03 PROCEDURE — 97530 THERAPEUTIC ACTIVITIES: CPT | Mod: CQ

## 2020-07-03 PROCEDURE — 03PY33Z REMOVAL OF INFUSION DEVICE FROM UPPER ARTERY, PERCUTANEOUS APPROACH: ICD-10-PCS | Performed by: RADIOLOGY

## 2020-07-03 PROCEDURE — 99233 SBSQ HOSP IP/OBS HIGH 50: CPT | Performed by: INTERNAL MEDICINE

## 2020-07-03 PROCEDURE — 97535 SELF CARE MNGMENT TRAINING: CPT

## 2020-07-03 PROCEDURE — 700105 HCHG RX REV CODE 258: Performed by: INTERNAL MEDICINE

## 2020-07-03 PROCEDURE — 700102 HCHG RX REV CODE 250 W/ 637 OVERRIDE(OP): Performed by: INTERNAL MEDICINE

## 2020-07-03 PROCEDURE — 83605 ASSAY OF LACTIC ACID: CPT

## 2020-07-03 PROCEDURE — 770001 HCHG ROOM/CARE - MED/SURG/GYN PRIV*

## 2020-07-03 PROCEDURE — 97530 THERAPEUTIC ACTIVITIES: CPT

## 2020-07-03 PROCEDURE — 90935 HEMODIALYSIS ONE EVALUATION: CPT

## 2020-07-03 PROCEDURE — 36415 COLL VENOUS BLD VENIPUNCTURE: CPT

## 2020-07-03 PROCEDURE — A9270 NON-COVERED ITEM OR SERVICE: HCPCS | Performed by: INTERNAL MEDICINE

## 2020-07-03 PROCEDURE — 700101 HCHG RX REV CODE 250: Performed by: INTERNAL MEDICINE

## 2020-07-03 PROCEDURE — 51798 US URINE CAPACITY MEASURE: CPT

## 2020-07-03 PROCEDURE — 92526 ORAL FUNCTION THERAPY: CPT

## 2020-07-03 PROCEDURE — P9016 RBC LEUKOCYTES REDUCED: HCPCS

## 2020-07-03 PROCEDURE — 5A1D70Z PERFORMANCE OF URINARY FILTRATION, INTERMITTENT, LESS THAN 6 HOURS PER DAY: ICD-10-PCS | Performed by: INTERNAL MEDICINE

## 2020-07-03 PROCEDURE — 36430 TRANSFUSION BLD/BLD COMPNT: CPT

## 2020-07-03 PROCEDURE — 700105 HCHG RX REV CODE 258

## 2020-07-03 PROCEDURE — 85014 HEMATOCRIT: CPT

## 2020-07-03 PROCEDURE — 85018 HEMOGLOBIN: CPT

## 2020-07-03 PROCEDURE — 80053 COMPREHEN METABOLIC PANEL: CPT

## 2020-07-03 RX ORDER — SODIUM CHLORIDE 9 MG/ML
INJECTION, SOLUTION INTRAVENOUS
Status: COMPLETED
Start: 2020-07-03 | End: 2020-07-03

## 2020-07-03 RX ADMIN — ACETAMINOPHEN 650 MG: 325 TABLET, FILM COATED ORAL at 13:30

## 2020-07-03 RX ADMIN — SODIUM CHLORIDE 500 ML: 9 INJECTION, SOLUTION INTRAVENOUS at 01:52

## 2020-07-03 RX ADMIN — ACETAMINOPHEN 650 MG: 325 TABLET, FILM COATED ORAL at 06:40

## 2020-07-03 RX ADMIN — OMEPRAZOLE 20 MG: 20 CAPSULE, DELAYED RELEASE ORAL at 06:40

## 2020-07-03 RX ADMIN — ACETAMINOPHEN 650 MG: 325 TABLET, FILM COATED ORAL at 23:25

## 2020-07-03 RX ADMIN — Medication 667 MG: at 13:30

## 2020-07-03 RX ADMIN — ATORVASTATIN CALCIUM 40 MG: 40 TABLET, FILM COATED ORAL at 17:17

## 2020-07-03 RX ADMIN — ACETAMINOPHEN 650 MG: 325 TABLET, FILM COATED ORAL at 17:39

## 2020-07-03 RX ADMIN — FUROSEMIDE 80 MG: 10 INJECTION, SOLUTION INTRAMUSCULAR; INTRAVENOUS at 17:16

## 2020-07-03 RX ADMIN — Medication 667 MG: at 09:01

## 2020-07-03 RX ADMIN — PIPERACILLIN AND TAZOBACTAM 3.38 G: 3; .375 INJECTION, POWDER, LYOPHILIZED, FOR SOLUTION INTRAVENOUS; PARENTERAL at 13:31

## 2020-07-03 RX ADMIN — ACETAMINOPHEN 650 MG: 325 TABLET, FILM COATED ORAL at 00:01

## 2020-07-03 RX ADMIN — LIDOCAINE 1 PATCH: 50 PATCH TOPICAL at 14:54

## 2020-07-03 RX ADMIN — Medication 667 MG: at 17:16

## 2020-07-03 ASSESSMENT — COGNITIVE AND FUNCTIONAL STATUS - GENERAL
DRESSING REGULAR UPPER BODY CLOTHING: A LOT
DRESSING REGULAR LOWER BODY CLOTHING: TOTAL
HELP NEEDED FOR BATHING: A LOT
STANDING UP FROM CHAIR USING ARMS: TOTAL
CLIMB 3 TO 5 STEPS WITH RAILING: TOTAL
WALKING IN HOSPITAL ROOM: TOTAL
MOVING TO AND FROM BED TO CHAIR: UNABLE
SUGGESTED CMS G CODE MODIFIER DAILY ACTIVITY: CL
MOVING FROM LYING ON BACK TO SITTING ON SIDE OF FLAT BED: UNABLE
MOBILITY SCORE: 6
TOILETING: TOTAL
SUGGESTED CMS G CODE MODIFIER MOBILITY: CN
DAILY ACTIVITIY SCORE: 10
TURNING FROM BACK TO SIDE WHILE IN FLAT BAD: UNABLE
PERSONAL GROOMING: A LOT
EATING MEALS: A LOT

## 2020-07-03 ASSESSMENT — ENCOUNTER SYMPTOMS
HEADACHES: 0
DIARRHEA: 0
NECK PAIN: 0
FOCAL WEAKNESS: 0
SHORTNESS OF BREATH: 0
FEVER: 0
CHILLS: 0
COUGH: 0
VOMITING: 0
BACK PAIN: 0
EYE DISCHARGE: 0
STRIDOR: 0
ABDOMINAL PAIN: 0
DIZZINESS: 0
SPUTUM PRODUCTION: 0
WEIGHT LOSS: 0
HEARTBURN: 0
PALPITATIONS: 0
INSOMNIA: 0
ORTHOPNEA: 0
WEAKNESS: 1
MYALGIAS: 1
MEMORY LOSS: 0
NAUSEA: 0
SEIZURES: 0
EYE PAIN: 0

## 2020-07-03 ASSESSMENT — GAIT ASSESSMENTS: GAIT LEVEL OF ASSIST: UNABLE TO PARTICIPATE

## 2020-07-03 NOTE — PROGRESS NOTES
Gastroenterology Progress Note     Author: Jean Patino M.D.   Date & Time Created: 7/3/2020 12:14 PM    Chief Complaint:  Melena    Interval History:  No bleeding. Hb stable    Review of Systems:  Review of Systems   Gastrointestinal: Negative for heartburn, melena and nausea.       Physical Exam:  Physical Exam   Constitutional: No distress.   Abdominal: Soft. She exhibits no distension. There is no abdominal tenderness.         Current Facility-Administered Medications:   •  [COMPLETED] piperacillin-tazobactam (ZOSYN) 3.375 g in  mL IVPB, 3.375 g, Intravenous, Once, Stopped at 06/30/20 1147 **AND** piperacillin-tazobactam (ZOSYN) 3.375 g in  mL IVPB, 3.375 g, Intravenous, Q12HRS, Kelvin Noe M.D., Stopped at 07/02/20 2027  •  omeprazole (PRILOSEC) capsule 20 mg, 20 mg, Oral, DAILY, Nathan Rich M.D., 20 mg at 07/03/20 0640  •  furosemide (LASIX) injection 80 mg, 80 mg, Intravenous, BID DIURETIC, Alexa Gross D.OJames, 80 mg at 07/02/20 1626  •  haloperidol lactate (HALDOL) injection 1-2 mg, 1-2 mg, Intravenous, Q4HRS PRN, Kelvin Noe M.D., 2 mg at 07/01/20 0855  •  lidocaine (LIDODERM) 5 % 1 Patch, 1 Patch, Transdermal, Q24HR, Kelvin Noe M.D., 1 Patch at 07/01/20 1826  •  vitamin D (Ergocalciferol) (DRISDOL) capsule 50,000 Units, 50,000 Units, Oral, Q7 DAYS, Alexa Gross, D.O., 50,000 Units at 06/29/20 1159  •  calcium acetate (PHOS-LO) 667 MG tablet 667 mg, 667 mg, Oral, TID WITH MEALS, Alexa Gross D.O., Stopped at 07/03/20 1130  •  atorvastatin (LIPITOR) tablet 40 mg, 40 mg, Oral, Q EVENING, Kory Aiken M.D., 40 mg at 07/02/20 1626  •  acetaminophen (TYLENOL) tablet 650 mg, 650 mg, Oral, Q6HRS, Kory Aiken M.D., 650 mg at 07/03/20 0640  •  ondansetron (ZOFRAN) syringe/vial injection 4 mg, 4 mg, Intravenous, Q4HRS PRN, Rosales Suarez M.D.  •  ondansetron (ZOFRAN ODT) dispertab 4 mg, 4 mg, Oral, Q4HRS PRN, Rosales Suarez M.D.  •  LORazepam (ATIVAN) injection 0.26-0.5 mg,  0.26-0.5 mg, Intravenous, Q4HRS PRN, Juwan Lyle M.D., 0.5 mg at 07/01/20 2043  •  heparin injection 3,200 Units, 3,200 Units, Intravenous, ACUTE DIALYSIS PRN, Marlene Nelson M.D., 3,200 Units at 07/01/20 1736  •  hydrALAZINE (APRESOLINE) injection 10-20 mg, 10-20 mg, Intravenous, Q4HRS PRN, Juwan Lyle M.D., 10 mg at 06/28/20 2201  Labs:          Recent Labs     07/01/20  0653 07/02/20  0603 07/03/20  0719   SODIUM 139 136 138   POTASSIUM 4.7 4.5 3.6   CHLORIDE 103 98 100   CO2 22 21 28   BUN 63* 26* 21   CREATININE 5.76* 3.49* 2.96*   MAGNESIUM 2.0 1.9 1.8   PHOSPHORUS 5.6* 3.7  --    CALCIUM 7.2* 7.6* 7.4*     Recent Labs     07/01/20 0653 07/02/20  0603 07/03/20  0719   ALTSGPT 12 14 13   ASTSGOT 21 25 25   ALKPHOSPHAT 45 49 53   TBILIRUBIN 0.2 0.3 0.2   GLUCOSE 86 81 121*     Recent Labs     07/01/20 0653 07/02/20  0603 07/02/20  1158 07/03/20  0001 07/03/20 0719   RBC 2.18* 2.32*  --  2.12* 2.45*   HEMOGLOBIN 6.4* 6.6*  --  6.1* 7.1*   HEMATOCRIT 20.2* 21.4*  --  19.5* 22.7*   PLATELETCT 248 263  --  246 268   PROTHROMBTM  --   --  15.0*  --   --    INR  --   --  1.15*  --   --    IRON 24*  --   --   --   --    FERRITIN 2759.0*  --   --   --   --    TOTIRONBC 108*  --   --   --   --      Recent Labs     07/01/20 0653 07/02/20  0603 07/03/20  0001 07/03/20 0719   WBC 8.5 10.2 10.2 11.9*   NEUTSPOLYS 73.80* 81.80* 82.40* 84.20*   LYMPHOCYTES 16.30* 9.30* 9.40* 8.10*   MONOCYTES 6.90 7.50 6.60 6.30   EOSINOPHILS 1.60 0.40 0.40 0.70   BASOPHILS 0.50 0.20 0.20 0.20   ASTSGOT 21 25  --  25   ALTSGPT 12 14  --  13   ALKPHOSPHAT 45 49  --  53   TBILIRUBIN 0.2 0.3  --  0.2       Assessment:  Obscure GI bleeding. 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 Hb stable    Plan:  1. Monitor Hb  2. Advance diet    GI will sign off.Please call with questions.    Quality-Core Measures

## 2020-07-03 NOTE — PROGRESS NOTES
Assumed care @@ 0715. Bedside report from Jody VILLEGAS. Dialysis in progress @ shift change, dialysis RN @ bedsdie. Pt resting in bed and in no distress. Bed in low position, call light w/in reach.

## 2020-07-03 NOTE — PROGRESS NOTES
HD treatment today x 4 H.BP soft during treatment otherwise treatment tolerated well.Net UF removed 3500 ml.CVC flushed with ns then capped.Patient scheduled for cvc removal sometime today.Report given to primary Rn.

## 2020-07-03 NOTE — THERAPY
"Speech Language Pathology  Daily Treatment     Patient Name: Corine Dela Cruz  Age:  87 y.o., Sex:  female  Medical Record #: [id number]  Today's Date: 7/3/2020     Precautions  Precautions: Fall Risk, Swallow Precautions ( See Comments)  Comments: L sided weakness with report of right UE weakness    Assessment    Pt was seen for dysphagia tx with breakfast meal of full liquids. Pt had an enteroscopy yesterday with recommendations for Full Liquid diet per GI. SLP will not trial any advanced textures until cleared by GI. Pt was receiving dialysis, awake and cooperative. She required 1:1 feeding d/t BUE weakness. Pt was initially given straw sips of thins, though pt tended to take serial sips, resulting in delayed coughing and throat clearing. Single straw sips of thins resulted in coughing as well. Pt did better with single cup sips of thins with occasional delayed throat clearing noted. No s/sx of aspiration occurred with applesauce. Pt declined yogurt/cream of wheat due to dislike. Continue Full Liquid diet per GI (thins by cup ok; NO STRAWS). Pills can be given whole w/ thins as pt has been taking pills this way with no report of difficulty. SLP will reduce to 3x/wk given medical complexity with need for HD and palliative care needs.     Plan    Continue Full Liquid diet per GI (thins by cup ok; NO STRAWS). Pills can be given whole w/ thins.    Treatment plan modified to 3 times per week until therapy goals are met for the following treatments:  Dysphagia Training and Patient / Family / Caregiver Education.    Discharge recommendations:  Recommend inpatient transitional care services for continued speech therapy services.        Subjective    \"I'm hungry for real food.\"     Objective       07/03/20 0849   Dysphagia    Positioning / Behavior Modification Modulate Rate or Bite Size;Self Monitoring;Other (see Comments)  (upright positioning)   Other Treatments meal tx full liquids   Diet / Liquid Recommendation " Other (Comments);Thin (0)  (Full Liquid diet per GI)   Recommended Route of Medication Administration   Medication Administration  Whole with Liquid Wash   Short Term Goals   Short Term Goal # 1 B  Patient will consume clear liquid diet (thins) without signs of aspiration, given feeding assistance   Goal Outcome  # 1 B Goal met   Short Term Goal # 2 NEW 7/3: Patient will consume a full liquid (thin) diet with no s/sx of aspiratin given 1:1 feeding.   Goal Outcome # 2  Progressing as expected

## 2020-07-03 NOTE — CARE PLAN
Problem: Bowel/Gastric:  Goal: Normal bowel function is maintained or improved  Outcome: PROGRESSING AS EXPECTED   Patient having regular bowel movements, LBM 7/1. Stool softeners available as needed.     Problem: Skin Integrity  Goal: Risk for impaired skin integrity will decrease  Outcome: PROGRESSING AS EXPECTED   Patient has waffle cushion, heel float boots, pillows in use for support/positioning, Q2hr turns, purewick in place for incontinence.

## 2020-07-03 NOTE — CARE PLAN
Problem: Nutritional:  Goal: Achieve adequate nutritional intake  Description: Patient's diet will advance past clears and consume ~50% of meals  Outcome: PROGRESSING SLOWER THAN EXPECTED   Diet advanced to fulls 72 and thus far pt consuming <25%.  RD reviewed appropriate snacks and oral nutrition supplements (will optimize nutritional value of meal trays); RD(s) continues to monitor for adequate intake.

## 2020-07-03 NOTE — PROGRESS NOTES
[facility] Nephrology Progress Note      Author: Audrey RAO  Supervising Physician: Dr Alexa Gross       Date of Service  7/3/2020    HISTORY OF PRESENT ILLNESS:    86 yo F PMH ESRD MWF iHD, HTN, anemia of CKD, CKD-MBD, and HLD who presents to ED with CC as above.  She is visiting the [facility] from [place] and due to current pandemic has not been able to go back home and continued to stay in this area with family.  She has OP dialysis at [facility] and has been compliant.  She was doing well until about two weeks ago when she started to notice dark stools.  She was doing ok until about 3 days ago when she also noted orthostatic symptoms upon standing.  It resolved and then today it came back and she had some slurred speech and left arm numbness so she was brought in for evaluation.  She has had a prior stroke and the numbness and slurred speech have been present off and on as a result of that so initially she though nothing of it. Neurology was consulted and they did not feel that this was CVA related and more hemodynamic related.  Labs in ED showed Hgb 5.5 and serum labs showed  and K+ 5.3.  She was given pRBCs and admitted to ICU.  CT of head showed an acute on chronic epidural bleed with mild midline shift as well.  She was on plavix at home.  No recent F/C/N/V/CP/SOB.  No hematochezia, hematemesis.  No HA, visual changes, or abdominal pain    Daily nephrology summary  06/26 - consult done, HD done  06/27 - 2u prbc this AM, has been seen and evaluated by neurology and neurosurgery, further imaging planned for today  06/28 - not seen, patient in procedures  06/29 - seen on HD, tolerating with anxiolytic, somnolent and provides minimal responses to questions, 3Ca bath today, EGD and colonoscopy normal, capsule endoscopy  06/30 - transferred to floor, capsule endoscopy with fresh blood, push enteroscopy deferred, some concern for UTI but no urine cx and UA without bacteria, WBC increased,  "+fever, +AMS  07/01 - patient is agitated this AM, initially declined HD, +edema, +hbg decreased and receiving prbc  07/02 - patient sleepy this afternoon post procedure, erosive duodenitis and oozing AVMs in gastric cardia noted, tolerated HD with 4L UF yesterday, on lasix though UOP no longer being recorded  07/03 - HD this am, 3.5L UF. IR removed dialysis catheter. Pt \"feeling much better\".     Review of Systems  Review of Systems   Constitutional: Negative for chills and fever.   Respiratory: Negative for cough and shortness of breath.    Cardiovascular: Positive for leg swelling (improved). Negative for chest pain.   Gastrointestinal: Negative for nausea and vomiting.   Musculoskeletal: Negative for back pain.        BLE discomfort   Neurological: Positive for weakness. Negative for dizziness.   Psychiatric/Behavioral: Negative for memory loss. The patient does not have insomnia.         Physical Exam  Temp:  [35.7 °C (96.3 °F)-37.2 °C (99 °F)] 37.2 °C (98.9 °F)  Pulse:  [70-93] 75  Resp:  [14-18] 16  BP: (103-143)/(31-71) 104/55  SpO2:  [95 %-100 %] 99 %    Physical Exam  Constitutional:       General: She is not in acute distress.     Appearance: She is ill-appearing.   HENT:      Head: Normocephalic.      Right Ear: External ear normal.      Left Ear: External ear normal.      Nose: Nose normal.      Mouth/Throat:      Mouth: Mucous membranes are moist.      Pharynx: Oropharynx is clear.   Eyes:      Extraocular Movements: Extraocular movements intact.      Conjunctiva/sclera: Conjunctivae normal.   Cardiovascular:      Rate and Rhythm: Normal rate and regular rhythm.      Comments: R chest TDC c/d/d  Pulmonary:      Effort: Pulmonary effort is normal.      Breath sounds: No wheezing.   Abdominal:      Palpations: Abdomen is soft.      Tenderness: There is no abdominal tenderness.   Musculoskeletal:      Right lower leg: Edema (improved) present.      Left lower leg: Edema (improved) present.      Comments: " anasarca   Skin:     Coloration: Skin is pale.      Comments: LE dressing in place   Neurological:      Mental Status: She is alert and oriented to person, place, and time. She is confused.   Psychiatric:         Attention and Perception: Attention normal.         Behavior: Behavior is agitated. Behavior is not aggressive.         Fluids    Intake/Output Summary (Last 24 hours) at 7/3/2020 1215  Last data filed at 7/3/2020 1041  Gross per 24 hour   Intake 1036 ml   Output 3500 ml   Net -2464 ml       Laboratory  Recent Labs     07/02/20  0603 07/03/20  0001 07/03/20  0719   WBC 10.2 10.2 11.9*   RBC 2.32* 2.12* 2.45*   HEMOGLOBIN 6.6* 6.1* 7.1*   HEMATOCRIT 21.4* 19.5* 22.7*   MCV 92.2 92.0 92.7   MCH 28.4 28.8 29.0   MCHC 30.8* 31.3* 31.3*   RDW 49.6 49.8 49.6   PLATELETCT 263 246 268   MPV 9.4 9.2 9.4     Recent Labs     07/01/20  0653 07/02/20  0603 07/03/20  0719   SODIUM 139 136 138   POTASSIUM 4.7 4.5 3.6   CHLORIDE 103 98 100   CO2 22 21 28   GLUCOSE 86 81 121*   BUN 63* 26* 21   CREATININE 5.76* 3.49* 2.96*   CALCIUM 7.2* 7.6* 7.4*     Recent Labs     07/02/20  1158   INR 1.15*     No results for input(s): NTPROBNP in the last 72 hours.        Imaging  Reviewed     Assessment/Plan  IMPRESSION:  - ESRD    * Etiology likely 2/2 HTN    * R chest TDC     * Visitor at [facility], lives in [place]  - Melena    * EGD and colonoscopy normal, capsule endoscopy with fresh blood 6/30  - Hyperkalemia    * correct with HD  - Acute epidural hematoma    * Superimposed on chronic epidural bleed, with a small midline shift    * Neuro following, has been evaluated by neurosurgery  - HTN    * Goal BP < 140/90    * Stable  - Anemia, multifactorial    * Goal Hgb 10-11 in CKD, ferritin significantly elevated    * transfuse hgb<7    * see melena above  - CKD-MBD    * Managed at HD unit, phos now above goal, Ca low, vitamin D low, PTH appropriate  - HLD  - CAD  - Leukocytosis with low grade fever  - Bacteremia    * Probable GI  source  - Edema, anasarca    * UF with HD as tolerated     * On diuretics      PLAN:  - HD today then pull TDC for line holiday  - Plan for replacement line Monday.   - Abx per primary service  - UF as tolerated--will have HD x 4 hours for maximum UF  - Continue Lasix 80mg BID  - Blood cultures pending  - Abx per primary service  - No dietary protein restrictions  - Dose all meds per ESRD  - Transfuse as needed to maintain Hgb > 7  - Do not provide with gadolinium without discussing with nephrology, as HD x 3 days will need to be arranged  - Continue calcium acetate and ergocalciferol  - Low sodium diet   - Fluid restriction     All prior notes form other doctors and RN staff were reviewed from admission to current day to help me make my clinical decisions     Thank you,

## 2020-07-03 NOTE — PROGRESS NOTES
Labs showed hemoglobin dropped from 6.6 to 6.1 after transfusion 7/2. Paged MD Ion Duran with the updates and MD put in order for 1 unit PRBC and follow up Q6hr hemoglobin checks. PRBC transfusion started at 0200.

## 2020-07-03 NOTE — PROGRESS NOTES
[facility] Medicine Daily Progress Note    Date of Service  7/3/2020    Chief Complaint  87 y.o. female admitted 6/26/2020 with melena and weakness, patient has a history of CVA, end-stage renal disease on hemodialysis Monday Wednesday Friday, peripheral arterial disease, the patient apparently is visiting from [place], she has 2 sons in town, she also complained of left-sided weakness, speech difficulty apparently and also some residual deficits from her previous CVA, the patient does have some chronic lower extremity wounds that she is being treated with antibiotics for, the patient on presentation was imaged and was found to have a right-sided chronic epidural hematoma with some mild acute hemorrhage within the lesion, not a surgical candidate.    Hospital Course    Patient admitted with Severe blood loss anemia, chronic right-sided subdural hematoma without intervention indicated at this time, chronic end-stage renal disease on hemodialysis  Interval Problem Update  Patient seen and examined today.    Patient tolerating treatment and therapies.  All Data, Medication data reviewed.  Case discussed with nursing as available.  Plan of Care reviewed with patient and notified of changes.  6/29 patient appears fairly confused, we discussed the findings of her capsule endoscopy, need for additional push endoscopy, she is somewhat resistant to the idea to undergo another procedure, the patient is otherwise afebrile, heart rate in the 80s to 100s, blood pressure is stabilized, patient has stable left upper extremity weakness and bilateral lower extremity weakness, patient with significant anxiety overall, seen by nephrology, gastroenterology, intensive care physicians, stabilized for transfer out of ICU    6/30: Initially the patient plan to have capsule endoscopy today but due to her worsening confusion it was canceled.  She had low-grade temperature 100.1 and tachycardia.  WBC was 13.8, urinalysis was negative for  infection.  I saw and examined the patient today.    7/1: Her hemoglobin was 6.4 this morning and I will order 1 unit of packed blood cell.  Yesterday her endoscopy was canceled due to confusion.  The patient is alert and oriented x4 this morning.  I saw and examined the patient today.\  I had a very long time discussion with daughter Elena about her medical condition.  I updated her that her hemodialysis catheter is infected and I am going to consult vascular surgeon for that and also infectious disease consult.  He is planning to have enteroscopy by GI.  At the same time I told her that she is in a lot of pain and asking for pain medication and we do not want to give her narcotics due to risks of getting respiratory distress.  I asked her what is her opinion about pain medication and she was not able to give me the opinion.  She stated that treat her everything we can medically to have her with her current condition.  I also explained to her that due to her age and multiple medical comorbidity she will be benefit from palliative care and possibly hospice care who also can help manage her pain.  She finally said that palliative team can contact her brother at  [phone].  I put a consult for palliative care today.    7/2 the patient stated that her pain is better today.  She still wanted to be treated medically for her medical condition.  Palliative team is following.  Plan Bertha is planning to have enteroscopy today.    7/3: the patient is getting HD. HD catheter needs to be removed by IR per nephro. I saw and examined her today.   IR to remove HD catheter today.          Consultants/Specialty  Nephrology, gastroenterology, intensivist    Code Status  DNR/DNI    Disposition  TBD    Review of Systems  Review of Systems   Constitutional: Positive for malaise/fatigue. Negative for chills and weight loss.   HENT: Negative for congestion and nosebleeds.    Eyes: Negative for pain and discharge.   Respiratory:  Negative for sputum production, shortness of breath and stridor.    Cardiovascular: Negative for palpitations and orthopnea.   Gastrointestinal: Positive for melena. Negative for abdominal pain, diarrhea, nausea and vomiting.   Genitourinary: Negative for dysuria and urgency.   Musculoskeletal: Positive for joint pain. Negative for back pain and neck pain.   Skin: Negative for itching and rash.   Neurological: Negative for focal weakness, seizures and headaches.        Physical Exam  Temp:  [35.7 °C (96.3 °F)-37.2 °C (99 °F)] 36.6 °C (97.9 °F)  Pulse:  [70-93] 77  Resp:  [14-18] 16  BP: (103-143)/(31-71) 143/45  SpO2:  [95 %-100 %] 100 %    Physical Exam  Vitals signs and nursing note reviewed.   Constitutional:       Appearance: She is well-developed. She is not diaphoretic.   HENT:      Head: Normocephalic and atraumatic.      Nose: Nose normal.   Eyes:      Pupils: Pupils are equal, round, and reactive to light.   Neck:      Musculoskeletal: Normal range of motion and neck supple.      Thyroid: No thyromegaly.      Vascular: No JVD.   Cardiovascular:      Rate and Rhythm: Normal rate.      Heart sounds: Normal heart sounds. No friction rub.   Pulmonary:      Effort: Pulmonary effort is normal.      Breath sounds: Normal breath sounds. No wheezing.   Abdominal:      General: Bowel sounds are normal.   Musculoskeletal: Normal range of motion.         General: Tenderness present.   Lymphadenopathy:      Cervical: No cervical adenopathy.   Skin:     General: Skin is warm and dry.      Coloration: Skin is pale.   Neurological:      Mental Status: She is alert. She is disoriented.         Fluids    Intake/Output Summary (Last 24 hours) at 7/3/2020 0816  Last data filed at 7/3/2020 0510  Gross per 24 hour   Intake 1036 ml   Output --   Net 1036 ml       Laboratory  Recent Labs     07/02/20  0603 07/03/20  0001 07/03/20  0719   WBC 10.2 10.2 11.9*   RBC 2.32* 2.12* 2.45*   HEMOGLOBIN 6.6* 6.1* 7.1*   HEMATOCRIT 21.4*  19.5* 22.7*   MCV 92.2 92.0 92.7   MCH 28.4 28.8 29.0   MCHC 30.8* 31.3* 31.3*   RDW 49.6 49.8 49.6   PLATELETCT 263 246 268   MPV 9.4 9.2 9.4     Recent Labs     07/01/20  0653 07/02/20  0603 07/03/20  0719   SODIUM 139 136 138   POTASSIUM 4.7 4.5 3.6   CHLORIDE 103 98 100   CO2 22 21 28   GLUCOSE 86 81 121*   BUN 63* 26* 21   CREATININE 5.76* 3.49* 2.96*   CALCIUM 7.2* 7.6* 7.4*     Recent Labs     07/02/20  1158   INR 1.15*               Imaging  CT-HEAD W/O   Final Result      No significant interval change.      MR-BRAIN-W/O   Final Result      1.  Mild cerebral atrophy. Age-appropriate.   2.  Moderate-large lentiform extra-axial fluid collection over the right posterior frontal-parietal convexity consistent with a chronic or late subacute epidural hematoma or loculated subdural hematoma. Possible minimal component of more recent    hemorrhage where there is linear bandlike hyperdensity on CT.   3.  Patchy and punctate areas of bright diffusion signal in the underlying right parietal lobe which may represent acute or recent subacute arterial or venous infarction. No acute parenchymal hemorrhage.   4.  Minimal supratentorial white matter disease most consistent with microvascular ischemic change.   5.  Old lacunar infarct left lateral thalamus.      MR-MRA HEAD-W/O   Final Result      1.  Intracranial atherosclerotic cerebrovascular stenotic disease involving the posterior cerebral arteries and probably the middle cerebral arteries beyond the genu. No alexx occlusion.   2.  Normal variant dominant caliber right anterior cerebral artery A1 segment with a hypoplastic left A1 segment.   3.  No evidence of aneurysm about [place].      EC-ECHOCARDIOGRAM COMPLETE W/ CONT   Final Result      US-CAROTID DOPPLER BILAT   Final Result      DX-CHEST-PORTABLE (1 VIEW)   Final Result      No acute cardiopulmonary abnormality.      CT-HEAD W/O   Final Result      1. Small amount of acute hemorrhage in a chronic 2.1 cm  right epidural collection, along the right frontoparietal convexity. Associated 2 mm right to left midline shift.   2. Minimal subacute subdural hemorrhage anterior to the right epidural collection.   3. Hyperdensity in the temporal horn of the right lateral ventricle may represent small amount of intraventricular hemorrhage.   4. No CT evidence of acute infarct.           Assessment/Plan  * Acute GI bleeding  Assessment & Plan  With melena, EGD and colonoscopy did not see any active lesions, capsule endoscopy identified a possible source of 50 cm,   plan for push endoscopy when stable  Continuous cardiac monitoring  Continue PPI for now  Monitor H&H every 8 hours, transfuse for hemoglobin less than 7  Gastroenterology is following    Epidural hemorrhage (HCC)  Assessment & Plan  2.3 cm with 2 mm shift  Neurosurgery consulted and recommended no intervention  Neurology consulted, MRI and MRA resulted, possible recent small CVA      Leg wounds  Assessment & Plan  Wound culture grew pseudomonas aeruginosa  Was on oral ciprofloxacin and I will change it to IV Zosyn      Peripheral artery disease ([facility])  Assessment & Plan  Hold aspirin and Plavix    Type 2 diabetes mellitus with kidney complication, without long-term current use of insulin ([facility])  Assessment & Plan  Start on insulin sliding scale with serial Accu-Checks  Check hemoglobin A1c  Hypoglycemic protocol in place        Primary hypertension  Assessment & Plan  Restart medication    ESRD (end stage renal disease) ([facility])  Assessment & Plan  Dialysis Monday Wednesday Friday,   nephrology is consulted  Monitor BMP and assess response  Avoid IV contrast/nephrotoxins/NSAIDs  Dose adjust meds for decreased GFR        Acute blood loss anemia  Assessment & Plan  Given 3 units of RBCs, 1 unit of platelets  Hemoglobin 6.6 today.    To transfuse 1 unit of blood today  Transfuse as needed with target hemoglobin above 7.  GI is following  To have endoscopy  Follow CBC in the  morning    Bacteremia  Assessment & Plan  From hemodialysis catheter  Nephrology following  Hemodialysis catheter need to be removed    Encephalopathy acute  Assessment & Plan  Likely multifactorial  Treating infection with IV antibiotic  Improving and alert oriented x4  We will try to avoid benzos and narcotic as much as possible       VTE prophylaxis: SCD

## 2020-07-03 NOTE — THERAPY
"Physical Therapy   Daily Treatment     Patient Name: Corine Dela Cruz  Age:  87 y.o., Sex:  female  Medical Record #: [id number]  Today's Date: 7/3/2020     Precautions: Fall Risk, Swallow Precautions ( See Comments)    Assessment    Pt progressing well w/ therapy. Pt following more cues to assist w/ mobility. She still requires a lot of assist d/t weakness. Pt unable to use the L UE to assist w/ rolling or scooting d/t weakness and edematous. Pt was able to hold her balance EOB and perform some seated LE exercises. Pt was able to get to a partial stand w/ the platform step under her and B knee blocking. She was unable to activate her glutes to get to full stand but was able to maintain balance w/ Cara in the semi squat position. Pt unable to transfer to the chair as she was being taken to IR.    Plan    Continue current treatment plan.    Discharge recommendations:  Recommend post-acute placement for continued physical therapy services prior to discharge home.       Subjective    \"I can try.\"     Objective       07/03/20 1141   Precautions   Precautions Fall Risk;Swallow Precautions ( See Comments)   Comments L sided weakness   Gait Analysis   Gait Level Of Assist Unable to Participate   Bed Mobility    Supine to Sit Maximal Assist   Sit to Supine Maximal Assist   Scooting Maximal Assist   Rolling Moderate Assist to Rt.   Functional Mobility   Sit to Stand Maximal Assist   Bed, Chair, Wheelchair Transfer Unable to Participate   Mobility Pt unable to get to full height but was able to maintain balance in semi squat position w/ platform step.   Short Term Goals    Short Term Goal # 1 Pt will improve bed mob to perform supine<>sit with min A within 6 visits to increase OOB activity/tolerance.   Goal Outcome # 1 goal not met   Short Term Goal # 2 Pt will perform STS with min A within 6 visits to initiate gait.   Goal Outcome # 2 Goal not met         "

## 2020-07-03 NOTE — THERAPY
Occupational Therapy  Daily Treatment     Patient Name: Corine Dela Cruz  Age:  87 y.o., Sex:  female  Medical Record #: [id number]  Today's Date: 7/3/2020     Precautions  Precautions: Fall Risk, Swallow Precautions ( See Comments)  Comments: L sided weakness    Assessment    Pt seen for OT tx today, pt pleasant, agreeable and following 1-step commands during session. Pt continues to be limited by activity tolerance, decreased strength, coordination, BUE ROM( L>R) and balance deficits. Pt will continue to benefit from acute skilled OT services while in house.     Plan    Continue current treatment plan.    Discharge recommendations:  Recommend post-acute placement for additional occupational therapy services prior to discharge home.     Objective       07/03/20 1126   Cognition    Cognition / Consciousness X   Level of Consciousness Alert   Ability To Follow Commands 1 Step   Comments Pt following 1-step directions, very pleasant today and gave good effort during session   Active ROM Upper Body   Active ROM Upper Body  X   Comments BUE ROM decreased, L>R    Strength Upper Body   Upper Body Strength  X   Comments BUE weakness, L>R, attempting to use UE's appropriately   Balance   Sitting Balance (Static) Fair -   Sitting Balance (Dynamic) Poor +   Standing Balance (Static) Poor -   Weight Shift Sitting Poor   Weight Shift Standing Poor   Skilled Intervention Verbal Cuing;Tactile Cuing;Sequencing;Postural Facilitation;Facilitation   Comments w/HHA of 2, requires blocking of both feet and facilitation at pelvis to achieve extension   Bed Mobility    Supine to Sit Maximal Assist   Sit to Supine Maximal Assist   Scooting Maximal Assist   Rolling Moderate Assist to Lt.   Skilled Intervention Verbal Cuing;Tactile Cuing;Sequencing;Facilitation   Comments raised hob and use of bed rail   Activities of Daily Living   Eating Moderate Assist   Grooming Seated;Moderate Assist   Bathing Maximal Assist   Upper Body Dressing  Maximal Assist  (for hospital gown change)   Lower Body Dressing Total Assist   Toileting Total Assist  (incontinent for BM upon arrival)   Skilled Intervention Verbal Cuing;Tactile Cuing;Sequencing;Postural Facilitation;Facilitation   Comments tolerated ~2mins stand during pericare and linen change with one seated rest break   Functional Mobility   Toilet Transfers Unable to Participate   Mobility bed mobility, STS eob x 2 trials   Skilled Intervention Verbal Cuing;Tactile Cuing;Sequencing;Postural Facilitation;Facilitation   Short Term Goals   Short Term Goal # 1 supervised with UB dressing   Goal Outcome # 1 Progressing slower than expected   Short Term Goal # 2 min A with LB dressing   Goal Outcome # 2 Progressing slower than expected   Short Term Goal # 3 min A with ADL txfs   Goal Outcome # 3 Progressing slower than expected   Anticipated Discharge Equipment   DC Equipment Unable To Determine At This Time

## 2020-07-03 NOTE — PROGRESS NOTES
Infectious Disease Progress Note    Author: Arabella Lockhart M.D. Date & Time of service: 7/3/2020  2:19 PM    Chief Complaint:  Sepsis, strep    Interval History:  87-year-old female with ESRD, DM, PVD, CVA admitted 2020 for gastrointestinal bleed with associated weakness, dizziness and falls resulting in intracranial hemorrhage. Developed fever and leukocytosis during hospitalization and found to have above  7/2 AF (99.6) WBC 10.2 remains obtunded-pain better controlled. Seen by Palliative-now agreeable to GI procedure  /3 AF WBC 11.9  tolerated procedures well-bleeding AVMs seen with erosive duodenitis. Less pain today. Plan for removal HD cath noted    Labs Reviewed, Medications Reviewed, and Wound Reviewed.    Review of Systems:  Review of Systems   Constitutional: Negative for fever.   Musculoskeletal: Positive for myalgias.   All other systems reviewed and are negative.  Limited    Hemodynamics:  Temp (24hrs), Av.7 °C (98.1 °F), Min:35.7 °C (96.3 °F), Max:37.2 °C (99 °F)  Temperature: 37.2 °C (98.9 °F)  Pulse  Av.2  Min: 9  Max: 111   Blood Pressure : 104/55       Physical Exam:  Physical Exam  Vitals signs and nursing note reviewed.   Constitutional:       General: She is not in acute distress.     Appearance: She is ill-appearing. She is not toxic-appearing or diaphoretic.   HENT:      Nose: No rhinorrhea.      Mouth/Throat:      Pharynx: No posterior oropharyngeal erythema.   Eyes:      General:         Right eye: No discharge.         Left eye: No discharge.   Cardiovascular:      Rate and Rhythm: Normal rate.   Pulmonary:      Effort: Pulmonary effort is normal. No respiratory distress.      Breath sounds: No stridor. No wheezing.   Abdominal:      General: There is no distension.      Palpations: Abdomen is soft.      Tenderness: There is no abdominal tenderness. There is no guarding.   Skin:     Coloration: Skin is not jaundiced.      Findings: Bruising present.      Comments:  Extensive bruising LUE with hand swelling   Neurological:      General: No focal deficit present.      Comments: More alert         Meds:    Current Facility-Administered Medications:   •  [COMPLETED] piperacillin-tazobactam **AND** piperacillin-tazobactam  •  omeprazole  •  furosemide  •  haloperidol lactate  •  lidocaine  •  vitamin D (Ergocalciferol)  •  calcium acetate  •  atorvastatin  •  acetaminophen  •  ondansetron  •  ondansetron  •  LORazepam  •  heparin  •  hydrALAZINE    Labs:  Recent Labs     07/02/20  0603 07/03/20  0001 07/03/20  0719   WBC 10.2 10.2 11.9*   RBC 2.32* 2.12* 2.45*   HEMOGLOBIN 6.6* 6.1* 7.1*   HEMATOCRIT 21.4* 19.5* 22.7*   MCV 92.2 92.0 92.7   MCH 28.4 28.8 29.0   RDW 49.6 49.8 49.6   PLATELETCT 263 246 268   MPV 9.4 9.2 9.4   NEUTSPOLYS 81.80* 82.40* 84.20*   LYMPHOCYTES 9.30* 9.40* 8.10*   MONOCYTES 7.50 6.60 6.30   EOSINOPHILS 0.40 0.40 0.70   BASOPHILS 0.20 0.20 0.20     Recent Labs     07/01/20  0653 07/02/20  0603 07/03/20  0719   SODIUM 139 136 138   POTASSIUM 4.7 4.5 3.6   CHLORIDE 103 98 100   CO2 22 21 28   GLUCOSE 86 81 121*   BUN 63* 26* 21     Recent Labs     07/01/20  0653 07/02/20  0603 07/03/20  0719   ALBUMIN 2.5* 2.5* 2.6*   TBILIRUBIN 0.2 0.3 0.2   ALKPHOSPHAT 45 49 53   TOTPROTEIN 5.6* 5.8* 6.3   ALTSGPT 12 14 13   ASTSGOT 21 25 25   CREATININE 5.76* 3.49* 2.96*       Imaging:  Ct-head W/o    Result Date: 6/29/2020 6/29/2020 11:28 AM HISTORY/REASON FOR EXAM:  Stroke, follow up. Intracranial hemorrhage TECHNIQUE/EXAM DESCRIPTION AND NUMBER OF VIEWS: CT of the head without contrast. The study was performed on a helical multidetector CT scanner. Contiguous 2.5 mm axial sections were obtained from the skull base through the vertex. Up to date radiation dose reduction adjustments have been utilized to meet ALARA standards for radiation dose reduction. COMPARISON:  CT head 6/26/2020. Brain MRI 6/27/2020 FINDINGS: Redemonstrated is a predominantly low density extra axial  fluid collection along the right frontoparietal convexity, probably loculated subdural hematoma although a component of epidural hemorrhage is not excluded. There is a small amount of hyperdensity  along the inferior aspect of the collection which could represent small amount of acute hemorrhage. There is no significant change in appearance. The collection measures up to 22 mm in thickness. There is stable mass effect with sulcal effacement and slight right to left midline shift. No herniation. Generalized age-related atrophy. Chronic small left thalamic lacunar infarct. No acute osseous abnormality. Probable prior partial right mastoidectomy.     No significant interval change.    Ct-head W/o    Result Date: 6/26/2020   CT HEAD WITHOUT CONTRAST 6/26/2020 12:34 PM HISTORY/REASON FOR EXAM:  Left-sided facial droop TECHNIQUE/EXAM DESCRIPTION AND NUMBER OF VIEWS: CT of the head without contrast. The study was performed on a helical multidetector CT scanner. Contiguous 2.5 mm axial sections were obtained from the skull base through the vertex. Up to date radiation dose reduction adjustments have been utilized to meet ALARA standards for radiation dose reduction. COMPARISON:  None available FINDINGS: Lateral ventricles are normal in size and symmetric. Hyperdensity in the temporal horn of the right lateral ventricle. Mild global parenchymal atrophy. Chronic small vessel ischemic changes. Minimal 2 mm right to left midline shift Basal cisterns are patent. There is a chronic epidural collection along the right frontoparietal convexity measuring 2.1 cm in thickness. Small amount of acute blood products are seen in the inferior aspect of the collection. Minimal subacute subdural hemorrhage anterior to the right epidural collection, along the right frontal convexity. Calvaria are intact. Atherosclerosis. Visualized orbits are unremarkable. Visualized mastoid air cells are clear. No significant sinus disease in the visualized  paranasal sinuses.     1. Small amount of acute hemorrhage in a chronic 2.1 cm right epidural collection, along the right frontoparietal convexity. Associated 2 mm right to left midline shift. 2. Minimal subacute subdural hemorrhage anterior to the right epidural collection. 3. Hyperdensity in the temporal horn of the right lateral ventricle may represent small amount of intraventricular hemorrhage. 4. No CT evidence of acute infarct.    Dx-chest-portable (1 View)    Result Date: 6/26/2020 6/26/2020 1:03 PM HISTORY/REASON FOR EXAM:  Shortness of Breath. TECHNIQUE/EXAM DESCRIPTION AND NUMBER OF VIEWS: Single portable view of the chest. COMPARISON: None. FINDINGS: LUNGS: Clear. No effusions. PNEUMOTHORAX: None. LINES AND TUBES: Right IJ central catheter is in the distal SVC. MEDIASTINUM: No cardiomegaly. Atherosclerosis. MUSCULOSKELETAL STRUCTURES: No acute displaced fracture.     No acute cardiopulmonary abnormality.    Mr-brain-w/o    Result Date: 6/27/2020 6/27/2020 5:18 PM HISTORY/REASON FOR EXAM:  Syncope, recurrent. Dizziness, left arm numbness and slurred speech. Left-sided facial droop. History of prior stroke. Recurrent syncope. End-stage renal disease. Hypertension. TECHNIQUE/EXAM DESCRIPTION: MRI of the brain without contrast. T1 sagittal, T2 fast spin-echo axial, T1 coronal, FLAIR coronal, Diffusion weighted and Apparent Diffusion Coefficient (ADC map) axial images were obtained of the whole brain. Additional FLAIR axial images were obtained. The study was performed on a [employer] Signa 1.5 Starla MRI scanner. COMPARISON:  Head CT 6/26/2020 FINDINGS: The calvaria are unremarkable. There is a moderate-large T2 hyperintense extra-axial fluid collection over the right posterior frontal-parietal convexity. There is intermediate-slightly hyperintense FLAIR signal and T1 signal is slightly greater than CSF. The collection is somewhat lentiform. This could represent a late subacute or chronic epidural hematoma or  loculated subdural hematoma. There is gradient echo hypointensity hemosiderin staining along the deep aspect of the collection. Corresponding to the bandlike hyperdense linear focus toward the anterior aspect of the collection seen on CT, there is corresponding gradient echo hypointensity and this may be a more recent bandlike focus of hemorrhage. There is local mass effect with effacement of the right trigone and anterior displacement of the posterior body right lateral ventricle. There is minimal right to left shift of midline structures of about 1.6 mm. The underlying brain parenchyma in the right parietal lobe shows hazy and punctate areas of restricted diffusion which may represent associated arterial infarction or venous infarction due to compression of cortical veins. There is underlying mild cerebral atrophy with prominence of sulcal markings and mild ventriculomegaly in the left hemisphere. Age-appropriate. There is an old lacunar infarct in the thalamus Elsewhere in the cerebral hemispheres, there is a pattern of minimal supratentorial white matter disease with a few rare foci of T2 and FLAIR hyperintensity in the subcortical and/or deep white matter consistent with small vessel ischemic change versus demyelination or gliosis. The brainstem and cerebellum are unremarkable. The major vessels including the dural venous sinuses appear intact. Paranasal sinuses show mild mucosal thickening in the sphenoid sinus and minimal mucosal thickening scattered among the ethmoid air cells. The mastoids are clear. There has been cataract surgery. The gaze is divergent.     1.  Mild cerebral atrophy. Age-appropriate. 2.  Moderate-large lentiform extra-axial fluid collection over the right posterior frontal-parietal convexity consistent with a chronic or late subacute epidural hematoma or loculated subdural hematoma. Possible minimal component of more recent hemorrhage where there is linear bandlike hyperdensity on CT. 3.   Patchy and punctate areas of bright diffusion signal in the underlying right parietal lobe which may represent acute or recent subacute arterial or venous infarction. No acute parenchymal hemorrhage. 4.  Minimal supratentorial white matter disease most consistent with microvascular ischemic change. 5.  Old lacunar infarct left lateral thalamus.    Mr-mra Head-w/o    Result Date: 6/27/2020 6/27/2020 5:18 PM HISTORY/REASON FOR EXAM:  Dizziness, non-specific. Syncope, recurrent. Dizziness, left arm numbness and slurred speech. Left-sided facial droop. History of prior stroke. Recurrent syncope. End-stage renal disease. Hypertension. TECHNIQUE/EXAM DESCRIPTION: MRA of the Head ([place]) without contrast. The study was performed on a [employer] Signa 1.5 Starla MRI scanner. MRA of the [place] was performed with 3D time-of-flight technique with axial acquisition. Additional MRA of the internal carotid and vertebrobasilar arteries at the level of the skull base and craniocervical junction was also performed with 3D time-of-flight technique with axial acquisition. Images are reviewed at the PACS or [facility] workstations as axial source images as well as maximum intensity ray projection (MIP) multiplanar 3D reconstructions. COMPARISON:  MRI brain from today's date FINDINGS: The posterior circulation shows patent distal vertebral arteries. The left vertebral artery is minimally dominant. The vertebrobasilar confluence is intact. The basilar artery is widely patent with uniform caliber and robust flow signal. There is multifocal irregularity of the posterior cerebral arteries, right greater than left with a gap-like defect in the right P2 segment consistent with moderate to high-grade stenosis. Similar lesser findings of the left posterior cerebral artery. These findings are consistent with intracranial atherosclerotic cerebrovascular disease. No evidence of aneurysm in the posterior circulation. The anterior  circulation shows the carotid siphons to be patent. There is a normal variant dominant caliber right anterior cerebral artery A1 segment with a markedly hypoplastic left A1 segment. The proximal middle cerebral arteries are intact. The M2 and M3/sylvian branches beyond the genu show some attenuation of caliber and interruption of flow signal suggesting intracranial atherosclerotic stenotic disease. No evidence of aneurysm about the anterior circulation.     1.  Intracranial atherosclerotic cerebrovascular stenotic disease involving the posterior cerebral arteries and probably the middle cerebral arteries beyond the genu. No alexx occlusion. 2.  Normal variant dominant caliber right anterior cerebral artery A1 segment with a hypoplastic left A1 segment. 3.  No evidence of aneurysm about [place].    Us-carotid Doppler Bilat    Result Date: 2020   Carotid Duplex  Report  Vascular Laboratory  CONCLUSIONS  Moderate atherosclerosis. Velocities are consistent with 50-69% stenosis of  the internal carotid arteries bilaterally.  BECKIE BRITTON  Exam Date:     2020 09:01  Room #:     Inpatient  Priority:     Routine  Ht (in):             Wt (lb):  Ordering Physician:        LAWRENCE SHEFFIELD  Referring Physician:       [id number] Tineo  Sonographer:               MARGO Cason RDCS  Study Type:                Complete Bilateral  Technical Quality:         Adequate  Age:    87    Gender:     F  MRN:    [id number]  :    10/26/1932      BSA:  Indications:     Dizziness and giddiness  CPT Codes:       43602  ICD Codes:       R42  History:  Limitations:     Body habitus  Right Brachial BP:              /  Left Brachial BP:             /  RIGHT  Plaque          Plaque         Velocity (cm/s)  Characteristics Composition    Syst/Diast                                 128  / 32      Distal ICA                                 121  / 35      Mid ICA                                 78   /  28      Prox ICA                                 62   / 10      Distal CCA                                      /         Mid CCA                                 14   / 4       Prox CCA                                 381  /         ECA  Waveform:   Triphasic                         SCA  LEFT  Plaque         Plaque          Velocity (cm/s)  CharacteristicsComposition     Syst/Diast                                 155  / 30      Distal ICA                                 166  / 30      Mid ICA  Irregular      Heterogeneo     122  / 26      Prox ICA                 us  Irregular      Heterogeneo     71   / 16      Distal CCA                 us                                      /         Mid CCA                                 96   / 17      Prox CCA  Irregular      Heterogeneo     336  /         ECA                 us  Waveform:   Triphasic                         SCA          5.4          ICA/CCA       1.3        Antegrade      Vertebral   Antegrade         78   / 16     cm/s        63    / 12  FINDINGS  Right carotid.  Plaque of the bifurcation extending into the internal carotid. Velocities  are consistent with 50-69% stenosis of the internal carotid artery.    Left carotid.  Plaque of the bifurcation extending into the internal carotid. Velocities  are consistent with 50-69% stenosis of the internal carotid artery.  Plaque is irregular on the surface and heterogeneous with mixed acoustic  densities.  Bilateral subclavian and vertebral artery waveforms are antegrade and  waveforms are normal in character and velocity.  Liam Duong MD  (Electronically Signed)  Final Date:      27 June 2020                   10:28    Ec-echocardiogram Complete W/ Cont    Result Date: 6/27/2020  Transthoracic Echo Report Echocardiography Laboratory CONCLUSIONS No prior study is available for comparison. Normal left ventricular systolic function. Left ventricular ejection fraction is visually estimated to be 60%. The right  ventricle was normal in size and function. Moderately dilated left atrium. Mild aortic stenosis. Mild tricuspid regurgitation. REBA BECKIE Exam Date:         2020                    15:19 Exam Location:     Inpatient Priority:          Routine Ordering Physician:        LAURI ROWLEY Referring Physician: Sonographer:               FERNANDO Frazier Age:    87     Gender:    F MRN:    [id number] :    10/26/1932 BSA:    1.73   Ht (in):    59     Wt (lb):    171 Exam Type:     Complete, Contrast Indications:     Transient cerebral ischemic attack, unspecified ICD Codes:       G45.9 CPT Codes:       37539,  BP:   114    /   56     HR:   85 Technical Quality:       Fair MEASUREMENTS  (Male / Female) Normal Values 2D ECHO LV Diastolic Diameter PLAX        4.9 cm                4.2 - 5.9 / 3.9 - 5.3 cm LV Systolic Diameter PLAX         2.5 cm                2.1 - 4.0 cm IVS Diastolic Thickness           0.92 cm               LVPW Diastolic Thickness          0.87 cm               LVOT Diameter                     2.1 cm                LV Ejection Fraction MOD BP       62.4 %                >= 55  % LV Ejection Fraction MOD 4C       61.3 %                LV Ejection Fraction MOD 2C       64.4 %                IVC Diameter                      1.6 cm                DOPPLER AV Peak Velocity                  2.1 m/s               AV Peak Gradient                  17.8 mmHg             AV Mean Gradient                  11.1 mmHg             LVOT Peak Velocity                0.96 m/s              AV Area Cont Eq vti               1.7 cm2               Mitral E Point Velocity           1.3 m/s               Mitral E to A Ratio               0.93                  Mitral A Duration                 108 ms                MV Pressure Half Time             55.6 ms               MV Area PHT                       4 cm2                 MV Deceleration  Time              192 ms                TR Peak Velocity                  244 cm/s              PV Peak Velocity                  1 m/s                 PV Peak Gradient                  4 mmHg                RVOT Peak Velocity                0.75 m/s              * Indicates values subject to auto-interpretation LV EF:        % FINDINGS Left Ventricle Normal left ventricular chamber size. Normal left ventricular wall thickness. Normal left ventricular systolic function. Left ventricular ejection fraction is visually estimated to be 60%. Normal regional wall motion. Grade II diastolic dysfunction. Contrast was used to enhance visualization of the endocardial border. 1 mL of contrast was administered. Existing IV was used. Located at the left antecubital Right Ventricle The right ventricle was normal in size and function. Right Atrium The right atrium is normal in size.  Normal inferior vena cava size and inspiratory collapse. Left Atrium The left atrium is normal in size.  Moderately dilated left atrium. Left atrial volume index is 43 mL/sq m. Mitral Valve Structurally normal mitral valve without significant stenosis. Trace mitral regurgitation. Aortic Valve Tricuspid aortic valve. Calcified aortic valve leaflets. Mild aortic stenosis. Aortic valve area calculated from the continuity equation is 1.7 cm2. Vmax is 2.2 m/s. Transvalvular gradients are - Peak: 19 mmHg, Mean: 11 mmHg. Dimensionless index is 0.52. No aortic insufficiency. Tricuspid Valve Structurally normal tricuspid valve without significant stenosis. Mild tricuspid regurgitation. Estimated right ventricular systolic pressure  is 30 mmHg. Right atrial pressure is estimated to be 3 mmHg. Pulmonic Valve The pulmonic valve is not well visualized. No pulmonic stenosis. Trace pulmonic insufficiency. Pericardium Normal pericardium without effusion. Aorta The aortic root is normal.  Ascending aorta diameter is 3.1 cm. Ricco Portillo MD (Electronically Signed)  Final Date:     27 June 2020                 17:47      Micro:  Results     Procedure Component Value Units Date/Time    CATH TIP CULTURE [[id number]]     Order Status:  No result Specimen:  Cath Tip from Central Line     Blood Culture [[id number]]  (Abnormal) Collected:  06/30/20 1340    Order Status:  Completed Specimen:  Blood Updated:  07/03/20 1202     Significant Indicator POS     Source BLD     Site HD Cath     Culture Result Growth detected by Bactec instrument. 07/01/2020  05:28      Enterococcus faecalis  Combination therapy with ampicillin, penicillin, or  vancomycin (for susceptible strains) plus an aminoglycoside  is usually indicated for serious enterococcal infections,  such as endocarditis unless high-level resistance to both  gentamicin and streptomycin is documented; such combinations  are predicted to result in synergistic killing of the  Enterococcus.  See previous culture for sensitivity report.      Narrative:       CALL  [place]  131 tel. [phone],  CALLED  131 tel. [phone] 07/01/2020, 05:30, RB PERF. RESULTS CALLED TO: RN  [id number]  HD Cath    Blood Culture [[id number]]  (Abnormal)  (Susceptibility) Collected:  06/30/20 1340    Order Status:  Completed Specimen:  Blood Updated:  07/03/20 1202     Significant Indicator POS     Source BLD     Site HD Cath     Culture Result Growth detected by Bactec instrument. 07/01/2020  05:31      Enterococcus faecalis  Combination therapy with ampicillin, penicillin, or  vancomycin (for susceptible strains) plus an aminoglycoside  is usually indicated for serious enterococcal infections,  such as endocarditis unless high-level resistance to both  gentamicin and streptomycin is documented; such combinations  are predicted to result in synergistic killing of the  Enterococcus.      Narrative:       HD Cath    Susceptibility     Enterococcus faecalis (1)     Antibiotic Interpretation Microscan Method Status    Daptomycin Sensitive <=1 mcg/mL MELISSA Final     "Ampicillin Sensitive <=2 mcg/mL MELISSA Final    Gent Synergy Sensitive <=500 mcg/mL MELISSA Final    Vancomycin Sensitive 2 mcg/mL MELISSA Final    Penicillin Sensitive 2 mcg/mL MELISSA Final                   Blood Culture [[id number]] Collected:  07/02/20 1158    Order Status:  Completed Specimen:  Blood from Peripheral Updated:  07/03/20 0726     Significant Indicator NEG     Source BLD     Site PERIPHERAL     Culture Result No Growth  Note: Blood cultures are incubated for 5 days and  are monitored continuously.Positive blood cultures  are called to the RN and reported as soon as  they are identified.      Narrative:       Per Hospital Policy: Only change Specimen Src: to \"Line\" if  specified by physician order.  Right Wrist    Blood Culture [[id number]] Collected:  07/02/20 1158    Order Status:  Completed Specimen:  Blood from Peripheral Updated:  07/03/20 0726     Significant Indicator NEG     Source BLD     Site PERIPHERAL     Culture Result No Growth  Note: Blood cultures are incubated for 5 days and  are monitored continuously.Positive blood cultures  are called to the RN and reported as soon as  they are identified.      Narrative:       Per Hospital Policy: Only change Specimen Src: to \"Line\" if  specified by physician order.  Right Hand    Blood Culture [[id number]]     Order Status:  Canceled Specimen:  Blood from Peripheral     Blood Culture [[id number]]     Order Status:  Canceled Specimen:  Blood from Peripheral     Urinalysis [[id number]]     Order Status:  No result Specimen:  Urine     CULTURE WOUND W/ GRAM STAIN [[id number]]  (Abnormal)  (Susceptibility) Collected:  06/27/20 0385    Order Status:  Completed Specimen:  Wound from Exudate Updated:  06/30/20 1321     Significant Indicator POS     Source WND     Site LEFT LEG     Culture Result Rare growth mixed skin naina.     Gram Stain Result No organisms seen.     Culture Result Pseudomonas aeruginosa  Light growth  P.aeruginosa may develop resistance during " prolonged therapy  with all antibiotics. Isolates that are initially susceptible  may become resistant within three to four days after  initiation of therapy. Testing of repeat isolates may be  warranted.        Stenotrophomonas maltophilia  Rare growth      Narrative:       Left leg  Left leg    Susceptibility     Pseudomonas aeruginosa (1)     Antibiotic Interpretation Microscan Method Status    Ceftazidime Sensitive 4 mcg/mL MELISSA Final    Ciprofloxacin Sensitive <=1 mcg/mL MELISSA Final    Cefepime Sensitive <=2 mcg/mL MELISSA Final    Gentamicin Sensitive <=4 mcg/mL MELISSA Final    Amikacin Sensitive <=16 mcg/mL MELISSA Final    Meropenem Sensitive <=1 mcg/mL MELISSA Final    Pip/Tazobactam Sensitive <=16 mcg/mL MELISSA Final    Tobramycin Sensitive <=4 mcg/mL MELISSA Final          Stenotrophomonas maltophilia (2)     Antibiotic Interpretation Microscan Method Status    Ceftazidime Resistant >16 mcg/mL MELISSA Final    Trimeth/Sulfa Sensitive <=2/38 mcg/mL MELISSA Final    Levofloxacin Sensitive <=2 mcg/mL MELISSA Final                   CULTURE WOUND W/ GRAM STAIN [[id number]]  (Abnormal) Collected:  06/27/20 2335    Order Status:  Completed Specimen:  Wound from Exudate Updated:  06/30/20 0912     Significant Indicator POS     Source WND     Site RIGHT LEG     Culture Result -     Gram Stain Result Moderate WBCs.  No organisms seen.       Culture Result Pseudomonas aeruginosa  Light growth  See previous culture for sensitivity report.      Narrative:       Right leg  Right leg    Urinalysis [[id number]]  (Abnormal) Collected:  06/30/20 0824    Order Status:  Completed Specimen:  Urine, Cath Updated:  06/30/20 0856     Color Yellow     Character Clear     Specific Gravity 1.014     Ph >=9.0     Glucose 100 mg/dL      Ketones Negative mg/dL      Bilirubin Negative     Urobilinogen, Urine 0.2     Nitrite Negative     Leukocyte Esterase Negative     Occult Blood Trace     Micro Urine Req Microscopic    Narrative:       Collected By:[id number] NATIVIDAD  CHEVY SUNSHINE  If not done within the last 24 hours    Blood Culture [[id number]]     Order Status:  No result Specimen:  Blood from Peripheral     Blood Culture [[id number]]     Order Status:  No result Specimen:  Blood from Peripheral     Blood Culture [[id number]]     Order Status:  Canceled Specimen:  Blood from Peripheral     Blood Culture [[id number]]     Order Status:  Canceled Specimen:  Blood from Peripheral     GRAM STAIN [[id number]] Collected:  06/27/20 2355    Order Status:  Completed Specimen:  Wound Updated:  06/28/20 0927     Significant Indicator .     Source WND     Site LEFT LEG     Gram Stain Result No organisms seen.    Narrative:       Left leg  Left leg    GRAM STAIN [[id number]] Collected:  06/27/20 2335    Order Status:  Completed Specimen:  Wound Updated:  06/28/20 0926     Significant Indicator .     Source WND     Site RIGHT LEG     Gram Stain Result Moderate WBCs.  No organisms seen.      Narrative:       Right leg  Right leg    SARS-CoV-2, PCR (In-House) [[id number]] Collected:  06/27/20 0805    Order Status:  Completed Updated:  06/27/20 0915     SARS-CoV-2 Source NP Swab     SARS-CoV-2 by PCR NotDetected     Comment: [facility] providers: PLEASE REFER TO DE-ESCALATION AND RETESTING PROTOCOL  [email or web address]  **The [employer] GeneXpert Xpress SARS-CoV-2 Test has been made available for  use under the Emergency Use Authorization (EUA) only.         Narrative:       Collected By:MARAH ZENDEJAS  Procedure this morning  Expected Turn around time?->Rush (Cepheid 2-4 hours)    COVID/SARS CoV-2 PCR [[id number]] Collected:  06/27/20 0805    Order Status:  Completed Specimen:  Respirate from Nasopharyngeal Updated:  06/27/20 0813     COVID Order Status Received    Narrative:       Collected By:MARAH ZENDEJAS  Procedure this morning  Expected Turn around time?->Rush (Cepheid 2-4 hours)          Assessment:  Active Hospital Problems    Diagnosis   • *Acute GI bleeding [K92.2]   • Epidural hemorrhage  (Formerly [facility]) [S06.4X9A]   • Acute blood loss anemia [D62]   • ESRD (end stage renal disease) (Formerly [facility]) [N18.6]   • Type 2 diabetes mellitus with kidney complication, without long-term current use of insulin (Formerly [facility]) [E11.29]   • Peripheral artery disease (Formerly [facility]) [I73.9]   • Leg wounds [S81.801A]   • Encephalopathy acute [G93.40]       Plan:  Enterococcal sepsis  GI source most likely-HD cath infection also possible  Fever resolved  Leukocytosis increased  AMS improved  BCxs + Efaecalis amp-S 6/30  Repeat Bcxs every 48 hours until neg-blood cxs 7/2 neg so far  Replace HD cath once blood cxs are neg-plan for removal 7/3  TTE with calcified valves;  ANABELL if persistently +blood cxs if feasible  Continue Zosyn    GI bleed  Continued blood loss .    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  Management is conservatively.    No new CVA seen by MRI     ESRD  Dose adjust abx    Chronic stasis ulceration  No evidence of active infection, so do not add additional antimicrobial therapy for this wound.    Wound swab showed colonization with pseudomonas and stenotrophomonas  Continue with wound care   Recommend vascular studies     Diabetes  Keep BS under 150 to help control current infection

## 2020-07-04 LAB
ALBUMIN SERPL BCP-MCNC: 2.4 G/DL (ref 3.2–4.9)
ALBUMIN/GLOB SERPL: 0.7 G/DL
ALP SERPL-CCNC: 46 U/L (ref 30–99)
ALT SERPL-CCNC: 11 U/L (ref 2–50)
ANION GAP SERPL CALC-SCNC: 9 MMOL/L (ref 7–16)
ANISOCYTOSIS BLD QL SMEAR: ABNORMAL
AST SERPL-CCNC: 18 U/L (ref 12–45)
BASOPHILS # BLD AUTO: 0.2 % (ref 0–1.8)
BASOPHILS # BLD AUTO: 0.2 % (ref 0–1.8)
BASOPHILS # BLD: 0.02 K/UL (ref 0–0.12)
BASOPHILS # BLD: 0.02 K/UL (ref 0–0.12)
BILIRUB SERPL-MCNC: 0.3 MG/DL (ref 0.1–1.5)
BUN SERPL-MCNC: 22 MG/DL (ref 8–22)
BURR CELLS BLD QL SMEAR: NORMAL
CALCIUM SERPL-MCNC: 7.5 MG/DL (ref 8.5–10.5)
CHLORIDE SERPL-SCNC: 99 MMOL/L (ref 96–112)
CO2 SERPL-SCNC: 28 MMOL/L (ref 20–33)
COMMENT 1642: NORMAL
CREAT SERPL-MCNC: 3.35 MG/DL (ref 0.5–1.4)
EOSINOPHIL # BLD AUTO: 0.14 K/UL (ref 0–0.51)
EOSINOPHIL # BLD AUTO: 0.15 K/UL (ref 0–0.51)
EOSINOPHIL NFR BLD: 1.2 % (ref 0–6.9)
EOSINOPHIL NFR BLD: 1.3 % (ref 0–6.9)
ERYTHROCYTE [DISTWIDTH] IN BLOOD BY AUTOMATED COUNT: 50.1 FL (ref 35.9–50)
ERYTHROCYTE [DISTWIDTH] IN BLOOD BY AUTOMATED COUNT: 51.7 FL (ref 35.9–50)
GLOBULIN SER CALC-MCNC: 3.4 G/DL (ref 1.9–3.5)
GLUCOSE SERPL-MCNC: 105 MG/DL (ref 65–99)
HCT VFR BLD AUTO: 23.8 % (ref 37–47)
HCT VFR BLD AUTO: 24.8 % (ref 37–47)
HGB BLD-MCNC: 6.8 G/DL (ref 12–16)
HGB BLD-MCNC: 7.3 G/DL (ref 12–16)
HGB BLD-MCNC: 7.8 G/DL (ref 12–16)
HYPOCHROMIA BLD QL SMEAR: ABNORMAL
IMM GRANULOCYTES # BLD AUTO: 0.1 K/UL (ref 0–0.11)
IMM GRANULOCYTES # BLD AUTO: 0.11 K/UL (ref 0–0.11)
IMM GRANULOCYTES NFR BLD AUTO: 0.8 % (ref 0–0.9)
IMM GRANULOCYTES NFR BLD AUTO: 1 % (ref 0–0.9)
LG PLATELETS BLD QL SMEAR: NORMAL
LYMPHOCYTES # BLD AUTO: 1.31 K/UL (ref 1–4.8)
LYMPHOCYTES # BLD AUTO: 1.54 K/UL (ref 1–4.8)
LYMPHOCYTES NFR BLD: 10.9 % (ref 22–41)
LYMPHOCYTES NFR BLD: 13.4 % (ref 22–41)
MAGNESIUM SERPL-MCNC: 1.9 MG/DL (ref 1.5–2.5)
MCH RBC QN AUTO: 28.5 PG (ref 27–33)
MCH RBC QN AUTO: 29.1 PG (ref 27–33)
MCHC RBC AUTO-ENTMCNC: 29.4 G/DL (ref 33.6–35)
MCHC RBC AUTO-ENTMCNC: 30.7 G/DL (ref 33.6–35)
MCV RBC AUTO: 94.8 FL (ref 81.4–97.8)
MCV RBC AUTO: 96.9 FL (ref 81.4–97.8)
MONOCYTES # BLD AUTO: 0.83 K/UL (ref 0–0.85)
MONOCYTES # BLD AUTO: 0.84 K/UL (ref 0–0.85)
MONOCYTES NFR BLD AUTO: 7 % (ref 0–13.4)
MONOCYTES NFR BLD AUTO: 7.2 % (ref 0–13.4)
MORPHOLOGY BLD-IMP: NORMAL
MORPHOLOGY BLD-IMP: NORMAL
NEUTROPHILS # BLD AUTO: 8.82 K/UL (ref 2–7.15)
NEUTROPHILS # BLD AUTO: 9.57 K/UL (ref 2–7.15)
NEUTROPHILS NFR BLD: 76.9 % (ref 44–72)
NEUTROPHILS NFR BLD: 79.9 % (ref 44–72)
NRBC # BLD AUTO: 0 K/UL
NRBC # BLD AUTO: 0 K/UL
NRBC BLD-RTO: 0 /100 WBC
NRBC BLD-RTO: 0 /100 WBC
OVALOCYTES BLD QL SMEAR: NORMAL
PLATELET # BLD AUTO: 281 K/UL (ref 164–446)
PLATELET # BLD AUTO: 295 K/UL (ref 164–446)
PLATELET BLD QL SMEAR: NORMAL
PMV BLD AUTO: 9.6 FL (ref 9–12.9)
PMV BLD AUTO: 9.7 FL (ref 9–12.9)
POLYCHROMASIA BLD QL SMEAR: NORMAL
POTASSIUM SERPL-SCNC: 3.6 MMOL/L (ref 3.6–5.5)
PROT SERPL-MCNC: 5.8 G/DL (ref 6–8.2)
RBC # BLD AUTO: 2.51 M/UL (ref 4.2–5.4)
RBC # BLD AUTO: 2.56 M/UL (ref 4.2–5.4)
RBC BLD AUTO: PRESENT
SODIUM SERPL-SCNC: 136 MMOL/L (ref 135–145)
WBC # BLD AUTO: 11.5 K/UL (ref 4.8–10.8)
WBC # BLD AUTO: 12 K/UL (ref 4.8–10.8)

## 2020-07-04 PROCEDURE — A9270 NON-COVERED ITEM OR SERVICE: HCPCS | Performed by: INTERNAL MEDICINE

## 2020-07-04 PROCEDURE — 770001 HCHG ROOM/CARE - MED/SURG/GYN PRIV*

## 2020-07-04 PROCEDURE — 700102 HCHG RX REV CODE 250 W/ 637 OVERRIDE(OP): Performed by: INTERNAL MEDICINE

## 2020-07-04 PROCEDURE — 700111 HCHG RX REV CODE 636 W/ 250 OVERRIDE (IP): Performed by: INTERNAL MEDICINE

## 2020-07-04 PROCEDURE — 83735 ASSAY OF MAGNESIUM: CPT

## 2020-07-04 PROCEDURE — 36415 COLL VENOUS BLD VENIPUNCTURE: CPT

## 2020-07-04 PROCEDURE — 80053 COMPREHEN METABOLIC PANEL: CPT

## 2020-07-04 PROCEDURE — 85018 HEMOGLOBIN: CPT | Mod: 91

## 2020-07-04 PROCEDURE — 99233 SBSQ HOSP IP/OBS HIGH 50: CPT | Performed by: INTERNAL MEDICINE

## 2020-07-04 PROCEDURE — 700105 HCHG RX REV CODE 258: Performed by: INTERNAL MEDICINE

## 2020-07-04 PROCEDURE — 85025 COMPLETE CBC W/AUTO DIFF WBC: CPT | Mod: 91

## 2020-07-04 RX ADMIN — AMPICILLIN SODIUM 2000 MG: 2 INJECTION, POWDER, FOR SOLUTION INTRAMUSCULAR; INTRAVENOUS at 17:07

## 2020-07-04 RX ADMIN — ACETAMINOPHEN 650 MG: 325 TABLET, FILM COATED ORAL at 17:07

## 2020-07-04 RX ADMIN — ATORVASTATIN CALCIUM 40 MG: 40 TABLET, FILM COATED ORAL at 21:01

## 2020-07-04 RX ADMIN — ACETAMINOPHEN 650 MG: 325 TABLET, FILM COATED ORAL at 13:04

## 2020-07-04 RX ADMIN — FUROSEMIDE 80 MG: 10 INJECTION, SOLUTION INTRAMUSCULAR; INTRAVENOUS at 17:07

## 2020-07-04 RX ADMIN — OMEPRAZOLE 20 MG: 20 CAPSULE, DELAYED RELEASE ORAL at 05:05

## 2020-07-04 RX ADMIN — Medication 667 MG: at 05:32

## 2020-07-04 RX ADMIN — ACETAMINOPHEN 650 MG: 325 TABLET, FILM COATED ORAL at 23:51

## 2020-07-04 RX ADMIN — LORAZEPAM 0.5 MG: 2 INJECTION INTRAMUSCULAR; INTRAVENOUS at 23:51

## 2020-07-04 RX ADMIN — FUROSEMIDE 80 MG: 10 INJECTION, SOLUTION INTRAMUSCULAR; INTRAVENOUS at 05:10

## 2020-07-04 RX ADMIN — Medication 667 MG: at 17:07

## 2020-07-04 RX ADMIN — Medication 667 MG: at 13:04

## 2020-07-04 RX ADMIN — ACETAMINOPHEN 650 MG: 325 TABLET, FILM COATED ORAL at 05:05

## 2020-07-04 RX ADMIN — PIPERACILLIN AND TAZOBACTAM 3.38 G: 3; .375 INJECTION, POWDER, LYOPHILIZED, FOR SOLUTION INTRAVENOUS; PARENTERAL at 01:28

## 2020-07-04 RX ADMIN — PIPERACILLIN AND TAZOBACTAM 3.38 G: 3; .375 INJECTION, POWDER, LYOPHILIZED, FOR SOLUTION INTRAVENOUS; PARENTERAL at 13:04

## 2020-07-04 RX ADMIN — LORAZEPAM 0.5 MG: 2 INJECTION INTRAMUSCULAR; INTRAVENOUS at 01:37

## 2020-07-04 ASSESSMENT — ENCOUNTER SYMPTOMS
SHORTNESS OF BREATH: 0
DIZZINESS: 0
NECK PAIN: 0
ABDOMINAL PAIN: 0
MEMORY LOSS: 0
ORTHOPNEA: 0
DIARRHEA: 0
NAUSEA: 0
WEAKNESS: 1
MYALGIAS: 1
COUGH: 0
EYE PAIN: 0
SEIZURES: 0
FEVER: 0
FOCAL WEAKNESS: 0
PALPITATIONS: 0
EYE DISCHARGE: 0
CHILLS: 0
SPUTUM PRODUCTION: 0
VOMITING: 0
INSOMNIA: 0
BACK PAIN: 0

## 2020-07-04 NOTE — PROGRESS NOTES
Infectious Disease Progress Note    Author: Arabella Lockhart M.D. Date & Time of service: 2020  4:09 PM    Chief Complaint:  Sepsis, strep    Interval History:  87-year-old female with ESRD, DM, PVD, CVA admitted 2020 for gastrointestinal bleed with associated weakness, dizziness and falls resulting in intracranial hemorrhage. Developed fever and leukocytosis during hospitalization and found to have above  7/ AF (99.6) WBC 10.2 remains obtunded-pain better controlled. Seen by Palliative-now agreeable to GI procedure  7/3 AF WBC 11.9  tolerated procedures well-bleeding AVMs seen with erosive duodenitis. Less pain today. Plan for removal HD cath noted   AF WBC 12 up to chair-no new complaints  Labs Reviewed, Medications Reviewed, and Wound Reviewed.    Review of Systems:  Review of Systems   Constitutional: Negative for fever.   Respiratory: Negative for cough and shortness of breath.    Cardiovascular: Negative for chest pain.   Musculoskeletal: Positive for myalgias.   All other systems reviewed and are negative.  Limited    Hemodynamics:  Temp (24hrs), Av.2 °C (97.2 °F), Min:36 °C (96.8 °F), Max:36.6 °C (97.8 °F)  Temperature: 36 °C (96.8 °F)  Pulse  Av.7  Min: 9  Max: 111   Blood Pressure : 123/68       Physical Exam:  Physical Exam  Vitals signs and nursing note reviewed.   Constitutional:       General: She is not in acute distress.     Appearance: She is ill-appearing. She is not toxic-appearing or diaphoretic.   HENT:      Nose: No rhinorrhea.      Mouth/Throat:      Pharynx: No posterior oropharyngeal erythema.   Eyes:      Extraocular Movements: Extraocular movements intact.      Pupils: Pupils are equal, round, and reactive to light.   Cardiovascular:      Rate and Rhythm: Normal rate.   Pulmonary:      Effort: Pulmonary effort is normal. No respiratory distress.      Breath sounds: No stridor. No wheezing.   Abdominal:      General: There is no distension.      Palpations: Abdomen  is soft.      Tenderness: There is no abdominal tenderness. There is no guarding.   Skin:     Coloration: Skin is not jaundiced.      Findings: Bruising present.      Comments: Extensive bruising LUE with hand swelling   Neurological:      General: No focal deficit present.      Comments: More alert         Meds:    Current Facility-Administered Medications:   •  [COMPLETED] piperacillin-tazobactam **AND** piperacillin-tazobactam  •  omeprazole  •  furosemide  •  haloperidol lactate  •  lidocaine  •  vitamin D (Ergocalciferol)  •  calcium acetate  •  atorvastatin  •  acetaminophen  •  ondansetron  •  ondansetron  •  LORazepam  •  heparin  •  hydrALAZINE    Labs:  Recent Labs     07/03/20 0719 07/03/20 2223 07/04/20 0519 07/04/20  1032   WBC 11.9*  --  11.5* 12.0*   RBC 2.45*  --  2.51* 2.56*   HEMOGLOBIN 7.1* 7.1* 7.3*  7.3* 7.3*   HEMATOCRIT 22.7* 22.8* 23.8* 24.8*   MCV 92.7  --  94.8 96.9   MCH 29.0  --  29.1 28.5   RDW 49.6  --  50.1* 51.7*   PLATELETCT 268  --  295 281   MPV 9.4  --  9.7 9.6   NEUTSPOLYS 84.20*  --  76.90* 79.90*   LYMPHOCYTES 8.10*  --  13.40* 10.90*   MONOCYTES 6.30  --  7.20 7.00   EOSINOPHILS 0.70  --  1.30 1.20   BASOPHILS 0.20  --  0.20 0.20   RBCMORPHOLO  --   --   --  Present     Recent Labs     07/02/20  0603 07/03/20  0719 07/04/20  0615   SODIUM 136 138 136   POTASSIUM 4.5 3.6 3.6   CHLORIDE 98 100 99   CO2 21 28 28   GLUCOSE 81 121* 105*   BUN 26* 21 22     Recent Labs     07/02/20  0603 07/03/20  0719 07/04/20  0615   ALBUMIN 2.5* 2.6* 2.4*   TBILIRUBIN 0.3 0.2 0.3   ALKPHOSPHAT 49 53 46   TOTPROTEIN 5.8* 6.3 5.8*   ALTSGPT 14 13 11   ASTSGOT 25 25 18   CREATININE 3.49* 2.96* 3.35*       Imaging:  Ct-head W/o    Result Date: 6/29/2020 6/29/2020 11:28 AM HISTORY/REASON FOR EXAM:  Stroke, follow up. Intracranial hemorrhage TECHNIQUE/EXAM DESCRIPTION AND NUMBER OF VIEWS: CT of the head without contrast. The study was performed on a helical multidetector CT scanner. Contiguous 2.5  mm axial sections were obtained from the skull base through the vertex. Up to date radiation dose reduction adjustments have been utilized to meet ALARA standards for radiation dose reduction. COMPARISON:  CT head 6/26/2020. Brain MRI 6/27/2020 FINDINGS: Redemonstrated is a predominantly low density extra axial fluid collection along the right frontoparietal convexity, probably loculated subdural hematoma although a component of epidural hemorrhage is not excluded. There is a small amount of hyperdensity  along the inferior aspect of the collection which could represent small amount of acute hemorrhage. There is no significant change in appearance. The collection measures up to 22 mm in thickness. There is stable mass effect with sulcal effacement and slight right to left midline shift. No herniation. Generalized age-related atrophy. Chronic small left thalamic lacunar infarct. No acute osseous abnormality. Probable prior partial right mastoidectomy.     No significant interval change.    Ct-head W/o    Result Date: 6/26/2020   CT HEAD WITHOUT CONTRAST 6/26/2020 12:34 PM HISTORY/REASON FOR EXAM:  Left-sided facial droop TECHNIQUE/EXAM DESCRIPTION AND NUMBER OF VIEWS: CT of the head without contrast. The study was performed on a helical multidetector CT scanner. Contiguous 2.5 mm axial sections were obtained from the skull base through the vertex. Up to date radiation dose reduction adjustments have been utilized to meet ALARA standards for radiation dose reduction. COMPARISON:  None available FINDINGS: Lateral ventricles are normal in size and symmetric. Hyperdensity in the temporal horn of the right lateral ventricle. Mild global parenchymal atrophy. Chronic small vessel ischemic changes. Minimal 2 mm right to left midline shift Basal cisterns are patent. There is a chronic epidural collection along the right frontoparietal convexity measuring 2.1 cm in thickness. Small amount of acute blood products are seen in  the inferior aspect of the collection. Minimal subacute subdural hemorrhage anterior to the right epidural collection, along the right frontal convexity. Calvaria are intact. Atherosclerosis. Visualized orbits are unremarkable. Visualized mastoid air cells are clear. No significant sinus disease in the visualized paranasal sinuses.     1. Small amount of acute hemorrhage in a chronic 2.1 cm right epidural collection, along the right frontoparietal convexity. Associated 2 mm right to left midline shift. 2. Minimal subacute subdural hemorrhage anterior to the right epidural collection. 3. Hyperdensity in the temporal horn of the right lateral ventricle may represent small amount of intraventricular hemorrhage. 4. No CT evidence of acute infarct.    Dx-chest-portable (1 View)    Result Date: 6/26/2020 6/26/2020 1:03 PM HISTORY/REASON FOR EXAM:  Shortness of Breath. TECHNIQUE/EXAM DESCRIPTION AND NUMBER OF VIEWS: Single portable view of the chest. COMPARISON: None. FINDINGS: LUNGS: Clear. No effusions. PNEUMOTHORAX: None. LINES AND TUBES: Right IJ central catheter is in the distal SVC. MEDIASTINUM: No cardiomegaly. Atherosclerosis. MUSCULOSKELETAL STRUCTURES: No acute displaced fracture.     No acute cardiopulmonary abnormality.    Mr-brain-w/o    Result Date: 6/27/2020 6/27/2020 5:18 PM HISTORY/REASON FOR EXAM:  Syncope, recurrent. Dizziness, left arm numbness and slurred speech. Left-sided facial droop. History of prior stroke. Recurrent syncope. End-stage renal disease. Hypertension. TECHNIQUE/EXAM DESCRIPTION: MRI of the brain without contrast. T1 sagittal, T2 fast spin-echo axial, T1 coronal, FLAIR coronal, Diffusion weighted and Apparent Diffusion Coefficient (ADC map) axial images were obtained of the whole brain. Additional FLAIR axial images were obtained. The study was performed on a [employer] 1.5 Starla MRI scanner. COMPARISON:  Head CT 6/26/2020 FINDINGS: The calvaria are unremarkable. There is a  moderate-large T2 hyperintense extra-axial fluid collection over the right posterior frontal-parietal convexity. There is intermediate-slightly hyperintense FLAIR signal and T1 signal is slightly greater than CSF. The collection is somewhat lentiform. This could represent a late subacute or chronic epidural hematoma or loculated subdural hematoma. There is gradient echo hypointensity hemosiderin staining along the deep aspect of the collection. Corresponding to the bandlike hyperdense linear focus toward the anterior aspect of the collection seen on CT, there is corresponding gradient echo hypointensity and this may be a more recent bandlike focus of hemorrhage. There is local mass effect with effacement of the right trigone and anterior displacement of the posterior body right lateral ventricle. There is minimal right to left shift of midline structures of about 1.6 mm. The underlying brain parenchyma in the right parietal lobe shows hazy and punctate areas of restricted diffusion which may represent associated arterial infarction or venous infarction due to compression of cortical veins. There is underlying mild cerebral atrophy with prominence of sulcal markings and mild ventriculomegaly in the left hemisphere. Age-appropriate. There is an old lacunar infarct in the thalamus Elsewhere in the cerebral hemispheres, there is a pattern of minimal supratentorial white matter disease with a few rare foci of T2 and FLAIR hyperintensity in the subcortical and/or deep white matter consistent with small vessel ischemic change versus demyelination or gliosis. The brainstem and cerebellum are unremarkable. The major vessels including the dural venous sinuses appear intact. Paranasal sinuses show mild mucosal thickening in the sphenoid sinus and minimal mucosal thickening scattered among the ethmoid air cells. The mastoids are clear. There has been cataract surgery. The gaze is divergent.     1.  Mild cerebral atrophy.  Age-appropriate. 2.  Moderate-large lentiform extra-axial fluid collection over the right posterior frontal-parietal convexity consistent with a chronic or late subacute epidural hematoma or loculated subdural hematoma. Possible minimal component of more recent hemorrhage where there is linear bandlike hyperdensity on CT. 3.  Patchy and punctate areas of bright diffusion signal in the underlying right parietal lobe which may represent acute or recent subacute arterial or venous infarction. No acute parenchymal hemorrhage. 4.  Minimal supratentorial white matter disease most consistent with microvascular ischemic change. 5.  Old lacunar infarct left lateral thalamus.    Mr-mra Head-w/o    Result Date: 6/27/2020 6/27/2020 5:18 PM HISTORY/REASON FOR EXAM:  Dizziness, non-specific. Syncope, recurrent. Dizziness, left arm numbness and slurred speech. Left-sided facial droop. History of prior stroke. Recurrent syncope. End-stage renal disease. Hypertension. TECHNIQUE/EXAM DESCRIPTION: MRA of the Head ([place]) without contrast. The study was performed on a [employer] 1.5 Starla MRI scanner. MRA of the [place] was performed with 3D time-of-flight technique with axial acquisition. Additional MRA of the internal carotid and vertebrobasilar arteries at the level of the skull base and craniocervical junction was also performed with 3D time-of-flight technique with axial acquisition. Images are reviewed at the PACS or [employer] workstations as axial source images as well as maximum intensity ray projection (MIP) multiplanar 3D reconstructions. COMPARISON:  MRI brain from today's date FINDINGS: The posterior circulation shows patent distal vertebral arteries. The left vertebral artery is minimally dominant. The vertebrobasilar confluence is intact. The basilar artery is widely patent with uniform caliber and robust flow signal. There is multifocal irregularity of the posterior cerebral arteries, right greater  than left with a gap-like defect in the right P2 segment consistent with moderate to high-grade stenosis. Similar lesser findings of the left posterior cerebral artery. These findings are consistent with intracranial atherosclerotic cerebrovascular disease. No evidence of aneurysm in the posterior circulation. The anterior circulation shows the carotid siphons to be patent. There is a normal variant dominant caliber right anterior cerebral artery A1 segment with a markedly hypoplastic left A1 segment. The proximal middle cerebral arteries are intact. The M2 and M3/sylvian branches beyond the genu show some attenuation of caliber and interruption of flow signal suggesting intracranial atherosclerotic stenotic disease. No evidence of aneurysm about the anterior circulation.     1.  Intracranial atherosclerotic cerebrovascular stenotic disease involving the posterior cerebral arteries and probably the middle cerebral arteries beyond the genu. No alexx occlusion. 2.  Normal variant dominant caliber right anterior cerebral artery A1 segment with a hypoplastic left A1 segment. 3.  No evidence of aneurysm about [place].    Us-carotid Doppler Bilat    Result Date: 2020   Carotid Duplex  Report  Vascular Laboratory  CONCLUSIONS  Moderate atherosclerosis. Velocities are consistent with 50-69% stenosis of  the internal carotid arteries bilaterally.  BECKIE BRITTON  Exam Date:     2020 09:01  Room #:     Inpatient  Priority:     Routine  Ht (in):             Wt (lb):  Ordering Physician:        LAWRENCE SHEFFIELD  Referring Physician:       [id number] Tineo  Sonographer:               MARGO Cason RDCS  Study Type:                Complete Bilateral  Technical Quality:         Adequate  Age:    87    Gender:     F  MRN:    [id number]  :    10/26/1932      BSA:  Indications:     Dizziness and giddiness  CPT Codes:       81319  ICD Codes:       R42  History:  Limitations:     Body  habitus  Right Brachial BP:              /  Left Brachial BP:             /  RIGHT  Plaque          Plaque         Velocity (cm/s)  Characteristics Composition    Syst/Diast                                 128  / 32      Distal ICA                                 121  / 35      Mid ICA                                 78   / 28      Prox ICA                                 62   / 10      Distal CCA                                      /         Mid CCA                                 14   / 4       Prox CCA                                 381  /         ECA  Waveform:   Triphasic                         SCA  LEFT  Plaque         Plaque          Velocity (cm/s)  CharacteristicsComposition     Syst/Diast                                 155  / 30      Distal ICA                                 166  / 30      Mid ICA  Irregular      Heterogeneo     122  / 26      Prox ICA                 us  Irregular      Heterogeneo     71   / 16      Distal CCA                 us                                      /         Mid CCA                                 96   / 17      Prox CCA  Irregular      Heterogeneo     336  /         ECA                 us  Waveform:   Triphasic                         SCA          5.4          ICA/CCA       1.3        Antegrade      Vertebral   Antegrade         78   / 16     cm/s        63    / 12  FINDINGS  Right carotid.  Plaque of the bifurcation extending into the internal carotid. Velocities  are consistent with 50-69% stenosis of the internal carotid artery.    Left carotid.  Plaque of the bifurcation extending into the internal carotid. Velocities  are consistent with 50-69% stenosis of the internal carotid artery.  Plaque is irregular on the surface and heterogeneous with mixed acoustic  densities.  Bilateral subclavian and vertebral artery waveforms are antegrade and  waveforms are normal in character and velocity.  Liam Duong MD  (Electronically Signed)  Final Date:      27 June                     10:28    Ec-echocardiogram Complete W/ Cont    Result Date: 2020  Transthoracic Echo Report Echocardiography Laboratory CONCLUSIONS No prior study is available for comparison. Normal left ventricular systolic function. Left ventricular ejection fraction is visually estimated to be 60%. The right ventricle was normal in size and function. Moderately dilated left atrium. Mild aortic stenosis. Mild tricuspid regurgitation. BECKIE BRITTON Exam Date:         2020                    15:19 Exam Location:     Inpatient Priority:          Routine Ordering Physician:        LAURI ROWLEY Referring Physician: Sonographer:               FERNANDO Frazier Age:    87     Gender:    F MRN:    [id number] :    10/26/1932 BSA:    1.73   Ht (in):    59     Wt (lb):    171 Exam Type:     Complete, Contrast Indications:     Transient cerebral ischemic attack, unspecified ICD Codes:       G45.9 CPT Codes:       28491,  BP:   114    /   56     HR:   85 Technical Quality:       Fair MEASUREMENTS  (Male / Female) Normal Values 2D ECHO LV Diastolic Diameter PLAX        4.9 cm                4.2 - 5.9 / 3.9 - 5.3 cm LV Systolic Diameter PLAX         2.5 cm                2.1 - 4.0 cm IVS Diastolic Thickness           0.92 cm               LVPW Diastolic Thickness          0.87 cm               LVOT Diameter                     2.1 cm                LV Ejection Fraction MOD BP       62.4 %                >= 55  % LV Ejection Fraction MOD 4C       61.3 %                LV Ejection Fraction MOD 2C       64.4 %                IVC Diameter                      1.6 cm                DOPPLER AV Peak Velocity                  2.1 m/s               AV Peak Gradient                  17.8 mmHg             AV Mean Gradient                  11.1 mmHg             LVOT Peak Velocity                0.96 m/s              AV Area Cont Eq vti                1.7 cm2               Mitral E Point Velocity           1.3 m/s               Mitral E to A Ratio               0.93                  Mitral A Duration                 108 ms                MV Pressure Half Time             55.6 ms               MV Area PHT                       4 cm2                 MV Deceleration Time              192 ms                TR Peak Velocity                  244 cm/s              PV Peak Velocity                  1 m/s                 PV Peak Gradient                  4 mmHg                RVOT Peak Velocity                0.75 m/s              * Indicates values subject to auto-interpretation LV EF:        % FINDINGS Left Ventricle Normal left ventricular chamber size. Normal left ventricular wall thickness. Normal left ventricular systolic function. Left ventricular ejection fraction is visually estimated to be 60%. Normal regional wall motion. Grade II diastolic dysfunction. Contrast was used to enhance visualization of the endocardial border. 1 mL of contrast was administered. Existing IV was used. Located at the left antecubital Right Ventricle The right ventricle was normal in size and function. Right Atrium The right atrium is normal in size.  Normal inferior vena cava size and inspiratory collapse. Left Atrium The left atrium is normal in size.  Moderately dilated left atrium. Left atrial volume index is 43 mL/sq m. Mitral Valve Structurally normal mitral valve without significant stenosis. Trace mitral regurgitation. Aortic Valve Tricuspid aortic valve. Calcified aortic valve leaflets. Mild aortic stenosis. Aortic valve area calculated from the continuity equation is 1.7 cm2. Vmax is 2.2 m/s. Transvalvular gradients are - Peak: 19 mmHg, Mean: 11 mmHg. Dimensionless index is 0.52. No aortic insufficiency. Tricuspid Valve Structurally normal tricuspid valve without significant stenosis. Mild tricuspid regurgitation. Estimated right ventricular systolic pressure  is 30 mmHg.  Right atrial pressure is estimated to be 3 mmHg. Pulmonic Valve The pulmonic valve is not well visualized. No pulmonic stenosis. Trace pulmonic insufficiency. Pericardium Normal pericardium without effusion. Aorta The aortic root is normal.  Ascending aorta diameter is 3.1 cm. Ricco Portillo MD (Electronically Signed) Final Date:     27 June 2020                 17:47      Micro:  Results     Procedure Component Value Units Date/Time    CATH TIP CULTURE [[id number]] Collected:  07/03/20 1230    Order Status:  Completed Specimen:  Cath Tip Updated:  07/04/20 1012     Significant Indicator NEG     Source CTIP     Site Central Line     Culture Result Culture in progress.    CATH TIP CULTURE [[id number]]     Order Status:  No result Specimen:  Cath Tip from Central Line     Blood Culture [[id number]]  (Abnormal) Collected:  06/30/20 1340    Order Status:  Completed Specimen:  Blood Updated:  07/03/20 1202     Significant Indicator POS     Source BLD     Site HD Cath     Culture Result Growth detected by Bactec instrument. 07/01/2020  05:28      Enterococcus faecalis  Combination therapy with ampicillin, penicillin, or  vancomycin (for susceptible strains) plus an aminoglycoside  is usually indicated for serious enterococcal infections,  such as endocarditis unless high-level resistance to both  gentamicin and streptomycin is documented; such combinations  are predicted to result in synergistic killing of the  Enterococcus.  See previous culture for sensitivity report.      Narrative:       CALL  [place]  131 tel. [phone],  CALLED  131 tel. [phone] 07/01/2020, 05:30, RB PERF. RESULTS CALLED TO: RN  [id number]  HD Cath    Blood Culture [[id number]]  (Abnormal)  (Susceptibility) Collected:  06/30/20 1340    Order Status:  Completed Specimen:  Blood Updated:  07/03/20 1202     Significant Indicator POS     Source BLD     Site HD Cath     Culture Result Growth detected by Bactec instrument. 07/01/2020  05:31      Enterococcus  "faecalis  Combination therapy with ampicillin, penicillin, or  vancomycin (for susceptible strains) plus an aminoglycoside  is usually indicated for serious enterococcal infections,  such as endocarditis unless high-level resistance to both  gentamicin and streptomycin is documented; such combinations  are predicted to result in synergistic killing of the  Enterococcus.      Narrative:       HD Cath    Susceptibility     Enterococcus faecalis (1)     Antibiotic Interpretation Microscan Method Status    Daptomycin Sensitive <=1 mcg/mL MELISSA Final    Ampicillin Sensitive <=2 mcg/mL MELISSA Final    Gent Synergy Sensitive <=500 mcg/mL MELISSA Final    Vancomycin Sensitive 2 mcg/mL MELISSA Final    Penicillin Sensitive 2 mcg/mL MELISSA Final                   Blood Culture [[id number]] Collected:  07/02/20 1158    Order Status:  Completed Specimen:  Blood from Peripheral Updated:  07/03/20 0726     Significant Indicator NEG     Source BLD     Site PERIPHERAL     Culture Result No Growth  Note: Blood cultures are incubated for 5 days and  are monitored continuously.Positive blood cultures  are called to the RN and reported as soon as  they are identified.      Narrative:       Per Hospital Policy: Only change Specimen Src: to \"Line\" if  specified by physician order.  Right Wrist    Blood Culture [[id number]] Collected:  07/02/20 1158    Order Status:  Completed Specimen:  Blood from Peripheral Updated:  07/03/20 0726     Significant Indicator NEG     Source BLD     Site PERIPHERAL     Culture Result No Growth  Note: Blood cultures are incubated for 5 days and  are monitored continuously.Positive blood cultures  are called to the RN and reported as soon as  they are identified.      Narrative:       Per Hospital Policy: Only change Specimen Src: to \"Line\" if  specified by physician order.  Right Hand    Blood Culture [[id number]]     Order Status:  Canceled Specimen:  Blood from Peripheral     Blood Culture [[id number]]     Order Status:  " Canceled Specimen:  Blood from Peripheral     Urinalysis [[id number]]     Order Status:  No result Specimen:  Urine     CULTURE WOUND W/ GRAM STAIN [[id number]]  (Abnormal)  (Susceptibility) Collected:  06/27/20 2355    Order Status:  Completed Specimen:  Wound from Exudate Updated:  06/30/20 1321     Significant Indicator POS     Source WND     Site LEFT LEG     Culture Result Rare growth mixed skin naina.     Gram Stain Result No organisms seen.     Culture Result Pseudomonas aeruginosa  Light growth  P.aeruginosa may develop resistance during prolonged therapy  with all antibiotics. Isolates that are initially susceptible  may become resistant within three to four days after  initiation of therapy. Testing of repeat isolates may be  warranted.        Stenotrophomonas maltophilia  Rare growth      Narrative:       Left leg  Left leg    Susceptibility     Pseudomonas aeruginosa (1)     Antibiotic Interpretation Microscan Method Status    Ceftazidime Sensitive 4 mcg/mL MELISSA Final    Ciprofloxacin Sensitive <=1 mcg/mL MELISSA Final    Cefepime Sensitive <=2 mcg/mL MELISSA Final    Gentamicin Sensitive <=4 mcg/mL MELISSA Final    Amikacin Sensitive <=16 mcg/mL MELISSA Final    Meropenem Sensitive <=1 mcg/mL MELISSA Final    Pip/Tazobactam Sensitive <=16 mcg/mL MELISSA Final    Tobramycin Sensitive <=4 mcg/mL MELISSA Final          Stenotrophomonas maltophilia (2)     Antibiotic Interpretation Microscan Method Status    Ceftazidime Resistant >16 mcg/mL MELISSA Final    Trimeth/Sulfa Sensitive <=2/38 mcg/mL MELISSA Final    Levofloxacin Sensitive <=2 mcg/mL MELISSA Final                   CULTURE WOUND W/ GRAM STAIN [[id number]]  (Abnormal) Collected:  06/27/20 2335    Order Status:  Completed Specimen:  Wound from Exudate Updated:  06/30/20 0912     Significant Indicator POS     Source WND     Site RIGHT LEG     Culture Result -     Gram Stain Result Moderate WBCs.  No organisms seen.       Culture Result Pseudomonas aeruginosa  Light growth  See previous  culture for sensitivity report.      Narrative:       Right leg  Right leg    Urinalysis [[id number]]  (Abnormal) Collected:  06/30/20 0824    Order Status:  Completed Specimen:  Urine, Cath Updated:  06/30/20 0856     Color Yellow     Character Clear     Specific Gravity 1.014     Ph >=9.0     Glucose 100 mg/dL      Ketones Negative mg/dL      Bilirubin Negative     Urobilinogen, Urine 0.2     Nitrite Negative     Leukocyte Esterase Negative     Occult Blood Trace     Micro Urine Req Microscopic    Narrative:       Collected By:[id number] NATIVIDAD SUNSHINE  If not done within the last 24 hours    Blood Culture [[id number]]     Order Status:  No result Specimen:  Blood from Peripheral     Blood Culture [[id number]]     Order Status:  No result Specimen:  Blood from Peripheral     Blood Culture [[id number]]     Order Status:  Canceled Specimen:  Blood from Peripheral     Blood Culture [[id number]]     Order Status:  Canceled Specimen:  Blood from Peripheral     GRAM STAIN [[id number]] Collected:  06/27/20 2355    Order Status:  Completed Specimen:  Wound Updated:  06/28/20 0927     Significant Indicator .     Source WND     Site LEFT LEG     Gram Stain Result No organisms seen.    Narrative:       Left leg  Left leg    GRAM STAIN [[id number]] Collected:  06/27/20 2335    Order Status:  Completed Specimen:  Wound Updated:  06/28/20 0926     Significant Indicator .     Source WND     Site RIGHT LEG     Gram Stain Result Moderate WBCs.  No organisms seen.      Narrative:       Right leg  Right leg          Assessment:  Active Hospital Problems    Diagnosis   • *Acute GI bleeding [K92.2]   • Epidural hemorrhage ([facility]) [S06.4X9A]   • Acute blood loss anemia [D62]   • ESRD (end stage renal disease) ([facility]) [N18.6]   • Type 2 diabetes mellitus with kidney complication, without long-term current use of insulin ([facility]) [E11.29]   • Peripheral artery disease ([facility]) [I73.9]   • Leg wounds [S81.801A]   • Encephalopathy acute [G93.40]        Plan:  Enterococcal sepsis  GI source most likely-HD cath infection also possible  Fever resolved  Leukocytosis increased  AMS improved  BCxs + Efaecalis amp-S 6/30  Blood cxs 7/2 neg so far  Can replace HD cath once blood cxs are neg for 72 hours  TTE with calcified valves;  ANABELL if persistently +blood cxs if feasible  Switch Zosyn to ampicillin     GI bleed  Continued blood loss .    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  Management is conservatively.    No new CVA seen by MRI     ESRD  Dose adjust abx    Chronic stasis ulceration  No evidence of active infection, so do not add additional antimicrobial therapy for this wound.    Wound swab showed colonization with pseudomonas and stenotrophomonas  Continue with wound care   Recommend vascular studies     Diabetes  Keep BS under 150 to help control current infection

## 2020-07-04 NOTE — CARE PLAN
Problem: Skin Integrity  Goal: Risk for impaired skin integrity will decrease  Outcome: PROGRESSING AS EXPECTED  Note: Waffle cushion in place, protective mepilex bilateral heels and elbows, q2 turns     Problem: Safety  Goal: Will remain free from injury  Outcome: PROGRESSING AS EXPECTED  Note: Safety precautions in place. Bed in lowest and locked position. Call light and personal belongings within reach, bed alarm in place

## 2020-07-04 NOTE — PROGRESS NOTES
[facility] Nephrology Progress Note      Author: Honorio Olivo      Date of Service  7/4/2020    HISTORY OF PRESENT ILLNESS:    88 yo F PMH ESRD MWF iHD, HTN, anemia of CKD, CKD-MBD, and HLD who presents to ED with CC as above.  She is visiting the [facility] from [place] and due to current pandemic has not been able to go back home and continued to stay in this area with family.  She has OP dialysis at [facility] and has been compliant.  She was doing well until about two weeks ago when she started to notice dark stools.  She was doing ok until about 3 days ago when she also noted orthostatic symptoms upon standing.  It resolved and then today it came back and she had some slurred speech and left arm numbness so she was brought in for evaluation.  She has had a prior stroke and the numbness and slurred speech have been present off and on as a result of that so initially she though nothing of it. Neurology was consulted and they did not feel that this was CVA related and more hemodynamic related.  Labs in ED showed Hgb 5.5 and serum labs showed  and K+ 5.3.  She was given pRBCs and admitted to ICU.  CT of head showed an acute on chronic epidural bleed with mild midline shift as well.  She was on plavix at home.  No recent F/C/N/V/CP/SOB.  No hematochezia, hematemesis.  No HA, visual changes, or abdominal pain    Daily nephrology summary  06/26 - consult done, HD done  06/27 - 2u prbc this AM, has been seen and evaluated by neurology and neurosurgery, further imaging planned for today  06/28 - not seen, patient in procedures  06/29 - seen on HD, tolerating with anxiolytic, somnolent and provides minimal responses to questions, 3Ca bath today, EGD and colonoscopy normal, capsule endoscopy  06/30 - transferred to floor, capsule endoscopy with fresh blood, push enteroscopy deferred, some concern for UTI but no urine cx and UA without bacteria, WBC increased, +fever, +AMS  07/01 - patient is agitated this AM,  "initially declined HD, +edema, +hbg decreased and receiving prbc  07/02 - patient sleepy this afternoon post procedure, erosive duodenitis and oozing AVMs in gastric cardia noted, tolerated HD with 4L UF yesterday, on lasix though UOP no longer being recorded  07/03 - HD this am, 3.5L UF. IR removed dialysis catheter. Pt \"feeling much better\".   07/04 - No new overnight renal events. S/p HD yesterday and tolerated well.     Review of Systems  Review of Systems   Constitutional: Negative for chills and fever.   Respiratory: Negative for cough and shortness of breath.    Cardiovascular: Positive for leg swelling (improved). Negative for chest pain.   Gastrointestinal: Negative for nausea and vomiting.   Musculoskeletal: Negative for back pain.        BLE discomfort   Neurological: Positive for weakness. Negative for dizziness.   Psychiatric/Behavioral: Negative for memory loss. The patient does not have insomnia.         Physical Exam  Temp:  [36 °C (96.8 °F)-36.8 °C (98.2 °F)] 36 °C (96.8 °F)  Pulse:  [63-78] 70  Resp:  [15-16] 15  BP: ()/(39-64) 119/64  SpO2:  [95 %-100 %] 95 %    Physical Exam  Constitutional:       General: She is not in acute distress.     Appearance: She is ill-appearing.   HENT:      Head: Normocephalic.      Right Ear: External ear normal.      Left Ear: External ear normal.      Nose: Nose normal.      Mouth/Throat:      Mouth: Mucous membranes are moist.      Pharynx: Oropharynx is clear.   Eyes:      Extraocular Movements: Extraocular movements intact.      Conjunctiva/sclera: Conjunctivae normal.   Cardiovascular:      Rate and Rhythm: Normal rate and regular rhythm.      Comments: R chest TDC c/d/d  Pulmonary:      Effort: Pulmonary effort is normal.      Breath sounds: No wheezing.   Abdominal:      Palpations: Abdomen is soft.      Tenderness: There is no abdominal tenderness.   Musculoskeletal:      Right lower leg: Edema (improved) present.      Left lower leg: Edema (improved) " present.      Comments: anasarca   Skin:     Coloration: Skin is pale.      Comments: LE dressing in place   Neurological:      Mental Status: She is alert and oriented to person, place, and time. She is confused.   Psychiatric:         Attention and Perception: Attention normal.         Behavior: Behavior is agitated. Behavior is not aggressive.         Fluids    Intake/Output Summary (Last 24 hours) at 7/4/2020 1314  Last data filed at 7/3/2020 1800  Gross per 24 hour   Intake 240 ml   Output --   Net 240 ml       Laboratory  Recent Labs     07/03/20  0719 07/03/20  2223 07/04/20  0519 07/04/20  1032   WBC 11.9*  --  11.5* 12.0*   RBC 2.45*  --  2.51* 2.56*   HEMOGLOBIN 7.1* 7.1* 7.3*  7.3* 7.3*   HEMATOCRIT 22.7* 22.8* 23.8* 24.8*   MCV 92.7  --  94.8 96.9   MCH 29.0  --  29.1 28.5   MCHC 31.3*  --  30.7* 29.4*   RDW 49.6  --  50.1* 51.7*   PLATELETCT 268  --  295 281   MPV 9.4  --  9.7 9.6     Recent Labs     07/02/20  0603 07/03/20  0719 07/04/20  0615   SODIUM 136 138 136   POTASSIUM 4.5 3.6 3.6   CHLORIDE 98 100 99   CO2 21 28 28   GLUCOSE 81 121* 105*   BUN 26* 21 22   CREATININE 3.49* 2.96* 3.35*   CALCIUM 7.6* 7.4* 7.5*     Recent Labs     07/02/20  1158   INR 1.15*     No results for input(s): NTPROBNP in the last 72 hours.        Imaging  Reviewed     Assessment/Plan  IMPRESSION:  - ESRD    * Etiology likely 2/2 HTN    * R chest TDC     * Visitor at [facility], lives in [place]  - Melena    * EGD and colonoscopy normal, capsule endoscopy with fresh blood 6/30  - Hyperkalemia    * correct with HD  - Acute epidural hematoma    * Superimposed on chronic epidural bleed, with a small midline shift    * Neuro following, has been evaluated by neurosurgery  - HTN    * Goal BP < 140/90    * Stable  - Anemia, multifactorial    * Goal Hgb 10-11 in CKD, ferritin significantly elevated    * transfuse hgb<7    * see melena above  - CKD-MBD    * Managed at HD unit, phos now above goal, Ca low, vitamin D low, PTH  appropriate  - HLD  - CAD  - Leukocytosis with low grade fever  - Bacteremia    * Probable GI source  - Edema, anasarca    * UF with HD as tolerated     * On diuretics      PLAN:  - No plans for HD today. Line Holiday  - Plan for replacement line Monday.   - Abx per primary service  - UF as tolerated--will have HD x 4 hours for maximum UF  - Continue Lasix 80mg BID  - Blood cultures pending  - Abx per primary service  - No dietary protein restrictions  - Dose all meds per ESRD  - Transfuse as needed to maintain Hgb > 7  - Do not provide with gadolinium without discussing with nephrology, as HD x 3 days will need to be arranged  - Continue calcium acetate and ergocalciferol  - Low sodium diet   - Fluid restriction     All prior notes form other doctors and RN staff were reviewed from admission to current day to help me make my clinical decisions     Thank you,

## 2020-07-04 NOTE — PROGRESS NOTES
RN received report, assumed patient care. Patient is sitting up in chair. Breakfast delivered. Call light within reach.

## 2020-07-04 NOTE — PALLIATIVE CARE
Palliative Care follow-up  PC RN visited pt at bedside, reviewed AD she completed with granddaughter Ghulam. Completed statements of desires, # 2, 3, & 5. Pt declined declaration to [facility]. AD awaiting shea signature.     Reviewed POLST form, pt agreeable to DNR, selective treatment and no feeding tubes. POLST completed, will scan a copy into EMR, original placed on hard chart.     Once completed, both documents should be sent with pt upon discharge.     Active listening, education, validation, normalization, therapeutic touch, and emotional support provided.     Updated:   Dr. Noe    Plan:   AD awaiting shea, POLST completed.       Thank you for allowing Palliative Care to participate in this patient's care. Please feel free to call [phone] with any questions or concerns.

## 2020-07-04 NOTE — PROGRESS NOTES
[facility] Medicine Daily Progress Note    Date of Service  7/4/2020    Chief Complaint  87 y.o. female admitted 6/26/2020 with melena and weakness, patient has a history of CVA, end-stage renal disease on hemodialysis Monday Wednesday Friday, peripheral arterial disease, the patient apparently is visiting from [place], she has 2 sons in town, she also complained of left-sided weakness, speech difficulty apparently and also some residual deficits from her previous CVA, the patient does have some chronic lower extremity wounds that she is being treated with antibiotics for, the patient on presentation was imaged and was found to have a right-sided chronic epidural hematoma with some mild acute hemorrhage within the lesion, not a surgical candidate.    Hospital Course    Patient admitted with Severe blood loss anemia, chronic right-sided subdural hematoma without intervention indicated at this time, chronic end-stage renal disease on hemodialysis  Interval Problem Update  Patient seen and examined today.    Patient tolerating treatment and therapies.  All Data, Medication data reviewed.  Case discussed with nursing as available.  Plan of Care reviewed with patient and notified of changes.  6/29 patient appears fairly confused, we discussed the findings of her capsule endoscopy, need for additional push endoscopy, she is somewhat resistant to the idea to undergo another procedure, the patient is otherwise afebrile, heart rate in the 80s to 100s, blood pressure is stabilized, patient has stable left upper extremity weakness and bilateral lower extremity weakness, patient with significant anxiety overall, seen by nephrology, gastroenterology, intensive care physicians, stabilized for transfer out of ICU    6/30: Initially the patient plan to have capsule endoscopy today but due to her worsening confusion it was canceled.  She had low-grade temperature 100.1 and tachycardia.  WBC was 13.8, urinalysis was negative for  infection.  I saw and examined the patient today.    7/1: Her hemoglobin was 6.4 this morning and I will order 1 unit of packed blood cell.  Yesterday her endoscopy was canceled due to confusion.  The patient is alert and oriented x4 this morning.  I saw and examined the patient today.\  I had a very long time discussion with daughter Elena about her medical condition.  I updated her that her hemodialysis catheter is infected and I am going to consult vascular surgeon for that and also infectious disease consult.  He is planning to have enteroscopy by GI.  At the same time I told her that she is in a lot of pain and asking for pain medication and we do not want to give her narcotics due to risks of getting respiratory distress.  I asked her what is her opinion about pain medication and she was not able to give me the opinion.  She stated that treat her everything we can medically to have her with her current condition.  I also explained to her that due to her age and multiple medical comorbidity she will be benefit from palliative care and possibly hospice care who also can help manage her pain.  She finally said that palliative team can contact her brother at  [phone].  I put a consult for palliative care today.    7/2 the patient stated that her pain is better today.  She still wanted to be treated medically for her medical condition.  Palliative team is following.  Plan Bertha is planning to have enteroscopy today.    7/3: the patient is getting HD. HD catheter needs to be removed by IR per nephro. I saw and examined her today.   IR to remove HD catheter today.    7/4: The patient complained about being hungry and requested to advance her diet to regular diet.          Consultants/Specialty  Nephrology, gastroenterology, intensivist    Code Status  DNR/DNI    Disposition  TBD    Review of Systems  Review of Systems   Constitutional: Positive for malaise/fatigue. Negative for chills.   HENT: Negative for  congestion and nosebleeds.    Eyes: Negative for pain and discharge.   Respiratory: Negative for sputum production and shortness of breath.    Cardiovascular: Negative for palpitations and orthopnea.   Gastrointestinal: Positive for melena. Negative for abdominal pain, diarrhea and vomiting.   Genitourinary: Negative for dysuria and urgency.   Musculoskeletal: Positive for joint pain. Negative for back pain and neck pain.   Skin: Negative for itching.   Neurological: Negative for focal weakness and seizures.        Physical Exam  Temp:  [36 °C (96.8 °F)-36.8 °C (98.2 °F)] 36 °C (96.8 °F)  Pulse:  [63-78] 70  Resp:  [15-16] 15  BP: ()/(39-64) 119/64  SpO2:  [95 %-100 %] 95 %    Physical Exam  Vitals signs and nursing note reviewed.   Constitutional:       Appearance: She is well-developed.   HENT:      Head: Normocephalic and atraumatic.      Nose: Nose normal.   Eyes:      Pupils: Pupils are equal, round, and reactive to light.   Neck:      Musculoskeletal: Normal range of motion and neck supple.      Thyroid: No thyromegaly.      Vascular: No JVD.   Cardiovascular:      Rate and Rhythm: Normal rate.      Heart sounds: Normal heart sounds. No friction rub.   Pulmonary:      Effort: Pulmonary effort is normal.      Breath sounds: Normal breath sounds. No wheezing.   Abdominal:      General: Bowel sounds are normal.   Musculoskeletal: Normal range of motion.         General: Tenderness present.   Skin:     General: Skin is warm.      Coloration: Skin is pale.   Neurological:      Mental Status: She is alert. She is disoriented.         Fluids    Intake/Output Summary (Last 24 hours) at 7/4/2020 0830  Last data filed at 7/3/2020 1800  Gross per 24 hour   Intake 360 ml   Output 3500 ml   Net -3140 ml       Laboratory  Recent Labs     07/03/20  0001 07/03/20  0719 07/03/20  2223 07/04/20  0519   WBC 10.2 11.9*  --  11.5*   RBC 2.12* 2.45*  --  2.51*   HEMOGLOBIN 6.1* 7.1* 7.1* 7.3*  7.3*   HEMATOCRIT 19.5* 22.7*  22.8* 23.8*   MCV 92.0 92.7  --  94.8   MCH 28.8 29.0  --  29.1   MCHC 31.3* 31.3*  --  30.7*   RDW 49.8 49.6  --  50.1*   PLATELETCT 246 268  --  295   MPV 9.2 9.4  --  9.7     Recent Labs     07/02/20  0603 07/03/20  0719 07/04/20  0615   SODIUM 136 138 136   POTASSIUM 4.5 3.6 3.6   CHLORIDE 98 100 99   CO2 21 28 28   GLUCOSE 81 121* 105*   BUN 26* 21 22   CREATININE 3.49* 2.96* 3.35*   CALCIUM 7.6* 7.4* 7.5*     Recent Labs     07/02/20  1158   INR 1.15*               Imaging  IR-CVC TUNNEL WITH PORT REMOVAL   Final Result         1.  Removal of the right IJ] hemodialysis catheter.      2.  The tip of the catheter was cut off and sent to lab for analysis.      CT-HEAD W/O   Final Result      No significant interval change.      MR-BRAIN-W/O   Final Result      1.  Mild cerebral atrophy. Age-appropriate.   2.  Moderate-large lentiform extra-axial fluid collection over the right posterior frontal-parietal convexity consistent with a chronic or late subacute epidural hematoma or loculated subdural hematoma. Possible minimal component of more recent    hemorrhage where there is linear bandlike hyperdensity on CT.   3.  Patchy and punctate areas of bright diffusion signal in the underlying right parietal lobe which may represent acute or recent subacute arterial or venous infarction. No acute parenchymal hemorrhage.   4.  Minimal supratentorial white matter disease most consistent with microvascular ischemic change.   5.  Old lacunar infarct left lateral thalamus.      MR-MRA HEAD-W/O   Final Result      1.  Intracranial atherosclerotic cerebrovascular stenotic disease involving the posterior cerebral arteries and probably the middle cerebral arteries beyond the genu. No alexx occlusion.   2.  Normal variant dominant caliber right anterior cerebral artery A1 segment with a hypoplastic left A1 segment.   3.  No evidence of aneurysm about [place].      EC-ECHOCARDIOGRAM COMPLETE W/ CONT   Final Result       US-CAROTID DOPPLER BILAT   Final Result      DX-CHEST-PORTABLE (1 VIEW)   Final Result      No acute cardiopulmonary abnormality.      CT-HEAD W/O   Final Result      1. Small amount of acute hemorrhage in a chronic 2.1 cm right epidural collection, along the right frontoparietal convexity. Associated 2 mm right to left midline shift.   2. Minimal subacute subdural hemorrhage anterior to the right epidural collection.   3. Hyperdensity in the temporal horn of the right lateral ventricle may represent small amount of intraventricular hemorrhage.   4. No CT evidence of acute infarct.           Assessment/Plan  * Acute GI bleeding  Assessment & Plan  With melena, EGD and colonoscopy did not see any active lesions, capsule endoscopy identified a possible source of 50 cm,   Enteroscopy: duodenitis, bleeding AVM  Continuous cardiac monitoring  Continue PPI for now  Monitor H&H every 8 hours, transfuse for hemoglobin less than 7      Epidural hemorrhage (HCC)  Assessment & Plan  2.3 cm with 2 mm shift  Neurosurgery consulted and recommended no intervention  Neurology consulted, MRI and MRA resulted, possible recent small CVA      Leg wounds  Assessment & Plan  Wound culture grew pseudomonas aeruginosa  Was on oral ciprofloxacin and I will change it to IV Zosyn      Peripheral artery disease (HCC)  Assessment & Plan  Hold aspirin and Plavix    Type 2 diabetes mellitus with kidney complication, without long-term current use of insulin ([facility])  Assessment & Plan  Start on insulin sliding scale with serial Accu-Checks  Check hemoglobin A1c  Hypoglycemic protocol in place        Primary hypertension  Assessment & Plan  Restart medication    ESRD (end stage renal disease) ([facility])  Assessment & Plan  Dialysis Monday Wednesday Friday,   nephrology is consulted  Monitor BMP and assess response  Avoid IV contrast/nephrotoxins/NSAIDs  Dose adjust meds for decreased GFR        Acute blood loss anemia  Assessment & Plan  Given 3 units of  RBCs, 1 unit of platelets  Hemoglobin 7.3 today.    Transfuse as needed with target hemoglobin above 7.  GI is following  Follow CBC in the morning  Enteroscopy: duodenitis, bleeding AVM      Bacteremia  Assessment & Plan  From hemodialysis catheter  Remove HD catheter  Nephrology following  ID following    Encephalopathy acute  Assessment & Plan  Likely multifactorial  Treating infection with IV antibiotic  Improving and alert oriented x4  We will try to avoid benzos and narcotic as much as possible  improving       VTE prophylaxis: SCD

## 2020-07-05 LAB
ABO GROUP BLD: NORMAL
ALBUMIN SERPL BCP-MCNC: 2.8 G/DL (ref 3.2–4.9)
ALBUMIN/GLOB SERPL: 0.8 G/DL
ALP SERPL-CCNC: 53 U/L (ref 30–99)
ALT SERPL-CCNC: 15 U/L (ref 2–50)
ANION GAP SERPL CALC-SCNC: 13 MMOL/L (ref 7–16)
AST SERPL-CCNC: 22 U/L (ref 12–45)
BARCODED ABORH UBTYP: 600
BARCODED PRD CODE UBPRD: NORMAL
BARCODED UNIT NUM UBUNT: NORMAL
BASOPHILS # BLD AUTO: 0.3 % (ref 0–1.8)
BASOPHILS # BLD: 0.04 K/UL (ref 0–0.12)
BILIRUB SERPL-MCNC: 0.7 MG/DL (ref 0.1–1.5)
BLD GP AB SCN SERPL QL: NORMAL
BUN SERPL-MCNC: 35 MG/DL (ref 8–22)
CALCIUM SERPL-MCNC: 7.4 MG/DL (ref 8.5–10.5)
CC # CATH TIP CULT: NORMAL /ML
CHLORIDE SERPL-SCNC: 99 MMOL/L (ref 96–112)
CO2 SERPL-SCNC: 25 MMOL/L (ref 20–33)
COMPONENT R 8504R: NORMAL
CREAT SERPL-MCNC: 4.59 MG/DL (ref 0.5–1.4)
EOSINOPHIL # BLD AUTO: 0.21 K/UL (ref 0–0.51)
EOSINOPHIL NFR BLD: 1.7 % (ref 0–6.9)
ERYTHROCYTE [DISTWIDTH] IN BLOOD BY AUTOMATED COUNT: 50.7 FL (ref 35.9–50)
GLOBULIN SER CALC-MCNC: 3.7 G/DL (ref 1.9–3.5)
GLUCOSE SERPL-MCNC: 104 MG/DL (ref 65–99)
HCT VFR BLD AUTO: 28.9 % (ref 37–47)
HGB BLD-MCNC: 8.2 G/DL (ref 12–16)
HGB BLD-MCNC: 8.5 G/DL (ref 12–16)
HGB BLD-MCNC: 9 G/DL (ref 12–16)
IMM GRANULOCYTES # BLD AUTO: 0.16 K/UL (ref 0–0.11)
IMM GRANULOCYTES NFR BLD AUTO: 1.3 % (ref 0–0.9)
LYMPHOCYTES # BLD AUTO: 1.78 K/UL (ref 1–4.8)
LYMPHOCYTES NFR BLD: 14.3 % (ref 22–41)
MAGNESIUM SERPL-MCNC: 1.9 MG/DL (ref 1.5–2.5)
MCH RBC QN AUTO: 28.7 PG (ref 27–33)
MCHC RBC AUTO-ENTMCNC: 31.1 G/DL (ref 33.6–35)
MCV RBC AUTO: 92 FL (ref 81.4–97.8)
MONOCYTES # BLD AUTO: 0.65 K/UL (ref 0–0.85)
MONOCYTES NFR BLD AUTO: 5.2 % (ref 0–13.4)
NEUTROPHILS # BLD AUTO: 9.64 K/UL (ref 2–7.15)
NEUTROPHILS NFR BLD: 77.2 % (ref 44–72)
NRBC # BLD AUTO: 0 K/UL
NRBC BLD-RTO: 0 /100 WBC
PLATELET # BLD AUTO: 280 K/UL (ref 164–446)
PMV BLD AUTO: 9.6 FL (ref 9–12.9)
POTASSIUM SERPL-SCNC: 3.6 MMOL/L (ref 3.6–5.5)
PRODUCT TYPE UPROD: NORMAL
PROT SERPL-MCNC: 6.5 G/DL (ref 6–8.2)
RBC # BLD AUTO: 3.14 M/UL (ref 4.2–5.4)
RH BLD: NORMAL
SIGNIFICANT IND 70042: NORMAL
SITE SITE: NORMAL
SODIUM SERPL-SCNC: 137 MMOL/L (ref 135–145)
SOURCE SOURCE: NORMAL
UNIT STATUS USTAT: NORMAL
WBC # BLD AUTO: 12.5 K/UL (ref 4.8–10.8)

## 2020-07-05 PROCEDURE — 99232 SBSQ HOSP IP/OBS MODERATE 35: CPT | Performed by: INTERNAL MEDICINE

## 2020-07-05 PROCEDURE — 700102 HCHG RX REV CODE 250 W/ 637 OVERRIDE(OP): Performed by: INTERNAL MEDICINE

## 2020-07-05 PROCEDURE — 700105 HCHG RX REV CODE 258

## 2020-07-05 PROCEDURE — 700111 HCHG RX REV CODE 636 W/ 250 OVERRIDE (IP): Performed by: INTERNAL MEDICINE

## 2020-07-05 PROCEDURE — P9016 RBC LEUKOCYTES REDUCED: HCPCS

## 2020-07-05 PROCEDURE — 99233 SBSQ HOSP IP/OBS HIGH 50: CPT | Performed by: INTERNAL MEDICINE

## 2020-07-05 PROCEDURE — 770001 HCHG ROOM/CARE - MED/SURG/GYN PRIV*

## 2020-07-05 PROCEDURE — 700105 HCHG RX REV CODE 258: Performed by: INTERNAL MEDICINE

## 2020-07-05 PROCEDURE — A9270 NON-COVERED ITEM OR SERVICE: HCPCS | Performed by: INTERNAL MEDICINE

## 2020-07-05 PROCEDURE — 80053 COMPREHEN METABOLIC PANEL: CPT

## 2020-07-05 PROCEDURE — 36415 COLL VENOUS BLD VENIPUNCTURE: CPT

## 2020-07-05 PROCEDURE — 85018 HEMOGLOBIN: CPT

## 2020-07-05 PROCEDURE — 83735 ASSAY OF MAGNESIUM: CPT

## 2020-07-05 PROCEDURE — 700101 HCHG RX REV CODE 250: Performed by: INTERNAL MEDICINE

## 2020-07-05 PROCEDURE — 36430 TRANSFUSION BLD/BLD COMPNT: CPT

## 2020-07-05 PROCEDURE — 302146: Performed by: INTERNAL MEDICINE

## 2020-07-05 PROCEDURE — 85025 COMPLETE CBC W/AUTO DIFF WBC: CPT

## 2020-07-05 PROCEDURE — 86900 BLOOD TYPING SEROLOGIC ABO: CPT

## 2020-07-05 PROCEDURE — 86923 COMPATIBILITY TEST ELECTRIC: CPT

## 2020-07-05 PROCEDURE — 86850 RBC ANTIBODY SCREEN: CPT

## 2020-07-05 PROCEDURE — 86901 BLOOD TYPING SEROLOGIC RH(D): CPT

## 2020-07-05 RX ORDER — SODIUM CHLORIDE 9 MG/ML
INJECTION, SOLUTION INTRAVENOUS
Status: COMPLETED
Start: 2020-07-05 | End: 2020-07-05

## 2020-07-05 RX ADMIN — SODIUM CHLORIDE 500 ML: 9 INJECTION, SOLUTION INTRAVENOUS at 05:06

## 2020-07-05 RX ADMIN — Medication 667 MG: at 17:05

## 2020-07-05 RX ADMIN — Medication 667 MG: at 08:32

## 2020-07-05 RX ADMIN — OMEPRAZOLE 20 MG: 20 CAPSULE, DELAYED RELEASE ORAL at 05:26

## 2020-07-05 RX ADMIN — ACETAMINOPHEN 650 MG: 325 TABLET, FILM COATED ORAL at 17:05

## 2020-07-05 RX ADMIN — AMPICILLIN SODIUM 2000 MG: 2 INJECTION, POWDER, FOR SOLUTION INTRAMUSCULAR; INTRAVENOUS at 08:32

## 2020-07-05 RX ADMIN — ACETAMINOPHEN 650 MG: 325 TABLET, FILM COATED ORAL at 11:46

## 2020-07-05 RX ADMIN — LIDOCAINE 1 PATCH: 50 PATCH TOPICAL at 14:03

## 2020-07-05 RX ADMIN — ACETAMINOPHEN 650 MG: 325 TABLET, FILM COATED ORAL at 22:53

## 2020-07-05 RX ADMIN — Medication 667 MG: at 11:46

## 2020-07-05 RX ADMIN — AMPICILLIN SODIUM 2000 MG: 2 INJECTION, POWDER, FOR SOLUTION INTRAMUSCULAR; INTRAVENOUS at 17:15

## 2020-07-05 RX ADMIN — LORAZEPAM 0.5 MG: 2 INJECTION INTRAMUSCULAR; INTRAVENOUS at 21:26

## 2020-07-05 RX ADMIN — ATORVASTATIN CALCIUM 40 MG: 40 TABLET, FILM COATED ORAL at 17:21

## 2020-07-05 RX ADMIN — FUROSEMIDE 80 MG: 10 INJECTION, SOLUTION INTRAMUSCULAR; INTRAVENOUS at 17:04

## 2020-07-05 RX ADMIN — ACETAMINOPHEN 650 MG: 325 TABLET, FILM COATED ORAL at 05:26

## 2020-07-05 ASSESSMENT — ENCOUNTER SYMPTOMS
INSOMNIA: 0
NAUSEA: 0
SHORTNESS OF BREATH: 0
SPUTUM PRODUCTION: 0
MEMORY LOSS: 0
VOMITING: 0
COUGH: 0
EYE PAIN: 0
DIZZINESS: 0
ABDOMINAL PAIN: 0
PALPITATIONS: 0
NECK PAIN: 0
WEAKNESS: 0
WEAKNESS: 1
FOCAL WEAKNESS: 0
ORTHOPNEA: 0
SEIZURES: 0
MYALGIAS: 1
BACK PAIN: 0
FEVER: 0
EYE DISCHARGE: 0
CHILLS: 0

## 2020-07-05 NOTE — PROGRESS NOTES
[facility] Nephrology Progress Note      Author: Honorio Olivo      Date of Service  7/5/2020    HISTORY OF PRESENT ILLNESS:    86 yo F PMH ESRD MWF iHD, HTN, anemia of CKD, CKD-MBD, and HLD who presents to ED with CC as above.  She is visiting the [facility] from [place] and due to current pandemic has not been able to go back home and continued to stay in this area with family.  She has OP dialysis at [facility] and has been compliant.  She was doing well until about two weeks ago when she started to notice dark stools.  She was doing ok until about 3 days ago when she also noted orthostatic symptoms upon standing.  It resolved and then today it came back and she had some slurred speech and left arm numbness so she was brought in for evaluation.  She has had a prior stroke and the numbness and slurred speech have been present off and on as a result of that so initially she though nothing of it. Neurology was consulted and they did not feel that this was CVA related and more hemodynamic related.  Labs in ED showed Hgb 5.5 and serum labs showed  and K+ 5.3.  She was given pRBCs and admitted to ICU.  CT of head showed an acute on chronic epidural bleed with mild midline shift as well.  She was on plavix at home.  No recent F/C/N/V/CP/SOB.  No hematochezia, hematemesis.  No HA, visual changes, or abdominal pain    Daily nephrology summary  06/26 - consult done, HD done  06/27 - 2u prbc this AM, has been seen and evaluated by neurology and neurosurgery, further imaging planned for today  06/28 - not seen, patient in procedures  06/29 - seen on HD, tolerating with anxiolytic, somnolent and provides minimal responses to questions, 3Ca bath today, EGD and colonoscopy normal, capsule endoscopy  06/30 - transferred to floor, capsule endoscopy with fresh blood, push enteroscopy deferred, some concern for UTI but no urine cx and UA without bacteria, WBC increased, +fever, +AMS  07/01 - patient is agitated this AM,  "initially declined HD, +edema, +hbg decreased and receiving prbc  07/02 - patient sleepy this afternoon post procedure, erosive duodenitis and oozing AVMs in gastric cardia noted, tolerated HD with 4L UF yesterday, on lasix though UOP no longer being recorded  07/03 - HD this am, 3.5L UF. IR removed dialysis catheter. Pt \"feeling much better\".   07/04 - No new overnight renal events. S/p HD yesterday and tolerated well.   07/05 - Hgb 6.8. Feels ok. Getting one unit of PRBC.    Review of Systems  Review of Systems   Constitutional: Negative for chills and fever.   Respiratory: Negative for cough and shortness of breath.    Cardiovascular: Positive for leg swelling (improved). Negative for chest pain.   Gastrointestinal: Negative for nausea and vomiting.   Musculoskeletal: Negative for back pain.        BLE discomfort   Neurological: Positive for weakness. Negative for dizziness.   Psychiatric/Behavioral: Negative for memory loss. The patient does not have insomnia.         Physical Exam  Temp:  [36 °C (96.8 °F)-36.4 °C (97.5 °F)] 36.4 °C (97.5 °F)  Pulse:  [68-73] 73  Resp:  [15-17] 16  BP: (100-124)/(30-72) 121/72  SpO2:  [95 %-100 %] 100 %    Physical Exam  Constitutional:       General: She is not in acute distress.     Appearance: She is ill-appearing.   HENT:      Head: Normocephalic.      Right Ear: External ear normal.      Left Ear: External ear normal.      Nose: Nose normal.      Mouth/Throat:      Mouth: Mucous membranes are moist.      Pharynx: Oropharynx is clear.   Eyes:      Extraocular Movements: Extraocular movements intact.      Conjunctiva/sclera: Conjunctivae normal.   Cardiovascular:      Rate and Rhythm: Normal rate and regular rhythm.   Pulmonary:      Effort: Pulmonary effort is normal.      Breath sounds: No wheezing.   Abdominal:      Palpations: Abdomen is soft.      Tenderness: There is no abdominal tenderness.   Musculoskeletal:      Right lower leg: Edema (improved) present.      Left " lower leg: Edema (improved) present.   Skin:     Coloration: Skin is pale.      Comments: LE dressing in place   Neurological:      General: No focal deficit present.      Mental Status: She is alert and oriented to person, place, and time.   Psychiatric:         Attention and Perception: Attention normal.         Behavior: Behavior is not agitated or aggressive.         Fluids    Intake/Output Summary (Last 24 hours) at 7/5/2020 0736  Last data filed at 7/4/2020 2050  Gross per 24 hour   Intake 120 ml   Output --   Net 120 ml       Laboratory  Recent Labs     07/03/20  0719 07/03/20  2223 07/04/20  0519 07/04/20  1032 07/04/20  1649 07/04/20  2301   WBC 11.9*  --  11.5* 12.0*  --   --    RBC 2.45*  --  2.51* 2.56*  --   --    HEMOGLOBIN 7.1* 7.1* 7.3*  7.3* 7.3* 7.8* 6.8*   HEMATOCRIT 22.7* 22.8* 23.8* 24.8*  --   --    MCV 92.7  --  94.8 96.9  --   --    MCH 29.0  --  29.1 28.5  --   --    MCHC 31.3*  --  30.7* 29.4*  --   --    RDW 49.6  --  50.1* 51.7*  --   --    PLATELETCT 268  --  295 281  --   --    MPV 9.4  --  9.7 9.6  --   --      Recent Labs     07/03/20  0719 07/04/20  0615   SODIUM 138 136   POTASSIUM 3.6 3.6   CHLORIDE 100 99   CO2 28 28   GLUCOSE 121* 105*   BUN 21 22   CREATININE 2.96* 3.35*   CALCIUM 7.4* 7.5*     Recent Labs     07/02/20  1158   INR 1.15*     No results for input(s): NTPROBNP in the last 72 hours.        Imaging  Reviewed     Assessment/Plan  IMPRESSION:  - ESRD    * Etiology likely 2/2 HTN    * Visitor at [facility], lives in [place]  - Melena    * EGD and colonoscopy normal, capsule endoscopy with fresh blood 6/30  - Hyperkalemia    * correct with HD  - Acute epidural hematoma    * Superimposed on chronic epidural bleed, with a small midline shift    * Neuro following, has been evaluated by neurosurgery  - HTN    * Goal BP < 140/90    * Stable  - Anemia, multifactorial    * Goal Hgb 10-11 in CKD, ferritin significantly elevated    * transfuse hgb<7    * see melena above  -  CKD-MBD    * Managed at HD unit, phos now above goal, Ca low, vitamin D low, PTH appropriate  - HLD  - CAD  - Leukocytosis with low grade fever  - Bacteremia    * Probable GI source  - Edema, anasarca    * UF with HD as tolerated     * On diuretics      PLAN:  - No plans for HD today. BCxs + Efaecalis amp-S 6/30.  Line Holiday.   - Can replace HD cath once blood cxs are neg for 72 hours   - Abx per primary service  - UF as tolerated--will have HD x 4 hours for maximum UF  - Continue Lasix   - No dietary protein restrictions  - Dose all meds per ESRD  - Transfuse as needed to maintain Hgb > 7  - Continue calcium acetate and ergocalciferol  - Low sodium diet   - Fluid restriction     All prior notes form other doctors and RN staff were reviewed from admission to current day to help me make my clinical decisions     Thank you,

## 2020-07-05 NOTE — PROGRESS NOTES
[facility] Medicine Daily Progress Note    Date of Service  7/5/2020    Chief Complaint  87 y.o. female admitted 6/26/2020 with melena and weakness, patient has a history of CVA, end-stage renal disease on hemodialysis Monday Wednesday Friday, peripheral arterial disease, the patient apparently is visiting from [place], she has 2 sons in town, she also complained of left-sided weakness, speech difficulty apparently and also some residual deficits from her previous CVA, the patient does have some chronic lower extremity wounds that she is being treated with antibiotics for, the patient on presentation was imaged and was found to have a right-sided chronic epidural hematoma with some mild acute hemorrhage within the lesion, not a surgical candidate.    Hospital Course    Patient admitted with Severe blood loss anemia, chronic right-sided subdural hematoma without intervention indicated at this time, chronic end-stage renal disease on hemodialysis  Interval Problem Update  Patient seen and examined today.    Patient tolerating treatment and therapies.  All Data, Medication data reviewed.  Case discussed with nursing as available.  Plan of Care reviewed with patient and notified of changes.  6/29 patient appears fairly confused, we discussed the findings of her capsule endoscopy, need for additional push endoscopy, she is somewhat resistant to the idea to undergo another procedure, the patient is otherwise afebrile, heart rate in the 80s to 100s, blood pressure is stabilized, patient has stable left upper extremity weakness and bilateral lower extremity weakness, patient with significant anxiety overall, seen by nephrology, gastroenterology, intensive care physicians, stabilized for transfer out of ICU    6/30: Initially the patient plan to have capsule endoscopy today but due to her worsening confusion it was canceled.  She had low-grade temperature 100.1 and tachycardia.  WBC was 13.8, urinalysis was negative for  infection.  I saw and examined the patient today.    7/1: Her hemoglobin was 6.4 this morning and I will order 1 unit of packed blood cell.  Yesterday her endoscopy was canceled due to confusion.  The patient is alert and oriented x4 this morning.  I saw and examined the patient today.\  I had a very long time discussion with daughter Elena about her medical condition.  I updated her that her hemodialysis catheter is infected and I am going to consult vascular surgeon for that and also infectious disease consult.  He is planning to have enteroscopy by GI.  At the same time I told her that she is in a lot of pain and asking for pain medication and we do not want to give her narcotics due to risks of getting respiratory distress.  I asked her what is her opinion about pain medication and she was not able to give me the opinion.  She stated that treat her everything we can medically to have her with her current condition.  I also explained to her that due to her age and multiple medical comorbidity she will be benefit from palliative care and possibly hospice care who also can help manage her pain.  She finally said that palliative team can contact her brother at  [phone].  I put a consult for palliative care today.    7/2 the patient stated that her pain is better today.  She still wanted to be treated medically for her medical condition.  Palliative team is following.  Plan Bertha is planning to have enteroscopy today.    7/3: the patient is getting HD. HD catheter needs to be removed by IR per nephro. I saw and examined her today.   IR to remove HD catheter today.    7/4: The patient complained about being hungry and requested to advance her diet to regular diet.    7/5: no acute issue overnight.  Her antibiotic was changed to ampicillin by infectious disease      Consultants/Specialty  Nephrology, gastroenterology, intensivist    Code Status  DNR/DNI    Disposition  TBD    Review of Systems  Review of Systems    Constitutional: Positive for malaise/fatigue. Negative for chills.   HENT: Negative for congestion and nosebleeds.    Eyes: Negative for pain and discharge.   Respiratory: Negative for sputum production and shortness of breath.    Cardiovascular: Negative for chest pain, palpitations and orthopnea.   Gastrointestinal: Positive for melena. Negative for abdominal pain and vomiting.   Genitourinary: Negative for dysuria and urgency.   Musculoskeletal: Positive for joint pain. Negative for back pain and neck pain.   Skin: Negative for itching.   Neurological: Negative for focal weakness, seizures and weakness.        Physical Exam  Temp:  [36 °C (96.8 °F)-36.4 °C (97.5 °F)] 36.2 °C (97.1 °F)  Pulse:  [68-78] 78  Resp:  [16-17] 16  BP: (100-124)/(30-72) 101/46  SpO2:  [93 %-100 %] 93 %    Physical Exam  Vitals signs and nursing note reviewed.   Constitutional:       Appearance: She is well-developed.   HENT:      Head: Normocephalic.      Nose: Nose normal.   Eyes:      Pupils: Pupils are equal, round, and reactive to light.   Neck:      Musculoskeletal: Normal range of motion and neck supple.      Thyroid: No thyromegaly.      Vascular: No JVD.   Cardiovascular:      Rate and Rhythm: Normal rate.      Heart sounds: Normal heart sounds. No friction rub.   Pulmonary:      Effort: Pulmonary effort is normal.      Breath sounds: Normal breath sounds. No wheezing.   Abdominal:      General: Bowel sounds are normal.   Musculoskeletal: Normal range of motion.         General: Tenderness present.   Skin:     General: Skin is warm.      Coloration: Skin is pale.   Neurological:      Mental Status: She is alert.         Fluids    Intake/Output Summary (Last 24 hours) at 7/5/2020 0805  Last data filed at 7/4/2020 2050  Gross per 24 hour   Intake 120 ml   Output --   Net 120 ml       Laboratory  Recent Labs     07/03/20  0719 07/03/20  2223 07/04/20  0519 07/04/20  1032 07/04/20  1649 07/04/20  2301   WBC 11.9*  --  11.5* 12.0*   --   --    RBC 2.45*  --  2.51* 2.56*  --   --    HEMOGLOBIN 7.1* 7.1* 7.3*  7.3* 7.3* 7.8* 6.8*   HEMATOCRIT 22.7* 22.8* 23.8* 24.8*  --   --    MCV 92.7  --  94.8 96.9  --   --    MCH 29.0  --  29.1 28.5  --   --    MCHC 31.3*  --  30.7* 29.4*  --   --    RDW 49.6  --  50.1* 51.7*  --   --    PLATELETCT 268  --  295 281  --   --    MPV 9.4  --  9.7 9.6  --   --      Recent Labs     07/03/20  0719 07/04/20  0615   SODIUM 138 136   POTASSIUM 3.6 3.6   CHLORIDE 100 99   CO2 28 28   GLUCOSE 121* 105*   BUN 21 22   CREATININE 2.96* 3.35*   CALCIUM 7.4* 7.5*     Recent Labs     07/02/20  1158   INR 1.15*               Imaging  IR-CVC TUNNEL WITH PORT REMOVAL   Final Result         1.  Removal of the right IJ] hemodialysis catheter.      2.  The tip of the catheter was cut off and sent to lab for analysis.      CT-HEAD W/O   Final Result      No significant interval change.      MR-BRAIN-W/O   Final Result      1.  Mild cerebral atrophy. Age-appropriate.   2.  Moderate-large lentiform extra-axial fluid collection over the right posterior frontal-parietal convexity consistent with a chronic or late subacute epidural hematoma or loculated subdural hematoma. Possible minimal component of more recent    hemorrhage where there is linear bandlike hyperdensity on CT.   3.  Patchy and punctate areas of bright diffusion signal in the underlying right parietal lobe which may represent acute or recent subacute arterial or venous infarction. No acute parenchymal hemorrhage.   4.  Minimal supratentorial white matter disease most consistent with microvascular ischemic change.   5.  Old lacunar infarct left lateral thalamus.      MR-MRA HEAD-W/O   Final Result      1.  Intracranial atherosclerotic cerebrovascular stenotic disease involving the posterior cerebral arteries and probably the middle cerebral arteries beyond the genu. No alexx occlusion.   2.  Normal variant dominant caliber right anterior cerebral artery A1 segment with a  hypoplastic left A1 segment.   3.  No evidence of aneurysm about [place].      EC-ECHOCARDIOGRAM COMPLETE W/ CONT   Final Result      US-CAROTID DOPPLER BILAT   Final Result      DX-CHEST-PORTABLE (1 VIEW)   Final Result      No acute cardiopulmonary abnormality.      CT-HEAD W/O   Final Result      1. Small amount of acute hemorrhage in a chronic 2.1 cm right epidural collection, along the right frontoparietal convexity. Associated 2 mm right to left midline shift.   2. Minimal subacute subdural hemorrhage anterior to the right epidural collection.   3. Hyperdensity in the temporal horn of the right lateral ventricle may represent small amount of intraventricular hemorrhage.   4. No CT evidence of acute infarct.           Assessment/Plan  * Acute GI bleeding  Assessment & Plan  With melena, EGD and colonoscopy did not see any active lesions, capsule endoscopy identified a possible source of 50 cm,   Enteroscopy: duodenitis, bleeding AVM  Continuous cardiac monitoring  Continue PPI for now  Monitor H&H every 8 hours, transfuse for hemoglobin less than 7      Epidural hemorrhage (HCC)  Assessment & Plan  2.3 cm with 2 mm shift  Neurosurgery consulted and recommended no intervention  Neurology consulted, MRI and MRA resulted, possible recent small CVA      Leg wounds  Assessment & Plan  Wound culture grew pseudomonas aeruginosa  Was on oral ciprofloxacin and I will change it to IV ampicillin      Peripheral artery disease (HCC)  Assessment & Plan  Hold aspirin and Plavix    Type 2 diabetes mellitus with kidney complication, without long-term current use of insulin ([facility])  Assessment & Plan  Start on insulin sliding scale with serial Accu-Checks  Check hemoglobin A1c  Hypoglycemic protocol in place        Primary hypertension  Assessment & Plan  Restart medication    ESRD (end stage renal disease) ([facility])  Assessment & Plan  Dialysis Monday Wednesday Friday,   nephrology is consulted  Monitor BMP and assess  response  Avoid IV contrast/nephrotoxins/NSAIDs  Dose adjust meds for decreased GFR        Acute blood loss anemia  Assessment & Plan  Given 3 units of RBCs, 1 unit of platelets  Transfuse as needed with target hemoglobin above 7.  GI is following  Follow CBC in the morning  Enteroscopy: duodenitis, bleeding AVM      Bacteremia  Assessment & Plan  From hemodialysis catheter  Remove HD catheter  Nephrology following  ID following on ampicillin  Repeat blood culture negative to date    Encephalopathy acute  Assessment & Plan  Likely multifactorial  Treating infection with IV antibiotic  Improving and alert oriented x4  We will try to avoid benzos and narcotic as much as possible  improving       VTE prophylaxis: SCD

## 2020-07-05 NOTE — PROGRESS NOTES
Infectious Disease Progress Note    Author: Arabella Lockhart M.D. Date & Time of service: 2020  1:35 PM    Chief Complaint:  Sepsis, strep    Interval History:  87-year-old female with ESRD, DM, PVD, CVA admitted 2020 for gastrointestinal bleed with associated weakness, dizziness and falls resulting in intracranial hemorrhage. Developed fever and leukocytosis during hospitalization and found to have above  7/2 AF (99.6) WBC 10.2 remains obtunded-pain better controlled. Seen by Palliative-now agreeable to GI procedure  /3 AF WBC 11.9  tolerated procedures well-bleeding AVMs seen with erosive duodenitis. Less pain today. Plan for removal HD cath noted   AF WBC 12 up to chair-no new complaints  / AF WBC 12.5 up to chair again  Today-pain controlled. Required another transfusion (Hgb down to 6.8)-wants to know how long it will take to get new HD cath  Labs Reviewed, Medications Reviewed, and Wound Reviewed.    Review of Systems:  Review of Systems   Constitutional: Negative for chills and fever.   Respiratory: Negative for cough and shortness of breath.    Cardiovascular: Negative for chest pain.   Gastrointestinal: Negative for nausea and vomiting.   Musculoskeletal: Positive for myalgias.   All other systems reviewed and are negative.      Hemodynamics:  Temp (24hrs), Av.3 °C (97.3 °F), Min:36 °C (96.8 °F), Max:36.4 °C (97.5 °F)  Temperature: 36.4 °C (97.5 °F)  Pulse  Av.2  Min: 9  Max: 111   Blood Pressure : (!) 131/37       Physical Exam:  Physical Exam  Vitals signs and nursing note reviewed.   Constitutional:       General: She is not in acute distress.     Appearance: She is ill-appearing. She is not toxic-appearing or diaphoretic.   HENT:      Nose: No rhinorrhea.      Mouth/Throat:      Pharynx: No posterior oropharyngeal erythema.   Eyes:      General: No scleral icterus.     Extraocular Movements: Extraocular movements intact.      Pupils: Pupils are equal, round, and reactive to  light.   Neck:      Musculoskeletal: No neck rigidity.   Cardiovascular:      Rate and Rhythm: Normal rate.      Comments: ectopy  Pulmonary:      Effort: Pulmonary effort is normal. No respiratory distress.      Breath sounds: No stridor. No wheezing.   Abdominal:      General: There is no distension.      Palpations: Abdomen is soft.      Tenderness: There is no abdominal tenderness. There is no guarding.   Skin:     Coloration: Skin is not jaundiced.      Findings: Bruising present.      Comments: Extensive bruising LUE with hand swelling   Neurological:      General: No focal deficit present.      Mental Status: She is alert.      Comments: More alert         Meds:    Current Facility-Administered Medications:   •  ampicillin  •  omeprazole  •  furosemide  •  haloperidol lactate  •  lidocaine  •  vitamin D (Ergocalciferol)  •  calcium acetate  •  atorvastatin  •  acetaminophen  •  ondansetron  •  ondansetron  •  LORazepam  •  heparin  •  hydrALAZINE    Labs:  Recent Labs     07/04/20  0519 07/04/20  1032 07/04/20  1649 07/04/20  2301 07/05/20  0834   WBC 11.5* 12.0*  --   --  12.5*   RBC 2.51* 2.56*  --   --  3.14*   HEMOGLOBIN 7.3*  7.3* 7.3* 7.8* 6.8* 9.0*   HEMATOCRIT 23.8* 24.8*  --   --  28.9*   MCV 94.8 96.9  --   --  92.0   MCH 29.1 28.5  --   --  28.7   RDW 50.1* 51.7*  --   --  50.7*   PLATELETCT 295 281  --   --  280   MPV 9.7 9.6  --   --  9.6   NEUTSPOLYS 76.90* 79.90*  --   --  77.20*   LYMPHOCYTES 13.40* 10.90*  --   --  14.30*   MONOCYTES 7.20 7.00  --   --  5.20   EOSINOPHILS 1.30 1.20  --   --  1.70   BASOPHILS 0.20 0.20  --   --  0.30   RBCMORPHOLO  --  Present  --   --   --      Recent Labs     07/03/20  0719 07/04/20  0615 07/05/20  0834   SODIUM 138 136 137   POTASSIUM 3.6 3.6 3.6   CHLORIDE 100 99 99   CO2 28 28 25   GLUCOSE 121* 105* 104*   BUN 21 22 35*     Recent Labs     07/03/20  0719 07/04/20  0615 07/05/20  0834   ALBUMIN 2.6* 2.4* 2.8*   TBILIRUBIN 0.2 0.3 0.7   ALKPHOSPHAT 53 46  53   TOTPROTEIN 6.3 5.8* 6.5   ALTSGPT 13 11 15   ASTSGOT 25 18 22   CREATININE 2.96* 3.35* 4.59*       Imaging:  Ct-head W/o    Result Date: 6/29/2020 6/29/2020 11:28 AM HISTORY/REASON FOR EXAM:  Stroke, follow up. Intracranial hemorrhage TECHNIQUE/EXAM DESCRIPTION AND NUMBER OF VIEWS: CT of the head without contrast. The study was performed on a helical multidetector CT scanner. Contiguous 2.5 mm axial sections were obtained from the skull base through the vertex. Up to date radiation dose reduction adjustments have been utilized to meet ALARA standards for radiation dose reduction. COMPARISON:  CT head 6/26/2020. Brain MRI 6/27/2020 FINDINGS: Redemonstrated is a predominantly low density extra axial fluid collection along the right frontoparietal convexity, probably loculated subdural hematoma although a component of epidural hemorrhage is not excluded. There is a small amount of hyperdensity  along the inferior aspect of the collection which could represent small amount of acute hemorrhage. There is no significant change in appearance. The collection measures up to 22 mm in thickness. There is stable mass effect with sulcal effacement and slight right to left midline shift. No herniation. Generalized age-related atrophy. Chronic small left thalamic lacunar infarct. No acute osseous abnormality. Probable prior partial right mastoidectomy.     No significant interval change.    Ct-head W/o    Result Date: 6/26/2020   CT HEAD WITHOUT CONTRAST 6/26/2020 12:34 PM HISTORY/REASON FOR EXAM:  Left-sided facial droop TECHNIQUE/EXAM DESCRIPTION AND NUMBER OF VIEWS: CT of the head without contrast. The study was performed on a helical multidetector CT scanner. Contiguous 2.5 mm axial sections were obtained from the skull base through the vertex. Up to date radiation dose reduction adjustments have been utilized to meet ALARA standards for radiation dose reduction. COMPARISON:  None available FINDINGS: Lateral ventricles  are normal in size and symmetric. Hyperdensity in the temporal horn of the right lateral ventricle. Mild global parenchymal atrophy. Chronic small vessel ischemic changes. Minimal 2 mm right to left midline shift Basal cisterns are patent. There is a chronic epidural collection along the right frontoparietal convexity measuring 2.1 cm in thickness. Small amount of acute blood products are seen in the inferior aspect of the collection. Minimal subacute subdural hemorrhage anterior to the right epidural collection, along the right frontal convexity. Calvaria are intact. Atherosclerosis. Visualized orbits are unremarkable. Visualized mastoid air cells are clear. No significant sinus disease in the visualized paranasal sinuses.     1. Small amount of acute hemorrhage in a chronic 2.1 cm right epidural collection, along the right frontoparietal convexity. Associated 2 mm right to left midline shift. 2. Minimal subacute subdural hemorrhage anterior to the right epidural collection. 3. Hyperdensity in the temporal horn of the right lateral ventricle may represent small amount of intraventricular hemorrhage. 4. No CT evidence of acute infarct.    Dx-chest-portable (1 View)    Result Date: 6/26/2020 6/26/2020 1:03 PM HISTORY/REASON FOR EXAM:  Shortness of Breath. TECHNIQUE/EXAM DESCRIPTION AND NUMBER OF VIEWS: Single portable view of the chest. COMPARISON: None. FINDINGS: LUNGS: Clear. No effusions. PNEUMOTHORAX: None. LINES AND TUBES: Right IJ central catheter is in the distal SVC. MEDIASTINUM: No cardiomegaly. Atherosclerosis. MUSCULOSKELETAL STRUCTURES: No acute displaced fracture.     No acute cardiopulmonary abnormality.    Mr-brain-w/o    Result Date: 6/27/2020 6/27/2020 5:18 PM HISTORY/REASON FOR EXAM:  Syncope, recurrent. Dizziness, left arm numbness and slurred speech. Left-sided facial droop. History of prior stroke. Recurrent syncope. End-stage renal disease. Hypertension. TECHNIQUE/EXAM DESCRIPTION: MRI of the  brain without contrast. T1 sagittal, T2 fast spin-echo axial, T1 coronal, FLAIR coronal, Diffusion weighted and Apparent Diffusion Coefficient (ADC map) axial images were obtained of the whole brain. Additional FLAIR axial images were obtained. The study was performed on a [employer] Signa 1.5 Starla MRI scanner. COMPARISON:  Head CT 6/26/2020 FINDINGS: The calvaria are unremarkable. There is a moderate-large T2 hyperintense extra-axial fluid collection over the right posterior frontal-parietal convexity. There is intermediate-slightly hyperintense FLAIR signal and T1 signal is slightly greater than CSF. The collection is somewhat lentiform. This could represent a late subacute or chronic epidural hematoma or loculated subdural hematoma. There is gradient echo hypointensity hemosiderin staining along the deep aspect of the collection. Corresponding to the bandlike hyperdense linear focus toward the anterior aspect of the collection seen on CT, there is corresponding gradient echo hypointensity and this may be a more recent bandlike focus of hemorrhage. There is local mass effect with effacement of the right trigone and anterior displacement of the posterior body right lateral ventricle. There is minimal right to left shift of midline structures of about 1.6 mm. The underlying brain parenchyma in the right parietal lobe shows hazy and punctate areas of restricted diffusion which may represent associated arterial infarction or venous infarction due to compression of cortical veins. There is underlying mild cerebral atrophy with prominence of sulcal markings and mild ventriculomegaly in the left hemisphere. Age-appropriate. There is an old lacunar infarct in the thalamus Elsewhere in the cerebral hemispheres, there is a pattern of minimal supratentorial white matter disease with a few rare foci of T2 and FLAIR hyperintensity in the subcortical and/or deep white matter consistent with small vessel ischemic change versus  demyelination or gliosis. The brainstem and cerebellum are unremarkable. The major vessels including the dural venous sinuses appear intact. Paranasal sinuses show mild mucosal thickening in the sphenoid sinus and minimal mucosal thickening scattered among the ethmoid air cells. The mastoids are clear. There has been cataract surgery. The gaze is divergent.     1.  Mild cerebral atrophy. Age-appropriate. 2.  Moderate-large lentiform extra-axial fluid collection over the right posterior frontal-parietal convexity consistent with a chronic or late subacute epidural hematoma or loculated subdural hematoma. Possible minimal component of more recent hemorrhage where there is linear bandlike hyperdensity on CT. 3.  Patchy and punctate areas of bright diffusion signal in the underlying right parietal lobe which may represent acute or recent subacute arterial or venous infarction. No acute parenchymal hemorrhage. 4.  Minimal supratentorial white matter disease most consistent with microvascular ischemic change. 5.  Old lacunar infarct left lateral thalamus.    Mr-mra Head-w/o    Result Date: 6/27/2020 6/27/2020 5:18 PM HISTORY/REASON FOR EXAM:  Dizziness, non-specific. Syncope, recurrent. Dizziness, left arm numbness and slurred speech. Left-sided facial droop. History of prior stroke. Recurrent syncope. End-stage renal disease. Hypertension. TECHNIQUE/EXAM DESCRIPTION: MRA of the Head ([place]) without contrast. The study was performed on a [employer] Sign[employer] 1.5 Starla MRI scanner. MRA of the [place] was performed with 3D time-of-flight technique with axial acquisition. Additional MRA of the internal carotid and vertebrobasilar arteries at the level of the skull base and craniocervical junction was also performed with 3D time-of-flight technique with axial acquisition. Images are reviewed at the PACS or [employer] workstations as axial source images as well as maximum intensity ray projection (MIP) multiplanar  3D reconstructions. COMPARISON:  MRI brain from today's date FINDINGS: The posterior circulation shows patent distal vertebral arteries. The left vertebral artery is minimally dominant. The vertebrobasilar confluence is intact. The basilar artery is widely patent with uniform caliber and robust flow signal. There is multifocal irregularity of the posterior cerebral arteries, right greater than left with a gap-like defect in the right P2 segment consistent with moderate to high-grade stenosis. Similar lesser findings of the left posterior cerebral artery. These findings are consistent with intracranial atherosclerotic cerebrovascular disease. No evidence of aneurysm in the posterior circulation. The anterior circulation shows the carotid siphons to be patent. There is a normal variant dominant caliber right anterior cerebral artery A1 segment with a markedly hypoplastic left A1 segment. The proximal middle cerebral arteries are intact. The M2 and M3/sylvian branches beyond the genu show some attenuation of caliber and interruption of flow signal suggesting intracranial atherosclerotic stenotic disease. No evidence of aneurysm about the anterior circulation.     1.  Intracranial atherosclerotic cerebrovascular stenotic disease involving the posterior cerebral arteries and probably the middle cerebral arteries beyond the genu. No alexx occlusion. 2.  Normal variant dominant caliber right anterior cerebral artery A1 segment with a hypoplastic left A1 segment. 3.  No evidence of aneurysm about [place].    Us-carotid Doppler Bilat    Result Date: 6/27/2020   Carotid Duplex  Report  Vascular Laboratory  CONCLUSIONS  Moderate atherosclerosis. Velocities are consistent with 50-69% stenosis of  the internal carotid arteries bilaterally.  BECKIE BRITTON  Exam Date:     06/27/2020 09:01  Room #:     Inpatient  Priority:     Routine  Ht (in):             Wt (lb):  Ordering Physician:        LAWRENCE SHEFFIELD  Physician:       [id number]  Sonographer:               MARGO Cason RDCS  Study Type:                Complete Bilateral  Technical Quality:         Adequate  Age:    87    Gender:     F  MRN:    [id number]  :    10/26/1932      BSA:  Indications:     Dizziness and giddiness  CPT Codes:       91933  ICD Codes:       R42  History:  Limitations:     Body habitus  Right Brachial BP:              /  Left Brachial BP:             /  RIGHT  Plaque          Plaque         Velocity (cm/s)  Characteristics Composition    Syst/Diast                                 128  / 32      Distal ICA                                 121  / 35      Mid ICA                                 78   / 28      Prox ICA                                 62   / 10      Distal CCA                                      /         Mid CCA                                 14   / 4       Prox CCA                                 381  /         ECA  Waveform:   Triphasic                         SCA  LEFT  Plaque         Plaque          Velocity (cm/s)  CharacteristicsComposition     Syst/Diast                                 155  / 30      Distal ICA                                 166  / 30      Mid ICA  Irregular      Heterogeneo     122  / 26      Prox ICA                 us  Irregular      Heterogeneo     71   / 16      Distal CCA                 us                                      /         Mid CCA                                 96   / 17      Prox CCA  Irregular      Heterogeneo     336  /         ECA                 us  Waveform:   Triphasic                         SCA          5.4          ICA/CCA       1.3        Antegrade      Vertebral   Antegrade         78   / 16     cm/s        63    / 12  FINDINGS  Right carotid.  Plaque of the bifurcation extending into the internal carotid. Velocities  are consistent with 50-69% stenosis of the internal carotid artery.    Left carotid.  Plaque of the bifurcation  extending into the internal carotid. Velocities  are consistent with 50-69% stenosis of the internal carotid artery.  Plaque is irregular on the surface and heterogeneous with mixed acoustic  densities.  Bilateral subclavian and vertebral artery waveforms are antegrade and  waveforms are normal in character and velocity.  Liam Duong MD  (Electronically Signed)  Final Date:      2020                   10:28    Ec-echocardiogram Complete W/ Cont    Result Date: 2020  Transthoracic Echo Report Echocardiography Laboratory CONCLUSIONS No prior study is available for comparison. Normal left ventricular systolic function. Left ventricular ejection fraction is visually estimated to be 60%. The right ventricle was normal in size and function. Moderately dilated left atrium. Mild aortic stenosis. Mild tricuspid regurgitation. BECKIE BRITTON Exam Date:         2020                    15:19 Exam Location:     Inpatient Priority:          Routine Ordering Physician:        LAURI ROWLEY Referring Physician: Sonographer:               FERNANDO Frazier Age:    87     Gender:    F MRN:    [id number] :    10/26/1932 BSA:    1.73   Ht (in):    59     Wt (lb):    171 Exam Type:     Complete, Contrast Indications:     Transient cerebral ischemic attack, unspecified ICD Codes:       G45.9 CPT Codes:       90703,  BP:   114    /   56     HR:   85 Technical Quality:       Fair MEASUREMENTS  (Male / Female) Normal Values 2D ECHO LV Diastolic Diameter PLAX        4.9 cm                4.2 - 5.9 / 3.9 - 5.3 cm LV Systolic Diameter PLAX         2.5 cm                2.1 - 4.0 cm IVS Diastolic Thickness           0.92 cm               LVPW Diastolic Thickness          0.87 cm               LVOT Diameter                     2.1 cm                LV Ejection Fraction MOD BP       62.4 %                >= 55  % LV Ejection Fraction MOD 4C        61.3 %                LV Ejection Fraction MOD 2C       64.4 %                IVC Diameter                      1.6 cm                DOPPLER AV Peak Velocity                  2.1 m/s               AV Peak Gradient                  17.8 mmHg             AV Mean Gradient                  11.1 mmHg             LVOT Peak Velocity                0.96 m/s              AV Area Cont Eq vti               1.7 cm2               Mitral E Point Velocity           1.3 m/s               Mitral E to A Ratio               0.93                  Mitral A Duration                 108 ms                MV Pressure Half Time             55.6 ms               MV Area PHT                       4 cm2                 MV Deceleration Time              192 ms                TR Peak Velocity                  244 cm/s              PV Peak Velocity                  1 m/s                 PV Peak Gradient                  4 mmHg                RVOT Peak Velocity                0.75 m/s              * Indicates values subject to auto-interpretation LV EF:        % FINDINGS Left Ventricle Normal left ventricular chamber size. Normal left ventricular wall thickness. Normal left ventricular systolic function. Left ventricular ejection fraction is visually estimated to be 60%. Normal regional wall motion. Grade II diastolic dysfunction. Contrast was used to enhance visualization of the endocardial border. 1 mL of contrast was administered. Existing IV was used. Located at the left antecubital Right Ventricle The right ventricle was normal in size and function. Right Atrium The right atrium is normal in size.  Normal inferior vena cava size and inspiratory collapse. Left Atrium The left atrium is normal in size.  Moderately dilated left atrium. Left atrial volume index is 43 mL/sq m. Mitral Valve Structurally normal mitral valve without significant stenosis. Trace mitral regurgitation. Aortic Valve Tricuspid aortic valve. Calcified aortic valve  leaflets. Mild aortic stenosis. Aortic valve area calculated from the continuity equation is 1.7 cm2. Vmax is 2.2 m/s. Transvalvular gradients are - Peak: 19 mmHg, Mean: 11 mmHg. Dimensionless index is 0.52. No aortic insufficiency. Tricuspid Valve Structurally normal tricuspid valve without significant stenosis. Mild tricuspid regurgitation. Estimated right ventricular systolic pressure  is 30 mmHg. Right atrial pressure is estimated to be 3 mmHg. Pulmonic Valve The pulmonic valve is not well visualized. No pulmonic stenosis. Trace pulmonic insufficiency. Pericardium Normal pericardium without effusion. Aorta The aortic root is normal.  Ascending aorta diameter is 3.1 cm. Ricco Portillo MD (Electronically Signed) Final Date:     27 June 2020                 17:47      Micro:  Results     Procedure Component Value Units Date/Time    CATH TIP CULTURE [[id number]] Collected:  07/03/20 1230    Order Status:  Completed Specimen:  Cath Tip Updated:  07/05/20 1152     Significant Indicator NEG     Source CTIP     Site Central Line     Culture Result <15 CFU's Coagulase negative Staphylococcus.    CATH TIP CULTURE [[id number]]     Order Status:  No result Specimen:  Cath Tip from Central Line     Blood Culture [[id number]]  (Abnormal) Collected:  06/30/20 1340    Order Status:  Completed Specimen:  Blood Updated:  07/03/20 1202     Significant Indicator POS     Source BLD     Site HD Cath     Culture Result Growth detected by Bactec instrument. 07/01/2020  05:28      Enterococcus faecalis  Combination therapy with ampicillin, penicillin, or  vancomycin (for susceptible strains) plus an aminoglycoside  is usually indicated for serious enterococcal infections,  such as endocarditis unless high-level resistance to both  gentamicin and streptomycin is documented; such combinations  are predicted to result in synergistic killing of the  Enterococcus.  See previous culture for sensitivity report.      Narrative:       CALL  [place]   "[phone] tel. [phone],  CALLED  131 tel. [phone] 07/01/2020, 05:30, RB PERF. RESULTS CALLED TO: RN  [id number]  HD Cath    Blood Culture [[id number]]  (Abnormal)  (Susceptibility) Collected:  06/30/20 1340    Order Status:  Completed Specimen:  Blood Updated:  07/03/20 1202     Significant Indicator POS     Source BLD     Site HD Cath     Culture Result Growth detected by Bactec instrument. 07/01/2020  05:31      Enterococcus faecalis  Combination therapy with ampicillin, penicillin, or  vancomycin (for susceptible strains) plus an aminoglycoside  is usually indicated for serious enterococcal infections,  such as endocarditis unless high-level resistance to both  gentamicin and streptomycin is documented; such combinations  are predicted to result in synergistic killing of the  Enterococcus.      Narrative:       HD Cath    Susceptibility     Enterococcus faecalis (1)     Antibiotic Interpretation Microscan Method Status    Daptomycin Sensitive <=1 mcg/mL MELISSA Final    Ampicillin Sensitive <=2 mcg/mL MELISSA Final    Gent Synergy Sensitive <=500 mcg/mL MELISSA Final    Vancomycin Sensitive 2 mcg/mL MELISSA Final    Penicillin Sensitive 2 mcg/mL MELISSA Final                   Blood Culture [[id number]] Collected:  07/02/20 1158    Order Status:  Completed Specimen:  Blood from Peripheral Updated:  07/03/20 0726     Significant Indicator NEG     Source BLD     Site PERIPHERAL     Culture Result No Growth  Note: Blood cultures are incubated for 5 days and  are monitored continuously.Positive blood cultures  are called to the RN and reported as soon as  they are identified.      Narrative:       Per Hospital Policy: Only change Specimen Src: to \"Line\" if  specified by physician order.  Right Wrist    Blood Culture [[id number]] Collected:  07/02/20 1158    Order Status:  Completed Specimen:  Blood from Peripheral Updated:  07/03/20 0726     Significant Indicator NEG     Source BLD     Site PERIPHERAL     Culture Result No Growth  Note: Blood " "cultures are incubated for 5 days and  are monitored continuously.Positive blood cultures  are called to the RN and reported as soon as  they are identified.      Narrative:       Per Hospital Policy: Only change Specimen Src: to \"Line\" if  specified by physician order.  Right Hand    Blood Culture [[id number]]     Order Status:  Canceled Specimen:  Blood from Peripheral     Blood Culture [[id number]]     Order Status:  Canceled Specimen:  Blood from Peripheral     Urinalysis [[id number]]     Order Status:  No result Specimen:  Urine     CULTURE WOUND W/ GRAM STAIN [[id number]]  (Abnormal)  (Susceptibility) Collected:  06/27/20 9905    Order Status:  Completed Specimen:  Wound from Exudate Updated:  06/30/20 1321     Significant Indicator POS     Source WND     Site LEFT LEG     Culture Result Rare growth mixed skin naina.     Gram Stain Result No organisms seen.     Culture Result Pseudomonas aeruginosa  Light growth  P.aeruginosa may develop resistance during prolonged therapy  with all antibiotics. Isolates that are initially susceptible  may become resistant within three to four days after  initiation of therapy. Testing of repeat isolates may be  warranted.        Stenotrophomonas maltophilia  Rare growth      Narrative:       Left leg  Left leg    Susceptibility     Pseudomonas aeruginosa (1)     Antibiotic Interpretation Microscan Method Status    Ceftazidime Sensitive 4 mcg/mL MELISSA Final    Ciprofloxacin Sensitive <=1 mcg/mL MELISSA Final    Cefepime Sensitive <=2 mcg/mL MELISSA Final    Gentamicin Sensitive <=4 mcg/mL MELISSA Final    Amikacin Sensitive <=16 mcg/mL MELISSA Final    Meropenem Sensitive <=1 mcg/mL MELISSA Final    Pip/Tazobactam Sensitive <=16 mcg/mL MELISSA Final    Tobramycin Sensitive <=4 mcg/mL MELISSA Final          Stenotrophomonas maltophilia (2)     Antibiotic Interpretation Microscan Method Status    Ceftazidime Resistant >16 mcg/mL MELISSA Final    Trimeth/Sulfa Sensitive <=2/38 mcg/mL MELISSA Final    Levofloxacin " Sensitive <=2 mcg/mL MELISSA Final                   CULTURE WOUND W/ GRAM STAIN [[id number]]  (Abnormal) Collected:  06/27/20 2335    Order Status:  Completed Specimen:  Wound from Exudate Updated:  06/30/20 0912     Significant Indicator POS     Source WND     Site RIGHT LEG     Culture Result -     Gram Stain Result Moderate WBCs.  No organisms seen.       Culture Result Pseudomonas aeruginosa  Light growth  See previous culture for sensitivity report.      Narrative:       Right leg  Right leg    Urinalysis [[id number]]  (Abnormal) Collected:  06/30/20 0824    Order Status:  Completed Specimen:  Urine, Cath Updated:  06/30/20 0856     Color Yellow     Character Clear     Specific Gravity 1.014     Ph >=9.0     Glucose 100 mg/dL      Ketones Negative mg/dL      Bilirubin Negative     Urobilinogen, Urine 0.2     Nitrite Negative     Leukocyte Esterase Negative     Occult Blood Trace     Micro Urine Req Microscopic    Narrative:       Collected By:[id number] NATIVIDAD SUNSHINE  If not done within the last 24 hours    Blood Culture [[id number]]     Order Status:  No result Specimen:  Blood from Peripheral     Blood Culture [[id number]]     Order Status:  No result Specimen:  Blood from Peripheral     Blood Culture [[id number]]     Order Status:  Canceled Specimen:  Blood from Peripheral     Blood Culture [[id number]]     Order Status:  Canceled Specimen:  Blood from Peripheral           Assessment:  Active Hospital Problems    Diagnosis   • *Acute GI bleeding [K92.2]   • Epidural hemorrhage ([facility]) [S06.4X9A]   • Acute blood loss anemia [D62]   • ESRD (end stage renal disease) ([facility]) [N18.6]   • Type 2 diabetes mellitus with kidney complication, without long-term current use of insulin ([facility]) [E11.29]   • Peripheral artery disease ([facility]) [I73.9]   • Leg wounds [S81.801A]   • Encephalopathy acute [G93.40]       Plan:  Enterococcal sepsis  GI source most likely-HD cath infection also possible  Afebrile  Leukocytosis mild  AMS  improved  BCxs + Efaecalis amp-S 6/30  Blood cxs 7/2 neg so far  Cath tip neg  Can replace HD cath once blood cxs are neg for 72 hours  TTE with calcified valves;  ANABELL if persistently +blood cxs if feasible  Continue ampicillin -2 weeks if no new positive cxs    GI bleed  Continued blood loss .    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  Management is conservatively.    No new CVA seen by MRI     ESRD  Dose adjust abx    Chronic stasis ulceration  No evidence of active infection, so do not add additional antimicrobial therapy for this wound.    Wound swab showed colonization with pseudomonas and stenotrophomonas  Continue with wound care   Recommend vascular studies when feasible    Diabetes  Keep BS under 150 to help control current infection

## 2020-07-05 NOTE — CARE PLAN
Problem: Bowel/Gastric:  Goal: Will not experience complications related to bowel motility  Outcome: PROGRESSING SLOWER THAN EXPECTED  Intervention: Assess baseline bowel pattern  Note: Pt having loose bms. Will hold stool softeners. Pt states boost is causing the diarrhea.      Problem: Safety  Goal: Will remain free from falls  Outcome: PROGRESSING AS EXPECTED  Intervention: Assess risk factors for falls  Note: high fall risk. Bed in lowest and locked position. Upper side rails up. Yellow fall risk band on. Pt calls appropriately and doesn't attempt to get out of bed without staff assistance. Bed and chair alarm on.

## 2020-07-05 NOTE — PROGRESS NOTES
Blood administration started - 1 unit transfusing now. Vitals taken and pt is tolerating it well.

## 2020-07-05 NOTE — CARE PLAN
Problem: Psychosocial Needs:  Goal: Level of anxiety will decrease  Outcome: PROGRESSING AS EXPECTED  Pt is pleasant, smiling and understanding of plan of care. Able to verbalize needs and education when reiterated     Problem: Safety  Goal: Will remain free from falls  Outcome: PROGRESSING AS EXPECTED  Bed alarm on in addition to universal fall precautions

## 2020-07-05 NOTE — PROGRESS NOTES
"Dr Kelsey notified of pt's dx and hgb drop to 6.8. Pt is asymptomatic, and vitals stable   /47   Pulse 69   Temp 36.3 °C (97.3 °F) (Temporal)   Resp 16   Ht 1.499 m (4' 11.02\")   Wt 84.2 kg (185 lb 10 oz)   SpO2 96%   Face-to-face order received for a one time transfusion of 1 unit of RBCs   "

## 2020-07-06 ENCOUNTER — APPOINTMENT (OUTPATIENT)
Dept: RADIOLOGY | Facility: MEDICAL CENTER | Age: 85
DRG: 377 | End: 2020-07-06
Attending: INTERNAL MEDICINE
Payer: MEDICARE

## 2020-07-06 LAB
ALBUMIN SERPL BCP-MCNC: 2.9 G/DL (ref 3.2–4.9)
ALBUMIN/GLOB SERPL: 0.9 G/DL
ALP SERPL-CCNC: 48 U/L (ref 30–99)
ALT SERPL-CCNC: 14 U/L (ref 2–50)
ANION GAP SERPL CALC-SCNC: 15 MMOL/L (ref 7–16)
AST SERPL-CCNC: 17 U/L (ref 12–45)
BASOPHILS # BLD AUTO: 0.3 % (ref 0–1.8)
BASOPHILS # BLD: 0.04 K/UL (ref 0–0.12)
BILIRUB SERPL-MCNC: 0.3 MG/DL (ref 0.1–1.5)
BUN SERPL-MCNC: 49 MG/DL (ref 8–22)
CALCIUM SERPL-MCNC: 7 MG/DL (ref 8.5–10.5)
CHLORIDE SERPL-SCNC: 99 MMOL/L (ref 96–112)
CO2 SERPL-SCNC: 23 MMOL/L (ref 20–33)
CREAT SERPL-MCNC: 6.15 MG/DL (ref 0.5–1.4)
EOSINOPHIL # BLD AUTO: 0.16 K/UL (ref 0–0.51)
EOSINOPHIL NFR BLD: 1.3 % (ref 0–6.9)
ERYTHROCYTE [DISTWIDTH] IN BLOOD BY AUTOMATED COUNT: 52.5 FL (ref 35.9–50)
GLOBULIN SER CALC-MCNC: 3.2 G/DL (ref 1.9–3.5)
GLUCOSE SERPL-MCNC: 144 MG/DL (ref 65–99)
HCT VFR BLD AUTO: 26.1 % (ref 37–47)
HGB BLD-MCNC: 8.3 G/DL (ref 12–16)
IMM GRANULOCYTES # BLD AUTO: 0.13 K/UL (ref 0–0.11)
IMM GRANULOCYTES NFR BLD AUTO: 1.1 % (ref 0–0.9)
LYMPHOCYTES # BLD AUTO: 1.86 K/UL (ref 1–4.8)
LYMPHOCYTES NFR BLD: 15.1 % (ref 22–41)
MAGNESIUM SERPL-MCNC: 1.9 MG/DL (ref 1.5–2.5)
MCH RBC QN AUTO: 28.8 PG (ref 27–33)
MCHC RBC AUTO-ENTMCNC: 31.8 G/DL (ref 33.6–35)
MCV RBC AUTO: 90.6 FL (ref 81.4–97.8)
MONOCYTES # BLD AUTO: 0.7 K/UL (ref 0–0.85)
MONOCYTES NFR BLD AUTO: 5.7 % (ref 0–13.4)
NEUTROPHILS # BLD AUTO: 9.42 K/UL (ref 2–7.15)
NEUTROPHILS NFR BLD: 76.5 % (ref 44–72)
NRBC # BLD AUTO: 0 K/UL
NRBC BLD-RTO: 0 /100 WBC
PLATELET # BLD AUTO: 265 K/UL (ref 164–446)
PMV BLD AUTO: 9.7 FL (ref 9–12.9)
POTASSIUM SERPL-SCNC: 3.8 MMOL/L (ref 3.6–5.5)
PROT SERPL-MCNC: 6.1 G/DL (ref 6–8.2)
RBC # BLD AUTO: 2.88 M/UL (ref 4.2–5.4)
SODIUM SERPL-SCNC: 137 MMOL/L (ref 135–145)
WBC # BLD AUTO: 12.3 K/UL (ref 4.8–10.8)

## 2020-07-06 PROCEDURE — 36415 COLL VENOUS BLD VENIPUNCTURE: CPT

## 2020-07-06 PROCEDURE — 700102 HCHG RX REV CODE 250 W/ 637 OVERRIDE(OP): Performed by: INTERNAL MEDICINE

## 2020-07-06 PROCEDURE — 770001 HCHG ROOM/CARE - MED/SURG/GYN PRIV*

## 2020-07-06 PROCEDURE — 97116 GAIT TRAINING THERAPY: CPT | Mod: CQ

## 2020-07-06 PROCEDURE — 700101 HCHG RX REV CODE 250: Performed by: INTERNAL MEDICINE

## 2020-07-06 PROCEDURE — 94760 N-INVAS EAR/PLS OXIMETRY 1: CPT

## 2020-07-06 PROCEDURE — 99232 SBSQ HOSP IP/OBS MODERATE 35: CPT | Performed by: INTERNAL MEDICINE

## 2020-07-06 PROCEDURE — 80053 COMPREHEN METABOLIC PANEL: CPT

## 2020-07-06 PROCEDURE — A9270 NON-COVERED ITEM OR SERVICE: HCPCS | Performed by: INTERNAL MEDICINE

## 2020-07-06 PROCEDURE — 85025 COMPLETE CBC W/AUTO DIFF WBC: CPT

## 2020-07-06 PROCEDURE — 97530 THERAPEUTIC ACTIVITIES: CPT | Mod: CQ

## 2020-07-06 PROCEDURE — 700111 HCHG RX REV CODE 636 W/ 250 OVERRIDE (IP): Performed by: INTERNAL MEDICINE

## 2020-07-06 PROCEDURE — 700105 HCHG RX REV CODE 258: Performed by: INTERNAL MEDICINE

## 2020-07-06 PROCEDURE — 83735 ASSAY OF MAGNESIUM: CPT

## 2020-07-06 RX ADMIN — ACETAMINOPHEN 650 MG: 325 TABLET, FILM COATED ORAL at 17:30

## 2020-07-06 RX ADMIN — OMEPRAZOLE 20 MG: 20 CAPSULE, DELAYED RELEASE ORAL at 05:02

## 2020-07-06 RX ADMIN — FUROSEMIDE 80 MG: 10 INJECTION, SOLUTION INTRAMUSCULAR; INTRAVENOUS at 05:02

## 2020-07-06 RX ADMIN — Medication 667 MG: at 17:30

## 2020-07-06 RX ADMIN — AMPICILLIN SODIUM 2000 MG: 2 INJECTION, POWDER, FOR SOLUTION INTRAMUSCULAR; INTRAVENOUS at 05:02

## 2020-07-06 RX ADMIN — LORAZEPAM 0.5 MG: 2 INJECTION INTRAMUSCULAR; INTRAVENOUS at 22:38

## 2020-07-06 RX ADMIN — AMPICILLIN SODIUM 2000 MG: 2 INJECTION, POWDER, FOR SOLUTION INTRAMUSCULAR; INTRAVENOUS at 17:33

## 2020-07-06 RX ADMIN — Medication 667 MG: at 07:48

## 2020-07-06 RX ADMIN — ACETAMINOPHEN 650 MG: 325 TABLET, FILM COATED ORAL at 05:02

## 2020-07-06 RX ADMIN — ERGOCALCIFEROL 50000 UNITS: 1.25 CAPSULE ORAL at 07:48

## 2020-07-06 RX ADMIN — LIDOCAINE 1 PATCH: 50 PATCH TOPICAL at 14:16

## 2020-07-06 RX ADMIN — FUROSEMIDE 80 MG: 10 INJECTION, SOLUTION INTRAMUSCULAR; INTRAVENOUS at 16:11

## 2020-07-06 ASSESSMENT — GAIT ASSESSMENTS
DISTANCE (FEET): 5
GAIT LEVEL OF ASSIST: MODERATE ASSIST
ASSISTIVE DEVICE: FRONT WHEEL WALKER
DEVIATION: STEP TO;BRADYKINETIC;SHUFFLED GAIT

## 2020-07-06 ASSESSMENT — COGNITIVE AND FUNCTIONAL STATUS - GENERAL
MOBILITY SCORE: 8
STANDING UP FROM CHAIR USING ARMS: A LOT
TURNING FROM BACK TO SIDE WHILE IN FLAT BAD: UNABLE
CLIMB 3 TO 5 STEPS WITH RAILING: TOTAL
WALKING IN HOSPITAL ROOM: A LOT
MOVING FROM LYING ON BACK TO SITTING ON SIDE OF FLAT BED: UNABLE
SUGGESTED CMS G CODE MODIFIER MOBILITY: CM
MOVING TO AND FROM BED TO CHAIR: UNABLE

## 2020-07-06 ASSESSMENT — ENCOUNTER SYMPTOMS
VOMITING: 0
ABDOMINAL PAIN: 0
CHILLS: 0
EYE PAIN: 0
NAUSEA: 0
FEVER: 0
PALPITATIONS: 0
BACK PAIN: 0
FOCAL WEAKNESS: 0
NECK PAIN: 0
SEIZURES: 0
WEAKNESS: 0
MYALGIAS: 1
EYE DISCHARGE: 0
SPUTUM PRODUCTION: 0
SHORTNESS OF BREATH: 0
COUGH: 0

## 2020-07-06 NOTE — CARE PLAN
Problem: Communication  Goal: The ability to communicate needs accurately and effectively will improve  7/6/2020 0923 by Manisha Funes R.N.  Outcome: PROGRESSING AS EXPECTED  7/6/2020 0920 by Manisha Funes R.N.  Reactivated  7/6/2020 0920 by Manisha Funes R.N.  Reactivated  Intervention: Educate patient and significant other/support system about the plan of care, procedures, treatments, medications and allow for questions  Flowsheets (Taken 7/6/2020 0923)  Pt & Family Have Been Educated on Methods Available to Report Concerns Related to Care, Treatment, Services, and Patient Safety Issues: Yes  Note: Plan of care discussed with patient. All questions answered. Plan for today IR [place] today, dialysis tomorrow and PT/OT.        Problem: Safety  Goal: Will remain free from falls  7/6/2020 0923 by Manisha Funes R.N.  Outcome: PROGRESSING AS EXPECTED  7/6/2020 0920 by Manisha Funes R.N.  Reactivated  7/6/2020 0920 by Manisha Funes R.N.  Reactivated  Intervention: Assess risk factors for falls  Note: high fall risk. Bed in lowest and locked position. Upper side rails up. Yellow fall risk band on. Pt calls appropriately and doesn't attempt to get out of bed without staff assistance. Bed and chair alarm on.       Problem: Safety  Goal: Will remain free from falls  7/6/2020 0923 by Manisha Funes R.N.  Outcome: PROGRESSING AS EXPECTED  7/6/2020 0920 by Manisha Funes R.N.  Reactivated  7/6/2020 0920 by Manisha Funes R.N.  Reactivated  Intervention: Assess risk factors for falls  Note: high fall risk. Bed in lowest and locked position. Upper side rails up. Yellow fall risk band on. Pt calls appropriately and doesn't attempt to get out of bed without staff assistance. Bed and chair alarm on.

## 2020-07-06 NOTE — PROGRESS NOTES
2 RN Skin Check    2 RN skin check complete with Russ RN  Devices in place: Nasal Cannula, tubi socks.  Skin assessed under devices: yes.  Confirmed pressure ulcers found on: none.  New potential pressure ulcers noted on none. Wound consult placed Yes.  The following interventions in place Pillows, mepilex to b/l elbows, waffle cushion to chair and bed, pt educated on skin breakdown prevention, pt refusing to sleep in the bed despite skin education, frequent incontinence checks, b/l feet elevated on pillows, barrier cream to buttocks, tubi socks check qshift/prn.     Behind ears/elbow-skin intact, pink and blanching.      Areas of concern  · RUE with bruising, 2 skin tears (dressings intact and up to date), edema- mepilex to right elbow  · LUE with significant bruising, edema and 1 skin tear (dressings intact and up to date)- mepilex to left elbow for prevention. Left hand wrapped in ace to reduce swelling and elevated.   · Bruising to trunk/chest  · Old underwood dressing site changed.  · Sacrum-red but blanching-waffle cushion to chair.  · RLE- christy, flaky, edema with RLE venous ulcer-dressing intact  · LLE-christy, flaky, edema with LLE venous ulcer-dressing intact.

## 2020-07-06 NOTE — DIETARY
Nutrition Services: Update   Day 10 of admit.  Corine Dela Cruz is a 87 y.o. female with admitting DX of GI bleed    Diet advanced to Regular on 7/4 then diabetic on 7/5. PO % of most meals since diet advancement. Receiving Boost GC TID.  Pt NPO for tunneled dialysis line placement. Per nephrology note, plan to start HD tomorrow.     Pt with no new weight since 6/28, please obtain new wt as able.     Malnutrition Risk: Unable to meet criteria at this time.     Recommendations/Plan:  1. Resume diet following procedure. Continue Boost GC TID.   2. Encourage intake of meals  3. Document intake of all meals as % taken in ADL's to provide interdisciplinary communication across all shifts.   4. Please obtain new wt as able.   5. Nutrition rep will continue to see patient for ongoing meal and snack preferences.    RD following

## 2020-07-06 NOTE — THERAPY
"Physical Therapy   Daily Treatment     Patient Name: Corine Dela Cruz  Age:  87 y.o., Sex:  female  Medical Record #: [id number]  Today's Date: 7/6/2020     Precautions: Fall Risk, Swallow Precautions ( See Comments)    Assessment    Pt progressing well w/ therapy. Pt was able to demonstrate improved strength for mobility. She less swelling on the L LE and UE. Pt was able to perform seated LE exercises and progress to STS. She was able to get to full standing today w/ cues to lift her head up. Pt was able to perform pre gait exercises and eventually performed short distance gait. Pt unable to clear feet from the floor and just shuffled forward w/ very small steps. Pt fatiguing d/t dizziness that decreased w/ seated rest breaks.    Plan    Continue current treatment plan.    Discharge recommendations:  Recommend post-acute placement for continued physical therapy services prior to discharge home.       Subjective    \"Oh I used to walk everywhere!\"     Objective       07/06/20 1141   Precautions   Precautions Fall Risk;Swallow Precautions ( See Comments)   Comments L sided weakness   Gait Analysis   Gait Level Of Assist Moderate Assist   Assistive Device Front Wheel Walker   Distance (Feet) 5   # of Times Distance was Traveled 2   Deviation Step To;Bradykinetic;Shuffled Gait   Comments Very small shuffled steps. Pt distance limited by dizziness.   Bed Mobility    Comments NT up in chair pre/post. Pt refusing to go BTB.   Functional Mobility   Sit to Stand Minimal Assist   Bed, Chair, Wheelchair Transfer Moderate Assist   Mobility Pt able to stand w/ improved ease w/ the FWW. Cues to lean forward. Pt able to get to full upright posture.   Short Term Goals    Short Term Goal # 1 Pt will improve bed mob to perform supine<>sit with min A within 6 visits to increase OOB activity/tolerance.   Goal Outcome # 1 goal not met   Short Term Goal # 2 Pt will perform STS with min A within 6 visits to initiate gait.   Goal Outcome " # 2 Progressing as expected

## 2020-07-06 NOTE — RESPIRATORY CARE
Oxygen Rounds      Patient found on    O2 L/m:  __1_______    Oxygen device:  ___nc_____   Spo2: _____96____%        Respiratory device skin site inspection completed.

## 2020-07-06 NOTE — PALLIATIVE CARE
Palliative Care follow-up  Patients AD notarized today. Provided original to pt, copy scanned into EMR.     Thank you for allowing Palliative Care to support this patient and family. Contact [phone] for additional assistance, change in patient status, or with any questions/concerns.

## 2020-07-06 NOTE — CARE PLAN
Problem: Mobility  Goal: Risk for activity intolerance will decrease  Outcome: PROGRESSING SLOWER THAN EXPECTED     Problem: Pain Management  Goal: Pain level will decrease to patient's comfort goal  Outcome: PROGRESSING AS EXPECTED  Tylenol in use     Problem: Skin Integrity  Goal: Risk for impaired skin integrity will decrease  Outcome: PROGRESSING AS EXPECTED  In place: waffle cushion overlay, phytoplex barrier cream wipes, barrier paste, mepilex, reposiioning and extra pillowls.     Problem: Safety  Goal: Will remain free from falls  Outcome: PROGRESSING AS EXPECTED   Chair and bed alarm on in addition to universal fall precautions

## 2020-07-06 NOTE — PROGRESS NOTES
[facility] Nephrology Daily Progress Note     Date of Service       Author: Nathan Navarro Jr., MD     Chief Complaint  86 yo F PMH ESRD MWF iHD, HTN, anemia of CKD, CKD-MBD, and HLD who presents to ED with CC as above.  She is visiting the [place] area from [place] and due to current pandemic has not been able to go back home and continued to stay in this area with family.  She has OP dialysis at [facility] and has been compliant.  She was doing well until about two weeks ago when she started to notice dark stools.  She was doing ok until about 3 days ago when she also noted orthostatic symptoms upon standing.  It resolved and then today it came back and she had some slurred speech and left arm numbness so she was brought in for evaluation.  She has had a prior stroke and the numbness and slurred speech have been present off and on as a result of that so initially she though nothing of it. Neurology was consulted and they did not feel that this was CVA related and more hemodynamic related.  Labs in ED showed Hgb 5.5 and serum labs showed  and K+ 5.3.  She was given pRBCs and admitted to ICU.  CT of head showed an acute on chronic epidural bleed with mild midline shift as well.  She was on plavix at home.  No recent F/C/N/V/CP/SOB.  No hematochezia, hematemesis.  No HA, visual changes, or abdominal pain     Daily nephrology summary  06/26 - consult done, HD done  06/27 - 2u prbc this AM, has been seen and evaluated by neurology and neurosurgery, further imaging planned for today  06/28 - not seen, patient in procedures  06/29 - seen on HD, tolerating with anxiolytic, somnolent and provides minimal responses to questions, 3Ca bath today, EGD and colonoscopy normal, capsule endoscopy  06/30 - transferred to floor, capsule endoscopy with fresh blood, push enteroscopy deferred, some concern for UTI but no urine cx and UA without bacteria, WBC increased, +fever, +AMS  07/01 - patient is agitated this AM, initially  "declined HD, +edema, +hbg decreased and receiving prbc  07/02 - patient sleepy this afternoon post procedure, erosive duodenitis and oozing AVMs in gastric cardia noted, tolerated HD with 4L UF yesterday, on lasix though UOP no longer being recorded  07/03 - HD this am, 3.5L UF. IR removed dialysis catheter. Pt \"feeling much better\".   07/04 - No new overnight renal events. S/p HD yesterday and tolerated well.   07/05 - Hgb 6.8. Feels ok. Getting one unit of PRBC.  07/06 - already ate this Am at 7. Asks me when she is getting her tunneled dialysis line.      Review of Systems  GEN: no fevers/chills/weight loss  HEENT: No vision changes  RESP: No shortness of breath  CV: No edema, no chest pain  ABD: no N/V, no edema  EXT: no edema  Musculoskeletal: no joint pain  NEURO: no headaches, no weakness  PSYCH: no confusion, no depression      Physical Exam  Vitals:    07/06/20 0745   BP: 146/46   Pulse: 76   Resp: 16   Temp: 36.2 °C (97.2 °F)   SpO2: 100%          Physical Exam  Constitutional:       Appearance: Normal appearance. He is normal weight.   HENT:      Head: Normocephalic and atraumatic.      Nose: Nose normal.   Neck:      Musculoskeletal: Normal range of motion and neck supple.   Cardiovascular:      Rate and Rhythm: Normal rate and regular rhythm.      Pulses: Normal pulses.      Heart sounds: No murmur. No friction rub. No gallop.    Pulmonary:      Effort: Pulmonary effort is normal.      Breath sounds: Normal breath sounds.   Abdominal:      General: Abdomen is flat. Bowel sounds are normal.      Palpations: Abdomen is soft.   Musculoskeletal:         General: No edema  Skin:     General: Skin is warm and dry.   Neurological:      General: No focal deficit present.      Mental Status: He is alert and oriented to person, place, and time.   Psychiatric:         Mood and Affect: Mood normal.         Behavior: Behavior normal.         Thought Content: Thought content normal.         Judgment: Judgment normal. "            Fluids       Intake/Output Summary (Last 24 hours) at 7/6/2020 0928  Last data filed at 7/6/2020 0754  Gross per 24 hour   Intake 240 ml   Output 300 ml   Net -60 ml           Labs    Lab Results   Component Value Date/Time    SODIUM 137 07/06/2020 02:42 AM    POTASSIUM 3.8 07/06/2020 02:42 AM    CHLORIDE 99 07/06/2020 02:42 AM    CO2 23 07/06/2020 02:42 AM    GLUCOSE 144 (H) 07/06/2020 02:42 AM    BUN 49 (H) 07/06/2020 02:42 AM    CREATININE 6.15 (HH) 07/06/2020 02:42 AM       Recent Labs     07/04/20  1032  07/05/20  0834 07/05/20  1430 07/05/20 2021 07/06/20  0242   WBC 12.0*  --  12.5*  --   --  12.3*   RBC 2.56*  --  3.14*  --   --  2.88*   HEMOGLOBIN 7.3*   < > 9.0* 8.2* 8.5* 8.3*   HEMATOCRIT 24.8*  --  28.9*  --   --  26.1*   MCV 96.9  --  92.0  --   --  90.6   MCH 28.5  --  28.7  --   --  28.8   RDW 51.7*  --  50.7*  --   --  52.5*   PLATELETCT 281  --  280  --   --  265   MPV 9.6  --  9.6  --   --  9.7   NEUTSPOLYS 79.90*  --  77.20*  --   --  76.50*   LYMPHOCYTES 10.90*  --  14.30*  --   --  15.10*   MONOCYTES 7.00  --  5.20  --   --  5.70   EOSINOPHILS 1.20  --  1.70  --   --  1.30   BASOPHILS 0.20  --  0.30  --   --  0.30   RBCMORPHOLO Present  --   --   --   --   --     < > = values in this interval not displayed.       Assessment/Plan  - ESRD: Etiology likely 2/2 HTN.  Visitor at [facility], lives in [place]  - Melena. EGD and colonoscopy normal, capsule endoscopy with fresh blood 6/30  - Hyperkalemia - correct with HD  - Acute epidural hematoma    * Superimposed on chronic epidural bleed, with a small midline shift    * Neuro following, has been evaluated by neurosurgery  - HTN    * Goal BP < 140/90    * Stable  - Anemia, multifactorial    * Goal Hgb 10-11 in CKD, ferritin significantly elevated    * transfuse hgb<7    * see melena above  - CKD-MBD    * Managed at HD unit, phos now above goal, Ca low, vitamin D low, PTH appropriate  - HLD  - CAD  - Leukocytosis with low grade fever  -  Bacteremia - GI source versus line.   - Edema, anasarca    * UF with HD as tolerated     * On diuretics      PLAN:  1. IR consulted for tunneled dialysis line placement. Patient ate at 7AM, so it will have to be this afternoon.   2. Plan for HD tomorrow    Nathan Navarro Jr, MD  [facility] Nephrology

## 2020-07-06 NOTE — PROGRESS NOTES
0915  Order placed for underwood. Pt has been NPO since 8am. IR plans to take patient this afternoon. Plan for dialysis tomorrow.     1140  Pt refusing boosts because pt states that boost gives her diarrhea. Order dc'd.     1412  Old underwood site changed. Some white exudate noted. Cleansed with ns/gauze and new gauze/tegaderm applied. Pt NPO for IR procedure since after breakfast.     1705  S/w IR. IR didn't have time for underwood placed. IR attempting to get pt on schedule in the a.m

## 2020-07-06 NOTE — PROGRESS NOTES
[facility] Medicine Daily Progress Note    Date of Service  7/6/2020    Chief Complaint  87 y.o. female admitted 6/26/2020 with melena and weakness, patient has a history of CVA, end-stage renal disease on hemodialysis Monday Wednesday Friday, peripheral arterial disease, the patient apparently is visiting from [place], she has 2 sons in town, she also complained of left-sided weakness, speech difficulty apparently and also some residual deficits from her previous CVA, the patient does have some chronic lower extremity wounds that she is being treated with antibiotics for, the patient on presentation was imaged and was found to have a right-sided chronic epidural hematoma with some mild acute hemorrhage within the lesion, not a surgical candidate.    Hospital Course    Patient admitted with Severe blood loss anemia, chronic right-sided subdural hematoma without intervention indicated at this time, chronic end-stage renal disease on hemodialysis  Interval Problem Update  Patient seen and examined today.    Patient tolerating treatment and therapies.  All Data, Medication data reviewed.  Case discussed with nursing as available.  Plan of Care reviewed with patient and notified of changes.  6/29 patient appears fairly confused, we discussed the findings of her capsule endoscopy, need for additional push endoscopy, she is somewhat resistant to the idea to undergo another procedure, the patient is otherwise afebrile, heart rate in the 80s to 100s, blood pressure is stabilized, patient has stable left upper extremity weakness and bilateral lower extremity weakness, patient with significant anxiety overall, seen by nephrology, gastroenterology, intensive care physicians, stabilized for transfer out of ICU    6/30: Initially the patient plan to have capsule endoscopy today but due to her worsening confusion it was canceled.  She had low-grade temperature 100.1 and tachycardia.  WBC was 13.8, urinalysis was negative for  infection.  I saw and examined the patient today.    7/1: Her hemoglobin was 6.4 this morning and I will order 1 unit of packed blood cell.  Yesterday her endoscopy was canceled due to confusion.  The patient is alert and oriented x4 this morning.  I saw and examined the patient today.\  I had a very long time discussion with daughter Elena about her medical condition.  I updated her that her hemodialysis catheter is infected and I am going to consult vascular surgeon for that and also infectious disease consult.  He is planning to have enteroscopy by GI.  At the same time I told her that she is in a lot of pain and asking for pain medication and we do not want to give her narcotics due to risks of getting respiratory distress.  I asked her what is her opinion about pain medication and she was not able to give me the opinion.  She stated that treat her everything we can medically to have her with her current condition.  I also explained to her that due to her age and multiple medical comorbidity she will be benefit from palliative care and possibly hospice care who also can help manage her pain.  She finally said that palliative team can contact her brother at  [phone].  I put a consult for palliative care today.    7/2 the patient stated that her pain is better today.  She still wanted to be treated medically for her medical condition.  Palliative team is following.  Plan Bertha is planning to have enteroscopy today.    7/3: the patient is getting HD. HD catheter needs to be removed by IR per nephro. I saw and examined her today.   IR to remove HD catheter today.    7/4: The patient complained about being hungry and requested to advance her diet to regular diet.    7/5: no acute issue overnight.  Her antibiotic was changed to ampicillin by infectious disease    7/6: Patient is feeling better each day.  Nephrology and infectious disease are following.  Consultants/Specialty  Nephrology, gastroenterology,  intensivist    Code Status  DNR/DNI    Disposition  TBD    Review of Systems  Review of Systems   Constitutional: Positive for malaise/fatigue. Negative for chills.   HENT: Negative for congestion.    Eyes: Negative for pain and discharge.   Respiratory: Negative for sputum production and shortness of breath.    Cardiovascular: Negative for chest pain and palpitations.   Gastrointestinal: Positive for melena. Negative for abdominal pain and vomiting.   Genitourinary: Negative for dysuria and urgency.   Musculoskeletal: Positive for joint pain. Negative for back pain and neck pain.   Skin: Negative for itching.   Neurological: Negative for focal weakness, seizures and weakness.        Physical Exam  Temp:  [36.1 °C (97 °F)-36.9 °C (98.5 °F)] 36.2 °C (97.1 °F)  Pulse:  [69-78] 69  Resp:  [16-18] 18  BP: (101-146)/(37-78) 134/67  SpO2:  [93 %-99 %] 99 %    Physical Exam  Vitals signs and nursing note reviewed.   Constitutional:       Appearance: Normal appearance. She is well-developed.   HENT:      Head: Normocephalic.      Nose: Nose normal.   Eyes:      Pupils: Pupils are equal, round, and reactive to light.   Neck:      Musculoskeletal: Normal range of motion and neck supple.      Thyroid: No thyromegaly.      Vascular: No JVD.   Cardiovascular:      Rate and Rhythm: Normal rate and regular rhythm.      Pulses: Normal pulses.      Heart sounds: Normal heart sounds.   Pulmonary:      Effort: Pulmonary effort is normal.      Breath sounds: Normal breath sounds. No wheezing.   Abdominal:      General: Bowel sounds are normal.   Musculoskeletal: Normal range of motion.         General: Tenderness present.   Skin:     General: Skin is warm.      Coloration: Skin is pale.   Neurological:      Mental Status: She is alert.         Fluids    Intake/Output Summary (Last 24 hours) at 7/6/2020 0632  Last data filed at 7/6/2020 0000  Gross per 24 hour   Intake 403.83 ml   Output 300 ml   Net 103.83 ml       Laboratory  Recent  Labs     07/04/20  1032  07/05/20  0834 07/05/20  1430 07/05/20 2021 07/06/20  0242   WBC 12.0*  --  12.5*  --   --  12.3*   RBC 2.56*  --  3.14*  --   --  2.88*   HEMOGLOBIN 7.3*   < > 9.0* 8.2* 8.5* 8.3*   HEMATOCRIT 24.8*  --  28.9*  --   --  26.1*   MCV 96.9  --  92.0  --   --  90.6   MCH 28.5  --  28.7  --   --  28.8   MCHC 29.4*  --  31.1*  --   --  31.8*   RDW 51.7*  --  50.7*  --   --  52.5*   PLATELETCT 281  --  280  --   --  265   MPV 9.6  --  9.6  --   --  9.7    < > = values in this interval not displayed.     Recent Labs     07/04/20  0615 07/05/20  0834 07/06/20  0242   SODIUM 136 137 137   POTASSIUM 3.6 3.6 3.8   CHLORIDE 99 99 99   CO2 28 25 23   GLUCOSE 105* 104* 144*   BUN 22 35* 49*   CREATININE 3.35* 4.59* 6.15*   CALCIUM 7.5* 7.4* 7.0*                   Imaging  IR-CVC TUNNEL WITH PORT REMOVAL   Final Result         1.  Removal of the right IJ] hemodialysis catheter.      2.  The tip of the catheter was cut off and sent to lab for analysis.      CT-HEAD W/O   Final Result      No significant interval change.      MR-BRAIN-W/O   Final Result      1.  Mild cerebral atrophy. Age-appropriate.   2.  Moderate-large lentiform extra-axial fluid collection over the right posterior frontal-parietal convexity consistent with a chronic or late subacute epidural hematoma or loculated subdural hematoma. Possible minimal component of more recent    hemorrhage where there is linear bandlike hyperdensity on CT.   3.  Patchy and punctate areas of bright diffusion signal in the underlying right parietal lobe which may represent acute or recent subacute arterial or venous infarction. No acute parenchymal hemorrhage.   4.  Minimal supratentorial white matter disease most consistent with microvascular ischemic change.   5.  Old lacunar infarct left lateral thalamus.      MR-MRA HEAD-W/O   Final Result      1.  Intracranial atherosclerotic cerebrovascular stenotic disease involving the posterior cerebral arteries and  probably the middle cerebral arteries beyond the genu. No alexx occlusion.   2.  Normal variant dominant caliber right anterior cerebral artery A1 segment with a hypoplastic left A1 segment.   3.  No evidence of aneurysm about [place].      EC-ECHOCARDIOGRAM COMPLETE W/ CONT   Final Result      US-CAROTID DOPPLER BILAT   Final Result      DX-CHEST-PORTABLE (1 VIEW)   Final Result      No acute cardiopulmonary abnormality.      CT-HEAD W/O   Final Result      1. Small amount of acute hemorrhage in a chronic 2.1 cm right epidural collection, along the right frontoparietal convexity. Associated 2 mm right to left midline shift.   2. Minimal subacute subdural hemorrhage anterior to the right epidural collection.   3. Hyperdensity in the temporal horn of the right lateral ventricle may represent small amount of intraventricular hemorrhage.   4. No CT evidence of acute infarct.           Assessment/Plan  * Acute GI bleeding  Assessment & Plan  With melena, EGD and colonoscopy did not see any active lesions, capsule endoscopy identified a possible source of 50 cm,   Enteroscopy: duodenitis, bleeding AVM  Continuous cardiac monitoring  Continue PPI for now  Monitor H&H every 8 hours, transfuse for hemoglobin less than 7  Plan as above      Epidural hemorrhage (HCC)  Assessment & Plan  2.3 cm with 2 mm shift  Neurosurgery consulted and recommended no intervention  Neurology consulted, MRI and MRA resulted, possible recent small CVA      Leg wounds  Assessment & Plan  Wound culture grew pseudomonas aeruginosa  Was on oral ciprofloxacin and I will change it to IV ampicillin      Peripheral artery disease (HCC)  Assessment & Plan  Hold aspirin and Plavix    Type 2 diabetes mellitus with kidney complication, without long-term current use of insulin (HCC)  Assessment & Plan  Start on insulin sliding scale with serial Accu-Checks  Check hemoglobin A1c  Hypoglycemic protocol in place        Primary hypertension  Assessment  & Plan  Restart medication    ESRD (end stage renal disease) ([facility])  Assessment & Plan  Dialysis Monday Wednesday Friday,   nephrology is consulted  Monitor BMP and assess response  Avoid IV contrast/nephrotoxins/NSAIDs  Dose adjust meds for decreased GFR        Acute blood loss anemia  Assessment & Plan  Given 3 units of RBCs, 1 unit of platelets  Transfuse as needed with target hemoglobin above 7.  GI is following  Follow CBC in the morning  Enteroscopy: duodenitis, bleeding AVM  Hemoglobin has been stable      Bacteremia  Assessment & Plan  From hemodialysis catheter  Removed HD catheter  Nephrology following  ID following on ampicillin  Repeat blood culture negative to date  Plan to have new catheter placed 72-hour after negative blood culture    Encephalopathy acute  Assessment & Plan  Likely multifactorial  Treating infection with IV antibiotic  Improving and alert oriented x4  We will try to avoid benzos and narcotic as much as possible  improving       VTE prophylaxis: SCD

## 2020-07-06 NOTE — PROGRESS NOTES
Infectious Disease Progress Note    Author: Arabella Lockhart M.D. Date & Time of service: 2020  12:32 PM    Chief Complaint:  Sepsis, strep    Interval History:  87-year-old female with ESRD, DM, PVD, CVA admitted 2020 for gastrointestinal bleed with associated weakness, dizziness and falls resulting in intracranial hemorrhage. Developed fever and leukocytosis during hospitalization and found to have above  7/2 AF (99.6) WBC 10.2 remains obtunded-pain better controlled. Seen by Palliative-now agreeable to GI procedure  7/3 AF WBC 11.9  tolerated procedures well-bleeding AVMs seen with erosive duodenitis. Less pain today. Plan for removal HD cath noted   AF WBC 12 up to chair-no new complaints   AF WBC 12.5 up to chair again  Today-pain controlled. Required another transfusion (Hgb down to 6.8)-wants to know how long it will take to get new HD cath  / AF WBC 12.3 up to chair-tired today. No new complaints-plan for HD cath this afternoon noted  Labs Reviewed, Medications Reviewed, and Wound Reviewed.    Review of Systems:  Review of Systems   Constitutional: Negative for chills and fever.   Respiratory: Negative for cough and shortness of breath.    Cardiovascular: Negative for chest pain.   Gastrointestinal: Negative for nausea and vomiting.   Musculoskeletal: Positive for myalgias.   Skin: Negative for rash.   All other systems reviewed and are negative.      Hemodynamics:  Temp (24hrs), Av.4 °C (97.5 °F), Min:36.1 °C (97 °F), Max:36.9 °C (98.5 °F)  Temperature: 36.7 °C (98 °F)  Pulse  Av.6  Min: 9  Max: 111   Blood Pressure : 135/76       Physical Exam:  Physical Exam  Vitals signs and nursing note reviewed.   Constitutional:       General: She is not in acute distress.     Appearance: She is ill-appearing. She is not toxic-appearing or diaphoretic.   HENT:      Nose: No rhinorrhea.      Mouth/Throat:      Pharynx: No posterior oropharyngeal erythema.   Eyes:      General: No scleral  icterus.     Extraocular Movements: Extraocular movements intact.      Pupils: Pupils are equal, round, and reactive to light.   Neck:      Musculoskeletal: No neck rigidity.   Cardiovascular:      Rate and Rhythm: Normal rate.      Comments: ectopy  Pulmonary:      Effort: Pulmonary effort is normal. No respiratory distress.      Breath sounds: No stridor. No wheezing.   Abdominal:      General: There is no distension.      Palpations: Abdomen is soft.      Tenderness: There is no abdominal tenderness.   Skin:     Coloration: Skin is not jaundiced.      Findings: Bruising present.      Comments: Extensive bruising LUE with hand swelling   Neurological:      General: No focal deficit present.      Mental Status: She is alert.   Psychiatric:         Mood and Affect: Mood normal.         Meds:    Current Facility-Administered Medications:   •  ampicillin  •  omeprazole  •  furosemide  •  haloperidol lactate  •  lidocaine  •  vitamin D (Ergocalciferol)  •  calcium acetate  •  atorvastatin  •  acetaminophen  •  ondansetron  •  ondansetron  •  LORazepam  •  heparin  •  hydrALAZINE    Labs:  Recent Labs     07/04/20  1032  07/05/20  0834 07/05/20  1430 07/05/20  2021 07/06/20  0242   WBC 12.0*  --  12.5*  --   --  12.3*   RBC 2.56*  --  3.14*  --   --  2.88*   HEMOGLOBIN 7.3*   < > 9.0* 8.2* 8.5* 8.3*   HEMATOCRIT 24.8*  --  28.9*  --   --  26.1*   MCV 96.9  --  92.0  --   --  90.6   MCH 28.5  --  28.7  --   --  28.8   RDW 51.7*  --  50.7*  --   --  52.5*   PLATELETCT 281  --  280  --   --  265   MPV 9.6  --  9.6  --   --  9.7   NEUTSPOLYS 79.90*  --  77.20*  --   --  76.50*   LYMPHOCYTES 10.90*  --  14.30*  --   --  15.10*   MONOCYTES 7.00  --  5.20  --   --  5.70   EOSINOPHILS 1.20  --  1.70  --   --  1.30   BASOPHILS 0.20  --  0.30  --   --  0.30   RBCMORPHOLO Present  --   --   --   --   --     < > = values in this interval not displayed.     Recent Labs     07/04/20  0615 07/05/20  0834 07/06/20  0242   SODIUM 136  137 137   POTASSIUM 3.6 3.6 3.8   CHLORIDE 99 99 99   CO2 28 25 23   GLUCOSE 105* 104* 144*   BUN 22 35* 49*     Recent Labs     07/04/20  0615 07/05/20  0834 07/06/20  0242   ALBUMIN 2.4* 2.8* 2.9*   TBILIRUBIN 0.3 0.7 0.3   ALKPHOSPHAT 46 53 48   TOTPROTEIN 5.8* 6.5 6.1   ALTSGPT 11 15 14   ASTSGOT 18 22 17   CREATININE 3.35* 4.59* 6.15*       Imaging:  Ct-head W/o    Result Date: 6/29/2020 6/29/2020 11:28 AM HISTORY/REASON FOR EXAM:  Stroke, follow up. Intracranial hemorrhage TECHNIQUE/EXAM DESCRIPTION AND NUMBER OF VIEWS: CT of the head without contrast. The study was performed on a helical multidetector CT scanner. Contiguous 2.5 mm axial sections were obtained from the skull base through the vertex. Up to date radiation dose reduction adjustments have been utilized to meet ALARA standards for radiation dose reduction. COMPARISON:  CT head 6/26/2020. Brain MRI 6/27/2020 FINDINGS: Redemonstrated is a predominantly low density extra axial fluid collection along the right frontoparietal convexity, probably loculated subdural hematoma although a component of epidural hemorrhage is not excluded. There is a small amount of hyperdensity  along the inferior aspect of the collection which could represent small amount of acute hemorrhage. There is no significant change in appearance. The collection measures up to 22 mm in thickness. There is stable mass effect with sulcal effacement and slight right to left midline shift. No herniation. Generalized age-related atrophy. Chronic small left thalamic lacunar infarct. No acute osseous abnormality. Probable prior partial right mastoidectomy.     No significant interval change.    Ct-head W/o    Result Date: 6/26/2020   CT HEAD WITHOUT CONTRAST 6/26/2020 12:34 PM HISTORY/REASON FOR EXAM:  Left-sided facial droop TECHNIQUE/EXAM DESCRIPTION AND NUMBER OF VIEWS: CT of the head without contrast. The study was performed on a helical multidetector CT scanner. Contiguous 2.5 mm axial  sections were obtained from the skull base through the vertex. Up to date radiation dose reduction adjustments have been utilized to meet ALARA standards for radiation dose reduction. COMPARISON:  None available FINDINGS: Lateral ventricles are normal in size and symmetric. Hyperdensity in the temporal horn of the right lateral ventricle. Mild global parenchymal atrophy. Chronic small vessel ischemic changes. Minimal 2 mm right to left midline shift Basal cisterns are patent. There is a chronic epidural collection along the right frontoparietal convexity measuring 2.1 cm in thickness. Small amount of acute blood products are seen in the inferior aspect of the collection. Minimal subacute subdural hemorrhage anterior to the right epidural collection, along the right frontal convexity. Calvaria are intact. Atherosclerosis. Visualized orbits are unremarkable. Visualized mastoid air cells are clear. No significant sinus disease in the visualized paranasal sinuses.     1. Small amount of acute hemorrhage in a chronic 2.1 cm right epidural collection, along the right frontoparietal convexity. Associated 2 mm right to left midline shift. 2. Minimal subacute subdural hemorrhage anterior to the right epidural collection. 3. Hyperdensity in the temporal horn of the right lateral ventricle may represent small amount of intraventricular hemorrhage. 4. No CT evidence of acute infarct.    Dx-chest-portable (1 View)    Result Date: 6/26/2020 6/26/2020 1:03 PM HISTORY/REASON FOR EXAM:  Shortness of Breath. TECHNIQUE/EXAM DESCRIPTION AND NUMBER OF VIEWS: Single portable view of the chest. COMPARISON: None. FINDINGS: LUNGS: Clear. No effusions. PNEUMOTHORAX: None. LINES AND TUBES: Right IJ central catheter is in the distal SVC. MEDIASTINUM: No cardiomegaly. Atherosclerosis. MUSCULOSKELETAL STRUCTURES: No acute displaced fracture.     No acute cardiopulmonary abnormality.    Mr-brain-w/o    Result Date: 6/27/2020 6/27/2020 5:18 PM  HISTORY/REASON FOR EXAM:  Syncope, recurrent. Dizziness, left arm numbness and slurred speech. Left-sided facial droop. History of prior stroke. Recurrent syncope. End-stage renal disease. Hypertension. TECHNIQUE/EXAM DESCRIPTION: MRI of the brain without contrast. T1 sagittal, T2 fast spin-echo axial, T1 coronal, FLAIR coronal, Diffusion weighted and Apparent Diffusion Coefficient (ADC map) axial images were obtained of the whole brain. Additional FLAIR axial images were obtained. The study was performed on a [employer] 1.5 Starla MRI scanner. COMPARISON:  Head CT 6/26/2020 FINDINGS: The calvaria are unremarkable. There is a moderate-large T2 hyperintense extra-axial fluid collection over the right posterior frontal-parietal convexity. There is intermediate-slightly hyperintense FLAIR signal and T1 signal is slightly greater than CSF. The collection is somewhat lentiform. This could represent a late subacute or chronic epidural hematoma or loculated subdural hematoma. There is gradient echo hypointensity hemosiderin staining along the deep aspect of the collection. Corresponding to the bandlike hyperdense linear focus toward the anterior aspect of the collection seen on CT, there is corresponding gradient echo hypointensity and this may be a more recent bandlike focus of hemorrhage. There is local mass effect with effacement of the right trigone and anterior displacement of the posterior body right lateral ventricle. There is minimal right to left shift of midline structures of about 1.6 mm. The underlying brain parenchyma in the right parietal lobe shows hazy and punctate areas of restricted diffusion which may represent associated arterial infarction or venous infarction due to compression of cortical veins. There is underlying mild cerebral atrophy with prominence of sulcal markings and mild ventriculomegaly in the left hemisphere. Age-appropriate. There is an old lacunar infarct in the thalamus Elsewhere in the  cerebral hemispheres, there is a pattern of minimal supratentorial white matter disease with a few rare foci of T2 and FLAIR hyperintensity in the subcortical and/or deep white matter consistent with small vessel ischemic change versus demyelination or gliosis. The brainstem and cerebellum are unremarkable. The major vessels including the dural venous sinuses appear intact. Paranasal sinuses show mild mucosal thickening in the sphenoid sinus and minimal mucosal thickening scattered among the ethmoid air cells. The mastoids are clear. There has been cataract surgery. The gaze is divergent.     1.  Mild cerebral atrophy. Age-appropriate. 2.  Moderate-large lentiform extra-axial fluid collection over the right posterior frontal-parietal convexity consistent with a chronic or late subacute epidural hematoma or loculated subdural hematoma. Possible minimal component of more recent hemorrhage where there is linear bandlike hyperdensity on CT. 3.  Patchy and punctate areas of bright diffusion signal in the underlying right parietal lobe which may represent acute or recent subacute arterial or venous infarction. No acute parenchymal hemorrhage. 4.  Minimal supratentorial white matter disease most consistent with microvascular ischemic change. 5.  Old lacunar infarct left lateral thalamus.    Mr-mra Head-w/o    Result Date: 6/27/2020 6/27/2020 5:18 PM HISTORY/REASON FOR EXAM:  Dizziness, non-specific. Syncope, recurrent. Dizziness, left arm numbness and slurred speech. Left-sided facial droop. History of prior stroke. Recurrent syncope. End-stage renal disease. Hypertension. TECHNIQUE/EXAM DESCRIPTION: MRA of the Head ([place]) without contrast. The study was performed on a [employer] 1.5 Starla MRI scanner. MRA of the [place] was performed with 3D time-of-flight technique with axial acquisition. Additional MRA of the internal carotid and vertebrobasilar arteries at the level of the skull base and  craniocervical junction was also performed with 3D time-of-flight technique with axial acquisition. Images are reviewed at the PACS or [employer]2apps workstations as axial source images as well as maximum intensity ray projection (MIP) multiplanar 3D reconstructions. COMPARISON:  MRI brain from today's date FINDINGS: The posterior circulation shows patent distal vertebral arteries. The left vertebral artery is minimally dominant. The vertebrobasilar confluence is intact. The basilar artery is widely patent with uniform caliber and robust flow signal. There is multifocal irregularity of the posterior cerebral arteries, right greater than left with a gap-like defect in the right P2 segment consistent with moderate to high-grade stenosis. Similar lesser findings of the left posterior cerebral artery. These findings are consistent with intracranial atherosclerotic cerebrovascular disease. No evidence of aneurysm in the posterior circulation. The anterior circulation shows the carotid siphons to be patent. There is a normal variant dominant caliber right anterior cerebral artery A1 segment with a markedly hypoplastic left A1 segment. The proximal middle cerebral arteries are intact. The M2 and M3/sylvian branches beyond the genu show some attenuation of caliber and interruption of flow signal suggesting intracranial atherosclerotic stenotic disease. No evidence of aneurysm about the anterior circulation.     1.  Intracranial atherosclerotic cerebrovascular stenotic disease involving the posterior cerebral arteries and probably the middle cerebral arteries beyond the genu. No alexx occlusion. 2.  Normal variant dominant caliber right anterior cerebral artery A1 segment with a hypoplastic left A1 segment. 3.  No evidence of aneurysm about [place].    Us-carotid Doppler Bilat    Result Date: 6/27/2020   Carotid Duplex  Report  Vascular Laboratory  CONCLUSIONS  Moderate atherosclerosis. Velocities are consistent with  50-69% stenosis of  the internal carotid arteries bilaterally.  BECKIE BRITTON  Exam Date:     2020 09:01  Room #:     Inpatient  Priority:     Routine  Ht (in):             Wt (lb):  Ordering Physician:        LAWRENCE SHEFFIELD  Referring Physician:       [id number]  Sonographer:               MARGO Cason RDCS  Study Type:                Complete Bilateral  Technical Quality:         Adequate  Age:    87    Gender:     F  MRN:    [id number]  :    10/26/1932      BSA:  Indications:     Dizziness and giddiness  CPT Codes:       49365  ICD Codes:       R42  History:  Limitations:     Body habitus  Right Brachial BP:              /  Left Brachial BP:             /  RIGHT  Plaque          Plaque         Velocity (cm/s)  Characteristics Composition    Syst/Diast                                 128  / 32      Distal ICA                                 121  / 35      Mid ICA                                 78   / 28      Prox ICA                                 62   / 10      Distal CCA                                      /         Mid CCA                                 14   / 4       Prox CCA                                 381  /         ECA  Waveform:   Triphasic                         SCA  LEFT  Plaque         Plaque          Velocity (cm/s)  CharacteristicsComposition     Syst/Diast                                 155  / 30      Distal ICA                                 166  / 30      Mid ICA  Irregular      Heterogeneo     122  / 26      Prox ICA                 us  Irregular      Heterogeneo     71   / 16      Distal CCA                 us                                      /         Mid CCA                                 96   / 17      Prox CCA  Irregular      Heterogeneo     336  /         ECA                 us  Waveform:   Triphasic                         SCA          5.4          ICA/CCA       1.3        Antegrade      Vertebral   Antegrade         78   / 16      cm/s        63    / 12  FINDINGS  Right carotid.  Plaque of the bifurcation extending into the internal carotid. Velocities  are consistent with 50-69% stenosis of the internal carotid artery.    Left carotid.  Plaque of the bifurcation extending into the internal carotid. Velocities  are consistent with 50-69% stenosis of the internal carotid artery.  Plaque is irregular on the surface and heterogeneous with mixed acoustic  densities.  Bilateral subclavian and vertebral artery waveforms are antegrade and  waveforms are normal in character and velocity.  Liam Duong MD  (Electronically Signed)  Final Date:      2020                   10:28    Ec-echocardiogram Complete W/ Cont    Result Date: 2020  Transthoracic Echo Report Echocardiography Laboratory CONCLUSIONS No prior study is available for comparison. Normal left ventricular systolic function. Left ventricular ejection fraction is visually estimated to be 60%. The right ventricle was normal in size and function. Moderately dilated left atrium. Mild aortic stenosis. Mild tricuspid regurgitation. BECKIE BRITTON Exam Date:         2020                    15:19 Exam Location:     Inpatient Priority:          Routine Ordering Physician:        LAURI ROWLEY Referring Physician: Sonographer:               FERNANDO Frazier Age:    87     Gender:    F MRN:    [id number] :    10/26/1932 BSA:    1.73   Ht (in):    59     Wt (lb):    171 Exam Type:     Complete, Contrast Indications:     Transient cerebral ischemic attack, unspecified ICD Codes:       G45.9 CPT Codes:       20698,  BP:   114    /   56     HR:   85 Technical Quality:       Fair MEASUREMENTS  (Male / Female) Normal Values 2D ECHO LV Diastolic Diameter PLAX        4.9 cm                4.2 - 5.9 / 3.9 - 5.3 cm LV Systolic Diameter PLAX         2.5 cm                2.1 - 4.0 cm IVS Diastolic Thickness            0.92 cm               LVPW Diastolic Thickness          0.87 cm               LVOT Diameter                     2.1 cm                LV Ejection Fraction MOD BP       62.4 %                >= 55  % LV Ejection Fraction MOD 4C       61.3 %                LV Ejection Fraction MOD 2C       64.4 %                IVC Diameter                      1.6 cm                DOPPLER AV Peak Velocity                  2.1 m/s               AV Peak Gradient                  17.8 mmHg             AV Mean Gradient                  11.1 mmHg             LVOT Peak Velocity                0.96 m/s              AV Area Cont Eq vti               1.7 cm2               Mitral E Point Velocity           1.3 m/s               Mitral E to A Ratio               0.93                  Mitral A Duration                 108 ms                MV Pressure Half Time             55.6 ms               MV Area PHT                       4 cm2                 MV Deceleration Time              192 ms                TR Peak Velocity                  244 cm/s              PV Peak Velocity                  1 m/s                 PV Peak Gradient                  4 mmHg                RVOT Peak Velocity                0.75 m/s              * Indicates values subject to auto-interpretation LV EF:        % FINDINGS Left Ventricle Normal left ventricular chamber size. Normal left ventricular wall thickness. Normal left ventricular systolic function. Left ventricular ejection fraction is visually estimated to be 60%. Normal regional wall motion. Grade II diastolic dysfunction. Contrast was used to enhance visualization of the endocardial border. 1 mL of contrast was administered. Existing IV was used. Located at the left antecubital Right Ventricle The right ventricle was normal in size and function. Right Atrium The right atrium is normal in size.  Normal inferior vena cava size and inspiratory collapse. Left Atrium The left atrium is normal in size.   Moderately dilated left atrium. Left atrial volume index is 43 mL/sq m. Mitral Valve Structurally normal mitral valve without significant stenosis. Trace mitral regurgitation. Aortic Valve Tricuspid aortic valve. Calcified aortic valve leaflets. Mild aortic stenosis. Aortic valve area calculated from the continuity equation is 1.7 cm2. Vmax is 2.2 m/s. Transvalvular gradients are - Peak: 19 mmHg, Mean: 11 mmHg. Dimensionless index is 0.52. No aortic insufficiency. Tricuspid Valve Structurally normal tricuspid valve without significant stenosis. Mild tricuspid regurgitation. Estimated right ventricular systolic pressure  is 30 mmHg. Right atrial pressure is estimated to be 3 mmHg. Pulmonic Valve The pulmonic valve is not well visualized. No pulmonic stenosis. Trace pulmonic insufficiency. Pericardium Normal pericardium without effusion. Aorta The aortic root is normal.  Ascending aorta diameter is 3.1 cm. Ricco Portillo MD (Electronically Signed) Final Date:     27 June 2020                 17:47      Micro:  Results     Procedure Component Value Units Date/Time    CATH TIP CULTURE [[id number]] Collected:  07/03/20 1230    Order Status:  Completed Specimen:  Cath Tip Updated:  07/05/20 1152     Significant Indicator NEG     Source CTIP     Site Central Line     Culture Result <15 CFU's Coagulase negative Staphylococcus.    CATH TIP CULTURE [[id number]]     Order Status:  No result Specimen:  Cath Tip from Central Line     Blood Culture [[id number]]  (Abnormal) Collected:  06/30/20 1340    Order Status:  Completed Specimen:  Blood Updated:  07/03/20 1202     Significant Indicator POS     Source BLD     Site HD Cath     Culture Result Growth detected by Bactec instrument. 07/01/2020  05:28      Enterococcus faecalis  Combination therapy with ampicillin, penicillin, or  vancomycin (for susceptible strains) plus an aminoglycoside  is usually indicated for serious enterococcal infections,  such as endocarditis unless  "high-level resistance to both  gentamicin and streptomycin is documented; such combinations  are predicted to result in synergistic killing of the  Enterococcus.  See previous culture for sensitivity report.      Narrative:       CALL  [place]  131 tel. [phone],  CALLED  131 tel. [phone] 07/01/2020, 05:30, RB PERF. RESULTS CALLED TO: RN  [id number]  HD Cath    Blood Culture [[id number]]  (Abnormal)  (Susceptibility) Collected:  06/30/20 1340    Order Status:  Completed Specimen:  Blood Updated:  07/03/20 1202     Significant Indicator POS     Source BLD     Site HD Cath     Culture Result Growth detected by Bactec instrument. 07/01/2020  05:31      Enterococcus faecalis  Combination therapy with ampicillin, penicillin, or  vancomycin (for susceptible strains) plus an aminoglycoside  is usually indicated for serious enterococcal infections,  such as endocarditis unless high-level resistance to both  gentamicin and streptomycin is documented; such combinations  are predicted to result in synergistic killing of the  Enterococcus.      Narrative:       HD Cath    Susceptibility     Enterococcus faecalis (1)     Antibiotic Interpretation Microscan Method Status    Daptomycin Sensitive <=1 mcg/mL MELISSA Final    Ampicillin Sensitive <=2 mcg/mL MELISSA Final    Gent Synergy Sensitive <=500 mcg/mL MELISSA Final    Vancomycin Sensitive 2 mcg/mL MELISSA Final    Penicillin Sensitive 2 mcg/mL MELISSA Final                   Blood Culture [[id number]] Collected:  07/02/20 1158    Order Status:  Completed Specimen:  Blood from Peripheral Updated:  07/03/20 0726     Significant Indicator NEG     Source BLD     Site PERIPHERAL     Culture Result No Growth  Note: Blood cultures are incubated for 5 days and  are monitored continuously.Positive blood cultures  are called to the RN and reported as soon as  they are identified.      Narrative:       Per Hospital Policy: Only change Specimen Src: to \"Line\" if  specified by physician order.  Right Wrist    " "Blood Culture [[id number]] Collected:  07/02/20 1158    Order Status:  Completed Specimen:  Blood from Peripheral Updated:  07/03/20 0726     Significant Indicator NEG     Source BLD     Site PERIPHERAL     Culture Result No Growth  Note: Blood cultures are incubated for 5 days and  are monitored continuously.Positive blood cultures  are called to the RN and reported as soon as  they are identified.      Narrative:       Per Hospital Policy: Only change Specimen Src: to \"Line\" if  specified by physician order.  Right Hand    Blood Culture [[id number]]     Order Status:  Canceled Specimen:  Blood from Peripheral     Blood Culture [[id number]]     Order Status:  Canceled Specimen:  Blood from Peripheral     Urinalysis [[id number]]     Order Status:  No result Specimen:  Urine     CULTURE WOUND W/ GRAM STAIN [[id number]]  (Abnormal)  (Susceptibility) Collected:  06/27/20 0857    Order Status:  Completed Specimen:  Wound from Exudate Updated:  06/30/20 1321     Significant Indicator POS     Source WND     Site LEFT LEG     Culture Result Rare growth mixed skin naina.     Gram Stain Result No organisms seen.     Culture Result Pseudomonas aeruginosa  Light growth  P.aeruginosa may develop resistance during prolonged therapy  with all antibiotics. Isolates that are initially susceptible  may become resistant within three to four days after  initiation of therapy. Testing of repeat isolates may be  warranted.        Stenotrophomonas maltophilia  Rare growth      Narrative:       Left leg  Left leg    Susceptibility     Pseudomonas aeruginosa (1)     Antibiotic Interpretation Microscan Method Status    Ceftazidime Sensitive 4 mcg/mL MELISSA Final    Ciprofloxacin Sensitive <=1 mcg/mL MELISSA Final    Cefepime Sensitive <=2 mcg/mL MELISSA Final    Gentamicin Sensitive <=4 mcg/mL MELISSA Final    Amikacin Sensitive <=16 mcg/mL MELISSA Final    Meropenem Sensitive <=1 mcg/mL MELISSA Final    Pip/Tazobactam Sensitive <=16 mcg/mL MELISSA Final    Tobramycin " Sensitive <=4 mcg/mL MELISSA Final          Stenotrophomonas maltophilia (2)     Antibiotic Interpretation Microscan Method Status    Ceftazidime Resistant >16 mcg/mL MELISSA Final    Trimeth/Sulfa Sensitive <=2/38 mcg/mL MELISSA Final    Levofloxacin Sensitive <=2 mcg/mL MELISSA Final                   CULTURE WOUND W/ GRAM STAIN [[id number]]  (Abnormal) Collected:  06/27/20 2335    Order Status:  Completed Specimen:  Wound from Exudate Updated:  06/30/20 0912     Significant Indicator POS     Source WND     Site RIGHT LEG     Culture Result -     Gram Stain Result Moderate WBCs.  No organisms seen.       Culture Result Pseudomonas aeruginosa  Light growth  See previous culture for sensitivity report.      Narrative:       Right leg  Right leg    Urinalysis [[id number]]  (Abnormal) Collected:  06/30/20 0824    Order Status:  Completed Specimen:  Urine, Cath Updated:  06/30/20 0856     Color Yellow     Character Clear     Specific Gravity 1.014     Ph >=9.0     Glucose 100 mg/dL      Ketones Negative mg/dL      Bilirubin Negative     Urobilinogen, Urine 0.2     Nitrite Negative     Leukocyte Esterase Negative     Occult Blood Trace     Micro Urine Req Microscopic    Narrative:       Collected By:[id number] NATIVIDAD SUNSHINE  If not done within the last 24 hours    Blood Culture [[id number]]     Order Status:  No result Specimen:  Blood from Peripheral     Blood Culture [[id number]]     Order Status:  No result Specimen:  Blood from Peripheral     Blood Culture [[id number]]     Order Status:  Canceled Specimen:  Blood from Peripheral     Blood Culture [[id number]]     Order Status:  Canceled Specimen:  Blood from Peripheral           Assessment:  Active Hospital Problems    Diagnosis   • *Acute GI bleeding [K92.2]   • Epidural hemorrhage ([facility]) [S06.4X9A]   • Acute blood loss anemia [D62]   • ESRD (end stage renal disease) ([facility]) [N18.6]   • Type 2 diabetes mellitus with kidney complication, without long-term current use of insulin ([facility])  [E11.29]   • Peripheral artery disease (HCC) [I73.9]   • Leg wounds [S81.801A]   • Encephalopathy acute [G93.40]       Plan:  Enterococcal sepsis  GI source most likely-HD cath infection also possible  Afebrile  Leukocytosis mild  AMS improved  BCxs + Efaecalis amp-S 6/30  Blood cxs 7/2 neg to date  Cath tip CNS  TTE with calcified valves;  ANABELL if persistently +blood cxs if feasible  Continue ampicillin -2 weeks if no new positive cxs    GI bleed  Continued blood loss .    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  Management is conservatively.    No new CVA seen by MRI     ESRD  Dose adjust abx    Chronic stasis ulceration  No evidence of active infection, so do not add additional antimicrobial therapy for this wound.    Wound swab showed colonization with pseudomonas and stenotrophomonas  Continue with wound care   Recommend vascular studies when feasible    Diabetes  Keep BS under 150 to help control current infection

## 2020-07-06 NOTE — CARE PLAN
Problem: Nutritional:  Goal: Achieve adequate nutritional intake  Description: Patient's diet will advance past clears and consume ~50% of meals  Outcome: PROGRESSING AS EXPECTED     See RD note.

## 2020-07-07 ENCOUNTER — APPOINTMENT (OUTPATIENT)
Dept: RADIOLOGY | Facility: MEDICAL CENTER | Age: 85
DRG: 377 | End: 2020-07-07
Attending: INTERNAL MEDICINE
Payer: MEDICARE

## 2020-07-07 LAB
ALBUMIN SERPL BCP-MCNC: 2.9 G/DL (ref 3.2–4.9)
ALBUMIN/GLOB SERPL: 0.9 G/DL
ALP SERPL-CCNC: 49 U/L (ref 30–99)
ALT SERPL-CCNC: 10 U/L (ref 2–50)
ANION GAP SERPL CALC-SCNC: 15 MMOL/L (ref 7–16)
AST SERPL-CCNC: 14 U/L (ref 12–45)
BACTERIA BLD CULT: NORMAL
BACTERIA BLD CULT: NORMAL
BASOPHILS # BLD AUTO: 0.3 % (ref 0–1.8)
BASOPHILS # BLD: 0.03 K/UL (ref 0–0.12)
BILIRUB SERPL-MCNC: 0.3 MG/DL (ref 0.1–1.5)
BUN SERPL-MCNC: 61 MG/DL (ref 8–22)
CALCIUM SERPL-MCNC: 6.6 MG/DL (ref 8.5–10.5)
CHLORIDE SERPL-SCNC: 102 MMOL/L (ref 96–112)
CO2 SERPL-SCNC: 23 MMOL/L (ref 20–33)
CREAT SERPL-MCNC: 7.35 MG/DL (ref 0.5–1.4)
EOSINOPHIL # BLD AUTO: 0.13 K/UL (ref 0–0.51)
EOSINOPHIL NFR BLD: 1.2 % (ref 0–6.9)
ERYTHROCYTE [DISTWIDTH] IN BLOOD BY AUTOMATED COUNT: 51.6 FL (ref 35.9–50)
GLOBULIN SER CALC-MCNC: 3.1 G/DL (ref 1.9–3.5)
GLUCOSE SERPL-MCNC: 112 MG/DL (ref 65–99)
HCT VFR BLD AUTO: 27.1 % (ref 37–47)
HGB BLD-MCNC: 8.5 G/DL (ref 12–16)
IMM GRANULOCYTES # BLD AUTO: 0.16 K/UL (ref 0–0.11)
IMM GRANULOCYTES NFR BLD AUTO: 1.4 % (ref 0–0.9)
LYMPHOCYTES # BLD AUTO: 1.61 K/UL (ref 1–4.8)
LYMPHOCYTES NFR BLD: 14.3 % (ref 22–41)
MAGNESIUM SERPL-MCNC: 1.8 MG/DL (ref 1.5–2.5)
MCH RBC QN AUTO: 28.4 PG (ref 27–33)
MCHC RBC AUTO-ENTMCNC: 31.4 G/DL (ref 33.6–35)
MCV RBC AUTO: 90.6 FL (ref 81.4–97.8)
MONOCYTES # BLD AUTO: 0.61 K/UL (ref 0–0.85)
MONOCYTES NFR BLD AUTO: 5.4 % (ref 0–13.4)
NEUTROPHILS # BLD AUTO: 8.75 K/UL (ref 2–7.15)
NEUTROPHILS NFR BLD: 77.4 % (ref 44–72)
NRBC # BLD AUTO: 0 K/UL
NRBC BLD-RTO: 0 /100 WBC
PLATELET # BLD AUTO: 278 K/UL (ref 164–446)
PMV BLD AUTO: 9.4 FL (ref 9–12.9)
POTASSIUM SERPL-SCNC: 3.7 MMOL/L (ref 3.6–5.5)
PROT SERPL-MCNC: 6 G/DL (ref 6–8.2)
RBC # BLD AUTO: 2.99 M/UL (ref 4.2–5.4)
SIGNIFICANT IND 70042: NORMAL
SIGNIFICANT IND 70042: NORMAL
SITE SITE: NORMAL
SITE SITE: NORMAL
SODIUM SERPL-SCNC: 140 MMOL/L (ref 135–145)
SOURCE SOURCE: NORMAL
SOURCE SOURCE: NORMAL
WBC # BLD AUTO: 11.3 K/UL (ref 4.8–10.8)

## 2020-07-07 PROCEDURE — 97116 GAIT TRAINING THERAPY: CPT | Mod: CQ

## 2020-07-07 PROCEDURE — 06H033Z INSERTION OF INFUSION DEVICE INTO INFERIOR VENA CAVA, PERCUTANEOUS APPROACH: ICD-10-PCS | Performed by: RADIOLOGY

## 2020-07-07 PROCEDURE — 700111 HCHG RX REV CODE 636 W/ 250 OVERRIDE (IP): Performed by: INTERNAL MEDICINE

## 2020-07-07 PROCEDURE — 90935 HEMODIALYSIS ONE EVALUATION: CPT

## 2020-07-07 PROCEDURE — 700105 HCHG RX REV CODE 258: Performed by: INTERNAL MEDICINE

## 2020-07-07 PROCEDURE — 99232 SBSQ HOSP IP/OBS MODERATE 35: CPT | Performed by: INTERNAL MEDICINE

## 2020-07-07 PROCEDURE — A9270 NON-COVERED ITEM OR SERVICE: HCPCS | Performed by: INTERNAL MEDICINE

## 2020-07-07 PROCEDURE — 700102 HCHG RX REV CODE 250 W/ 637 OVERRIDE(OP): Performed by: INTERNAL MEDICINE

## 2020-07-07 PROCEDURE — 700105 HCHG RX REV CODE 258

## 2020-07-07 PROCEDURE — 700111 HCHG RX REV CODE 636 W/ 250 OVERRIDE (IP)

## 2020-07-07 PROCEDURE — 97530 THERAPEUTIC ACTIVITIES: CPT | Mod: CQ

## 2020-07-07 PROCEDURE — 770001 HCHG ROOM/CARE - MED/SURG/GYN PRIV*

## 2020-07-07 PROCEDURE — 97530 THERAPEUTIC ACTIVITIES: CPT

## 2020-07-07 PROCEDURE — 80053 COMPREHEN METABOLIC PANEL: CPT

## 2020-07-07 PROCEDURE — 85025 COMPLETE CBC W/AUTO DIFF WBC: CPT

## 2020-07-07 PROCEDURE — 5A1D70Z PERFORMANCE OF URINARY FILTRATION, INTERMITTENT, LESS THAN 6 HOURS PER DAY: ICD-10-PCS | Performed by: INTERNAL MEDICINE

## 2020-07-07 PROCEDURE — 700111 HCHG RX REV CODE 636 W/ 250 OVERRIDE (IP): Performed by: RADIOLOGY

## 2020-07-07 PROCEDURE — B5191ZA FLUOROSCOPY OF INFERIOR VENA CAVA USING LOW OSMOLAR CONTRAST, GUIDANCE: ICD-10-PCS | Performed by: RADIOLOGY

## 2020-07-07 PROCEDURE — 83735 ASSAY OF MAGNESIUM: CPT

## 2020-07-07 PROCEDURE — 700117 HCHG RX CONTRAST REV CODE 255: Performed by: INTERNAL MEDICINE

## 2020-07-07 PROCEDURE — 0JH63XZ INSERTION OF TUNNELED VASCULAR ACCESS DEVICE INTO CHEST SUBCUTANEOUS TISSUE AND FASCIA, PERCUTANEOUS APPROACH: ICD-10-PCS | Performed by: RADIOLOGY

## 2020-07-07 PROCEDURE — 36415 COLL VENOUS BLD VENIPUNCTURE: CPT

## 2020-07-07 PROCEDURE — 99153 MOD SED SAME PHYS/QHP EA: CPT

## 2020-07-07 PROCEDURE — 700101 HCHG RX REV CODE 250

## 2020-07-07 RX ORDER — OXYCODONE HYDROCHLORIDE 5 MG/1
5 TABLET ORAL
Status: ACTIVE | OUTPATIENT
Start: 2020-07-07 | End: 2020-07-08

## 2020-07-07 RX ORDER — HEPARIN SODIUM (PORCINE) LOCK FLUSH IV SOLN 100 UNIT/ML 100 UNIT/ML
SOLUTION INTRAVENOUS
Status: COMPLETED
Start: 2020-07-07 | End: 2020-07-07

## 2020-07-07 RX ORDER — SODIUM CHLORIDE 9 MG/ML
500 INJECTION, SOLUTION INTRAVENOUS
Status: ACTIVE | OUTPATIENT
Start: 2020-07-07 | End: 2020-07-07

## 2020-07-07 RX ORDER — CEFAZOLIN SODIUM 1 G/3ML
INJECTION, POWDER, FOR SOLUTION INTRAMUSCULAR; INTRAVENOUS
Status: DISPENSED
Start: 2020-07-07 | End: 2020-07-08

## 2020-07-07 RX ORDER — CEFAZOLIN SODIUM 2 G/100ML
2 INJECTION, SOLUTION INTRAVENOUS ONCE
Status: COMPLETED | OUTPATIENT
Start: 2020-07-07 | End: 2020-07-07

## 2020-07-07 RX ORDER — MIDAZOLAM HYDROCHLORIDE 1 MG/ML
INJECTION INTRAMUSCULAR; INTRAVENOUS
Status: COMPLETED
Start: 2020-07-07 | End: 2020-07-07

## 2020-07-07 RX ORDER — LIDOCAINE HYDROCHLORIDE 10 MG/ML
INJECTION, SOLUTION EPIDURAL; INFILTRATION; INTRACAUDAL; PERINEURAL
Status: COMPLETED
Start: 2020-07-07 | End: 2020-07-07

## 2020-07-07 RX ORDER — ACETAMINOPHEN 325 MG/1
650 TABLET ORAL 3 TIMES DAILY PRN
Status: DISCONTINUED | OUTPATIENT
Start: 2020-07-07 | End: 2020-07-09 | Stop reason: HOSPADM

## 2020-07-07 RX ORDER — LIDOCAINE HYDROCHLORIDE AND EPINEPHRINE 10; 10 MG/ML; UG/ML
INJECTION, SOLUTION INFILTRATION; PERINEURAL
Status: COMPLETED
Start: 2020-07-07 | End: 2020-07-07

## 2020-07-07 RX ORDER — MIDAZOLAM HYDROCHLORIDE 1 MG/ML
.5-2 INJECTION INTRAMUSCULAR; INTRAVENOUS PRN
Status: ACTIVE | OUTPATIENT
Start: 2020-07-07 | End: 2020-07-07

## 2020-07-07 RX ORDER — ONDANSETRON 2 MG/ML
4 INJECTION INTRAMUSCULAR; INTRAVENOUS PRN
Status: ACTIVE | OUTPATIENT
Start: 2020-07-07 | End: 2020-07-07

## 2020-07-07 RX ORDER — SODIUM CHLORIDE 9 MG/ML
INJECTION, SOLUTION INTRAVENOUS
Status: ACTIVE
Start: 2020-07-07 | End: 2020-07-08

## 2020-07-07 RX ORDER — OXYCODONE HYDROCHLORIDE 10 MG/1
10 TABLET ORAL
Status: ACTIVE | OUTPATIENT
Start: 2020-07-07 | End: 2020-07-08

## 2020-07-07 RX ORDER — HEPARIN SODIUM 1000 [USP'U]/ML
3900 INJECTION, SOLUTION INTRAVENOUS; SUBCUTANEOUS
Status: DISCONTINUED | OUTPATIENT
Start: 2020-07-07 | End: 2020-07-09 | Stop reason: HOSPADM

## 2020-07-07 RX ADMIN — FENTANYL CITRATE 25 MCG: 50 INJECTION INTRAMUSCULAR; INTRAVENOUS at 12:53

## 2020-07-07 RX ADMIN — MIDAZOLAM HYDROCHLORIDE 1 MG: 1 INJECTION, SOLUTION INTRAMUSCULAR; INTRAVENOUS at 12:31

## 2020-07-07 RX ADMIN — FUROSEMIDE 80 MG: 10 INJECTION, SOLUTION INTRAMUSCULAR; INTRAVENOUS at 05:17

## 2020-07-07 RX ADMIN — FENTANYL CITRATE 50 MCG: 50 INJECTION INTRAMUSCULAR; INTRAVENOUS at 12:31

## 2020-07-07 RX ADMIN — IOHEXOL 10 ML: 300 INJECTION, SOLUTION INTRAVENOUS at 13:30

## 2020-07-07 RX ADMIN — AMPICILLIN SODIUM 2000 MG: 2 INJECTION, POWDER, FOR SOLUTION INTRAMUSCULAR; INTRAVENOUS at 05:18

## 2020-07-07 RX ADMIN — LIDOCAINE HYDROCHLORIDE,EPINEPHRINE BITARTRATE: 10; .01 INJECTION, SOLUTION INFILTRATION; PERINEURAL at 12:00

## 2020-07-07 RX ADMIN — MIDAZOLAM HYDROCHLORIDE 1 MG: 1 INJECTION, SOLUTION INTRAMUSCULAR; INTRAVENOUS at 12:55

## 2020-07-07 RX ADMIN — CEFAZOLIN SODIUM 2 G: 2 INJECTION, SOLUTION INTRAVENOUS at 12:31

## 2020-07-07 RX ADMIN — LIDOCAINE HYDROCHLORIDE: 10 INJECTION, SOLUTION EPIDURAL; INFILTRATION; INTRACAUDAL; PERINEURAL at 13:00

## 2020-07-07 RX ADMIN — LORAZEPAM 0.5 MG: 2 INJECTION INTRAMUSCULAR; INTRAVENOUS at 20:36

## 2020-07-07 RX ADMIN — ACETAMINOPHEN 650 MG: 325 TABLET, FILM COATED ORAL at 08:56

## 2020-07-07 RX ADMIN — AMPICILLIN SODIUM 2000 MG: 2 INJECTION, POWDER, FOR SOLUTION INTRAMUSCULAR; INTRAVENOUS at 20:38

## 2020-07-07 RX ADMIN — ACETAMINOPHEN 650 MG: 325 TABLET, FILM COATED ORAL at 20:35

## 2020-07-07 RX ADMIN — Medication 667 MG: at 07:36

## 2020-07-07 RX ADMIN — HEPARIN SODIUM 3900 UNITS: 1000 INJECTION, SOLUTION INTRAVENOUS; SUBCUTANEOUS at 19:42

## 2020-07-07 RX ADMIN — HEPARIN 500 UNITS: 100 SYRINGE at 13:05

## 2020-07-07 RX ADMIN — FENTANYL CITRATE 25 MCG: 50 INJECTION INTRAMUSCULAR; INTRAVENOUS at 12:57

## 2020-07-07 ASSESSMENT — ENCOUNTER SYMPTOMS
NECK PAIN: 0
VOMITING: 0
CHILLS: 0
EYE DISCHARGE: 0
ABDOMINAL PAIN: 0
FOCAL WEAKNESS: 0
SPUTUM PRODUCTION: 0
MYALGIAS: 1
WEAKNESS: 0
SHORTNESS OF BREATH: 0
ORTHOPNEA: 0
PHOTOPHOBIA: 0
EYE PAIN: 0
COUGH: 0
SEIZURES: 0
BACK PAIN: 0
FEVER: 0
PALPITATIONS: 0
NAUSEA: 0

## 2020-07-07 ASSESSMENT — COGNITIVE AND FUNCTIONAL STATUS - GENERAL
STANDING UP FROM CHAIR USING ARMS: A LOT
DAILY ACTIVITIY SCORE: 12
TURNING FROM BACK TO SIDE WHILE IN FLAT BAD: UNABLE
DRESSING REGULAR UPPER BODY CLOTHING: A LOT
MOVING TO AND FROM BED TO CHAIR: UNABLE
CLIMB 3 TO 5 STEPS WITH RAILING: TOTAL
PERSONAL GROOMING: A LOT
MOVING FROM LYING ON BACK TO SITTING ON SIDE OF FLAT BED: UNABLE
MOBILITY SCORE: 8
EATING MEALS: A LITTLE
SUGGESTED CMS G CODE MODIFIER MOBILITY: CM
DRESSING REGULAR LOWER BODY CLOTHING: A LOT
SUGGESTED CMS G CODE MODIFIER DAILY ACTIVITY: CL
TOILETING: TOTAL
HELP NEEDED FOR BATHING: A LOT
WALKING IN HOSPITAL ROOM: A LOT

## 2020-07-07 ASSESSMENT — GAIT ASSESSMENTS
DISTANCE (FEET): 10
ASSISTIVE DEVICE: FRONT WHEEL WALKER
GAIT LEVEL OF ASSIST: MODERATE ASSIST
DEVIATION: STEP TO;BRADYKINETIC;SHUFFLED GAIT

## 2020-07-07 NOTE — PROGRESS NOTES
[facility] Medicine Daily Progress Note    Date of Service  7/7/2020    Chief Complaint  87 y.o. female admitted 6/26/2020 with melena and weakness, patient has a history of CVA, end-stage renal disease on hemodialysis Monday Wednesday Friday, peripheral arterial disease, the patient apparently is visiting from [place], she has 2 sons in town, she also complained of left-sided weakness, speech difficulty apparently and also some residual deficits from her previous CVA, the patient does have some chronic lower extremity wounds that she is being treated with antibiotics for, the patient on presentation was imaged and was found to have a right-sided chronic epidural hematoma with some mild acute hemorrhage within the lesion, not a surgical candidate.    Hospital Course    Patient admitted with Severe blood loss anemia, chronic right-sided subdural hematoma without intervention indicated at this time, chronic end-stage renal disease on hemodialysis  Interval Problem Update  Patient seen and examined today.    Patient tolerating treatment and therapies.  All Data, Medication data reviewed.  Case discussed with nursing as available.  Plan of Care reviewed with patient and notified of changes.  6/29 patient appears fairly confused, we discussed the findings of her capsule endoscopy, need for additional push endoscopy, she is somewhat resistant to the idea to undergo another procedure, the patient is otherwise afebrile, heart rate in the 80s to 100s, blood pressure is stabilized, patient has stable left upper extremity weakness and bilateral lower extremity weakness, patient with significant anxiety overall, seen by nephrology, gastroenterology, intensive care physicians, stabilized for transfer out of ICU    6/30: Initially the patient plan to have capsule endoscopy today but due to her worsening confusion it was canceled.  She had low-grade temperature 100.1 and tachycardia.  WBC was 13.8, urinalysis was negative for  infection.  I saw and examined the patient today.    7/1: Her hemoglobin was 6.4 this morning and I will order 1 unit of packed blood cell.  Yesterday her endoscopy was canceled due to confusion.  The patient is alert and oriented x4 this morning.  I saw and examined the patient today.\  I had a very long time discussion with daughter Elena about her medical condition.  I updated her that her hemodialysis catheter is infected and I am going to consult vascular surgeon for that and also infectious disease consult.  He is planning to have enteroscopy by GI.  At the same time I told her that she is in a lot of pain and asking for pain medication and we do not want to give her narcotics due to risks of getting respiratory distress.  I asked her what is her opinion about pain medication and she was not able to give me the opinion.  She stated that treat her everything we can medically to have her with her current condition.  I also explained to her that due to her age and multiple medical comorbidity she will be benefit from palliative care and possibly hospice care who also can help manage her pain.  She finally said that palliative team can contact her brother at  [phone].  I put a consult for palliative care today.    7/2 the patient stated that her pain is better today.  She still wanted to be treated medically for her medical condition.  Palliative team is following.  Plan Bertha is planning to have enteroscopy today.    7/3: the patient is getting HD. HD catheter needs to be removed by IR per nephro. I saw and examined her today.   IR to remove HD catheter today.    7/4: The patient complained about being hungry and requested to advance her diet to regular diet.    7/5: no acute issue overnight.  Her antibiotic was changed to ampicillin by infectious disease    7/6: Patient is feeling better each day.  Nephrology and infectious disease are following.    7/7: Patient complained about generalized pain.  She is  going to have hemodialysis tunnel placed today and after that she will have dialysis.  Consultants/Specialty  Nephrology, gastroenterology, intensivist    Code Status  DNR/DNI    Disposition  TBD    Review of Systems  Review of Systems   Constitutional: Positive for malaise/fatigue. Negative for chills.   HENT: Negative for congestion.    Eyes: Negative for photophobia, pain and discharge.   Respiratory: Negative for sputum production and shortness of breath.    Cardiovascular: Negative for chest pain, palpitations and orthopnea.   Gastrointestinal: Positive for melena. Negative for abdominal pain and vomiting.   Genitourinary: Negative for dysuria, frequency and urgency.   Musculoskeletal: Positive for joint pain. Negative for back pain and neck pain.   Skin: Negative for itching.   Neurological: Negative for focal weakness, seizures and weakness.        Physical Exam  Temp:  [36.2 °C (97.1 °F)-36.7 °C (98 °F)] 36.3 °C (97.3 °F)  Pulse:  [67-77] 74  Resp:  [16-18] 17  BP: (112-146)/(46-76) 139/67  SpO2:  [93 %-100 %] 99 %    Physical Exam  Vitals signs and nursing note reviewed.   Constitutional:       Appearance: Normal appearance. She is well-developed.   HENT:      Head: Normocephalic.      Nose: Nose normal.   Eyes:      Pupils: Pupils are equal, round, and reactive to light.   Neck:      Musculoskeletal: Normal range of motion and neck supple.      Thyroid: No thyromegaly.      Vascular: No JVD.   Cardiovascular:      Rate and Rhythm: Normal rate.      Pulses: Normal pulses.   Pulmonary:      Effort: Pulmonary effort is normal. No respiratory distress.      Breath sounds: Normal breath sounds. No wheezing.   Abdominal:      General: Bowel sounds are normal.   Musculoskeletal: Normal range of motion.         General: Tenderness present.   Skin:     General: Skin is warm.      Coloration: Skin is pale.   Neurological:      Mental Status: She is alert.         Fluids    Intake/Output Summary (Last 24 hours) at  7/7/2020 0716  Last data filed at 7/6/2020 2300  Gross per 24 hour   Intake 600 ml   Output --   Net 600 ml       Laboratory  Recent Labs     07/05/20 0834 07/05/20 2021 07/06/20 0242 07/07/20 0452   WBC 12.5*  --   --  12.3* 11.3*   RBC 3.14*  --   --  2.88* 2.99*   HEMOGLOBIN 9.0*   < > 8.5* 8.3* 8.5*   HEMATOCRIT 28.9*  --   --  26.1* 27.1*   MCV 92.0  --   --  90.6 90.6   MCH 28.7  --   --  28.8 28.4   MCHC 31.1*  --   --  31.8* 31.4*   RDW 50.7*  --   --  52.5* 51.6*   PLATELETCT 280  --   --  265 278   MPV 9.6  --   --  9.7 9.4    < > = values in this interval not displayed.     Recent Labs     07/05/20 0834 07/06/20 0242 07/07/20 0452   SODIUM 137 137 140   POTASSIUM 3.6 3.8 3.7   CHLORIDE 99 99 102   CO2 25 23 23   GLUCOSE 104* 144* 112*   BUN 35* 49* 61*   CREATININE 4.59* 6.15* 7.35*   CALCIUM 7.4* 7.0* 6.6*                   Imaging  IR-US GUIDED PIV   Final Result    Ultrasound-guided PERIPHERAL IV INSERTION performed by    qualified nursing staff as above.      IR-CVC TUNNEL WITH PORT REMOVAL   Final Result         1.  Removal of the right IJ] hemodialysis catheter.      2.  The tip of the catheter was cut off and sent to lab for analysis.      CT-HEAD W/O   Final Result      No significant interval change.      MR-BRAIN-W/O   Final Result      1.  Mild cerebral atrophy. Age-appropriate.   2.  Moderate-large lentiform extra-axial fluid collection over the right posterior frontal-parietal convexity consistent with a chronic or late subacute epidural hematoma or loculated subdural hematoma. Possible minimal component of more recent    hemorrhage where there is linear bandlike hyperdensity on CT.   3.  Patchy and punctate areas of bright diffusion signal in the underlying right parietal lobe which may represent acute or recent subacute arterial or venous infarction. No acute parenchymal hemorrhage.   4.  Minimal supratentorial white matter disease most consistent with microvascular ischemic change.    5.  Old lacunar infarct left lateral thalamus.      MR-MRA HEAD-W/O   Final Result      1.  Intracranial atherosclerotic cerebrovascular stenotic disease involving the posterior cerebral arteries and probably the middle cerebral arteries beyond the genu. No alexx occlusion.   2.  Normal variant dominant caliber right anterior cerebral artery A1 segment with a hypoplastic left A1 segment.   3.  No evidence of aneurysm about [place].      EC-ECHOCARDIOGRAM COMPLETE W/ CONT   Final Result      US-CAROTID DOPPLER BILAT   Final Result      DX-CHEST-PORTABLE (1 VIEW)   Final Result      No acute cardiopulmonary abnormality.      CT-HEAD W/O   Final Result      1. Small amount of acute hemorrhage in a chronic 2.1 cm right epidural collection, along the right frontoparietal convexity. Associated 2 mm right to left midline shift.   2. Minimal subacute subdural hemorrhage anterior to the right epidural collection.   3. Hyperdensity in the temporal horn of the right lateral ventricle may represent small amount of intraventricular hemorrhage.   4. No CT evidence of acute infarct.      IR-CAMACHO,GROSHONG PLACEMENT >5    (Results Pending)   IR-CAMACHO,GROSHONG PLACEMENT < 5    (Results Pending)        Assessment/Plan  * Acute GI bleeding  Assessment & Plan  With melena, EGD and colonoscopy did not see any active lesions, capsule endoscopy identified a possible source of 50 cm,   Enteroscopy: duodenitis, bleeding AVM  Continuous cardiac monitoring  Continue PPI for now  Monitor H&H every 8 hours, transfuse for hemoglobin less than 7  Plan as above  hGB has been stable      Epidural hemorrhage (HCC)  Assessment & Plan  2.3 cm with 2 mm shift  Neurosurgery consulted and recommended no intervention  Neurology consulted, MRI and MRA resulted, possible recent small CVA      Leg wounds  Assessment & Plan  Wound culture grew pseudomonas aeruginosa  Was on oral ciprofloxacin and I will change it to IV  ampicillin      Peripheral artery disease (HCC)  Assessment & Plan  Hold aspirin and Plavix    Type 2 diabetes mellitus with kidney complication, without long-term current use of insulin ([facility])  Assessment & Plan  Start on insulin sliding scale with serial Accu-Checks  Check hemoglobin A1c  Hypoglycemic protocol in place        Primary hypertension  Assessment & Plan  Restart medication    ESRD (end stage renal disease) ([facility])  Assessment & Plan  Dialysis Monday Wednesday Friday,   nephrology is consulted  Monitor BMP and assess response  Avoid IV contrast/nephrotoxins/NSAIDs  Dose adjust meds for decreased GFR        Acute blood loss anemia  Assessment & Plan  Given 3 units of RBCs, 1 unit of platelets  Transfuse as needed with target hemoglobin above 7.  GI is following  Follow CBC in the morning  Enteroscopy: duodenitis, bleeding AVM  Hemoglobin has been stable      Bacteremia  Assessment & Plan  From hemodialysis catheter  Removed HD catheter  Nephrology following  ID following on ampicillin  Repeat blood culture negative to date  To have humeral dialysis tunnel placed today    Encephalopathy acute  Assessment & Plan  Likely multifactorial  Treating infection with IV antibiotic  Improving and alert oriented x4  We will try to avoid benzos and narcotic as much as possible  improving       VTE prophylaxis: SCD

## 2020-07-07 NOTE — THERAPY
Missed Therapy     Patient Name: Corine Dela Cruz  Age:  87 y.o., Sex:  female  Medical Record #: [id number]  Today's Date: 7/7/2020    Discussed missed therapy with BAKARI Salazar.    Pt is currently NPO and about to go down for a procedure. SLP will hold tx today.

## 2020-07-07 NOTE — PROGRESS NOTES
Infectious Disease Progress Note    Author: Arabella Lockhart M.D. Date & Time of service: 2020  1:23 PM    Chief Complaint:  Sepsis, strep    Interval History:  87-year-old female with ESRD, DM, PVD, CVA admitted 2020 for gastrointestinal bleed with associated weakness, dizziness and falls resulting in intracranial hemorrhage. Developed fever and leukocytosis during hospitalization and found to have above  7/2 AF (99.6) WBC 10.2 remains obtunded-pain better controlled. Seen by Palliative-now agreeable to GI procedure  /3 AF WBC 11.9  tolerated procedures well-bleeding AVMs seen with erosive duodenitis. Less pain today. Plan for removal HD cath noted   AF WBC 12 up to chair-no new complaints  / AF WBC 12.5 up to chair again  Today-pain controlled. Required another transfusion (Hgb down to 6.8)-wants to know how long it will take to get new HD cath  / AF WBC 12.3 up to chair-tired today. No new complaints-plan for HD cath this afternoon noted  / AF WBC 11.3 HD cath not placed yesterday-now planned for today. Perseverating and asking when cath will be placed-hungry  Labs Reviewed, Medications Reviewed, and Wound Reviewed.    Review of Systems:  Review of Systems   Constitutional: Negative for chills and fever.   Respiratory: Negative for cough and shortness of breath.    Cardiovascular: Negative for chest pain.   Gastrointestinal: Negative for nausea and vomiting.   Musculoskeletal: Positive for myalgias.   Skin: Negative for rash.   All other systems reviewed and are negative.      Hemodynamics:  Temp (24hrs), Av.4 °C (97.6 °F), Min:36.2 °C (97.1 °F), Max:37.1 °C (98.8 °F)  Temperature: 37.1 °C (98.8 °F)  Pulse  Av.2  Min: 9  Max: 111   Blood Pressure : (!) 184/72       Physical Exam:  Physical Exam  Vitals signs and nursing note reviewed.   Constitutional:       General: She is not in acute distress.     Appearance: She is not toxic-appearing or diaphoretic.   HENT:      Nose: No  rhinorrhea.      Mouth/Throat:      Pharynx: No posterior oropharyngeal erythema.   Eyes:      General: No scleral icterus.     Extraocular Movements: Extraocular movements intact.      Pupils: Pupils are equal, round, and reactive to light.   Neck:      Musculoskeletal: No neck rigidity.   Cardiovascular:      Rate and Rhythm: Normal rate.      Comments: ectopy  Pulmonary:      Effort: Pulmonary effort is normal. No respiratory distress.      Breath sounds: No stridor. No wheezing or rhonchi.   Abdominal:      General: There is no distension.      Palpations: Abdomen is soft.      Tenderness: There is no abdominal tenderness.   Musculoskeletal:         General: Swelling present.   Skin:     Coloration: Skin is not jaundiced.      Findings: Bruising present.      Comments: Extensive bruising LUE with hand swelling   Neurological:      General: No focal deficit present.      Mental Status: She is alert.   Psychiatric:         Mood and Affect: Mood normal.         Meds:    Current Facility-Administered Medications:   •  ceFAZolin  •  acetaminophen  •  NS  •  fentaNYL  •  midazolam  •  ondansetron  •  [COMPLETED] iohexol  •  ampicillin  •  omeprazole  •  furosemide  •  haloperidol lactate  •  lidocaine  •  vitamin D (Ergocalciferol)  •  calcium acetate  •  atorvastatin  •  acetaminophen  •  ondansetron  •  ondansetron  •  LORazepam  •  heparin  •  hydrALAZINE    Labs:  Recent Labs     07/05/20  0834  07/05/20  2021 07/06/20  0242 07/07/20  0452   WBC 12.5*  --   --  12.3* 11.3*   RBC 3.14*  --   --  2.88* 2.99*   HEMOGLOBIN 9.0*   < > 8.5* 8.3* 8.5*   HEMATOCRIT 28.9*  --   --  26.1* 27.1*   MCV 92.0  --   --  90.6 90.6   MCH 28.7  --   --  28.8 28.4   RDW 50.7*  --   --  52.5* 51.6*   PLATELETCT 280  --   --  265 278   MPV 9.6  --   --  9.7 9.4   NEUTSPOLYS 77.20*  --   --  76.50* 77.40*   LYMPHOCYTES 14.30*  --   --  15.10* 14.30*   MONOCYTES 5.20  --   --  5.70 5.40   EOSINOPHILS 1.70  --   --  1.30 1.20   BASOPHILS  0.30  --   --  0.30 0.30    < > = values in this interval not displayed.     Recent Labs     07/05/20  0834 07/06/20  0242 07/07/20  0452   SODIUM 137 137 140   POTASSIUM 3.6 3.8 3.7   CHLORIDE 99 99 102   CO2 25 23 23   GLUCOSE 104* 144* 112*   BUN 35* 49* 61*     Recent Labs     07/05/20  0834 07/06/20  0242 07/07/20  0452   ALBUMIN 2.8* 2.9* 2.9*   TBILIRUBIN 0.7 0.3 0.3   ALKPHOSPHAT 53 48 49   TOTPROTEIN 6.5 6.1 6.0   ALTSGPT 15 14 10   ASTSGOT 22 17 14   CREATININE 4.59* 6.15* 7.35*       Imaging:  Ct-head W/o    Result Date: 6/29/2020 6/29/2020 11:28 AM HISTORY/REASON FOR EXAM:  Stroke, follow up. Intracranial hemorrhage TECHNIQUE/EXAM DESCRIPTION AND NUMBER OF VIEWS: CT of the head without contrast. The study was performed on a helical multidetector CT scanner. Contiguous 2.5 mm axial sections were obtained from the skull base through the vertex. Up to date radiation dose reduction adjustments have been utilized to meet ALARA standards for radiation dose reduction. COMPARISON:  CT head 6/26/2020. Brain MRI 6/27/2020 FINDINGS: Redemonstrated is a predominantly low density extra axial fluid collection along the right frontoparietal convexity, probably loculated subdural hematoma although a component of epidural hemorrhage is not excluded. There is a small amount of hyperdensity  along the inferior aspect of the collection which could represent small amount of acute hemorrhage. There is no significant change in appearance. The collection measures up to 22 mm in thickness. There is stable mass effect with sulcal effacement and slight right to left midline shift. No herniation. Generalized age-related atrophy. Chronic small left thalamic lacunar infarct. No acute osseous abnormality. Probable prior partial right mastoidectomy.     No significant interval change.    Ct-head W/o    Result Date: 6/26/2020   CT HEAD WITHOUT CONTRAST 6/26/2020 12:34 PM HISTORY/REASON FOR EXAM:  Left-sided facial droop TECHNIQUE/EXAM  DESCRIPTION AND NUMBER OF VIEWS: CT of the head without contrast. The study was performed on a helical multidetector CT scanner. Contiguous 2.5 mm axial sections were obtained from the skull base through the vertex. Up to date radiation dose reduction adjustments have been utilized to meet ALARA standards for radiation dose reduction. COMPARISON:  None available FINDINGS: Lateral ventricles are normal in size and symmetric. Hyperdensity in the temporal horn of the right lateral ventricle. Mild global parenchymal atrophy. Chronic small vessel ischemic changes. Minimal 2 mm right to left midline shift Basal cisterns are patent. There is a chronic epidural collection along the right frontoparietal convexity measuring 2.1 cm in thickness. Small amount of acute blood products are seen in the inferior aspect of the collection. Minimal subacute subdural hemorrhage anterior to the right epidural collection, along the right frontal convexity. Calvaria are intact. Atherosclerosis. Visualized orbits are unremarkable. Visualized mastoid air cells are clear. No significant sinus disease in the visualized paranasal sinuses.     1. Small amount of acute hemorrhage in a chronic 2.1 cm right epidural collection, along the right frontoparietal convexity. Associated 2 mm right to left midline shift. 2. Minimal subacute subdural hemorrhage anterior to the right epidural collection. 3. Hyperdensity in the temporal horn of the right lateral ventricle may represent small amount of intraventricular hemorrhage. 4. No CT evidence of acute infarct.    Dx-chest-portable (1 View)    Result Date: 6/26/2020 6/26/2020 1:03 PM HISTORY/REASON FOR EXAM:  Shortness of Breath. TECHNIQUE/EXAM DESCRIPTION AND NUMBER OF VIEWS: Single portable view of the chest. COMPARISON: None. FINDINGS: LUNGS: Clear. No effusions. PNEUMOTHORAX: None. LINES AND TUBES: Right IJ central catheter is in the distal SVC. MEDIASTINUM: No cardiomegaly. Atherosclerosis.  MUSCULOSKELETAL STRUCTURES: No acute displaced fracture.     No acute cardiopulmonary abnormality.    Mr-brain-w/o    Result Date: 6/27/2020 6/27/2020 5:18 PM HISTORY/REASON FOR EXAM:  Syncope, recurrent. Dizziness, left arm numbness and slurred speech. Left-sided facial droop. History of prior stroke. Recurrent syncope. End-stage renal disease. Hypertension. TECHNIQUE/EXAM DESCRIPTION: MRI of the brain without contrast. T1 sagittal, T2 fast spin-echo axial, T1 coronal, FLAIR coronal, Diffusion weighted and Apparent Diffusion Coefficient (ADC map) axial images were obtained of the whole brain. Additional FLAIR axial images were obtained. The study was performed on a [employer] 1.5 Starla MRI scanner. COMPARISON:  Head CT 6/26/2020 FINDINGS: The calvaria are unremarkable. There is a moderate-large T2 hyperintense extra-axial fluid collection over the right posterior frontal-parietal convexity. There is intermediate-slightly hyperintense FLAIR signal and T1 signal is slightly greater than CSF. The collection is somewhat lentiform. This could represent a late subacute or chronic epidural hematoma or loculated subdural hematoma. There is gradient echo hypointensity hemosiderin staining along the deep aspect of the collection. Corresponding to the bandlike hyperdense linear focus toward the anterior aspect of the collection seen on CT, there is corresponding gradient echo hypointensity and this may be a more recent bandlike focus of hemorrhage. There is local mass effect with effacement of the right trigone and anterior displacement of the posterior body right lateral ventricle. There is minimal right to left shift of midline structures of about 1.6 mm. The underlying brain parenchyma in the right parietal lobe shows hazy and punctate areas of restricted diffusion which may represent associated arterial infarction or venous infarction due to compression of cortical veins. There is underlying mild cerebral atrophy with  prominence of sulcal markings and mild ventriculomegaly in the left hemisphere. Age-appropriate. There is an old lacunar infarct in the thalamus Elsewhere in the cerebral hemispheres, there is a pattern of minimal supratentorial white matter disease with a few rare foci of T2 and FLAIR hyperintensity in the subcortical and/or deep white matter consistent with small vessel ischemic change versus demyelination or gliosis. The brainstem and cerebellum are unremarkable. The major vessels including the dural venous sinuses appear intact. Paranasal sinuses show mild mucosal thickening in the sphenoid sinus and minimal mucosal thickening scattered among the ethmoid air cells. The mastoids are clear. There has been cataract surgery. The gaze is divergent.     1.  Mild cerebral atrophy. Age-appropriate. 2.  Moderate-large lentiform extra-axial fluid collection over the right posterior frontal-parietal convexity consistent with a chronic or late subacute epidural hematoma or loculated subdural hematoma. Possible minimal component of more recent hemorrhage where there is linear bandlike hyperdensity on CT. 3.  Patchy and punctate areas of bright diffusion signal in the underlying right parietal lobe which may represent acute or recent subacute arterial or venous infarction. No acute parenchymal hemorrhage. 4.  Minimal supratentorial white matter disease most consistent with microvascular ischemic change. 5.  Old lacunar infarct left lateral thalamus.    Mr-mra Head-w/o    Result Date: 6/27/2020 6/27/2020 5:18 PM HISTORY/REASON FOR EXAM:  Dizziness, non-specific. Syncope, recurrent. Dizziness, left arm numbness and slurred speech. Left-sided facial droop. History of prior stroke. Recurrent syncope. End-stage renal disease. Hypertension. TECHNIQUE/EXAM DESCRIPTION: MRA of the Head ([place]) without contrast. The study was performed on a [facility] 1.5 Starla MRI scanner. MRA of the [place] was performed with 3D  time-of-flight technique with axial acquisition. Additional MRA of the internal carotid and vertebrobasilar arteries at the level of the skull base and craniocervical junction was also performed with 3D time-of-flight technique with axial acquisition. Images are reviewed at the PACS or Celect workstations as axial source images as well as maximum intensity ray projection (MIP) multiplanar 3D reconstructions. COMPARISON:  MRI brain from today's date FINDINGS: The posterior circulation shows patent distal vertebral arteries. The left vertebral artery is minimally dominant. The vertebrobasilar confluence is intact. The basilar artery is widely patent with uniform caliber and robust flow signal. There is multifocal irregularity of the posterior cerebral arteries, right greater than left with a gap-like defect in the right P2 segment consistent with moderate to high-grade stenosis. Similar lesser findings of the left posterior cerebral artery. These findings are consistent with intracranial atherosclerotic cerebrovascular disease. No evidence of aneurysm in the posterior circulation. The anterior circulation shows the carotid siphons to be patent. There is a normal variant dominant caliber right anterior cerebral artery A1 segment with a markedly hypoplastic left A1 segment. The proximal middle cerebral arteries are intact. The M2 and M3/sylvian branches beyond the genu show some attenuation of caliber and interruption of flow signal suggesting intracranial atherosclerotic stenotic disease. No evidence of aneurysm about the anterior circulation.     1.  Intracranial atherosclerotic cerebrovascular stenotic disease involving the posterior cerebral arteries and probably the middle cerebral arteries beyond the genu. No alexx occlusion. 2.  Normal variant dominant caliber right anterior cerebral artery A1 segment with a hypoplastic left A1 segment. 3.  No evidence of aneurysm about [place].    Us-carotid Doppler  Bilat    Result Date: 2020   Carotid Duplex  Report  Vascular Laboratory  CONCLUSIONS  Moderate atherosclerosis. Velocities are consistent with 50-69% stenosis of  the internal carotid arteries bilaterally.  BECKIE BRITTON  Exam Date:     2020 09:01  Room #:     Inpatient  Priority:     Routine  Ht (in):             Wt (lb):  Ordering Physician:        LAWRENCE SHEFFIELD  Referring Physician:       [id number] Cobb  Sonographer:               MARGO Cason RDCS  Study Type:                Complete Bilateral  Technical Quality:         Adequate  Age:    87    Gender:     F  MRN:    [id number]  :    10/26/1932      BSA:  Indications:     Dizziness and giddiness  CPT Codes:       47588  ICD Codes:       R42  History:  Limitations:     Body habitus  Right Brachial BP:              /  Left Brachial BP:             /  RIGHT  Plaque          Plaque         Velocity (cm/s)  Characteristics Composition    Syst/Diast                                 128  / 32      Distal ICA                                 121  / 35      Mid ICA                                 78   / 28      Prox ICA                                 62   / 10      Distal CCA                                      /         Mid CCA                                 14   / 4       Prox CCA                                 381  /         ECA  Waveform:   Triphasic                         SCA  LEFT  Plaque         Plaque          Velocity (cm/s)  CharacteristicsComposition     Syst/Diast                                 155  / 30      Distal ICA                                 166  / 30      Mid ICA  Irregular      Heterogeneo     122  / 26      Prox ICA                 us  Irregular      Heterogeneo     71   / 16      Distal CCA                 us                                      /         Mid CCA                                 96   / 17      Prox CCA  Irregular      Heterogeneo     336  /         ECA                 us   Waveform:   Triphasic                         SCA          5.4          ICA/CCA       1.3        Antegrade      Vertebral   Antegrade         78   / 16     cm/s        63    / 12  FINDINGS  Right carotid.  Plaque of the bifurcation extending into the internal carotid. Velocities  are consistent with 50-69% stenosis of the internal carotid artery.    Left carotid.  Plaque of the bifurcation extending into the internal carotid. Velocities  are consistent with 50-69% stenosis of the internal carotid artery.  Plaque is irregular on the surface and heterogeneous with mixed acoustic  densities.  Bilateral subclavian and vertebral artery waveforms are antegrade and  waveforms are normal in character and velocity.  Liam Duong MD  (Electronically Signed)  Final Date:      2020                   10:28    Ec-echocardiogram Complete W/ Cont    Result Date: 2020  Transthoracic Echo Report Echocardiography Laboratory CONCLUSIONS No prior study is available for comparison. Normal left ventricular systolic function. Left ventricular ejection fraction is visually estimated to be 60%. The right ventricle was normal in size and function. Moderately dilated left atrium. Mild aortic stenosis. Mild tricuspid regurgitation. BECKIE BRITTON Exam Date:         2020                    15:19 Exam Location:     Inpatient Priority:          Routine Ordering Physician:        LAURI ROWLEY Referring Physician: Sonographer:               FERNANDO Frazier Age:    87     Gender:    F MRN:    [id number] :    10/26/1932 BSA:    1.73   Ht (in):    59     Wt (lb):    171 Exam Type:     Complete, Contrast Indications:     Transient cerebral ischemic attack, unspecified ICD Codes:       G45.9 CPT Codes:       02421,  BP:   114    /   56     HR:   85 Technical Quality:       Fair MEASUREMENTS  (Male / Female) Normal Values 2D ECHO LV Diastolic Diameter  PLAX        4.9 cm                4.2 - 5.9 / 3.9 - 5.3 cm LV Systolic Diameter PLAX         2.5 cm                2.1 - 4.0 cm IVS Diastolic Thickness           0.92 cm               LVPW Diastolic Thickness          0.87 cm               LVOT Diameter                     2.1 cm                LV Ejection Fraction MOD BP       62.4 %                >= 55  % LV Ejection Fraction MOD 4C       61.3 %                LV Ejection Fraction MOD 2C       64.4 %                IVC Diameter                      1.6 cm                DOPPLER AV Peak Velocity                  2.1 m/s               AV Peak Gradient                  17.8 mmHg             AV Mean Gradient                  11.1 mmHg             LVOT Peak Velocity                0.96 m/s              AV Area Cont Eq vti               1.7 cm2               Mitral E Point Velocity           1.3 m/s               Mitral E to A Ratio               0.93                  Mitral A Duration                 108 ms                MV Pressure Half Time             55.6 ms               MV Area PHT                       4 cm2                 MV Deceleration Time              192 ms                TR Peak Velocity                  244 cm/s              PV Peak Velocity                  1 m/s                 PV Peak Gradient                  4 mmHg                RVOT Peak Velocity                0.75 m/s              * Indicates values subject to auto-interpretation LV EF:        % FINDINGS Left Ventricle Normal left ventricular chamber size. Normal left ventricular wall thickness. Normal left ventricular systolic function. Left ventricular ejection fraction is visually estimated to be 60%. Normal regional wall motion. Grade II diastolic dysfunction. Contrast was used to enhance visualization of the endocardial border. 1 mL of contrast was administered. Existing IV was used. Located at the left antecubital Right Ventricle The right ventricle was normal in size and function.  "Right Atrium The right atrium is normal in size.  Normal inferior vena cava size and inspiratory collapse. Left Atrium The left atrium is normal in size.  Moderately dilated left atrium. Left atrial volume index is 43 mL/sq m. Mitral Valve Structurally normal mitral valve without significant stenosis. Trace mitral regurgitation. Aortic Valve Tricuspid aortic valve. Calcified aortic valve leaflets. Mild aortic stenosis. Aortic valve area calculated from the continuity equation is 1.7 cm2. Vmax is 2.2 m/s. Transvalvular gradients are - Peak: 19 mmHg, Mean: 11 mmHg. Dimensionless index is 0.52. No aortic insufficiency. Tricuspid Valve Structurally normal tricuspid valve without significant stenosis. Mild tricuspid regurgitation. Estimated right ventricular systolic pressure  is 30 mmHg. Right atrial pressure is estimated to be 3 mmHg. Pulmonic Valve The pulmonic valve is not well visualized. No pulmonic stenosis. Trace pulmonic insufficiency. Pericardium Normal pericardium without effusion. Aorta The aortic root is normal.  Ascending aorta diameter is 3.1 cm. Ricco Portillo MD (Electronically Signed) Final Date:     27 June 2020                 17:47      Micro:  Results     Procedure Component Value Units Date/Time    Blood Culture [[id number]] Collected:  07/02/20 1158    Order Status:  Completed Specimen:  Blood from Peripheral Updated:  07/07/20 1300     Significant Indicator NEG     Source BLD     Site PERIPHERAL     Culture Result No growth after 5 days of incubation.    Narrative:       Per Hospital Policy: Only change Specimen Src: to \"Line\" if  specified by physician order.  Right Wrist    Blood Culture [[id number]] Collected:  07/02/20 1158    Order Status:  Completed Specimen:  Blood from Peripheral Updated:  07/07/20 1300     Significant Indicator NEG     Source BLD     Site PERIPHERAL     Culture Result No growth after 5 days of incubation.    Narrative:       Per Hospital Policy: Only change Specimen Src: " "to \"Line\" if  specified by physician order.  Right Hand    CATH TIP CULTURE [[id number]] Collected:  07/03/20 1230    Order Status:  Completed Specimen:  Cath Tip Updated:  07/05/20 1152     Significant Indicator NEG     Source CTIP     Site Central Line     Culture Result <15 CFU's Coagulase negative Staphylococcus.    CATH TIP CULTURE [[id number]]     Order Status:  No result Specimen:  Cath Tip from Central Line     Blood Culture [[id number]]  (Abnormal) Collected:  06/30/20 1340    Order Status:  Completed Specimen:  Blood Updated:  07/03/20 1202     Significant Indicator POS     Source BLD     Site HD Cath     Culture Result Growth detected by Bactec instrument. 07/01/2020  05:28      Enterococcus faecalis  Combination therapy with ampicillin, penicillin, or  vancomycin (for susceptible strains) plus an aminoglycoside  is usually indicated for serious enterococcal infections,  such as endocarditis unless high-level resistance to both  gentamicin and streptomycin is documented; such combinations  are predicted to result in synergistic killing of the  Enterococcus.  See previous culture for sensitivity report.      Narrative:       CALL  [place]  131 tel. [phone],  CALLED  131 tel. [phone] 07/01/2020, 05:30, RB PERF. RESULTS CALLED TO: RN  [id number]  HD Cath    Blood Culture [[id number]]  (Abnormal)  (Susceptibility) Collected:  06/30/20 1340    Order Status:  Completed Specimen:  Blood Updated:  07/03/20 1202     Significant Indicator POS     Source BLD     Site HD Cath     Culture Result Growth detected by Bactec instrument. 07/01/2020  05:31      Enterococcus faecalis  Combination therapy with ampicillin, penicillin, or  vancomycin (for susceptible strains) plus an aminoglycoside  is usually indicated for serious enterococcal infections,  such as endocarditis unless high-level resistance to both  gentamicin and streptomycin is documented; such combinations  are predicted to result in synergistic killing of " the  Enterococcus.      Narrative:       HD Cath    Susceptibility     Enterococcus faecalis (1)     Antibiotic Interpretation Microscan Method Status    Daptomycin Sensitive <=1 mcg/mL MELISSA Final    Ampicillin Sensitive <=2 mcg/mL MELISSA Final    Gent Synergy Sensitive <=500 mcg/mL MELISSA Final    Vancomycin Sensitive 2 mcg/mL MELISSA Final    Penicillin Sensitive 2 mcg/mL MELISSA Final                   Blood Culture [[id number]]     Order Status:  Canceled Specimen:  Blood from Peripheral     Blood Culture [[id number]]     Order Status:  Canceled Specimen:  Blood from Peripheral     Urinalysis [[id number]]     Order Status:  No result Specimen:  Urine           Assessment:  Active Hospital Problems    Diagnosis   • *Acute GI bleeding [K92.2]   • Epidural hemorrhage ([facility]) [S06.4X9A]   • Acute blood loss anemia [D62]   • ESRD (end stage renal disease) ([facility]) [N18.6]   • Type 2 diabetes mellitus with kidney complication, without long-term current use of insulin ([facility]) [E11.29]   • Peripheral artery disease ([facility]) [I73.9]   • Leg wounds [S81.801A]   • Encephalopathy acute [G93.40]       Plan:  Enterococcal sepsis  GI source most likely-HD cath infection also possible  Afebrile  Leukocytosis resolving  AMS improved  BCxs + Efaecalis amp-S 6/30  Blood cxs 7/2 neg to date  Cath tip CNS  TTE with calcified valves;  ANABELL if persistently +blood cxs if feasible  Continue ampicillin -2 weeks if no new positive cxs    GI bleed  Decreased blood loss .    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  Management is conservatively.    No new CVA seen by MRI     ESRD  Dose adjust abx    Chronic stasis ulceration  No evidence of active infection, so do not add additional antimicrobial therapy for this wound.    Wound swab showed colonization with pseudomonas and stenotrophomonas  Continue with wound care   Recommend  vascular studies when feasible    Diabetes  Keep BS under 150 to help control current infection    BRIAN RN

## 2020-07-07 NOTE — OR SURGEON
Immediate Post- Operative Note        PostOp Diagnosis: Renal Failure      Procedure(s): Right Femoral Tunnelled HD Cath      Estimated Blood Loss: Less than 5 ml        Complications: None            7/7/2020     12:29 PM     Steven Lema M.D.

## 2020-07-07 NOTE — PROGRESS NOTES
Wound RN attempt to change pt dressing however, pt away from room for procedure.  Wound RN will return either at a later time today or tomorrow.

## 2020-07-07 NOTE — PROGRESS NOTES
IR Nursing Note:    Patient underwent a Tunneled hemodialysis catheter placement by Dr. Lema. Procedure site was marked by MD and verified using imaging guidance.  Patient was placed in a supine position.  Right IJ was attempted and unsuccessful.  Right and left jugular demonstrated stenosis.  Right femoral was prepped.  Right femoral vein was accessed.  Vitals were taken every 5 minutes and remained stable during procedure (see doc flow sheet for results).  CO2 waveform capnography was monitored and remained 28-33 throughout procedure.  Line was sutured in.  A Tegaderm and Biopatch dressing was placed over surgical site. Report called to Taylor VILLEGAS. Pt transported by bed with RN to T309.     Bard Hemosplit Hemodialysis catheter 14.5F x 27cm Ref #[id number] Lot # [id number]

## 2020-07-07 NOTE — PROGRESS NOTES
Assumed pt care at 0715.  Received report from night RN.  Assessment completed.  Pt AAOx4. Pt complains of gereralized pain.  Medicated per MAR.  Pt continues to call for repositioning.  Sitting at bedside repositioned multiple times.  Pt is 2 oerson assist.Pt does not call appropriately.  Treaded socks in place, call light within reach and staff numbers provided.  Pt needs met at this time.

## 2020-07-07 NOTE — WOUND TEAM
[facility]  Inpatient Services  Wound and Skin Care Progress Note    Admission Date: 6/26/2020     Last order of IP CONSULT TO WOUND CARE was found on 6/26/2020 from Hospital Encounter on 6/26/2020     HPI, PMH, SH: Reviewed    Unit where seen by Wound Team: T309/00     WOUND CONSULT/FOLLOW UP RELATED TO:  BLE dressing change     Self Report / Pain Level:  Pain with LLE. Tolerable.        OBJECTIVE:  Dressings in place, dated 7/3 even though due to be changed Q72hrs.    WOUND TYPE, LOCATION, CHARACTERISTICS (Pressure Injuries: location, stage, POA or date identified)  Wound 06/26/20 Venous Ulcer Leg Right (Active)   Wound Image      Site Assessment Red;Pale    Periwound Assessment Hyperpigmented;Red;Scar tissue;Dry    Margins Attached edges;Undefined edges    Closure Secondary intention    Drainage Amount Scant    Drainage Description Serosanguineous    Treatments Cleansed;Site care;Offloading;Tape change    Wound Cleansing Approved Wound Cleanser    Periwound Protectant Not Applicable    Dressing Cleansing/Solutions Not Applicable    Dressing Options Hydrofera Blue Ready;Mepilex;Tubigrip    Dressing Changed Changed    Dressing Status Clean;Dry;Intact    Dressing Change/Treatment Frequency Every 72 hrs, and As Needed    NEXT Dressing Change/Treatment Date 07/10/20    NEXT Weekly Photo (Inpatient Only) 07/21/20    Non-staged Wound Description Full thickness    Wound Length (cm) 9 cm    Wound Width (cm) 3 cm    Wound Surface Area (cm^2) 27 cm^2        Wound 06/26/20 Venous Ulcer Leg Left (Active)   Wound Image      Site Assessment Black;Pale;Red;Painful;Drainage    Periwound Assessment Hyperpigmented;Scar tissue;Dry    Margins Attached edges    Closure Secondary intention    Drainage Amount Scant    Drainage Description Serosanguineous    Treatments Cleansed;Direct pressure;Site care;Tape change    Wound Cleansing Approved Wound Cleanser    Periwound Protectant Not Applicable    Dressing  Cleansing/Solutions Not Applicable    Dressing Options Hydrofera Blue Ready;Tubigrip;Mepilex    Dressing Changed Changed    Dressing Status Clean;Dry;Intact    Dressing Change/Treatment Frequency Every 72 hrs, and As Needed    NEXT Dressing Change/Treatment Date 07/10/20    NEXT Weekly Photo (Inpatient Only) 07/14/20    Non-staged Wound Description Full thickness    Wound Length (cm) 11 cm    Wound Width (cm) 6 cm    Wound Surface Area (cm^2) 66 cm^2    Number of days: 11          Vascular:    FRANCISCO:   No results found.      Lab Values:    Lab Results   Component Value Date/Time    WBC 11.3 (H) 07/07/2020 04:52 AM    RBC 2.99 (L) 07/07/2020 04:52 AM    HEMOGLOBIN 8.5 (L) 07/07/2020 04:52 AM    HEMATOCRIT 27.1 (L) 07/07/2020 04:52 AM          Culture:   Obtained ,   Culture Results show:  Recent Results (from the past 720 hour(s))   CULTURE WOUND W/ GRAM STAIN    Collection Time: 06/27/20 11:55 PM    Specimen: Exudate; Wound   Result Value Ref Range    Significant Indicator POS (POS)     Source WND     Site LEFT LEG     Culture Result Rare growth mixed skin naina. (A)     Gram Stain Result No organisms seen.     Culture Result (A)      Pseudomonas aeruginosa  Light growth  P.aeruginosa may develop resistance during prolonged therapy  with all antibiotics. Isolates that are initially susceptible  may become resistant within three to four days after  initiation of therapy. Testing of repeat isolates may be  warranted.      Culture Result Stenotrophomonas maltophilia  Rare growth   (A)        Susceptibility    Pseudomonas aeruginosa - MELISSA     Ceftazidime 4 Sensitive mcg/mL     Ciprofloxacin <=1 Sensitive mcg/mL     Cefepime <=2 Sensitive mcg/mL     Gentamicin <=4 Sensitive mcg/mL     Amikacin <=16 Sensitive mcg/mL     Meropenem <=1 Sensitive mcg/mL     Pip/Tazobactam <=16 Sensitive mcg/mL     Tobramycin <=4 Sensitive mcg/mL    Stenotrophomonas maltophilia - MELISSA     Ceftazidime >16 Resistant mcg/mL     Trimeth/Sulfa <=2/38  Sensitive mcg/mL     Levofloxacin <=2 Sensitive mcg/mL         INTERVENTIONS BY WOUND TEAM:  Patient and chart reviewed. Removed dressings. RLE cleansed with approved wound cleanser and gauze, pictures and measurements taken, minimal changes from last pictures uploaded. Applied hydrofera blue to wound beds, place mepilex to help and to secure HFB in place. Tubi  F applied. LLE assessed, much larger than previous assessment, and opened. Pictures/measurements taken, too painful to try to debride. Cleansed with approved wound cleanser and gauze, applied hydrofera blue, secured with mepilex, mepilex also on heel. Reapplied tubi  F. CMS intact. Bedside RN Marie updated. Wound team to continue to follow up.     Interdisciplinary consultation: Patient, Bedside RN (Marie)    EVALUATION: patient has stasis ulcers posterior BLE present. Hemosiderin staining with flaky skin also present BLE. LLE appears to be larger and more open from previous assessment. Applied Hydrofera Blue applied for the hydrophilic polyurethane foam which contains ethylene oxide used as a bactericidal, fungicidal, and sporicidal disinfectant. Hydrofera Blue also aids in maintaining a moist wound environment. The absorption properties of this dressing are important in collecting exudates and bacteria from the injured area. These harmful fluid secretions bind to the dressing removing it from the wound without the foam sticking to the wound causing more harm. Mepilex to continue to offload pressure from wounds and heels. Tubi  F to apply mild compression. Wound team to continue to follow up.       Goals: Steady decrease in wound area and depth weekly.    NURSING PLAN OF CARE ORDERS (X):    Dressing changes: See Dressing Care orders: X  Skin care: See Skin Care orders: X  Rectal tube care: See Rectal Tube Care orders:   Other orders:     WOUND TEAM PLAN OF CARE:   Dressing changes by wound team:          Follow up 1-2 times weekly:                Follow up 3 times weekly:                NPWT change 3 times weekly:     Follow up as needed:       Other (explain):     Anticipated discharge plans:   LTACH:        SNF/Rehab:                  Home Care:           Outpatient Wound Center:            Self Care:

## 2020-07-07 NOTE — PROGRESS NOTES
[facility] Nephrology Daily Progress Note     Date of Service  7/7     Author: Nathan Navarro Jr., MD     Chief Complaint  88 yo F PMH ESRD MWF iHD, HTN, anemia of CKD, CKD-MBD, and HLD who presents to ED with CC as above.  She is visiting the [facility] from [place] and due to current pandemic has not been able to go back home and continued to stay in this area with family.  She has OP dialysis at [facility] and has been compliant.  She was doing well until about two weeks ago when she started to notice dark stools.  She was doing ok until about 3 days ago when she also noted orthostatic symptoms upon standing.  It resolved and then today it came back and she had some slurred speech and left arm numbness so she was brought in for evaluation.  She has had a prior stroke and the numbness and slurred speech have been present off and on as a result of that so initially she though nothing of it. Neurology was consulted and they did not feel that this was CVA related and more hemodynamic related.  Labs in ED showed Hgb 5.5 and serum labs showed  and K+ 5.3.  She was given pRBCs and admitted to ICU.  CT of head showed an acute on chronic epidural bleed with mild midline shift as well.  She was on plavix at home.  No recent F/C/N/V/CP/SOB.  No hematochezia, hematemesis.  No HA, visual changes, or abdominal pain     Daily nephrology summary  06/26 - consult done, HD done  06/27 - 2u prbc this AM, has been seen and evaluated by neurology and neurosurgery, further imaging planned for today  06/28 - not seen, patient in procedures  06/29 - seen on HD, tolerating with anxiolytic, somnolent and provides minimal responses to questions, 3Ca bath today, EGD and colonoscopy normal, capsule endoscopy  06/30 - transferred to floor, capsule endoscopy with fresh blood, push enteroscopy deferred, some concern for UTI but no urine cx and UA without bacteria, WBC increased, +fever, +AMS  07/01 - patient is agitated this AM,  "initially declined HD, +edema, +hbg decreased and receiving prbc  07/02 - patient sleepy this afternoon post procedure, erosive duodenitis and oozing AVMs in gastric cardia noted, tolerated HD with 4L UF yesterday, on lasix though UOP no longer being recorded  07/03 - HD this am, 3.5L UF. IR removed dialysis catheter. Pt \"feeling much better\".   07/04 - No new overnight renal events. S/p HD yesterday and tolerated well.   07/05 - Hgb 6.8. Feels ok. Getting one unit of PRBC.  07/06 - already ate this Am at 7. Asks me when she is getting her tunneled dialysis line.   7/07 - Tunneled dialysis line placed on hold yesterday due to inability to find suitable IR schedule. I called them again today - reports they will do it this AM.      Review of Systems  GEN: no fevers/chills/weight loss  HEENT: No vision changes  RESP: No shortness of breath  CV: No edema, no chest pain  ABD: no N/V, no edema  EXT: no edema  Musculoskeletal: no joint pain  NEURO: no headaches, no weakness  PSYCH: no confusion, no depression      Physical Exam  Vitals:    07/07/20 0500   BP: 139/67   Pulse: 74   Resp: 17   Temp: 36.3 °C (97.3 °F)   SpO2: 99%          Physical Exam  Constitutional:       Appearance: Normal appearance. He is normal weight.   HENT:      Head: Normocephalic and atraumatic.      Nose: Nose normal.   Eyes:      Extraocular Movements: Extraocular movements intact.      Conjunctiva/sclera: Conjunctivae normal.      Pupils: Pupils are equal, round, and reactive to light.   Neck:      Musculoskeletal: Normal range of motion and neck supple.   Cardiovascular:      Rate and Rhythm: Normal rate and regular rhythm.      Pulses: Normal pulses.      Heart sounds: No murmur. No friction rub. No gallop.    Pulmonary:      Effort: Pulmonary effort is normal.   Abdominal:      General: Abdomen is flat.   Musculoskeletal:         General: No edema  Skin:     General: Skin is warm and dry.   Neurological:      General: No focal deficit " present.      Mental Status: He is alert and oriented to person, place, and time.   Psychiatric:         Mood and Affect: Mood normal.         Behavior: Behavior normal.         Thought Content: Thought content normal.         Judgment: Judgment normal.            Fluids       Intake/Output Summary (Last 24 hours) at 7/7/2020 0707  Last data filed at 7/6/2020 2300  Gross per 24 hour   Intake 600 ml   Output --   Net 600 ml           Labs    Lab Results   Component Value Date/Time    SODIUM 140 07/07/2020 04:52 AM    POTASSIUM 3.7 07/07/2020 04:52 AM    CHLORIDE 102 07/07/2020 04:52 AM    CO2 23 07/07/2020 04:52 AM    GLUCOSE 112 (H) 07/07/2020 04:52 AM    BUN 61 (H) 07/07/2020 04:52 AM    CREATININE 7.35 (HH) 07/07/2020 04:52 AM       Recent Labs     07/04/20  1032  07/05/20  0834  07/05/20 2021 07/06/20  0242 07/07/20  0452   WBC 12.0*  --  12.5*  --   --  12.3* 11.3*   RBC 2.56*  --  3.14*  --   --  2.88* 2.99*   HEMOGLOBIN 7.3*   < > 9.0*   < > 8.5* 8.3* 8.5*   HEMATOCRIT 24.8*  --  28.9*  --   --  26.1* 27.1*   MCV 96.9  --  92.0  --   --  90.6 90.6   MCH 28.5  --  28.7  --   --  28.8 28.4   RDW 51.7*  --  50.7*  --   --  52.5* 51.6*   PLATELETCT 281  --  280  --   --  265 278   MPV 9.6  --  9.6  --   --  9.7 9.4   NEUTSPOLYS 79.90*  --  77.20*  --   --  76.50* 77.40*   LYMPHOCYTES 10.90*  --  14.30*  --   --  15.10* 14.30*   MONOCYTES 7.00  --  5.20  --   --  5.70 5.40   EOSINOPHILS 1.20  --  1.70  --   --  1.30 1.20   BASOPHILS 0.20  --  0.30  --   --  0.30 0.30   RBCMORPHOLO Present  --   --   --   --   --   --     < > = values in this interval not displayed.       Assessment/Plan  - ESRD: Etiology likely 2/2 HTN.  Visitor at [facility], lives in [place]  - Melena. EGD and colonoscopy normal, capsule endoscopy with fresh blood 6/30  - Hyperkalemia - correct with HD  - Acute epidural hematoma    * Superimposed on chronic epidural bleed, with a small midline shift    * Neuro following, has been evaluated by  neurosurgery  - HTN    * Goal BP < 140/90    * Stable  - Anemia, multifactorial    * Goal Hgb 10-11 in CKD, ferritin significantly elevated    * transfuse hgb<7    * see melena above  - CKD-MBD    * Managed at HD unit, phos now above goal, Ca low, vitamin D low, PTH appropriate  - HLD  - CAD  - Leukocytosis with low grade fever  - Bacteremia - GI source versus line.   - Edema, anasarca    * UF with HD as tolerated     * On diuretics     PLAN:  1. HD after tunneled dialysis line this AM  2. Patient NPO now - can be put back on renal diet afterward  3. For low calcium - will check phos. Consider increasing Phos-Lo if high.       Nathan aNvarro Jr, MD  [facility]

## 2020-07-07 NOTE — THERAPY
"Physical Therapy   Daily Treatment     Patient Name: Corine Dela Cruz  Age:  87 y.o., Sex:  female  Medical Record #: [id number]  Today's Date: 7/7/2020     Precautions: Fall Risk, Swallow Precautions ( See Comments)    Assessment    Pt continues to progress w/ therapy. Pt demonstrating improved strength and tolerance to mobility. Pt was able to increase her gait distances today. She needs a lot of cues for sequencing to assist w/ LE advancement. Pt does require long seated rest breaks after attempts. She is able to demonstrate improved LE strength during seated exercises but still lacks endurance and control.    Plan    Continue current treatment plan.    Discharge recommendations:  Recommend post-acute placement for continued physical therapy services prior to discharge home.       Subjective    \"I just want to go back to [place].\"     Objective       07/07/20 0811   Precautions   Precautions Fall Risk;Swallow Precautions ( See Comments)   Comments L sided weakness   Gait Analysis   Gait Level Of Assist Moderate Assist   Assistive Device Front Wheel Walker  (w/ chair follow)   Distance (Feet) 10   # of Times Distance was Traveled 2   Deviation Step To;Bradykinetic;Shuffled Gait   Comments Pt needing cues for weight shifting to assist w/ LE advancement. Pt continues to have shuffled steps w/ no foot clearance.   Bed Mobility    Comments NT up in chair pre/post tx   Functional Mobility   Sit to Stand Minimal Assist   Bed, Chair, Wheelchair Transfer Moderate Assist   Mobility Pt needing seated rest breaks in between STS and gait attempts   Short Term Goals    Short Term Goal # 1 Pt will improve bed mob to perform supine<>sit with min A within 6 visits to increase OOB activity/tolerance.   Goal Outcome # 1 goal not met   Short Term Goal # 2 Pt will perform STS with min A within 6 visits to initiate gait.   Goal Outcome # 2 Progressing as expected   Short Term Goal # 3 Pt will ambulate 25ft with FWW and min assist " in 6 visits to increase independence   Goal Outcome # 3 Goal not met

## 2020-07-07 NOTE — CARE PLAN
Problem: Safety  Goal: Will remain free from injury  Note: Call light within reach, treaded socks in place, bed in lowest position and locked.  Hourly rounding in progress.       Problem: Pain Management  Goal: Pain level will decrease to patient's comfort goal  Note: Pt medicated PRN per MAR.

## 2020-07-08 LAB
ALBUMIN SERPL BCP-MCNC: 2.6 G/DL (ref 3.2–4.9)
ALBUMIN/GLOB SERPL: 0.8 G/DL
ALP SERPL-CCNC: 43 U/L (ref 30–99)
ALT SERPL-CCNC: 10 U/L (ref 2–50)
ANION GAP SERPL CALC-SCNC: 14 MMOL/L (ref 7–16)
AST SERPL-CCNC: 16 U/L (ref 12–45)
BASOPHILS # BLD AUTO: 0.3 % (ref 0–1.8)
BASOPHILS # BLD: 0.03 K/UL (ref 0–0.12)
BILIRUB SERPL-MCNC: 0.3 MG/DL (ref 0.1–1.5)
BUN SERPL-MCNC: 45 MG/DL (ref 8–22)
CALCIUM SERPL-MCNC: 6.9 MG/DL (ref 8.5–10.5)
CHLORIDE SERPL-SCNC: 102 MMOL/L (ref 96–112)
CO2 SERPL-SCNC: 23 MMOL/L (ref 20–33)
CREAT SERPL-MCNC: 5.65 MG/DL (ref 0.5–1.4)
EOSINOPHIL # BLD AUTO: 0.07 K/UL (ref 0–0.51)
EOSINOPHIL NFR BLD: 0.8 % (ref 0–6.9)
ERYTHROCYTE [DISTWIDTH] IN BLOOD BY AUTOMATED COUNT: 53.6 FL (ref 35.9–50)
GLOBULIN SER CALC-MCNC: 3.4 G/DL (ref 1.9–3.5)
GLUCOSE SERPL-MCNC: 103 MG/DL (ref 65–99)
HCT VFR BLD AUTO: 25.5 % (ref 37–47)
HGB BLD-MCNC: 7.6 G/DL (ref 12–16)
IMM GRANULOCYTES # BLD AUTO: 0.11 K/UL (ref 0–0.11)
IMM GRANULOCYTES NFR BLD AUTO: 1.2 % (ref 0–0.9)
LYMPHOCYTES # BLD AUTO: 1.36 K/UL (ref 1–4.8)
LYMPHOCYTES NFR BLD: 14.6 % (ref 22–41)
MAGNESIUM SERPL-MCNC: 1.7 MG/DL (ref 1.5–2.5)
MCH RBC QN AUTO: 28.1 PG (ref 27–33)
MCHC RBC AUTO-ENTMCNC: 29.8 G/DL (ref 33.6–35)
MCV RBC AUTO: 94.4 FL (ref 81.4–97.8)
MONOCYTES # BLD AUTO: 0.6 K/UL (ref 0–0.85)
MONOCYTES NFR BLD AUTO: 6.4 % (ref 0–13.4)
NEUTROPHILS # BLD AUTO: 7.14 K/UL (ref 2–7.15)
NEUTROPHILS NFR BLD: 76.7 % (ref 44–72)
NRBC # BLD AUTO: 0 K/UL
NRBC BLD-RTO: 0 /100 WBC
PHOSPHATE SERPL-MCNC: 3.2 MG/DL (ref 2.5–4.5)
PLATELET # BLD AUTO: 226 K/UL (ref 164–446)
PMV BLD AUTO: 9.6 FL (ref 9–12.9)
POTASSIUM SERPL-SCNC: 3.7 MMOL/L (ref 3.6–5.5)
PROT SERPL-MCNC: 6 G/DL (ref 6–8.2)
RBC # BLD AUTO: 2.7 M/UL (ref 4.2–5.4)
SODIUM SERPL-SCNC: 139 MMOL/L (ref 135–145)
WBC # BLD AUTO: 9.3 K/UL (ref 4.8–10.8)

## 2020-07-08 PROCEDURE — 97530 THERAPEUTIC ACTIVITIES: CPT

## 2020-07-08 PROCEDURE — 700111 HCHG RX REV CODE 636 W/ 250 OVERRIDE (IP): Performed by: INTERNAL MEDICINE

## 2020-07-08 PROCEDURE — 97116 GAIT TRAINING THERAPY: CPT

## 2020-07-08 PROCEDURE — 700102 HCHG RX REV CODE 250 W/ 637 OVERRIDE(OP): Performed by: INTERNAL MEDICINE

## 2020-07-08 PROCEDURE — 84100 ASSAY OF PHOSPHORUS: CPT

## 2020-07-08 PROCEDURE — 99233 SBSQ HOSP IP/OBS HIGH 50: CPT | Performed by: INTERNAL MEDICINE

## 2020-07-08 PROCEDURE — 80053 COMPREHEN METABOLIC PANEL: CPT

## 2020-07-08 PROCEDURE — A9270 NON-COVERED ITEM OR SERVICE: HCPCS | Performed by: INTERNAL MEDICINE

## 2020-07-08 PROCEDURE — 36415 COLL VENOUS BLD VENIPUNCTURE: CPT

## 2020-07-08 PROCEDURE — 92526 ORAL FUNCTION THERAPY: CPT

## 2020-07-08 PROCEDURE — 99232 SBSQ HOSP IP/OBS MODERATE 35: CPT | Performed by: INTERNAL MEDICINE

## 2020-07-08 PROCEDURE — 700105 HCHG RX REV CODE 258: Performed by: INTERNAL MEDICINE

## 2020-07-08 PROCEDURE — 83735 ASSAY OF MAGNESIUM: CPT

## 2020-07-08 PROCEDURE — 700101 HCHG RX REV CODE 250: Performed by: INTERNAL MEDICINE

## 2020-07-08 PROCEDURE — 85025 COMPLETE CBC W/AUTO DIFF WBC: CPT

## 2020-07-08 PROCEDURE — 87493 C DIFF AMPLIFIED PROBE: CPT

## 2020-07-08 PROCEDURE — 770021 HCHG ROOM/CARE - ISO PRIVATE

## 2020-07-08 RX ADMIN — ACETAMINOPHEN 650 MG: 325 TABLET, FILM COATED ORAL at 23:38

## 2020-07-08 RX ADMIN — AMPICILLIN SODIUM 2000 MG: 2 INJECTION, POWDER, FOR SOLUTION INTRAMUSCULAR; INTRAVENOUS at 04:06

## 2020-07-08 RX ADMIN — Medication 667 MG: at 12:29

## 2020-07-08 RX ADMIN — FUROSEMIDE 80 MG: 10 INJECTION, SOLUTION INTRAMUSCULAR; INTRAVENOUS at 16:26

## 2020-07-08 RX ADMIN — LIDOCAINE 1 PATCH: 50 PATCH TOPICAL at 16:23

## 2020-07-08 RX ADMIN — Medication 667 MG: at 09:18

## 2020-07-08 RX ADMIN — FUROSEMIDE 80 MG: 10 INJECTION, SOLUTION INTRAMUSCULAR; INTRAVENOUS at 04:05

## 2020-07-08 RX ADMIN — Medication 667 MG: at 18:45

## 2020-07-08 RX ADMIN — LORAZEPAM 0.5 MG: 2 INJECTION INTRAMUSCULAR; INTRAVENOUS at 21:51

## 2020-07-08 RX ADMIN — LORAZEPAM 0.5 MG: 2 INJECTION INTRAMUSCULAR; INTRAVENOUS at 04:05

## 2020-07-08 RX ADMIN — ATORVASTATIN CALCIUM 40 MG: 40 TABLET, FILM COATED ORAL at 17:59

## 2020-07-08 RX ADMIN — ACETAMINOPHEN 650 MG: 325 TABLET, FILM COATED ORAL at 17:59

## 2020-07-08 RX ADMIN — ACETAMINOPHEN 650 MG: 325 TABLET, FILM COATED ORAL at 04:06

## 2020-07-08 RX ADMIN — OMEPRAZOLE 20 MG: 20 CAPSULE, DELAYED RELEASE ORAL at 04:05

## 2020-07-08 RX ADMIN — AMPICILLIN SODIUM 2000 MG: 2 INJECTION, POWDER, FOR SOLUTION INTRAMUSCULAR; INTRAVENOUS at 18:45

## 2020-07-08 RX ADMIN — ACETAMINOPHEN 650 MG: 325 TABLET, FILM COATED ORAL at 12:30

## 2020-07-08 ASSESSMENT — COGNITIVE AND FUNCTIONAL STATUS - GENERAL
TURNING FROM BACK TO SIDE WHILE IN FLAT BAD: UNABLE
MOBILITY SCORE: 8
WALKING IN HOSPITAL ROOM: A LOT
MOVING TO AND FROM BED TO CHAIR: UNABLE
SUGGESTED CMS G CODE MODIFIER MOBILITY: CM
CLIMB 3 TO 5 STEPS WITH RAILING: TOTAL
STANDING UP FROM CHAIR USING ARMS: A LOT
MOVING FROM LYING ON BACK TO SITTING ON SIDE OF FLAT BED: UNABLE

## 2020-07-08 ASSESSMENT — ENCOUNTER SYMPTOMS
COUGH: 0
ORTHOPNEA: 0
WEAKNESS: 0
PHOTOPHOBIA: 0
ABDOMINAL PAIN: 0
SPUTUM PRODUCTION: 0
MYALGIAS: 1
VOMITING: 0
SHORTNESS OF BREATH: 0
NAUSEA: 0
CHILLS: 0
DIARRHEA: 1
FOCAL WEAKNESS: 0
SEIZURES: 0
PALPITATIONS: 0
BACK PAIN: 0
NECK PAIN: 0
FEVER: 0
EYE PAIN: 0
EYE DISCHARGE: 0

## 2020-07-08 ASSESSMENT — GAIT ASSESSMENTS
DEVIATION: STEP TO;BRADYKINETIC
ASSISTIVE DEVICE: FRONT WHEEL WALKER
GAIT LEVEL OF ASSIST: MINIMAL ASSIST
DISTANCE (FEET): 20

## 2020-07-08 NOTE — DISCHARGE PLANNING
Anticipated Discharge Disposition: SNF    Action: Spoke to pt regarding SNF, pt agreeable to local SNF, choice form faxed to [facility].    Barriers to Discharge: SNF acceptance    Plan: f/u with medical team, pt, cca

## 2020-07-08 NOTE — CARE PLAN
Problem: Nutritional:  Goal: Achieve adequate nutritional intake  Description: Patient's diet will advance past clears and consume ~50% of meals  Outcome: MET    Pt is eating % of meals per ADLs and oral supplements previously discontinued. RD will sign off at this time, please consult as needed.

## 2020-07-08 NOTE — PROGRESS NOTES
Patient had multiple loose stools throughout shift.  Last BM appeared to have mucous in it.  Hospitalist notified.  Orders received for Cdiff sample and special contact isolation.

## 2020-07-08 NOTE — DISCHARGE PLANNING
Received Choice form at 1109  Agency/Facility Name: [facility](1), [facility](2), [facility](3)  Referral sent per Choice form @ 1447

## 2020-07-08 NOTE — PROGRESS NOTES
Infectious Disease Progress Note    Author: Arabella Lockhart M.D. Date & Time of service: 2020  12:55 PM    Chief Complaint:  Sepsis, strep    Interval History:  87-year-old female with ESRD, DM, PVD, CVA admitted 2020 for gastrointestinal bleed with associated weakness, dizziness and falls resulting in intracranial hemorrhage. Developed fever and leukocytosis during hospitalization and found to have above  7/2 AF (99.6) WBC 10.2 remains obtunded-pain better controlled. Seen by Palliative-now agreeable to GI procedure  7/3 AF WBC 11.9  tolerated procedures well-bleeding AVMs seen with erosive duodenitis. Less pain today. Plan for removal HD cath noted   AF WBC 12 up to chair-no new complaints   AF WBC 12.5 up to chair again  Today-pain controlled. Required another transfusion (Hgb down to 6.8)-wants to know how long it will take to get new HD cath   AF WBC 12.3 up to chair-tired today. No new complaints-plan for HD cath this afternoon noted   AF WBC 11.3 HD cath not placed yesterday-now planned for today. Perseverating and asking when cath will be placed-hungry   AF WBC 9  had c/o diarrhea so in Cdiff iso-working with therapy. Had cath placed but refused dialysis today  Labs Reviewed, Medications Reviewed, and Wound Reviewed.    Review of Systems:  Review of Systems   Constitutional: Negative for chills and fever.   Respiratory: Negative for cough and shortness of breath.    Cardiovascular: Negative for chest pain.   Gastrointestinal: Positive for diarrhea. Negative for abdominal pain, nausea and vomiting.   Musculoskeletal: Positive for myalgias.   Skin: Negative for rash.   All other systems reviewed and are negative.      Hemodynamics:  Temp (24hrs), Av.6 °C (97.8 °F), Min:35.9 °C (96.6 °F), Max:37.1 °C (98.7 °F)  Temperature: 36.7 °C (98.1 °F)  Pulse  Av.7  Min: 9  Max: 111   Blood Pressure : 134/122       Physical Exam:  Physical Exam  Vitals signs and nursing note reviewed.    Constitutional:       General: She is not in acute distress.     Appearance: She is not toxic-appearing or diaphoretic.      Comments: Chronically ill   HENT:      Nose: No rhinorrhea.      Mouth/Throat:      Pharynx: No posterior oropharyngeal erythema.   Eyes:      General: No scleral icterus.     Extraocular Movements: Extraocular movements intact.   Neck:      Musculoskeletal: No neck rigidity.   Cardiovascular:      Rate and Rhythm: Normal rate.      Comments: ectopy  Pulmonary:      Effort: Pulmonary effort is normal. No respiratory distress.      Breath sounds: No stridor.   Abdominal:      General: There is no distension.   Musculoskeletal:         General: Swelling present.   Skin:     Coloration: Skin is not jaundiced.      Findings: Bruising present.      Comments: Extensive bruising LUE with hand swelling   Neurological:      General: No focal deficit present.      Mental Status: She is alert.         Meds:    Current Facility-Administered Medications:   •  Special Contact Isolation **AND** C Diff by PCR rflx Toxin **AND** Pharmacy  •  acetaminophen  •  oxyCODONE immediate-release  •  oxyCODONE immediate release  •  heparin  •  ampicillin  •  omeprazole  •  furosemide  •  haloperidol lactate  •  lidocaine  •  vitamin D (Ergocalciferol)  •  calcium acetate  •  atorvastatin  •  acetaminophen  •  ondansetron  •  ondansetron  •  LORazepam  •  hydrALAZINE    Labs:  Recent Labs     07/06/20 0242 07/07/20  0452 07/08/20  0508   WBC 12.3* 11.3* 9.3   RBC 2.88* 2.99* 2.70*   HEMOGLOBIN 8.3* 8.5* 7.6*   HEMATOCRIT 26.1* 27.1* 25.5*   MCV 90.6 90.6 94.4   MCH 28.8 28.4 28.1   RDW 52.5* 51.6* 53.6*   PLATELETCT 265 278 226   MPV 9.7 9.4 9.6   NEUTSPOLYS 76.50* 77.40* 76.70*   LYMPHOCYTES 15.10* 14.30* 14.60*   MONOCYTES 5.70 5.40 6.40   EOSINOPHILS 1.30 1.20 0.80   BASOPHILS 0.30 0.30 0.30     Recent Labs     07/06/20  0242 07/07/20  0452 07/08/20  0508   SODIUM 137 140 139   POTASSIUM 3.8 3.7 3.7   CHLORIDE 99  102 102   CO2 23 23 23   GLUCOSE 144* 112* 103*   BUN 49* 61* 45*     Recent Labs     07/06/20  0242 07/07/20  0452 07/08/20  0508   ALBUMIN 2.9* 2.9* 2.6*   TBILIRUBIN 0.3 0.3 0.3   ALKPHOSPHAT 48 49 43   TOTPROTEIN 6.1 6.0 6.0   ALTSGPT 14 10 10   ASTSGOT 17 14 16   CREATININE 6.15* 7.35* 5.65*       Imaging:  Ct-head W/o    Result Date: 6/29/2020 6/29/2020 11:28 AM HISTORY/REASON FOR EXAM:  Stroke, follow up. Intracranial hemorrhage TECHNIQUE/EXAM DESCRIPTION AND NUMBER OF VIEWS: CT of the head without contrast. The study was performed on a helical multidetector CT scanner. Contiguous 2.5 mm axial sections were obtained from the skull base through the vertex. Up to date radiation dose reduction adjustments have been utilized to meet ALARA standards for radiation dose reduction. COMPARISON:  CT head 6/26/2020. Brain MRI 6/27/2020 FINDINGS: Redemonstrated is a predominantly low density extra axial fluid collection along the right frontoparietal convexity, probably loculated subdural hematoma although a component of epidural hemorrhage is not excluded. There is a small amount of hyperdensity  along the inferior aspect of the collection which could represent small amount of acute hemorrhage. There is no significant change in appearance. The collection measures up to 22 mm in thickness. There is stable mass effect with sulcal effacement and slight right to left midline shift. No herniation. Generalized age-related atrophy. Chronic small left thalamic lacunar infarct. No acute osseous abnormality. Probable prior partial right mastoidectomy.     No significant interval change.    Ct-head W/o    Result Date: 6/26/2020   CT HEAD WITHOUT CONTRAST 6/26/2020 12:34 PM HISTORY/REASON FOR EXAM:  Left-sided facial droop TECHNIQUE/EXAM DESCRIPTION AND NUMBER OF VIEWS: CT of the head without contrast. The study was performed on a helical multidetector CT scanner. Contiguous 2.5 mm axial sections were obtained from the skull base  through the vertex. Up to date radiation dose reduction adjustments have been utilized to meet ALARA standards for radiation dose reduction. COMPARISON:  None available FINDINGS: Lateral ventricles are normal in size and symmetric. Hyperdensity in the temporal horn of the right lateral ventricle. Mild global parenchymal atrophy. Chronic small vessel ischemic changes. Minimal 2 mm right to left midline shift Basal cisterns are patent. There is a chronic epidural collection along the right frontoparietal convexity measuring 2.1 cm in thickness. Small amount of acute blood products are seen in the inferior aspect of the collection. Minimal subacute subdural hemorrhage anterior to the right epidural collection, along the right frontal convexity. Calvaria are intact. Atherosclerosis. Visualized orbits are unremarkable. Visualized mastoid air cells are clear. No significant sinus disease in the visualized paranasal sinuses.     1. Small amount of acute hemorrhage in a chronic 2.1 cm right epidural collection, along the right frontoparietal convexity. Associated 2 mm right to left midline shift. 2. Minimal subacute subdural hemorrhage anterior to the right epidural collection. 3. Hyperdensity in the temporal horn of the right lateral ventricle may represent small amount of intraventricular hemorrhage. 4. No CT evidence of acute infarct.    Dx-chest-portable (1 View)    Result Date: 6/26/2020 6/26/2020 1:03 PM HISTORY/REASON FOR EXAM:  Shortness of Breath. TECHNIQUE/EXAM DESCRIPTION AND NUMBER OF VIEWS: Single portable view of the chest. COMPARISON: None. FINDINGS: LUNGS: Clear. No effusions. PNEUMOTHORAX: None. LINES AND TUBES: Right IJ central catheter is in the distal SVC. MEDIASTINUM: No cardiomegaly. Atherosclerosis. MUSCULOSKELETAL STRUCTURES: No acute displaced fracture.     No acute cardiopulmonary abnormality.    Mr-brain-w/o    Result Date: 6/27/2020 6/27/2020 5:18 PM HISTORY/REASON FOR EXAM:  Syncope,  recurrent. Dizziness, left arm numbness and slurred speech. Left-sided facial droop. History of prior stroke. Recurrent syncope. End-stage renal disease. Hypertension. TECHNIQUE/EXAM DESCRIPTION: MRI of the brain without contrast. T1 sagittal, T2 fast spin-echo axial, T1 coronal, FLAIR coronal, Diffusion weighted and Apparent Diffusion Coefficient (ADC map) axial images were obtained of the whole brain. Additional FLAIR axial images were obtained. The study was performed on a [employer] 1.5 Starla MRI scanner. COMPARISON:  Head CT 6/26/2020 FINDINGS: The calvaria are unremarkable. There is a moderate-large T2 hyperintense extra-axial fluid collection over the right posterior frontal-parietal convexity. There is intermediate-slightly hyperintense FLAIR signal and T1 signal is slightly greater than CSF. The collection is somewhat lentiform. This could represent a late subacute or chronic epidural hematoma or loculated subdural hematoma. There is gradient echo hypointensity hemosiderin staining along the deep aspect of the collection. Corresponding to the bandlike hyperdense linear focus toward the anterior aspect of the collection seen on CT, there is corresponding gradient echo hypointensity and this may be a more recent bandlike focus of hemorrhage. There is local mass effect with effacement of the right trigone and anterior displacement of the posterior body right lateral ventricle. There is minimal right to left shift of midline structures of about 1.6 mm. The underlying brain parenchyma in the right parietal lobe shows hazy and punctate areas of restricted diffusion which may represent associated arterial infarction or venous infarction due to compression of cortical veins. There is underlying mild cerebral atrophy with prominence of sulcal markings and mild ventriculomegaly in the left hemisphere. Age-appropriate. There is an old lacunar infarct in the thalamus Elsewhere in the cerebral hemispheres, there is a  pattern of minimal supratentorial white matter disease with a few rare foci of T2 and FLAIR hyperintensity in the subcortical and/or deep white matter consistent with small vessel ischemic change versus demyelination or gliosis. The brainstem and cerebellum are unremarkable. The major vessels including the dural venous sinuses appear intact. Paranasal sinuses show mild mucosal thickening in the sphenoid sinus and minimal mucosal thickening scattered among the ethmoid air cells. The mastoids are clear. There has been cataract surgery. The gaze is divergent.     1.  Mild cerebral atrophy. Age-appropriate. 2.  Moderate-large lentiform extra-axial fluid collection over the right posterior frontal-parietal convexity consistent with a chronic or late subacute epidural hematoma or loculated subdural hematoma. Possible minimal component of more recent hemorrhage where there is linear bandlike hyperdensity on CT. 3.  Patchy and punctate areas of bright diffusion signal in the underlying right parietal lobe which may represent acute or recent subacute arterial or venous infarction. No acute parenchymal hemorrhage. 4.  Minimal supratentorial white matter disease most consistent with microvascular ischemic change. 5.  Old lacunar infarct left lateral thalamus.    Mr-mra Head-w/o    Result Date: 6/27/2020 6/27/2020 5:18 PM HISTORY/REASON FOR EXAM:  Dizziness, non-specific. Syncope, recurrent. Dizziness, left arm numbness and slurred speech. Left-sided facial droop. History of prior stroke. Recurrent syncope. End-stage renal disease. Hypertension. TECHNIQUE/EXAM DESCRIPTION: MRA of the Head ([place]) without contrast. The study was performed on a [employer] 1.5 Starla MRI scanner. MRA of the [place] was performed with 3D time-of-flight technique with axial acquisition. Additional MRA of the internal carotid and vertebrobasilar arteries at the level of the skull base and craniocervical junction was also performed  with 3D time-of-flight technique with axial acquisition. Images are reviewed at the PACS or [employer] workstations as axial source images as well as maximum intensity ray projection (MIP) multiplanar 3D reconstructions. COMPARISON:  MRI brain from today's date FINDINGS: The posterior circulation shows patent distal vertebral arteries. The left vertebral artery is minimally dominant. The vertebrobasilar confluence is intact. The basilar artery is widely patent with uniform caliber and robust flow signal. There is multifocal irregularity of the posterior cerebral arteries, right greater than left with a gap-like defect in the right P2 segment consistent with moderate to high-grade stenosis. Similar lesser findings of the left posterior cerebral artery. These findings are consistent with intracranial atherosclerotic cerebrovascular disease. No evidence of aneurysm in the posterior circulation. The anterior circulation shows the carotid siphons to be patent. There is a normal variant dominant caliber right anterior cerebral artery A1 segment with a markedly hypoplastic left A1 segment. The proximal middle cerebral arteries are intact. The M2 and M3/sylvian branches beyond the genu show some attenuation of caliber and interruption of flow signal suggesting intracranial atherosclerotic stenotic disease. No evidence of aneurysm about the anterior circulation.     1.  Intracranial atherosclerotic cerebrovascular stenotic disease involving the posterior cerebral arteries and probably the middle cerebral arteries beyond the genu. No alexx occlusion. 2.  Normal variant dominant caliber right anterior cerebral artery A1 segment with a hypoplastic left A1 segment. 3.  No evidence of aneurysm about [place].    Us-carotid Doppler Bilat    Result Date: 6/27/2020   Carotid Duplex  Report  Vascular Laboratory  CONCLUSIONS  Moderate atherosclerosis. Velocities are consistent with 50-69% stenosis of  the internal carotid  arteries bilaterally.  BECKIE BRITTON  Exam Date:     2020 09:01  Room #:     Inpatient  Priority:     Routine  Ht (in):             Wt (lb):  Ordering Physician:        LAWRENCE SHEFFIELD  Referring Physician:       [id number]  Sonographer:               MARGO Cason RDCS  Study Type:                Complete Bilateral  Technical Quality:         Adequate  Age:    87    Gender:     F  MRN:    [id number]  :    10/26/1932      BSA:  Indications:     Dizziness and giddiness  CPT Codes:       19467  ICD Codes:       R42  History:  Limitations:     Body habitus  Right Brachial BP:              /  Left Brachial BP:             /  RIGHT  Plaque          Plaque         Velocity (cm/s)  Characteristics Composition    Syst/Diast                                 128  / 32      Distal ICA                                 121  / 35      Mid ICA                                 78   / 28      Prox ICA                                 62   / 10      Distal CCA                                      /         Mid CCA                                 14   / 4       Prox CCA                                 381  /         ECA  Waveform:   Triphasic                         SCA  LEFT  Plaque         Plaque          Velocity (cm/s)  CharacteristicsComposition     Syst/Diast                                 155  / 30      Distal ICA                                 166  / 30      Mid ICA  Irregular      Heterogeneo     122  / 26      Prox ICA                 us  Irregular      Heterogeneo     71   / 16      Distal CCA                 us                                      /         Mid CCA                                 96   / 17      Prox CCA  Irregular      Heterogeneo     336  /         ECA                 us  Waveform:   Triphasic                         SCA          5.4          ICA/CCA       1.3        Antegrade      Vertebral   Antegrade         78   / 16     cm/s        63    / 12  FINDINGS  Right  carotid.  Plaque of the bifurcation extending into the internal carotid. Velocities  are consistent with 50-69% stenosis of the internal carotid artery.    Left carotid.  Plaque of the bifurcation extending into the internal carotid. Velocities  are consistent with 50-69% stenosis of the internal carotid artery.  Plaque is irregular on the surface and heterogeneous with mixed acoustic  densities.  Bilateral subclavian and vertebral artery waveforms are antegrade and  waveforms are normal in character and velocity.  Liam Duong MD  (Electronically Signed)  Final Date:      2020                   10:28    Ec-echocardiogram Complete W/ Cont    Result Date: 2020  Transthoracic Echo Report Echocardiography Laboratory CONCLUSIONS No prior study is available for comparison. Normal left ventricular systolic function. Left ventricular ejection fraction is visually estimated to be 60%. The right ventricle was normal in size and function. Moderately dilated left atrium. Mild aortic stenosis. Mild tricuspid regurgitation. BECKIE BRITTON Exam Date:         2020                    15:19 Exam Location:     Inpatient Priority:          Routine Ordering Physician:        LAURI ROWLEY Referring Physician: Sonographer:               FERNANDO Frazier Age:    87     Gender:    F MRN:    [id number] :    10/26/1932 BSA:    1.73   Ht (in):    59     Wt (lb):    171 Exam Type:     Complete, Contrast Indications:     Transient cerebral ischemic attack, unspecified ICD Codes:       G45.9 CPT Codes:       82250,  BP:   114    /   56     HR:   85 Technical Quality:       Fair MEASUREMENTS  (Male / Female) Normal Values 2D ECHO LV Diastolic Diameter PLAX        4.9 cm                4.2 - 5.9 / 3.9 - 5.3 cm LV Systolic Diameter PLAX         2.5 cm                2.1 - 4.0 cm IVS Diastolic Thickness           0.92 cm               LVPW  Diastolic Thickness          0.87 cm               LVOT Diameter                     2.1 cm                LV Ejection Fraction MOD BP       62.4 %                >= 55  % LV Ejection Fraction MOD 4C       61.3 %                LV Ejection Fraction MOD 2C       64.4 %                IVC Diameter                      1.6 cm                DOPPLER AV Peak Velocity                  2.1 m/s               AV Peak Gradient                  17.8 mmHg             AV Mean Gradient                  11.1 mmHg             LVOT Peak Velocity                0.96 m/s              AV Area Cont Eq vti               1.7 cm2               Mitral E Point Velocity           1.3 m/s               Mitral E to A Ratio               0.93                  Mitral A Duration                 108 ms                MV Pressure Half Time             55.6 ms               MV Area PHT                       4 cm2                 MV Deceleration Time              192 ms                TR Peak Velocity                  244 cm/s              PV Peak Velocity                  1 m/s                 PV Peak Gradient                  4 mmHg                RVOT Peak Velocity                0.75 m/s              * Indicates values subject to auto-interpretation LV EF:        % FINDINGS Left Ventricle Normal left ventricular chamber size. Normal left ventricular wall thickness. Normal left ventricular systolic function. Left ventricular ejection fraction is visually estimated to be 60%. Normal regional wall motion. Grade II diastolic dysfunction. Contrast was used to enhance visualization of the endocardial border. 1 mL of contrast was administered. Existing IV was used. Located at the left antecubital Right Ventricle The right ventricle was normal in size and function. Right Atrium The right atrium is normal in size.  Normal inferior vena cava size and inspiratory collapse. Left Atrium The left atrium is normal in size.  Moderately dilated left atrium. Left  "atrial volume index is 43 mL/sq m. Mitral Valve Structurally normal mitral valve without significant stenosis. Trace mitral regurgitation. Aortic Valve Tricuspid aortic valve. Calcified aortic valve leaflets. Mild aortic stenosis. Aortic valve area calculated from the continuity equation is 1.7 cm2. Vmax is 2.2 m/s. Transvalvular gradients are - Peak: 19 mmHg, Mean: 11 mmHg. Dimensionless index is 0.52. No aortic insufficiency. Tricuspid Valve Structurally normal tricuspid valve without significant stenosis. Mild tricuspid regurgitation. Estimated right ventricular systolic pressure  is 30 mmHg. Right atrial pressure is estimated to be 3 mmHg. Pulmonic Valve The pulmonic valve is not well visualized. No pulmonic stenosis. Trace pulmonic insufficiency. Pericardium Normal pericardium without effusion. Aorta The aortic root is normal.  Ascending aorta diameter is 3.1 cm. Ricco Portillo MD (Electronically Signed) Final Date:     27 June 2020                 17:47      Micro:  Results     Procedure Component Value Units Date/Time    C Diff by PCR rflx Toxin [[id number]]     Order Status:  No result Specimen:  Stool     Blood Culture [[id number]] Collected:  07/02/20 1158    Order Status:  Completed Specimen:  Blood from Peripheral Updated:  07/07/20 1300     Significant Indicator NEG     Source BLD     Site PERIPHERAL     Culture Result No growth after 5 days of incubation.    Narrative:       Per Hospital Policy: Only change Specimen Src: to \"Line\" if  specified by physician order.  Right Wrist    Blood Culture [[id number]] Collected:  07/02/20 1158    Order Status:  Completed Specimen:  Blood from Peripheral Updated:  07/07/20 1300     Significant Indicator NEG     Source BLD     Site PERIPHERAL     Culture Result No growth after 5 days of incubation.    Narrative:       Per Hospital Policy: Only change Specimen Src: to \"Line\" if  specified by physician order.  Right Hand    CATH TIP CULTURE [[id number]] Collected:  " 07/03/20 1230    Order Status:  Completed Specimen:  Cath Tip Updated:  07/05/20 1152     Significant Indicator NEG     Source CTIP     Site Central Line     Culture Result <15 CFU's Coagulase negative Staphylococcus.    CATH TIP CULTURE [[id number]]     Order Status:  No result Specimen:  Cath Tip from Central Line     Blood Culture [[id number]]  (Abnormal) Collected:  06/30/20 1340    Order Status:  Completed Specimen:  Blood Updated:  07/03/20 1202     Significant Indicator POS     Source BLD     Site HD Cath     Culture Result Growth detected by Bactec instrument. 07/01/2020  05:28      Enterococcus faecalis  Combination therapy with ampicillin, penicillin, or  vancomycin (for susceptible strains) plus an aminoglycoside  is usually indicated for serious enterococcal infections,  such as endocarditis unless high-level resistance to both  gentamicin and streptomycin is documented; such combinations  are predicted to result in synergistic killing of the  Enterococcus.  See previous culture for sensitivity report.      Narrative:       CALL  [place]  131 tel. [phone],  CALLED  131 tel. [phone] 07/01/2020, 05:30, RB PERF. RESULTS CALLED TO: RN  [phone]  HD Cath    Blood Culture [[id number]]  (Abnormal)  (Susceptibility) Collected:  06/30/20 1340    Order Status:  Completed Specimen:  Blood Updated:  07/03/20 1202     Significant Indicator POS     Source BLD     Site HD Cath     Culture Result Growth detected by Bactec instrument. 07/01/2020  05:31      Enterococcus faecalis  Combination therapy with ampicillin, penicillin, or  vancomycin (for susceptible strains) plus an aminoglycoside  is usually indicated for serious enterococcal infections,  such as endocarditis unless high-level resistance to both  gentamicin and streptomycin is documented; such combinations  are predicted to result in synergistic killing of the  Enterococcus.      Narrative:       HD Cath    Susceptibility     Enterococcus faecalis (1)      Antibiotic Interpretation Microscan Method Status    Daptomycin Sensitive <=1 mcg/mL MELISSA Final    Ampicillin Sensitive <=2 mcg/mL MELISSA Final    Gent Synergy Sensitive <=500 mcg/mL MELISSA Final    Vancomycin Sensitive 2 mcg/mL MELISSA Final    Penicillin Sensitive 2 mcg/mL MELISSA Final                   Blood Culture [[id number]]     Order Status:  Canceled Specimen:  Blood from Peripheral     Blood Culture [[id number]]     Order Status:  Canceled Specimen:  Blood from Peripheral     Urinalysis [[id number]]     Order Status:  No result Specimen:  Urine           Assessment:  Active Hospital Problems    Diagnosis   • *Acute GI bleeding [K92.2]   • Epidural hemorrhage ([facility]) [S06.4X9A]   • Acute blood loss anemia [D62]   • ESRD (end stage renal disease) ([facility]) [N18.6]   • Type 2 diabetes mellitus with kidney complication, without long-term current use of insulin ([facility]) [E11.29]   • Peripheral artery disease ([facility]) [I73.9]   • Leg wounds [S81.801A]   • Encephalopathy acute [G93.40]       Plan:  Enterococcal sepsis  GI source most likely  Afebrile  Leukocytosis resolved  AMS improved  BCxs + Efaecalis amp-S 6/30  Blood cxs 7/2 neg to date  Cath tip CNS  TTE with calcified valves;  ANABELL if persistently +blood cxs if feasible  Continue ampicillin -2 weeks if no new positive cxs  Stop date 7/16/2020    GI bleed  Transfusing as needed    S/p EGD and colonoscopy without source  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  Management is conservatively.    No new CVA seen by MRI     ESRD  Dose adjust abx    Chronic stasis ulceration  No evidence of active infection, so do not add additional antimicrobial therapy for this wound.    Wound swab showed colonization with pseudomonas and stenotrophomonas  Continue with wound care   Recommend vascular studies when feasible    Diarrhea  No fever or leukocytosis  Agree with check Cdiff if liquid  stool    Diabetes  Keep BS under 150 to help control current infection

## 2020-07-08 NOTE — PROGRESS NOTES
[facility] Medicine Daily Progress Note    Date of Service  7/8/2020    Chief Complaint  87 y.o. female admitted 6/26/2020 with melena and weakness, patient has a history of CVA, end-stage renal disease on hemodialysis Monday Wednesday Friday, peripheral arterial disease, the patient apparently is visiting from [place], she has 2 sons in town, she also complained of left-sided weakness, speech difficulty apparently and also some residual deficits from her previous CVA, the patient does have some chronic lower extremity wounds that she is being treated with antibiotics for, the patient on presentation was imaged and was found to have a right-sided chronic epidural hematoma with some mild acute hemorrhage within the lesion, not a surgical candidate.    Hospital Course    Patient admitted with Severe blood loss anemia, chronic right-sided subdural hematoma without intervention indicated at this time, chronic end-stage renal disease on hemodialysis  Interval Problem Update  Patient seen and examined today.    Patient tolerating treatment and therapies.  All Data, Medication data reviewed.  Case discussed with nursing as available.  Plan of Care reviewed with patient and notified of changes.  6/29 patient appears fairly confused, we discussed the findings of her capsule endoscopy, need for additional push endoscopy, she is somewhat resistant to the idea to undergo another procedure, the patient is otherwise afebrile, heart rate in the 80s to 100s, blood pressure is stabilized, patient has stable left upper extremity weakness and bilateral lower extremity weakness, patient with significant anxiety overall, seen by nephrology, gastroenterology, intensive care physicians, stabilized for transfer out of ICU    6/30: Initially the patient plan to have capsule endoscopy today but due to her worsening confusion it was canceled.  She had low-grade temperature 100.1 and tachycardia.  WBC was 13.8, urinalysis was negative for  infection.  I saw and examined the patient today.    7/1: Her hemoglobin was 6.4 this morning and I will order 1 unit of packed blood cell.  Yesterday her endoscopy was canceled due to confusion.  The patient is alert and oriented x4 this morning.  I saw and examined the patient today.\  I had a very long time discussion with daughter Elena about her medical condition.  I updated her that her hemodialysis catheter is infected and I am going to consult vascular surgeon for that and also infectious disease consult.  He is planning to have enteroscopy by GI.  At the same time I told her that she is in a lot of pain and asking for pain medication and we do not want to give her narcotics due to risks of getting respiratory distress.  I asked her what is her opinion about pain medication and she was not able to give me the opinion.  She stated that treat her everything we can medically to have her with her current condition.  I also explained to her that due to her age and multiple medical comorbidity she will be benefit from palliative care and possibly hospice care who also can help manage her pain.  She finally said that palliative team can contact her brother at  [phone].  I put a consult for palliative care today.    7/2 the patient stated that her pain is better today.  She still wanted to be treated medically for her medical condition.  Palliative team is following.  Plan Bertha is planning to have enteroscopy today.    7/3: the patient is getting HD. HD catheter needs to be removed by IR per nephro. I saw and examined her today.   IR to remove HD catheter today.    7/4: The patient complained about being hungry and requested to advance her diet to regular diet.    7/5: no acute issue overnight.  Her antibiotic was changed to ampicillin by infectious disease    7/6: Patient is feeling better each day.  Nephrology and infectious disease are following.    7/7: Patient complained about generalized pain.  She is  going to have hemodialysis tunnel placed today and after that she will have dialysis.    7/8: The patient had hemodialysis yesterday.  Having diarrhea and pending C. difficile.  I also order SNF referral.  Consultants/Specialty  Nephrology, gastroenterology, intensivist        Code Status  DNR/DNI    Disposition  TBD    Review of Systems  Review of Systems   Constitutional: Positive for malaise/fatigue. Negative for chills.   HENT: Negative for congestion.    Eyes: Negative for photophobia, pain and discharge.   Respiratory: Negative for sputum production and shortness of breath.    Cardiovascular: Negative for palpitations and orthopnea.   Gastrointestinal: Positive for diarrhea and melena. Negative for abdominal pain and vomiting.   Genitourinary: Negative for dysuria, frequency and urgency.   Musculoskeletal: Positive for joint pain. Negative for back pain and neck pain.   Skin: Negative for itching.   Neurological: Negative for focal weakness, seizures and weakness.        Physical Exam  Temp:  [35.9 °C (96.6 °F)-37.1 °C (98.7 °F)] 36.7 °C (98.1 °F)  Pulse:  [69-88] 69  Resp:  [9-18] 16  BP: (102-184)/() 134/122  SpO2:  [98 %-100 %] 98 %    Physical Exam  Vitals signs and nursing note reviewed.   Constitutional:       Appearance: Normal appearance. She is well-developed.   HENT:      Head: Normocephalic.      Nose: Nose normal.   Eyes:      Pupils: Pupils are equal, round, and reactive to light.   Neck:      Musculoskeletal: Normal range of motion and neck supple.      Thyroid: No thyromegaly.      Vascular: No JVD.   Cardiovascular:      Rate and Rhythm: Normal rate and regular rhythm.      Pulses: Normal pulses.      Heart sounds: Normal heart sounds.   Pulmonary:      Effort: Pulmonary effort is normal. No respiratory distress.      Breath sounds: Normal breath sounds. No wheezing.   Abdominal:      General: Bowel sounds are normal.   Musculoskeletal: Normal range of motion.         General: Tenderness  present.   Skin:     General: Skin is warm.      Coloration: Skin is pale.   Neurological:      Mental Status: She is alert.         Fluids    Intake/Output Summary (Last 24 hours) at 7/8/2020 0749  Last data filed at 7/7/2020 1946  Gross per 24 hour   Intake 500 ml   Output 1800 ml   Net -1300 ml       Laboratory  Recent Labs     07/06/20  0242 07/07/20  0452 07/08/20  0508   WBC 12.3* 11.3* 9.3   RBC 2.88* 2.99* 2.70*   HEMOGLOBIN 8.3* 8.5* 7.6*   HEMATOCRIT 26.1* 27.1* 25.5*   MCV 90.6 90.6 94.4   MCH 28.8 28.4 28.1   MCHC 31.8* 31.4* 29.8*   RDW 52.5* 51.6* 53.6*   PLATELETCT 265 278 226   MPV 9.7 9.4 9.6     Recent Labs     07/06/20  0242 07/07/20  0452 07/08/20  0508   SODIUM 137 140 139   POTASSIUM 3.8 3.7 3.7   CHLORIDE 99 102 102   CO2 23 23 23   GLUCOSE 144* 112* 103*   BUN 49* 61* 45*   CREATININE 6.15* 7.35* 5.65*   CALCIUM 7.0* 6.6* 6.9*                   Imaging  IR-RANDAL CAMACHO PLACEMENT < 5   Final Result      1. ULTRASOUND AND FLUOROSCOPIC GUIDED PLACEMENT OF A RIGHT FEMORAL 14.5 [place] HemoSplit TUNNELED DIALYSIS CATHETER.      2. THE HEMODIALYSIS CATHETER MAY BE USED IMMEDIATELY AS CLINICALLY INDICATED. FLUSHES PER PROTOCOL.         IR-US GUIDED PIV   Final Result    Ultrasound-guided PERIPHERAL IV INSERTION performed by    qualified nursing staff as above.      IR-CVC TUNNEL WITH PORT REMOVAL   Final Result         1.  Removal of the right IJ] hemodialysis catheter.      2.  The tip of the catheter was cut off and sent to lab for analysis.      CT-HEAD W/O   Final Result      No significant interval change.      MR-BRAIN-W/O   Final Result      1.  Mild cerebral atrophy. Age-appropriate.   2.  Moderate-large lentiform extra-axial fluid collection over the right posterior frontal-parietal convexity consistent with a chronic or late subacute epidural hematoma or loculated subdural hematoma. Possible minimal component of more recent    hemorrhage where there is linear bandlike hyperdensity on  CT.   3.  Patchy and punctate areas of bright diffusion signal in the underlying right parietal lobe which may represent acute or recent subacute arterial or venous infarction. No acute parenchymal hemorrhage.   4.  Minimal supratentorial white matter disease most consistent with microvascular ischemic change.   5.  Old lacunar infarct left lateral thalamus.      MR-MRA HEAD-W/O   Final Result      1.  Intracranial atherosclerotic cerebrovascular stenotic disease involving the posterior cerebral arteries and probably the middle cerebral arteries beyond the genu. No alexx occlusion.   2.  Normal variant dominant caliber right anterior cerebral artery A1 segment with a hypoplastic left A1 segment.   3.  No evidence of aneurysm about [place].      EC-ECHOCARDIOGRAM COMPLETE W/ CONT   Final Result      US-CAROTID DOPPLER BILAT   Final Result      DX-CHEST-PORTABLE (1 VIEW)   Final Result      No acute cardiopulmonary abnormality.      CT-HEAD W/O   Final Result      1. Small amount of acute hemorrhage in a chronic 2.1 cm right epidural collection, along the right frontoparietal convexity. Associated 2 mm right to left midline shift.   2. Minimal subacute subdural hemorrhage anterior to the right epidural collection.   3. Hyperdensity in the temporal horn of the right lateral ventricle may represent small amount of intraventricular hemorrhage.   4. No CT evidence of acute infarct.           Assessment/Plan  * Acute GI bleeding  Assessment & Plan  With melena, EGD and colonoscopy did not see any active lesions, capsule endoscopy identified a possible source of 50 cm,   Enteroscopy: duodenitis, bleeding AVM  Continuous cardiac monitoring  Continue PPI for now  Monitor H&H every 8 hours, transfuse for hemoglobin less than 7  Plan as above  hGB has been stable with 7.6 today      Epidural hemorrhage ([facility])  Assessment & Plan  2.3 cm with 2 mm shift  Neurosurgery consulted and recommended no intervention  Neurology  consulted, MRI and MRA resulted, possible recent small CVA      Leg wounds  Assessment & Plan  Wound culture grew pseudomonas aeruginosa  Was on oral ciprofloxacin and I will change it to IV ampicillin      Peripheral artery disease ([facility])  Assessment & Plan  Hold aspirin and Plavix    Type 2 diabetes mellitus with kidney complication, without long-term current use of insulin ([facility])  Assessment & Plan  Start on insulin sliding scale with serial Accu-Checks  Check hemoglobin A1c  Hypoglycemic protocol in place        Primary hypertension  Assessment & Plan  Restart medication    ESRD (end stage renal disease) ([facility])  Assessment & Plan  Dialysis Monday Wednesday Friday,   nephrology is consulted  Monitor BMP and assess response  Avoid IV contrast/nephrotoxins/NSAIDs  Dose adjust meds for decreased GFR        Acute blood loss anemia  Assessment & Plan  Given 3 units of RBCs, 1 unit of platelets  Transfuse as needed with target hemoglobin above 7.  GI is following  Globin 7.6 today  Follow CBC in the morning  Enteroscopy: duodenitis, bleeding AVM  Hemoglobin has been stable      Bacteremia  Assessment & Plan  From hemodialysis catheter  Removed HD catheter  Nephrology following  ID following on ampicillin  Repeat blood culture negative to date  Have a new hemodialysis tunnel place in the right femoral area yesterday      Encephalopathy acute  Assessment & Plan  Likely multifactorial  Treating infection with IV antibiotic  Improving and alert oriented x4  We will try to avoid benzos and narcotic as much as possible  improving       VTE prophylaxis: SCD

## 2020-07-08 NOTE — DISCHARGE PLANNING
Agency/Facility Name: [facility]  Spoke To: Padma  Outcome: Pt accepted. Waiting for medical clearance.    RN CM informed

## 2020-07-08 NOTE — THERAPY
Speech Language Pathology  Daily Treatment     Patient Name: Corine Dela Cruz  Age:  87 y.o., Sex:  female  Medical Record #: [id number]  Today's Date: 7/8/2020       Precautions: (P) Fall Risk, Swallow Precautions ( See Comments)  Comments: L weakness    Assessment    Patient seen on this date for a clinical swallow evaluation. Patient now on regular diet per MD. Patient seen with meal tray during lunch. Patient consumed ~30% of items from tray and 8 oz thin liquids via single and consecutive sips from the straw with no overt s/sx of aspiration. Mastication mildly prolonged but overall appeared functional.     Plan    Continue regular/thin liquid diet. SLP following x1-2 likely.     Continue current treatment plan.    Discharge recommendations: Anticipate no further SLP services to address dysphagia once d/c from hospital.        Objective       07/08/20 1212   Cognitive-Linguistic   Level of Consciousness Alert   Dysphagia    Positioning / Behavior Modification Self Monitoring;Modulate Rate or Bite Size   Other Treatments Regular/thin liquid meal tray   Skilled Intervention Compensatory Strategies;Verbal Cueing   Recommended Route of Medication Administration   Medication Administration  Whole with Liquid Wash   Short Term Goals   Short Term Goal # 1 B  Patient will consume a regular/thin liquid diet with no overt s/sx of aspiration given min cues to swallow strategies.

## 2020-07-08 NOTE — PROGRESS NOTES
[facility] Nephrology Daily Progress Note     Date of Service  7/8     Author: Nathan Navarro Jr., MD     Chief Complaint  88 yo F PMH ESRD MWF iHD, HTN, anemia of CKD, CKD-MBD, and HLD who presents to ED with CC as above.  She is visiting the [facility] from [place] and due to current pandemic has not been able to go back home and continued to stay in this area with family.  She has OP dialysis at [facility] and has been compliant.  She was doing well until about two weeks ago when she started to notice dark stools.  She was doing ok until about 3 days ago when she also noted orthostatic symptoms upon standing.  It resolved and then today it came back and she had some slurred speech and left arm numbness so she was brought in for evaluation.  She has had a prior stroke and the numbness and slurred speech have been present off and on as a result of that so initially she though nothing of it. Neurology was consulted and they did not feel that this was CVA related and more hemodynamic related.  Labs in ED showed Hgb 5.5 and serum labs showed  and K+ 5.3.  She was given pRBCs and admitted to ICU.  CT of head showed an acute on chronic epidural bleed with mild midline shift as well.  She was on plavix at home.  No recent F/C/N/V/CP/SOB.  No hematochezia, hematemesis.  No HA, visual changes, or abdominal pain     Daily nephrology summary  06/26 - consult done, HD done  06/27 - 2u prbc this AM, has been seen and evaluated by neurology and neurosurgery, further imaging planned for today  06/28 - not seen, patient in procedures  06/29 - seen on HD, tolerating with anxiolytic, somnolent and provides minimal responses to questions, 3Ca bath today, EGD and colonoscopy normal, capsule endoscopy  06/30 - transferred to floor, capsule endoscopy with fresh blood, push enteroscopy deferred, some concern for UTI but no urine cx and UA without bacteria, WBC increased, +fever, +AMS  07/01 - patient is agitated this AM,  "initially declined HD, +edema, +hbg decreased and receiving prbc  07/02 - patient sleepy this afternoon post procedure, erosive duodenitis and oozing AVMs in gastric cardia noted, tolerated HD with 4L UF yesterday, on lasix though UOP no longer being recorded  07/03 - HD this am, 3.5L UF. IR removed dialysis catheter. Pt \"feeling much better\".   07/04 - No new overnight renal events. S/p HD yesterday and tolerated well.   07/05 - Hgb 6.8. Feels ok. Getting one unit of PRBC.  07/06 - already ate this Am at 7. Asks me when she is getting her tunneled dialysis line.   7/07 - Tunneled dialysis line placed on hold yesterday due to inability to find suitable IR schedule. I called them again today - reports they will do it this AM.   7/08 - Had tunneled line placed yesterday. Appears that they were unable to access upper vasculature, so right femoral catheter placed. She had HD yesterday. Overnight she has diarrhea, so she is on C.Diff precautions. Patient still volume up, but patient refuses to have dialysis today.      Review of Systems  GEN: no fevers/chills/weight loss  HEENT: No vision changes  RESP: No shortness of breath  CV: No edema, no chest pain  ABD: no N/V, + edema  EXT: + edema  Musculoskeletal: no joint pain  NEURO: no headaches, no weakness  PSYCH: no confusion, no depression      Physical Exam  Vitals:    07/08/20 0734   BP: 134/122   Pulse: 69   Resp: 16   Temp: 36.7 °C (98.1 °F)   SpO2: 98%          Physical Exam  Constitutional:       Appearance: chronically ill appearing  HENT:      Head: Normocephalic and atraumatic.      Nose: Nose normal.   Neck:      Musculoskeletal: Normal range of motion and neck supple.   Cardiovascular:   RRR  Pulmonary:      Effort: Pulmonary effort is normal.      Breath sounds: decreased BS  Abdominal:      General: Abdomen is flat. Bowel sounds are normal.      Palpations: Abdomen is soft.   Musculoskeletal:         General: Generalized anasarca  Skin:     General: Skin is " warm and dry.   Neurological:      General: No focal deficit present.      Mental Status: He is alert and oriented to person, place, and time.   Psychiatric:         Mood and Affect: Mood normal.         Behavior: Behavior normal.         Thought Content: Thought content normal.         Judgment: Judgment normal.            Fluids       Intake/Output Summary (Last 24 hours) at 7/8/2020 0935  Last data filed at 7/7/2020 1946  Gross per 24 hour   Intake 500 ml   Output 1800 ml   Net -1300 ml           Labs    Lab Results   Component Value Date/Time    SODIUM 139 07/08/2020 05:08 AM    POTASSIUM 3.7 07/08/2020 05:08 AM    CHLORIDE 102 07/08/2020 05:08 AM    CO2 23 07/08/2020 05:08 AM    GLUCOSE 103 (H) 07/08/2020 05:08 AM    BUN 45 (H) 07/08/2020 05:08 AM    CREATININE 5.65 (HH) 07/08/2020 05:08 AM       Recent Labs     07/06/20  0242 07/07/20  0452 07/08/20  0508   WBC 12.3* 11.3* 9.3   RBC 2.88* 2.99* 2.70*   HEMOGLOBIN 8.3* 8.5* 7.6*   HEMATOCRIT 26.1* 27.1* 25.5*   MCV 90.6 90.6 94.4   MCH 28.8 28.4 28.1   RDW 52.5* 51.6* 53.6*   PLATELETCT 265 278 226   MPV 9.7 9.4 9.6   NEUTSPOLYS 76.50* 77.40* 76.70*   LYMPHOCYTES 15.10* 14.30* 14.60*   MONOCYTES 5.70 5.40 6.40   EOSINOPHILS 1.30 1.20 0.80   BASOPHILS 0.30 0.30 0.30       Assessment/Plan  - ESRD: Etiology likely 2/2 HTN.  Visitor at [facility], lives in [place]  - Melena. EGD and colonoscopy normal, capsule endoscopy with fresh blood 6/30  - Hyperkalemia - correct with HD  - Acute epidural hematoma    * Superimposed on chronic epidural bleed, with a small midline shift    * Neuro following, has been evaluated by neurosurgery  - HTN    * Goal BP < 140/90    * Stable  - Anemia, multifactorial    * Goal Hgb 10-11 in CKD, ferritin significantly elevated    * transfuse hgb<7    * see melena above  - CKD-MBD    * Managed at HD unit, phos now above goal, Ca low, vitamin D low, PTH appropriate  - HLD  - CAD  - Leukocytosis with low grade fever  - Bacteremia - GI source  versus line.   - Edema, anasarca    * UF with HD as tolerated     * On diuretics  - Tunneled line - appears unable to get line in IJ, so currently has right femoral tunneled dialysis line     PLAN:  Recommended HD today give volume overload and inability to remove much UF due to intradialytic hypotension. Patient refused. Plan HD tomorrow      Nathan Navarro Jr, MD  [facility] Nephrology

## 2020-07-08 NOTE — THERAPY
Physical Therapy   Daily Treatment     Patient Name: Corine Dela Cruz  Age:  87 y.o., Sex:  female  Medical Record #: [id number]  Today's Date: 7/8/2020     Precautions: Fall Risk, Swallow Precautions ( See Comments)    Assessment    Patient demonstrates improved ambulation tolerance, however continues to have difficulty with bed mobility and transfers.  Patient has some buckling of L LE, however able to self correct.  When cued to increase weight bearing on left side, gait improves.  Patient also demonstrates improved swing on left side with ambulation.  Expected to be d/c to SNF tomorrow per , depending on lab results.         07/08/20 0956   Precautions   Precautions Fall Risk;Swallow Precautions ( See Comments)   Comments L weakness   Cognition    Cognition / Consciousness X   Orientation Level Oriented x 4   Level of Consciousness Alert   Ability To Follow Commands 1 Step   Sequencing Impaired   Initiation Impaired   Comments pleasant during session, very vocal when calling nurse   Balance   Sitting Balance (Static) Fair   Sitting Balance (Dynamic) Fair   Standing Balance (Static) Fair -   Standing Balance (Dynamic) Fair -   Weight Shift Sitting Fair   Weight Shift Standing Fair   Skilled Intervention Tactile Cuing;Verbal Cuing   Comments FWW   Gait Analysis   Gait Level Of Assist Minimal Assist   Assistive Device Front Wheel Walker   Distance (Feet) 20  (10 feet forward, 10 feet back)   # of Times Distance was Traveled 4   Deviation Step To;Bradykinetic  (left LE weakness, minor buckling)   Comments cued to increased stance time on left side, patient leaning right with ambulation   Bed Mobility    Supine to Sit   (in chair)   Scooting Maximal Assist   Functional Mobility   Sit to Stand Moderate Assist   Mobility significant rest breaks needed in between ambulation   How much difficulty does the patient currently have...   Turning over in bed (including adjusting bedclothes, sheets and blankets)?  1   Sitting down on and standing up from a chair with arms (e.g., wheelchair, bedside commode, etc.) 1   Moving from lying on back to sitting on the side of the bed? 1   How much help from another person does the patient currently need...   Moving to and from a bed to a chair (including a wheelchair)? 2   Need to walk in a hospital room? 2   Climbing 3-5 steps with a railing? 1   6 clicks Mobility Score 8   Short Term Goals    Short Term Goal # 1 Pt will improve bed mob to perform supine<>sit with min A within 6 visits to increase OOB activity/tolerance.   Goal Outcome # 1 goal not met   Short Term Goal # 2 Pt will perform STS with min A within 6 visits to initiate gait.   Goal Outcome # 2 Goal not met   Short Term Goal # 3 Pt will ambulate 25ft with FWW and min assist in 6 visits to increase independence   Goal Outcome # 3 Goal not met   Education Group   Education Provided Role of Physical Therapist   Role of Physical Therapist Patient Response Patient;Acceptance;Explanation;Demonstration;Action Demonstration   Interdisciplinary Plan of Care Collaboration   IDT Collaboration with  Nursing   Patient Position at End of Therapy Seated;Chair Alarm On;Call Light within Reach;Phone within Reach;Tray Table within Reach   Collaboration Comments results of PT       Plan    Continue current treatment plan.    Discharge recommendations:  Skilled Nursing Facility

## 2020-07-08 NOTE — THERAPY
"Occupational Therapy  Daily Treatment     Patient Name: Corine Dela Cruz  Age:  87 y.o., Sex:  female  Medical Record #: [id number]  Today's Date: 7/7/2020       Precautions: Fall Risk, Swallow Precautions ( See Comments)  Comments: L weakness    Assessment    Pt seen for OT following screams into the hallway for \"NURSE!\" because she was hungry. Pt with impaired insight and awareness to current situation, pt behaving inappropriately requiring reminders that her care team has other patients and she needs to be patient for response to non-emergencies. Pt assisted to bedside chair, required step by step cues for body mechanics, poor instruction following/safety awareness as pt sat back impulsively into chair without reaching for arm rests despite cues. Pt requests assist prior to attempting to initiate tasks or movements independently, with encouragement and cues pt able to scoot herself back in chair and open her lunch container with Cara. Pt perseverating on her meal and declined all additional attempts to complete ADLs. Acute OT to continue to follow.     Plan    Continue current treatment plan.  Discharge recommendations:  Recommend post-acute placement for additional occupational therapy services prior to discharge home.      Subjective    \"NURSE! NURSE!\" (shouting into hallway) however very pleasant upon initiating therapy session.      07/07/20 1540   Precautions   Precautions Fall Risk;Swallow Precautions ( See Comments)   Comments L weakness   Cognition    Ability To Follow Commands 1 Step   Sequencing Impaired   Initiation Impaired   Comments pleasant when receiving attention, screaming into hallways for \"NURSE!\" repeatedly when upset or requesting assistance   Balance   Weight Shift Sitting Fair   Weight Shift Standing Poor   Skilled Intervention Tactile Cuing;Verbal Cuing;Sequencing;Facilitation;Postural Facilitation   Bed Mobility    Supine to Sit Maximal Assist   Sit to Supine   (up to chair)   Scooting " Moderate Assist   Skilled Intervention Verbal Cuing;Tactile Cuing;Facilitation;Postural Facilitation   Activities of Daily Living   Eating (not tested)   Grooming (declined)   Upper Body Dressing (refused)   Lower Body Dressing (refused)   Toileting Total Assist  (incontienent in standing)   Comments refused ADLs, perseverating on getting to chair for lunch   Functional Mobility   Sit to Stand Minimal Assist   Bed, Chair, Wheelchair Transfer Moderate Assist   Toilet Transfers Refused   Transfer Method Stand Pivot   Skilled Intervention Verbal Cuing;Tactile Cuing;Sequencing;Facilitation;Postural Facilitation;Compensatory Strategies   Short Term Goals   Short Term Goal # 1 supervised with UB dressing   Goal Outcome # 1 Goal not met   Short Term Goal # 2 min A with LB dressing   Goal Outcome # 2 Goal not met   Short Term Goal # 3 min A with ADL txfs   Goal Outcome # 3 Progressing slower than expected   Session Information   Priority 2

## 2020-07-08 NOTE — PROGRESS NOTES
[facility] Progress Note     Hemodialysis treatment ordered today per Dr. Navarro x 3 hours.   Treatment initiated at 1629 ended at 1936.      Patient hypotensive at times throughout treatment. Dr. Navarro notified, ok to decrease UF goal to 1-2 L and adjust UF as BP tolerates. New order implemented.    Poor cath flow with R femoral CVC as evidenced by elevated Arterial Pressures outside appropriate limits; Blood Flow Rate decreased to 325 (vs. 350 per order) and lines reversed. Able to complete treatment.    See paper flow sheet for details.      Net UF 1,300 mL.      Post tx, CVC flushed with saline then locked with heparin 1000 units/mL per designated amount in each wing then clamped and capped. Aspirate heparin prior to next CVC use.     Report given to Primary Nurse, MARKIE Mathur RN.

## 2020-07-08 NOTE — CARE PLAN
Problem: Communication  Goal: The ability to communicate needs accurately and effectively will improve  Outcome: PROGRESSING AS EXPECTED   Encourage patient to voice needs. Check on patient hourly. Enforce the use of call light.  Problem: Skin Integrity  Goal: Risk for impaired skin integrity will decrease  Outcome: PROGRESSING AS EXPECTED   Q2 hour turn. Waffle mattress in bed and chair.

## 2020-07-09 VITALS
HEART RATE: 97 BPM | SYSTOLIC BLOOD PRESSURE: 151 MMHG | BODY MASS INDEX: 37.42 KG/M2 | RESPIRATION RATE: 17 BRPM | HEIGHT: 59 IN | TEMPERATURE: 97.1 F | OXYGEN SATURATION: 100 % | WEIGHT: 185.63 LBS | DIASTOLIC BLOOD PRESSURE: 43 MMHG

## 2020-07-09 LAB
ALBUMIN SERPL BCP-MCNC: 2.7 G/DL (ref 3.2–4.9)
ALBUMIN/GLOB SERPL: 0.8 G/DL
ALP SERPL-CCNC: 43 U/L (ref 30–99)
ALT SERPL-CCNC: 7 U/L (ref 2–50)
ANION GAP SERPL CALC-SCNC: 15 MMOL/L (ref 7–16)
AST SERPL-CCNC: 12 U/L (ref 12–45)
BILIRUB SERPL-MCNC: 0.2 MG/DL (ref 0.1–1.5)
BUN SERPL-MCNC: 71 MG/DL (ref 8–22)
C DIFF DNA SPEC QL NAA+PROBE: NEGATIVE
C DIFF TOX GENS STL QL NAA+PROBE: NEGATIVE
CALCIUM SERPL-MCNC: 6.8 MG/DL (ref 8.5–10.5)
CHLORIDE SERPL-SCNC: 102 MMOL/L (ref 96–112)
CO2 SERPL-SCNC: 22 MMOL/L (ref 20–33)
CREAT SERPL-MCNC: 6.89 MG/DL (ref 0.5–1.4)
GLOBULIN SER CALC-MCNC: 3.3 G/DL (ref 1.9–3.5)
GLUCOSE SERPL-MCNC: 156 MG/DL (ref 65–99)
MAGNESIUM SERPL-MCNC: 1.8 MG/DL (ref 1.5–2.5)
POTASSIUM SERPL-SCNC: 4 MMOL/L (ref 3.6–5.5)
PROT SERPL-MCNC: 6 G/DL (ref 6–8.2)
SODIUM SERPL-SCNC: 139 MMOL/L (ref 135–145)

## 2020-07-09 PROCEDURE — A9270 NON-COVERED ITEM OR SERVICE: HCPCS | Performed by: INTERNAL MEDICINE

## 2020-07-09 PROCEDURE — 700111 HCHG RX REV CODE 636 W/ 250 OVERRIDE (IP): Mod: TB

## 2020-07-09 PROCEDURE — 97116 GAIT TRAINING THERAPY: CPT | Mod: CQ

## 2020-07-09 PROCEDURE — 99232 SBSQ HOSP IP/OBS MODERATE 35: CPT | Performed by: INTERNAL MEDICINE

## 2020-07-09 PROCEDURE — 83735 ASSAY OF MAGNESIUM: CPT

## 2020-07-09 PROCEDURE — 700102 HCHG RX REV CODE 250 W/ 637 OVERRIDE(OP): Performed by: INTERNAL MEDICINE

## 2020-07-09 PROCEDURE — 700111 HCHG RX REV CODE 636 W/ 250 OVERRIDE (IP): Performed by: INTERNAL MEDICINE

## 2020-07-09 PROCEDURE — 99239 HOSP IP/OBS DSCHRG MGMT >30: CPT | Performed by: INTERNAL MEDICINE

## 2020-07-09 PROCEDURE — 36415 COLL VENOUS BLD VENIPUNCTURE: CPT

## 2020-07-09 PROCEDURE — 5A1D70Z PERFORMANCE OF URINARY FILTRATION, INTERMITTENT, LESS THAN 6 HOURS PER DAY: ICD-10-PCS | Performed by: INTERNAL MEDICINE

## 2020-07-09 PROCEDURE — 90935 HEMODIALYSIS ONE EVALUATION: CPT

## 2020-07-09 PROCEDURE — 80053 COMPREHEN METABOLIC PANEL: CPT

## 2020-07-09 PROCEDURE — 700105 HCHG RX REV CODE 258: Performed by: INTERNAL MEDICINE

## 2020-07-09 PROCEDURE — 97530 THERAPEUTIC ACTIVITIES: CPT | Mod: CQ

## 2020-07-09 RX ORDER — FUROSEMIDE 80 MG
80 TABLET ORAL DAILY
Qty: 30 TAB
Start: 2020-07-09

## 2020-07-09 RX ORDER — CALCIUM ACETATE 667 MG/1
667 TABLET ORAL
Qty: 180 TAB
Start: 2020-07-09

## 2020-07-09 RX ORDER — FUROSEMIDE 40 MG/1
80 TABLET ORAL
Status: DISCONTINUED | OUTPATIENT
Start: 2020-07-09 | End: 2020-07-09 | Stop reason: HOSPADM

## 2020-07-09 RX ORDER — ERGOCALCIFEROL 1.25 MG/1
50000 CAPSULE ORAL
Qty: 5 CAP
Start: 2020-07-13

## 2020-07-09 RX ORDER — OMEPRAZOLE 20 MG/1
20 CAPSULE, DELAYED RELEASE ORAL DAILY
Qty: 30 CAP
Start: 2020-07-10

## 2020-07-09 RX ORDER — HEPARIN SODIUM 1000 [USP'U]/ML
INJECTION, SOLUTION INTRAVENOUS; SUBCUTANEOUS
Status: COMPLETED
Start: 2020-07-09 | End: 2020-07-09

## 2020-07-09 RX ADMIN — LORAZEPAM 0.5 MG: 2 INJECTION INTRAMUSCULAR; INTRAVENOUS at 11:39

## 2020-07-09 RX ADMIN — OMEPRAZOLE 20 MG: 20 CAPSULE, DELAYED RELEASE ORAL at 05:53

## 2020-07-09 RX ADMIN — AMPICILLIN SODIUM 2000 MG: 2 INJECTION, POWDER, FOR SOLUTION INTRAMUSCULAR; INTRAVENOUS at 05:52

## 2020-07-09 RX ADMIN — ACETAMINOPHEN 650 MG: 325 TABLET, FILM COATED ORAL at 05:53

## 2020-07-09 RX ADMIN — HEPARIN SODIUM 3900 UNITS: 1000 INJECTION, SOLUTION INTRAVENOUS; SUBCUTANEOUS at 12:15

## 2020-07-09 RX ADMIN — Medication 667 MG: at 09:33

## 2020-07-09 RX ADMIN — EPOETIN ALFA-EPBX 4000 UNITS: 4000 INJECTION, SOLUTION INTRAVENOUS; SUBCUTANEOUS at 11:05

## 2020-07-09 ASSESSMENT — GAIT ASSESSMENTS
DEVIATION: STEP TO;INCREASED BASE OF SUPPORT;BRADYKINETIC;SHUFFLED GAIT
GAIT LEVEL OF ASSIST: MINIMAL ASSIST
ASSISTIVE DEVICE: FRONT WHEEL WALKER
DISTANCE (FEET): 10

## 2020-07-09 ASSESSMENT — COGNITIVE AND FUNCTIONAL STATUS - GENERAL
CLIMB 3 TO 5 STEPS WITH RAILING: TOTAL
MOVING TO AND FROM BED TO CHAIR: UNABLE
MOBILITY SCORE: 8
STANDING UP FROM CHAIR USING ARMS: A LOT
TURNING FROM BACK TO SIDE WHILE IN FLAT BAD: UNABLE
MOVING FROM LYING ON BACK TO SITTING ON SIDE OF FLAT BED: UNABLE
WALKING IN HOSPITAL ROOM: A LOT
SUGGESTED CMS G CODE MODIFIER MOBILITY: CM

## 2020-07-09 ASSESSMENT — ENCOUNTER SYMPTOMS
FEVER: 0
MYALGIAS: 1
SHORTNESS OF BREATH: 0
COUGH: 0

## 2020-07-09 NOTE — DISCHARGE PLANNING
Anticipated Discharge Disposition: [facility]    Action: Pt to transfer to [facility] at 1630, BAKARI Veras notified.     Barriers to Discharge: none    Plan: Transfer to [facility] after dialysis today.

## 2020-07-09 NOTE — PROGRESS NOTES
HD treatment today x 2 H only.Patient insisted on just doing 2 H despite education.Dr Navarro made aware.Net UF removed 1500 ml.Femoral cvc dressing changed and locked with heparin.Report given to primary Rn.

## 2020-07-09 NOTE — DISCHARGE SUMMARY
Discharge Summary    CHIEF COMPLAINT ON ADMISSION  Chief Complaint   Patient presents with   • Melena   • Weakness       Reason for Admission  EMS     Admission Date  6/26/2020    CODE STATUS  DNAR/DNI    HPI & HOSPITAL COURSE  87 y.o. female admitted 6/26/2020 with melena and weakness, patient has a history of CVA, end-stage renal disease on hemodialysis Monday Wednesday Friday, peripheral arterial disease, the patient apparently is visiting from [place], she has 2 sons in town, she also complained of left-sided weakness, speech difficulty apparently and also some residual deficits from her previous CVA. the patient does have some chronic lower extremity wounds that she is being treated with antibiotics for, the patient on presentation was imaged and was found to have a right-sided chronic epidural hematoma with some mild acute hemorrhage within the lesion.  Neurosurgery was consulted and recommended she is not a surgical candidate.    Because of a GI bleed, she initially received 3 units of blood and 1 unit of platelet.  Gastroenterology was consulted and did EGD and colonoscopy without any significant active bleeding lesion.  Capsule endoscopy was done and found to have possible source of bleeding in the small intestine.  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  GI recommended PPI and her hemoglobin has been stable over the past few days.    Due to her end-stage renal disease nephrology was consulted and the patient continued to have hemodialysis as scheduled.    In addition the patient was found to have leg wound and culture grew Pseudomonas.  In addition she was found to have bacteremia with enterococcus from hemodialysis catheter infection.  Infectious disease was consulted and recommended IV ampicillin treatment.  Repeat blood culture has been negative.  The patient is to complete antibiotic until July 16.    Due to her diarrhea C. difficile test was ordered.  She will be  discharged to skilled if it come back negative.    PT OT evaluated the patient and recommended rehab which was arranged upon discharge.  I saw and examined the patient upon discharge.  Please call [phone] to schedule PCP appointment for patient.    Required specialty appointments include:     As below    Therefore, she is discharged in fair and stable condition to skilled nursing facility.    The patient met 2-midnight criteria for an inpatient stay at the time of discharge.    Discharge Date  07/09/20      FOLLOW UP ITEMS POST DISCHARGE      DISCHARGE DIAGNOSES  Principal Problem:    Acute GI bleeding POA: Unknown  Active Problems:    Epidural hemorrhage (HCC) POA: Unknown    Acute blood loss anemia POA: Unknown    ESRD (end stage renal disease) (HCC) POA: Unknown    Primary hypertension POA: Unknown    Type 2 diabetes mellitus with kidney complication, without long-term current use of insulin (HCC) POA: Unknown    Peripheral artery disease (HCC) POA: Unknown    Leg wounds POA: Unknown    Encephalopathy acute POA: Unknown    Bacteremia POA: Unknown  Resolved Problems:    * No resolved hospital problems. *      FOLLOW UP  No future appointments.  [address] [address]  [phone]          MEDICATIONS ON DISCHARGE     Medication List      START taking these medications      Instructions   calcium acetate 667 MG Tabs tablet  Commonly known as:  PHOS-LO   Take 1 Tab by mouth 3 times a day, with meals.  Dose:  667 mg     furosemide 80 MG Tabs  Commonly known as:  LASIX   Take 1 Tab by mouth every day.  Dose:  80 mg     NS SOLN 100 mL with ampicillin 2 GM SOLR 2,000 mg   2,000 mg by Intravenous route every 12 hours for 7 days.  Dose:  2,000 mg     omeprazole 20 MG delayed-release capsule  Start taking on:  July 10, 2020  Commonly known as:  PRILOSEC   Take 1 Cap by mouth every day.  Dose:  20 mg     vitamin D (Ergocalciferol) 1.25 MG (30985 UT) Caps capsule  Start  taking on:  July 13, 2020  Commonly known as:  DRISDOL   Take 1 Cap by mouth every 7 days.  Dose:  50,000 Units        CONTINUE taking these medications      Instructions   acetaminophen 500 MG Tabs  Commonly known as:  TYLENOL   Take 500-1,000 mg by mouth every 6 hours as needed.  Dose:  500-1,000 mg     ALPRAZolam 0.5 MG Tabs  Commonly known as:  XANAX   Take 0.5 mg by mouth 3 times a day as needed for Sleep.  Dose:  0.5 mg     amLODIPine 5 MG Tabs  Commonly known as:  NORVASC   Take 5 mg by mouth 2 Times a Day.  Dose:  5 mg     aspirin EC 81 MG Tbec  Commonly known as:  ECOTRIN   Take 81 mg by mouth every day.  Dose:  81 mg     atorvastatin 40 MG Tabs  Commonly known as:  LIPITOR   Take 40 mg by mouth every day.  Dose:  40 mg     Auryxia 1  MG(Fe) Tabs  Generic drug:  Ferric Citrate   Take 210 mg by mouth 3 times a day, with meals.  Dose:  210 mg     clopidogrel 75 MG Tabs  Commonly known as:  PLAVIX   Take 75 mg by mouth every day.  Dose:  75 mg        STOP taking these medications    ciprofloxacin 250 MG Tabs  Commonly known as:  CIPRO            Allergies  Allergies   Allergen Reactions   • Lexapro      Pt states she doesn't know what happens when she takes lexapro         DIET  Orders Placed This Encounter   Procedures   • Diet Order Diabetic, Renal     Standing Status:   Standing     Number of Occurrences:   1     Order Specific Question:   Diet:     Answer:   Diabetic [3]     Order Specific Question:   Diet:     Answer:   Renal [8]       ACTIVITY  As tolerated.  Weight bearing as tolerated    CONSULTATIONS  GI  NS    PROCEDURES      LABORATORY  Lab Results   Component Value Date    SODIUM 139 07/09/2020    POTASSIUM 4.0 07/09/2020    CHLORIDE 102 07/09/2020    CO2 22 07/09/2020    GLUCOSE 156 (H) 07/09/2020    BUN 71 (HH) 07/09/2020    CREATININE 6.89 (HH) 07/09/2020        Lab Results   Component Value Date    WBC 9.3 07/08/2020    HEMOGLOBIN 7.6 (L) 07/08/2020    HEMATOCRIT 25.5 (L) 07/08/2020     PLATELETCT 226 07/08/2020        Total time of the discharge process exceeds 44 minutes.

## 2020-07-09 NOTE — CARE PLAN
Problem: Safety  Goal: Will remain free from falls  Outcome: PROGRESSING AS EXPECTED   Patient continues to remain free from falls  Problem: Infection  Goal: Will remain free from infection  Outcome: PROGRESSING AS EXPECTED   Patient continues to remain afebrile

## 2020-07-09 NOTE — THERAPY
"Physical Therapy   Daily Treatment     Patient Name: Corine Dela Cruz  Age:  87 y.o., Sex:  female  Medical Record #: [id number]  Today's Date: 7/9/2020     Precautions: Fall Risk, Swallow Precautions ( See Comments)    Assessment    Pt continues to progress w/ therapy. Pt needing extra time to initiate mobility but is able to perform w/ less assist. Pt was able to increase her ambulation tolerance but needs extra time to perform. Pt fatigues quickly and requires seated rest breaks during. Pt has a very difficult time coordinating bed mobility and often refuses to get in the bed unless it's for dialysis.    Plan    Continue current treatment plan.    Discharge recommendations:  Recommend post-acute placement for continued physical therapy services prior to discharge home.       Subjective    \"Why can't I do dialysis in this chair?\"     Objective       07/09/20 0858   Precautions   Precautions Fall Risk;Swallow Precautions ( See Comments)   Comments L sided weakness   Gait Analysis   Gait Level Of Assist Minimal Assist   Assistive Device Front Wheel Walker   Distance (Feet) 10   # of Times Distance was Traveled 3   Deviation Step To;Increased Base Of Support;Bradykinetic;Shuffled Gait   Comments Pt requiring a lot of time to perform short distance gait. Has a harder time initiating once she stops to take a break.   Bed Mobility    Supine to Sit   (NT up in chair)   Sit to Supine Maximal Assist   Scooting Minimal Assist   Comments Pt having a very hard time coordinating bed mobility.   Functional Mobility   Sit to Stand Minimal Assist   Bed, Chair, Wheelchair Transfer Moderate Assist   Transfer Method Stand Pivot   Mobility SPT chair>bed w/ FWW. Pt needing a lot of cues for safety w/ transfer and FWW management.   Short Term Goals    Short Term Goal # 1 Pt will improve bed mob to perform supine<>sit with min A within 6 visits to increase OOB activity/tolerance.   Goal Outcome # 1 goal not met   Short Term Goal # 2 " Pt will perform STS with min A within 6 visits to initiate gait.   Goal Outcome # 2 Progressing as expected   Short Term Goal # 3 Pt will ambulate 25ft with FWW and min assist in 6 visits to increase independence   Goal Outcome # 3 Goal not met

## 2020-07-09 NOTE — PROGRESS NOTES
Infectious Disease Progress Note    Author: Arabella Lockhart M.D. Date & Time of service: 2020  12:04 PM    Chief Complaint:  Sepsis, strep    Interval History:  87-year-old female with ESRD, DM, PVD, CVA admitted 2020 for gastrointestinal bleed with associated weakness, dizziness and falls resulting in intracranial hemorrhage. Developed fever and leukocytosis during hospitalization and found to have above  7/2 AF (99.6) WBC 10.2 remains obtunded-pain better controlled. Seen by Palliative-now agreeable to GI procedure  7/3 AF WBC 11.9  tolerated procedures well-bleeding AVMs seen with erosive duodenitis. Less pain today. Plan for removal HD cath noted   AF WBC 12 up to chair-no new complaints   AF WBC 12.5 up to chair again  Today-pain controlled. Required another transfusion (Hgb down to 6.8)-wants to know how long it will take to get new HD cath   AF WBC 12.3 up to chair-tired today. No new complaints-plan for HD cath this afternoon noted   AF WBC 11.3 HD cath not placed yesterday-now planned for today. Perseverating and asking when cath will be placed-hungry   AF WBC 9  had c/o diarrhea so in Cdiff iso-working with therapy. Had cath placed but refused dialysis today   AF getting HD-no new complaints  Labs Reviewed, Medications Reviewed    Review of Systems:  Review of Systems   Constitutional: Positive for malaise/fatigue. Negative for fever.   Respiratory: Negative for cough and shortness of breath.    Cardiovascular: Negative for chest pain.   Musculoskeletal: Positive for joint pain and myalgias.       Hemodynamics:  Temp (24hrs), Av.7 °C (98.1 °F), Min:36.3 °C (97.3 °F), Max:37.6 °C (99.6 °F)  Temperature: 36.3 °C (97.3 °F)  Pulse  Av.6  Min: 9  Max: 111   Blood Pressure : (!) 137/36       Physical Exam:  Physical Exam  Vitals signs and nursing note reviewed.   Constitutional:       General: She is not in acute distress.     Appearance: She is not toxic-appearing or  diaphoretic.      Comments: Chronically ill   Cardiovascular:      Rate and Rhythm: Normal rate.      Comments: ectopy  Pulmonary:      Effort: Pulmonary effort is normal. No respiratory distress.      Breath sounds: No stridor.   Musculoskeletal:         General: Swelling present.   Skin:     Coloration: Skin is not jaundiced.      Findings: Bruising present.      Comments: Extensive bruising LUE with hand swelling   Neurological:      Comments: somnolent         Meds:    Current Facility-Administered Medications:   •  furosemide  •  epoetin  •  [CANCELED] Special Contact Isolation **AND** [COMPLETED] C Diff by PCR rflx Toxin **AND** Pharmacy  •  acetaminophen  •  heparin  •  ampicillin  •  omeprazole  •  haloperidol lactate  •  lidocaine  •  vitamin D (Ergocalciferol)  •  calcium acetate  •  atorvastatin  •  acetaminophen  •  ondansetron  •  ondansetron  •  LORazepam  •  hydrALAZINE    Labs:  Recent Labs     07/07/20  0452 07/08/20  0508   WBC 11.3* 9.3   RBC 2.99* 2.70*   HEMOGLOBIN 8.5* 7.6*   HEMATOCRIT 27.1* 25.5*   MCV 90.6 94.4   MCH 28.4 28.1   RDW 51.6* 53.6*   PLATELETCT 278 226   MPV 9.4 9.6   NEUTSPOLYS 77.40* 76.70*   LYMPHOCYTES 14.30* 14.60*   MONOCYTES 5.40 6.40   EOSINOPHILS 1.20 0.80   BASOPHILS 0.30 0.30     Recent Labs     07/07/20  0452 07/08/20  0508 07/09/20  0528   SODIUM 140 139 139   POTASSIUM 3.7 3.7 4.0   CHLORIDE 102 102 102   CO2 23 23 22   GLUCOSE 112* 103* 156*   BUN 61* 45* 71*     Recent Labs     07/07/20  0452 07/08/20  0508 07/09/20  0528   ALBUMIN 2.9* 2.6* 2.7*   TBILIRUBIN 0.3 0.3 0.2   ALKPHOSPHAT 49 43 43   TOTPROTEIN 6.0 6.0 6.0   ALTSGPT 10 10 7   ASTSGOT 14 16 12   CREATININE 7.35* 5.65* 6.89*       Imaging:  Ct-head W/o    Result Date: 6/29/2020 6/29/2020 11:28 AM HISTORY/REASON FOR EXAM:  Stroke, follow up. Intracranial hemorrhage TECHNIQUE/EXAM DESCRIPTION AND NUMBER OF VIEWS: CT of the head without contrast. The study was performed on a helical multidetector CT  scanner. Contiguous 2.5 mm axial sections were obtained from the skull base through the vertex. Up to date radiation dose reduction adjustments have been utilized to meet ALARA standards for radiation dose reduction. COMPARISON:  CT head 6/26/2020. Brain MRI 6/27/2020 FINDINGS: Redemonstrated is a predominantly low density extra axial fluid collection along the right frontoparietal convexity, probably loculated subdural hematoma although a component of epidural hemorrhage is not excluded. There is a small amount of hyperdensity  along the inferior aspect of the collection which could represent small amount of acute hemorrhage. There is no significant change in appearance. The collection measures up to 22 mm in thickness. There is stable mass effect with sulcal effacement and slight right to left midline shift. No herniation. Generalized age-related atrophy. Chronic small left thalamic lacunar infarct. No acute osseous abnormality. Probable prior partial right mastoidectomy.     No significant interval change.    Ct-head W/o    Result Date: 6/26/2020   CT HEAD WITHOUT CONTRAST 6/26/2020 12:34 PM HISTORY/REASON FOR EXAM:  Left-sided facial droop TECHNIQUE/EXAM DESCRIPTION AND NUMBER OF VIEWS: CT of the head without contrast. The study was performed on a helical multidetector CT scanner. Contiguous 2.5 mm axial sections were obtained from the skull base through the vertex. Up to date radiation dose reduction adjustments have been utilized to meet ALARA standards for radiation dose reduction. COMPARISON:  None available FINDINGS: Lateral ventricles are normal in size and symmetric. Hyperdensity in the temporal horn of the right lateral ventricle. Mild global parenchymal atrophy. Chronic small vessel ischemic changes. Minimal 2 mm right to left midline shift Basal cisterns are patent. There is a chronic epidural collection along the right frontoparietal convexity measuring 2.1 cm in thickness. Small amount of acute blood  products are seen in the inferior aspect of the collection. Minimal subacute subdural hemorrhage anterior to the right epidural collection, along the right frontal convexity. Calvaria are intact. Atherosclerosis. Visualized orbits are unremarkable. Visualized mastoid air cells are clear. No significant sinus disease in the visualized paranasal sinuses.     1. Small amount of acute hemorrhage in a chronic 2.1 cm right epidural collection, along the right frontoparietal convexity. Associated 2 mm right to left midline shift. 2. Minimal subacute subdural hemorrhage anterior to the right epidural collection. 3. Hyperdensity in the temporal horn of the right lateral ventricle may represent small amount of intraventricular hemorrhage. 4. No CT evidence of acute infarct.    Dx-chest-portable (1 View)    Result Date: 6/26/2020 6/26/2020 1:03 PM HISTORY/REASON FOR EXAM:  Shortness of Breath. TECHNIQUE/EXAM DESCRIPTION AND NUMBER OF VIEWS: Single portable view of the chest. COMPARISON: None. FINDINGS: LUNGS: Clear. No effusions. PNEUMOTHORAX: None. LINES AND TUBES: Right IJ central catheter is in the distal SVC. MEDIASTINUM: No cardiomegaly. Atherosclerosis. MUSCULOSKELETAL STRUCTURES: No acute displaced fracture.     No acute cardiopulmonary abnormality.    Mr-brain-w/o    Result Date: 6/27/2020 6/27/2020 5:18 PM HISTORY/REASON FOR EXAM:  Syncope, recurrent. Dizziness, left arm numbness and slurred speech. Left-sided facial droop. History of prior stroke. Recurrent syncope. End-stage renal disease. Hypertension. TECHNIQUE/EXAM DESCRIPTION: MRI of the brain without contrast. T1 sagittal, T2 fast spin-echo axial, T1 coronal, FLAIR coronal, Diffusion weighted and Apparent Diffusion Coefficient (ADC map) axial images were obtained of the whole brain. Additional FLAIR axial images were obtained. The study was performed on a [email or web address] Signa 1.5 Starla MRI scanner. COMPARISON:  Head CT 6/26/2020 FINDINGS: The calvaria are unremarkable.  There is a moderate-large T2 hyperintense extra-axial fluid collection over the right posterior frontal-parietal convexity. There is intermediate-slightly hyperintense FLAIR signal and T1 signal is slightly greater than CSF. The collection is somewhat lentiform. This could represent a late subacute or chronic epidural hematoma or loculated subdural hematoma. There is gradient echo hypointensity hemosiderin staining along the deep aspect of the collection. Corresponding to the bandlike hyperdense linear focus toward the anterior aspect of the collection seen on CT, there is corresponding gradient echo hypointensity and this may be a more recent bandlike focus of hemorrhage. There is local mass effect with effacement of the right trigone and anterior displacement of the posterior body right lateral ventricle. There is minimal right to left shift of midline structures of about 1.6 mm. The underlying brain parenchyma in the right parietal lobe shows hazy and punctate areas of restricted diffusion which may represent associated arterial infarction or venous infarction due to compression of cortical veins. There is underlying mild cerebral atrophy with prominence of sulcal markings and mild ventriculomegaly in the left hemisphere. Age-appropriate. There is an old lacunar infarct in the thalamus Elsewhere in the cerebral hemispheres, there is a pattern of minimal supratentorial white matter disease with a few rare foci of T2 and FLAIR hyperintensity in the subcortical and/or deep white matter consistent with small vessel ischemic change versus demyelination or gliosis. The brainstem and cerebellum are unremarkable. The major vessels including the dural venous sinuses appear intact. Paranasal sinuses show mild mucosal thickening in the sphenoid sinus and minimal mucosal thickening scattered among the ethmoid air cells. The mastoids are clear. There has been cataract surgery. The gaze is divergent.     1.  Mild cerebral  atrophy. Age-appropriate. 2.  Moderate-large lentiform extra-axial fluid collection over the right posterior frontal-parietal convexity consistent with a chronic or late subacute epidural hematoma or loculated subdural hematoma. Possible minimal component of more recent hemorrhage where there is linear bandlike hyperdensity on CT. 3.  Patchy and punctate areas of bright diffusion signal in the underlying right parietal lobe which may represent acute or recent subacute arterial or venous infarction. No acute parenchymal hemorrhage. 4.  Minimal supratentorial white matter disease most consistent with microvascular ischemic change. 5.  Old lacunar infarct left lateral thalamus.    Mr-mra Head-w/o    Result Date: 6/27/2020 6/27/2020 5:18 PM HISTORY/REASON FOR EXAM:  Dizziness, non-specific. Syncope, recurrent. Dizziness, left arm numbness and slurred speech. Left-sided facial droop. History of prior stroke. Recurrent syncope. End-stage renal disease. Hypertension. TECHNIQUE/EXAM DESCRIPTION: MRA of the Head ([place]) without contrast. The study was performed on a [employer] Signa 1.5 Starla MRI scanner. MRA of the [place] was performed with 3D time-of-flight technique with axial acquisition. Additional MRA of the internal carotid and vertebrobasilar arteries at the level of the skull base and craniocervical junction was also performed with 3D time-of-flight technique with axial acquisition. Images are reviewed at the PACS or [employer] workstations as axial source images as well as maximum intensity ray projection (MIP) multiplanar 3D reconstructions. COMPARISON:  MRI brain from today's date FINDINGS: The posterior circulation shows patent distal vertebral arteries. The left vertebral artery is minimally dominant. The vertebrobasilar confluence is intact. The basilar artery is widely patent with uniform caliber and robust flow signal. There is multifocal irregularity of the posterior cerebral arteries, right  greater than left with a gap-like defect in the right P2 segment consistent with moderate to high-grade stenosis. Similar lesser findings of the left posterior cerebral artery. These findings are consistent with intracranial atherosclerotic cerebrovascular disease. No evidence of aneurysm in the posterior circulation. The anterior circulation shows the carotid siphons to be patent. There is a normal variant dominant caliber right anterior cerebral artery A1 segment with a markedly hypoplastic left A1 segment. The proximal middle cerebral arteries are intact. The M2 and M3/sylvian branches beyond the genu show some attenuation of caliber and interruption of flow signal suggesting intracranial atherosclerotic stenotic disease. No evidence of aneurysm about the anterior circulation.     1.  Intracranial atherosclerotic cerebrovascular stenotic disease involving the posterior cerebral arteries and probably the middle cerebral arteries beyond the genu. No alexx occlusion. 2.  Normal variant dominant caliber right anterior cerebral artery A1 segment with a hypoplastic left A1 segment. 3.  No evidence of aneurysm about [place].    Us-carotid Doppler Bilat    Result Date: 2020   Carotid Duplex  Report  Vascular Laboratory  CONCLUSIONS  Moderate atherosclerosis. Velocities are consistent with 50-69% stenosis of  the internal carotid arteries bilaterally.  BECKIE BRITTON  Exam Date:     2020 09:01  Room #:     Inpatient  Priority:     Routine  Ht (in):             Wt (lb):  Ordering Physician:        LAWRENCE SHEFFIELD  Referring Physician:       [id number] Cobb  Sonographer:               MARGO Cason RDCS  Study Type:                Complete Bilateral  Technical Quality:         Adequate  Age:    87    Gender:     F  MRN:    [id number]  :    10/26/1932      BSA:  Indications:     Dizziness and giddiness  CPT Codes:       34569  ICD Codes:       R42  History:  Limitations:      Body habitus  Right Brachial BP:              /  Left Brachial BP:             /  RIGHT  Plaque          Plaque         Velocity (cm/s)  Characteristics Composition    Syst/Diast                                 128  / 32      Distal ICA                                 121  / 35      Mid ICA                                 78   / 28      Prox ICA                                 62   / 10      Distal CCA                                      /         Mid CCA                                 14   / 4       Prox CCA                                 381  /         ECA  Waveform:   Triphasic                         SCA  LEFT  Plaque         Plaque          Velocity (cm/s)  CharacteristicsComposition     Syst/Diast                                 155  / 30      Distal ICA                                 166  / 30      Mid ICA  Irregular      Heterogeneo     122  / 26      Prox ICA                 us  Irregular      Heterogeneo     71   / 16      Distal CCA                 us                                      /         Mid CCA                                 96   / 17      Prox CCA  Irregular      Heterogeneo     336  /         ECA                 us  Waveform:   Triphasic                         SCA          5.4          ICA/CCA       1.3        Antegrade      Vertebral   Antegrade         78   / 16     cm/s        63    / 12  FINDINGS  Right carotid.  Plaque of the bifurcation extending into the internal carotid. Velocities  are consistent with 50-69% stenosis of the internal carotid artery.    Left carotid.  Plaque of the bifurcation extending into the internal carotid. Velocities  are consistent with 50-69% stenosis of the internal carotid artery.  Plaque is irregular on the surface and heterogeneous with mixed acoustic  densities.  Bilateral subclavian and vertebral artery waveforms are antegrade and  waveforms are normal in character and velocity.  Liam Duong MD  (Electronically Signed)  Final Date:      27  2020                   10:28    Ec-echocardiogram Complete W/ Cont    Result Date: 2020  Transthoracic Echo Report Echocardiography Laboratory CONCLUSIONS No prior study is available for comparison. Normal left ventricular systolic function. Left ventricular ejection fraction is visually estimated to be 60%. The right ventricle was normal in size and function. Moderately dilated left atrium. Mild aortic stenosis. Mild tricuspid regurgitation. BECKIE BRITTON Exam Date:         2020                    15:19 Exam Location:     Inpatient Priority:          Routine Ordering Physician:        LAURI ROWLEY Referring Physician: Sonographer:               FERNANDO Frazier Age:    87     Gender:    F MRN:    [id number] :    10/26/1932 BSA:    1.73   Ht (in):    59     Wt (lb):    171 Exam Type:     Complete, Contrast Indications:     Transient cerebral ischemic attack, unspecified ICD Codes:       G45.9 CPT Codes:       16720,  BP:   114    /   56     HR:   85 Technical Quality:       Fair MEASUREMENTS  (Male / Female) Normal Values 2D ECHO LV Diastolic Diameter PLAX        4.9 cm                4.2 - 5.9 / 3.9 - 5.3 cm LV Systolic Diameter PLAX         2.5 cm                2.1 - 4.0 cm IVS Diastolic Thickness           0.92 cm               LVPW Diastolic Thickness          0.87 cm               LVOT Diameter                     2.1 cm                LV Ejection Fraction MOD BP       62.4 %                >= 55  % LV Ejection Fraction MOD 4C       61.3 %                LV Ejection Fraction MOD 2C       64.4 %                IVC Diameter                      1.6 cm                DOPPLER AV Peak Velocity                  2.1 m/s               AV Peak Gradient                  17.8 mmHg             AV Mean Gradient                  11.1 mmHg             LVOT Peak Velocity                0.96 m/s              AV Area Cont Eq vti                1.7 cm2               Mitral E Point Velocity           1.3 m/s               Mitral E to A Ratio               0.93                  Mitral A Duration                 108 ms                MV Pressure Half Time             55.6 ms               MV Area PHT                       4 cm2                 MV Deceleration Time              192 ms                TR Peak Velocity                  244 cm/s              PV Peak Velocity                  1 m/s                 PV Peak Gradient                  4 mmHg                RVOT Peak Velocity                0.75 m/s              * Indicates values subject to auto-interpretation LV EF:        % FINDINGS Left Ventricle Normal left ventricular chamber size. Normal left ventricular wall thickness. Normal left ventricular systolic function. Left ventricular ejection fraction is visually estimated to be 60%. Normal regional wall motion. Grade II diastolic dysfunction. Contrast was used to enhance visualization of the endocardial border. 1 mL of contrast was administered. Existing IV was used. Located at the left antecubital Right Ventricle The right ventricle was normal in size and function. Right Atrium The right atrium is normal in size.  Normal inferior vena cava size and inspiratory collapse. Left Atrium The left atrium is normal in size.  Moderately dilated left atrium. Left atrial volume index is 43 mL/sq m. Mitral Valve Structurally normal mitral valve without significant stenosis. Trace mitral regurgitation. Aortic Valve Tricuspid aortic valve. Calcified aortic valve leaflets. Mild aortic stenosis. Aortic valve area calculated from the continuity equation is 1.7 cm2. Vmax is 2.2 m/s. Transvalvular gradients are - Peak: 19 mmHg, Mean: 11 mmHg. Dimensionless index is 0.52. No aortic insufficiency. Tricuspid Valve Structurally normal tricuspid valve without significant stenosis. Mild tricuspid regurgitation. Estimated right ventricular systolic pressure  is 30  "mmHg. Right atrial pressure is estimated to be 3 mmHg. Pulmonic Valve The pulmonic valve is not well visualized. No pulmonic stenosis. Trace pulmonic insufficiency. Pericardium Normal pericardium without effusion. Aorta The aortic root is normal.  Ascending aorta diameter is 3.1 cm. Ricco Portillo MD (Electronically Signed) Final Date:     27 June 2020                 17:47      Micro:  Results     Procedure Component Value Units Date/Time    C Diff by PCR rflx Toxin [[id number]] Collected:  07/08/20 2300    Order Status:  Completed Specimen:  Stool Updated:  07/09/20 0912     C Diff by PCR Negative     Comment: C. difficile NOT detected by PCR.  Treatment not indicated per guidelines.  Repeat testing not indicated within 7 days.          027-NAP1-BI Presumptive Negative     Comment: Presumptive 027/NAP1/BI target DNA sequences are NOT DETECTED.       Narrative:       Special Contact [id number] GAEL GRIFFITH  Does this patient have risk factors for C-diff?->Yes  C-Diff Risk Factors->antibiotic exposure  Has patient taken stool softeners or laxatives in the last 5  days?->No  Indication for CDiff by PCR?->Greater than/equal to 3 stools  in 24 hr & \"takes shape of container\"    Blood Culture [[id number]] Collected:  07/02/20 1158    Order Status:  Completed Specimen:  Blood from Peripheral Updated:  07/07/20 1300     Significant Indicator NEG     Source BLD     Site PERIPHERAL     Culture Result No growth after 5 days of incubation.    Narrative:       Per Hospital Policy: Only change Specimen Src: to \"Line\" if  specified by physician order.  Right Wrist    Blood Culture [[id number]] Collected:  07/02/20 1158    Order Status:  Completed Specimen:  Blood from Peripheral Updated:  07/07/20 1300     Significant Indicator NEG     Source BLD     Site PERIPHERAL     Culture Result No growth after 5 days of incubation.    Narrative:       Per Hospital Policy: Only change Specimen Src: to \"Line\" if  specified by physician " order.  Right Hand    CATH TIP CULTURE [[id number]] Collected:  07/03/20 1230    Order Status:  Completed Specimen:  Cath Tip Updated:  07/05/20 1152     Significant Indicator NEG     Source CTIP     Site Central Line     Culture Result <15 CFU's Coagulase negative Staphylococcus.    CATH TIP CULTURE [[id number]]     Order Status:  No result Specimen:  Cath Tip from Central Line     Blood Culture [[id number]]  (Abnormal) Collected:  06/30/20 1340    Order Status:  Completed Specimen:  Blood Updated:  07/03/20 1202     Significant Indicator POS     Source BLD     Site HD Cath     Culture Result Growth detected by Bactec instrument. 07/01/2020  05:28      Enterococcus faecalis  Combination therapy with ampicillin, penicillin, or  vancomycin (for susceptible strains) plus an aminoglycoside  is usually indicated for serious enterococcal infections,  such as endocarditis unless high-level resistance to both  gentamicin and streptomycin is documented; such combinations  are predicted to result in synergistic killing of the  Enterococcus.  See previous culture for sensitivity report.      Narrative:       CALL  [place]  131 tel. [phone],  CALLED  131 tel. [phone] 07/01/2020, 05:30, RB PERF. RESULTS CALLED TO: RN  [id number]  HD Cath    Blood Culture [[id number]]  (Abnormal)  (Susceptibility) Collected:  06/30/20 1340    Order Status:  Completed Specimen:  Blood Updated:  07/03/20 1202     Significant Indicator POS     Source BLD     Site HD Cath     Culture Result Growth detected by Bactec instrument. 07/01/2020  05:31      Enterococcus faecalis  Combination therapy with ampicillin, penicillin, or  vancomycin (for susceptible strains) plus an aminoglycoside  is usually indicated for serious enterococcal infections,  such as endocarditis unless high-level resistance to both  gentamicin and streptomycin is documented; such combinations  are predicted to result in synergistic killing of the  Enterococcus.      Narrative:       HD  Cath    Susceptibility     Enterococcus faecalis (1)     Antibiotic Interpretation Microscan Method Status    Daptomycin Sensitive <=1 mcg/mL MELISSA Final    Ampicillin Sensitive <=2 mcg/mL MELISSA Final    Gent Synergy Sensitive <=500 mcg/mL MELISSA Final    Vancomycin Sensitive 2 mcg/mL MELISSA Final    Penicillin Sensitive 2 mcg/mL MELISSA Final                         Assessment:  Active Hospital Problems    Diagnosis   • *Acute GI bleeding [K92.2]   • Epidural hemorrhage ([facility]) [S06.4X9A]   • Acute blood loss anemia [D62]   • ESRD (end stage renal disease) ([facility]) [N18.6]   • Type 2 diabetes mellitus with kidney complication, without long-term current use of insulin ([facility]) [E11.29]   • Peripheral artery disease ([facility]) [I73.9]   • Leg wounds [S81.801A]   • Encephalopathy acute [G93.40]       Plan:  Enterococcal sepsis  GI source most likely  Afebrile  Leukocytosis resolved  AMS improved  BCxs + Efaecalis amp-S 6/30  Blood cxs 7/2 neg to date  Cath tip CNS  TTE with calcified valves;  ANABELL if persistently +blood cxs if feasible  Continue ampicillin -2 weeks if no new positive cxs  Stop date 7/16/2020    GI bleed  Transfusing as needed    S/p EGD and colonoscopy without source  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  Management is conservatively.    No new CVA seen by MRI     ESRD  Dose adjust abx    Chronic stasis ulceration  No evidence of active infection, so do not add additional antimicrobial therapy for this wound.    Wound swab showed colonization with pseudomonas and stenotrophomonas  Continue with wound care   Recommend vascular studies when feasible    Diarrhea  No fever or leukocytosis  Neg Cdiff     Diabetes  Keep BS under 150 to help control current infection

## 2020-07-09 NOTE — PROGRESS NOTES
Patient transferred to [place]. Report given to BAKARI Che who requested to leave IV in place for IV ABX infusion. Discharge instructions reviewed with patient. All questions answered. All belongings taken.

## 2020-07-09 NOTE — DISCHARGE INSTRUCTIONS
Discharge Instructions    Discharged to other by medical transportation with escort. Discharged via wheelchair, hospital escort: Yes.  Special equipment needed: Not Applicable    Be sure to schedule a follow-up appointment with your primary care doctor or any specialists as instructed.     Discharge Plan:   Diet Plan: Discussed  Activity Level: Discussed  Confirmed Follow up Appointment: Patient to Call and Schedule Appointment  Confirmed Symptoms Management: Discussed  Medication Reconciliation Updated: Yes    I understand that a diet low in cholesterol, fat, and sodium is recommended for good health. Unless I have been given specific instructions below for another diet, I accept this instruction as my diet prescription.   Other diet: Diabetic/Renal     Special Instructions: None    · Is patient discharged on Warfarin / Coumadin?   No     Depression / Suicide Risk    As you are discharged from this [facility] facility, it is important to learn how to keep safe from harming yourself.    Recognize the warning signs:  · Abrupt changes in personality, positive or negative- including increase in energy   · Giving away possessions  · Change in eating patterns- significant weight changes-  positive or negative  · Change in sleeping patterns- unable to sleep or sleeping all the time   · Unwillingness or inability to communicate  · Depression  · Unusual sadness, discouragement and loneliness  · Talk of wanting to die  · Neglect of personal appearance   · Rebelliousness- reckless behavior  · Withdrawal from people/activities they love  · Confusion- inability to concentrate     If you or a loved one observes any of these behaviors or has concerns about self-harm, here's what you can do:  · Talk about it- your feelings and reasons for harming yourself  · Remove any means that you might use to hurt yourself (examples: pills, rope, extension cords, firearm)  · Get professional help from the community (Mental Health, Substance  Abuse, psychological counseling)  · Do not be alone:Call your Safe Contact- someone whom you trust who will be there for you.  · Call your local CRISIS HOTLINE 284-6504 or 971-517-2459  · Call your local Children's Mobile Crisis Response Team [place] (070) 353-2946 or www.[employer]  · Call the toll free National Suicide Prevention Hotlines   · National Suicide Prevention Lifeline 357-125-YAJY (0534)  · National Hope Line Network 800-SUICIDE (269-5425)

## 2020-07-09 NOTE — DISCHARGE PLANNING
Anticipated Discharge Disposition: [place]    Action: Transfer packet complete, placed in chart, RN Eda notified.    Barriers to Discharge: none    Plan: Transfer to [place] at 1630

## 2020-07-09 NOTE — PROGRESS NOTES
[facility] Nephrology Daily Progress Note     Date of Service  7/9     Author: Nathan Navarro Jr., MD     Chief Complaint  88 yo F PMH ESRD MWF iHD, HTN, anemia of CKD, CKD-MBD, and HLD who presents to ED with CC as above.  She is visiting the [facility] from [place] and due to current pandemic has not been able to go back home and continued to stay in this area with family.  She has OP dialysis at [facility] and has been compliant.  She was doing well until about two weeks ago when she started to notice dark stools.  She was doing ok until about 3 days ago when she also noted orthostatic symptoms upon standing.  It resolved and then today it came back and she had some slurred speech and left arm numbness so she was brought in for evaluation.  She has had a prior stroke and the numbness and slurred speech have been present off and on as a result of that so initially she though nothing of it. Neurology was consulted and they did not feel that this was CVA related and more hemodynamic related.  Labs in ED showed Hgb 5.5 and serum labs showed  and K+ 5.3.  She was given pRBCs and admitted to ICU.  CT of head showed an acute on chronic epidural bleed with mild midline shift as well.  She was on plavix at home.  No recent F/C/N/V/CP/SOB.  No hematochezia, hematemesis.  No HA, visual changes, or abdominal pain     Daily nephrology summary  06/26 - consult done, HD done  06/27 - 2u prbc this AM, has been seen and evaluated by neurology and neurosurgery, further imaging planned for today  06/28 - not seen, patient in procedures  06/29 - seen on HD, tolerating with anxiolytic, somnolent and provides minimal responses to questions, 3Ca bath today, EGD and colonoscopy normal, capsule endoscopy  06/30 - transferred to floor, capsule endoscopy with fresh blood, push enteroscopy deferred, some concern for UTI but no urine cx and UA without bacteria, WBC increased, +fever, +AMS  07/01 - patient is agitated this AM,  "initially declined HD, +edema, +hbg decreased and receiving prbc  07/02 - patient sleepy this afternoon post procedure, erosive duodenitis and oozing AVMs in gastric cardia noted, tolerated HD with 4L UF yesterday, on lasix though UOP no longer being recorded  07/03 - HD this am, 3.5L UF. IR removed dialysis catheter. Pt \"feeling much better\".   07/04 - No new overnight renal events. S/p HD yesterday and tolerated well.   07/05 - Hgb 6.8. Feels ok. Getting one unit of PRBC.  07/06 - already ate this Am at 7. Asks me when she is getting her tunneled dialysis line.   7/07 - Tunneled dialysis line placed on hold yesterday due to inability to find suitable IR schedule. I called them again today - reports they will do it this AM.   7/08 - Had tunneled line placed yesterday. Appears that they were unable to access upper vasculature, so right femoral catheter placed. She had HD yesterday. Overnight she has diarrhea, so she is on C.Diff precautions. Patient still volume up, but patient refuses to have dialysis today.   7/09 - HD due today. C.Diff still pending, but patient denies further diarrhea. I d/w her about future access, patient states she refuses any access in arm, only wants to use catheter. Also says she plans to go back to [place] as soon as she can.      Review of Systems  GEN: no fevers/chills/weight loss  HEENT: No vision changes  RESP: No shortness of breath  CV: No edema, no chest pain  ABD: no N/V, no edema  EXT: no edema  Musculoskeletal: no joint pain  NEURO: no headaches, no weakness  PSYCH: no confusion, no depression      Physical Exam  Vitals:    07/09/20 0500   BP: (!) 119/30   Pulse: 80   Resp: 16   Temp: 37.6 °C (99.6 °F)   SpO2: 99%          Physical Exam  Constitutional:       Appearance: Normal appearance.   HENT:      Head: Normocephalic and atraumatic.      Nose: Nose normal.   Eyes:      Extraocular Movements: Extraocular movements intact.      Conjunctiva/sclera: Conjunctivae normal.      " Pupils: Pupils are equal, round, and reactive to light.   Neck:      Musculoskeletal: Normal range of motion and neck supple.   Pulmonary:      Effort: Pulmonary effort is normal.   Abdominal:      General: Abdomen is flat. Bowel sounds are normal.      Palpations: Abdomen is soft.   Musculoskeletal:         General: + edema  Skin:     General: Skin is warm and dry.   Neurological:      General: No focal deficit present.      Mental Status: He is alert and oriented to person, place, and time.         Fluids     No intake or output data in the 24 hours ending 07/09/20 0728        Labs    Lab Results   Component Value Date/Time    SODIUM 139 07/09/2020 05:28 AM    POTASSIUM 4.0 07/09/2020 05:28 AM    CHLORIDE 102 07/09/2020 05:28 AM    CO2 22 07/09/2020 05:28 AM    GLUCOSE 156 (H) 07/09/2020 05:28 AM    BUN 71 (HH) 07/09/2020 05:28 AM    CREATININE 6.89 (HH) 07/09/2020 05:28 AM       Recent Labs     07/07/20  0452 07/08/20  0508   WBC 11.3* 9.3   RBC 2.99* 2.70*   HEMOGLOBIN 8.5* 7.6*   HEMATOCRIT 27.1* 25.5*   MCV 90.6 94.4   MCH 28.4 28.1   RDW 51.6* 53.6*   PLATELETCT 278 226   MPV 9.4 9.6   NEUTSPOLYS 77.40* 76.70*   LYMPHOCYTES 14.30* 14.60*   MONOCYTES 5.40 6.40   EOSINOPHILS 1.20 0.80   BASOPHILS 0.30 0.30       Assessment/Plan  - ESRD: Etiology likely 2/2 HTN.  Visitor at [facility], lives in [place]  - Melena. EGD and colonoscopy normal, capsule endoscopy with fresh blood 6/30  - Acute epidural hematoma    * Superimposed on chronic epidural bleed, with a small midline shift    * Neuro following, has been evaluated by neurosurgery  - HTN    * Goal BP < 140/90    * Stable  - Anemia, multifactorial    * Goal Hgb 10-11 in CKD, ferritin significantly elevated    * transfuse hgb<7    * see melena above  - CKD-MBD    * Managed at HD unit, phos now above goal, Ca low, vitamin D low, PTH appropriate  - HLD  - CAD  - Leukocytosis with low grade fever  - Enterococcus Bacteremia - GI source versus line.   - Edema,  anasarca    * UF with HD as tolerated     * On diuretics  - Tunneled line - appears unable to get line in IJ, so currently has right femoral tunneled dialysis line     PLAN:  1. HD today  2. Switch back to PO Lasix  3. EPO 4K with dialysis  4. I was requested by outpatient nephrologist to assess for fistula access - patient refuses any access in arms    Nathan Navarro Jr, MD  [facility] Nephrology

## 2020-07-13 ENCOUNTER — HOSPITAL ENCOUNTER (INPATIENT)
Facility: MEDICAL CENTER | Age: 85
LOS: 2 days | DRG: 871 | End: 2020-07-15
Attending: EMERGENCY MEDICINE | Admitting: INTERNAL MEDICINE
Payer: MEDICARE

## 2020-07-13 ENCOUNTER — APPOINTMENT (OUTPATIENT)
Dept: RADIOLOGY | Facility: MEDICAL CENTER | Age: 85
DRG: 871 | End: 2020-07-13
Payer: MEDICARE

## 2020-07-13 ENCOUNTER — APPOINTMENT (OUTPATIENT)
Dept: RADIOLOGY | Facility: MEDICAL CENTER | Age: 85
DRG: 871 | End: 2020-07-13
Attending: EMERGENCY MEDICINE
Payer: MEDICARE

## 2020-07-13 DIAGNOSIS — R41.0 CONFUSION: ICD-10-CM

## 2020-07-13 DIAGNOSIS — D64.9 SYMPTOMATIC ANEMIA: ICD-10-CM

## 2020-07-13 DIAGNOSIS — Z99.2 CHRONIC KIDNEY DISEASE ON CHRONIC DIALYSIS (HCC): ICD-10-CM

## 2020-07-13 DIAGNOSIS — K92.2 ACUTE GI BLEEDING: ICD-10-CM

## 2020-07-13 DIAGNOSIS — N18.6 CHRONIC KIDNEY DISEASE ON CHRONIC DIALYSIS (HCC): ICD-10-CM

## 2020-07-13 LAB
ABO GROUP BLD: NORMAL
ALBUMIN SERPL BCP-MCNC: 3.1 G/DL (ref 3.2–4.9)
ALBUMIN/GLOB SERPL: 0.9 G/DL
ALP SERPL-CCNC: 46 U/L (ref 30–99)
ALT SERPL-CCNC: <5 U/L (ref 2–50)
ANION GAP SERPL CALC-SCNC: 22 MMOL/L (ref 7–16)
APTT PPP: 51.5 SEC (ref 24.7–36)
AST SERPL-CCNC: 13 U/L (ref 12–45)
BARCODED ABORH UBTYP: 600
BARCODED PRD CODE UBPRD: NORMAL
BARCODED UNIT NUM UBUNT: NORMAL
BASOPHILS # BLD AUTO: 0.3 % (ref 0–1.8)
BASOPHILS # BLD: 0.05 K/UL (ref 0–0.12)
BILIRUB SERPL-MCNC: 0.4 MG/DL (ref 0.1–1.5)
BLD GP AB SCN SERPL QL: NORMAL
BUN SERPL-MCNC: 55 MG/DL (ref 8–22)
CALCIUM SERPL-MCNC: 7.9 MG/DL (ref 8.5–10.5)
CHLORIDE SERPL-SCNC: 95 MMOL/L (ref 96–112)
CO2 SERPL-SCNC: 21 MMOL/L (ref 20–33)
COMPONENT R 8504R: NORMAL
COVID ORDER STATUS COVID19: NORMAL
CREAT SERPL-MCNC: 4.2 MG/DL (ref 0.5–1.4)
EOSINOPHIL # BLD AUTO: 0.03 K/UL (ref 0–0.51)
EOSINOPHIL NFR BLD: 0.2 % (ref 0–6.9)
ERYTHROCYTE [DISTWIDTH] IN BLOOD BY AUTOMATED COUNT: 54.4 FL (ref 35.9–50)
GLOBULIN SER CALC-MCNC: 3.4 G/DL (ref 1.9–3.5)
GLUCOSE SERPL-MCNC: 169 MG/DL (ref 65–99)
HCT VFR BLD AUTO: 16.6 % (ref 37–47)
HGB BLD-MCNC: 5.3 G/DL (ref 12–16)
IMM GRANULOCYTES # BLD AUTO: 0.3 K/UL (ref 0–0.11)
IMM GRANULOCYTES NFR BLD AUTO: 1.9 % (ref 0–0.9)
INR PPP: 1.05 (ref 0.87–1.13)
LACTATE BLD-SCNC: 2.9 MMOL/L (ref 0.5–2)
LACTATE BLD-SCNC: 3.7 MMOL/L (ref 0.5–2)
LYMPHOCYTES # BLD AUTO: 0.99 K/UL (ref 1–4.8)
LYMPHOCYTES NFR BLD: 6.1 % (ref 22–41)
MCH RBC QN AUTO: 29.4 PG (ref 27–33)
MCHC RBC AUTO-ENTMCNC: 31.9 G/DL (ref 33.6–35)
MCV RBC AUTO: 92.1 FL (ref 81.4–97.8)
MONOCYTES # BLD AUTO: 0.73 K/UL (ref 0–0.85)
MONOCYTES NFR BLD AUTO: 4.5 % (ref 0–13.4)
NEUTROPHILS # BLD AUTO: 14.03 K/UL (ref 2–7.15)
NEUTROPHILS NFR BLD: 87 % (ref 44–72)
NRBC # BLD AUTO: 0 K/UL
NRBC BLD-RTO: 0 /100 WBC
PLATELET # BLD AUTO: 224 K/UL (ref 164–446)
PMV BLD AUTO: 9.9 FL (ref 9–12.9)
POTASSIUM SERPL-SCNC: 3.5 MMOL/L (ref 3.6–5.5)
PRODUCT TYPE UPROD: NORMAL
PROT SERPL-MCNC: 6.5 G/DL (ref 6–8.2)
PROTHROMBIN TIME: 14 SEC (ref 12–14.6)
RBC # BLD AUTO: 1.77 M/UL (ref 4.2–5.4)
RH BLD: NORMAL
SODIUM SERPL-SCNC: 138 MMOL/L (ref 135–145)
UNIT STATUS USTAT: NORMAL
WBC # BLD AUTO: 16.1 K/UL (ref 4.8–10.8)

## 2020-07-13 PROCEDURE — 96376 TX/PRO/DX INJ SAME DRUG ADON: CPT

## 2020-07-13 PROCEDURE — 87150 DNA/RNA AMPLIFIED PROBE: CPT

## 2020-07-13 PROCEDURE — U0003 INFECTIOUS AGENT DETECTION BY NUCLEIC ACID (DNA OR RNA); SEVERE ACUTE RESPIRATORY SYNDROME CORONAVIRUS 2 (SARS-COV-2) (CORONAVIRUS DISEASE [COVID-19]), AMPLIFIED PROBE TECHNIQUE, MAKING USE OF HIGH THROUGHPUT TECHNOLOGIES AS DESCRIBED BY CMS-2020-01-R: HCPCS

## 2020-07-13 PROCEDURE — 770001 HCHG ROOM/CARE - MED/SURG/GYN PRIV*

## 2020-07-13 PROCEDURE — 86850 RBC ANTIBODY SCREEN: CPT

## 2020-07-13 PROCEDURE — 83605 ASSAY OF LACTIC ACID: CPT | Mod: 91

## 2020-07-13 PROCEDURE — 80053 COMPREHEN METABOLIC PANEL: CPT

## 2020-07-13 PROCEDURE — 36430 TRANSFUSION BLD/BLD COMPNT: CPT

## 2020-07-13 PROCEDURE — 99223 1ST HOSP IP/OBS HIGH 75: CPT | Mod: AI | Performed by: INTERNAL MEDICINE

## 2020-07-13 PROCEDURE — 85730 THROMBOPLASTIN TIME PARTIAL: CPT

## 2020-07-13 PROCEDURE — 700111 HCHG RX REV CODE 636 W/ 250 OVERRIDE (IP)

## 2020-07-13 PROCEDURE — 36415 COLL VENOUS BLD VENIPUNCTURE: CPT

## 2020-07-13 PROCEDURE — C9803 HOPD COVID-19 SPEC COLLECT: HCPCS | Performed by: EMERGENCY MEDICINE

## 2020-07-13 PROCEDURE — 71045 X-RAY EXAM CHEST 1 VIEW: CPT

## 2020-07-13 PROCEDURE — 96375 TX/PRO/DX INJ NEW DRUG ADDON: CPT

## 2020-07-13 PROCEDURE — 87077 CULTURE AEROBIC IDENTIFY: CPT

## 2020-07-13 PROCEDURE — 700111 HCHG RX REV CODE 636 W/ 250 OVERRIDE (IP): Performed by: EMERGENCY MEDICINE

## 2020-07-13 PROCEDURE — 85025 COMPLETE CBC W/AUTO DIFF WBC: CPT

## 2020-07-13 PROCEDURE — 96374 THER/PROPH/DIAG INJ IV PUSH: CPT

## 2020-07-13 PROCEDURE — 86901 BLOOD TYPING SEROLOGIC RH(D): CPT

## 2020-07-13 PROCEDURE — 30243N1 TRANSFUSION OF NONAUTOLOGOUS RED BLOOD CELLS INTO CENTRAL VEIN, PERCUTANEOUS APPROACH: ICD-10-PCS | Performed by: EMERGENCY MEDICINE

## 2020-07-13 PROCEDURE — 87040 BLOOD CULTURE FOR BACTERIA: CPT

## 2020-07-13 PROCEDURE — 700111 HCHG RX REV CODE 636 W/ 250 OVERRIDE (IP): Performed by: INTERNAL MEDICINE

## 2020-07-13 PROCEDURE — 86900 BLOOD TYPING SEROLOGIC ABO: CPT

## 2020-07-13 PROCEDURE — 85610 PROTHROMBIN TIME: CPT

## 2020-07-13 PROCEDURE — P9016 RBC LEUKOCYTES REDUCED: HCPCS

## 2020-07-13 PROCEDURE — 86923 COMPATIBILITY TEST ELECTRIC: CPT

## 2020-07-13 PROCEDURE — 99285 EMERGENCY DEPT VISIT HI MDM: CPT

## 2020-07-13 RX ORDER — ATROPINE SULFATE 10 MG/ML
2 SOLUTION/ DROPS OPHTHALMIC EVERY 4 HOURS PRN
Status: DISCONTINUED | OUTPATIENT
Start: 2020-07-13 | End: 2020-07-16 | Stop reason: HOSPADM

## 2020-07-13 RX ORDER — MORPHINE SULFATE 10 MG/ML
10 INJECTION, SOLUTION INTRAMUSCULAR; INTRAVENOUS
Status: DISCONTINUED | OUTPATIENT
Start: 2020-07-13 | End: 2020-07-16 | Stop reason: HOSPADM

## 2020-07-13 RX ORDER — LORAZEPAM 2 MG/ML
1 INJECTION INTRAMUSCULAR ONCE
Status: COMPLETED | OUTPATIENT
Start: 2020-07-13 | End: 2020-07-13

## 2020-07-13 RX ORDER — LORAZEPAM 2 MG/ML
1-5 INJECTION INTRAMUSCULAR
Status: DISCONTINUED | OUTPATIENT
Start: 2020-07-13 | End: 2020-07-16 | Stop reason: HOSPADM

## 2020-07-13 RX ORDER — MORPHINE SULFATE 10 MG/ML
5 INJECTION, SOLUTION INTRAMUSCULAR; INTRAVENOUS
Status: DISCONTINUED | OUTPATIENT
Start: 2020-07-13 | End: 2020-07-16 | Stop reason: HOSPADM

## 2020-07-13 RX ORDER — LORAZEPAM 2 MG/ML
1 CONCENTRATE ORAL
Status: DISCONTINUED | OUTPATIENT
Start: 2020-07-13 | End: 2020-07-16 | Stop reason: HOSPADM

## 2020-07-13 RX ADMIN — MORPHINE SULFATE 10 MG: 10 INJECTION INTRAVENOUS at 23:26

## 2020-07-13 RX ADMIN — LORAZEPAM 1 MG: 2 INJECTION INTRAMUSCULAR; INTRAVENOUS at 21:38

## 2020-07-13 RX ADMIN — LORAZEPAM 1 MG: 2 INJECTION INTRAMUSCULAR; INTRAVENOUS at 22:30

## 2020-07-13 ASSESSMENT — FIBROSIS 4 INDEX: FIB4 SCORE: 1.75

## 2020-07-14 LAB
SARS-COV-2 RNA RESP QL NAA+PROBE: NOTDETECTED
SPECIMEN SOURCE: NORMAL

## 2020-07-14 PROCEDURE — 99233 SBSQ HOSP IP/OBS HIGH 50: CPT | Performed by: HOSPITALIST

## 2020-07-14 PROCEDURE — 700111 HCHG RX REV CODE 636 W/ 250 OVERRIDE (IP): Performed by: INTERNAL MEDICINE

## 2020-07-14 PROCEDURE — 770001 HCHG ROOM/CARE - MED/SURG/GYN PRIV*

## 2020-07-14 RX ADMIN — MORPHINE SULFATE 10 MG: 10 INJECTION INTRAVENOUS at 23:06

## 2020-07-14 SDOH — ECONOMIC STABILITY: TRANSPORTATION INSECURITY
IN THE PAST 12 MONTHS, HAS LACK OF TRANSPORTATION KEPT YOU FROM MEETINGS, WORK, OR FROM GETTING THINGS NEEDED FOR DAILY LIVING?: NO

## 2020-07-14 SDOH — HEALTH STABILITY: MENTAL HEALTH: HOW MANY STANDARD DRINKS CONTAINING ALCOHOL DO YOU HAVE ON A TYPICAL DAY?: 1 OR 2

## 2020-07-14 SDOH — HEALTH STABILITY: MENTAL HEALTH: HOW OFTEN DO YOU HAVE A DRINK CONTAINING ALCOHOL?: MONTHLY OR LESS

## 2020-07-14 SDOH — HEALTH STABILITY: MENTAL HEALTH: HOW OFTEN DO YOU HAVE 6 OR MORE DRINKS ON ONE OCCASION?: LESS THAN MONTHLY

## 2020-07-14 SDOH — ECONOMIC STABILITY: FOOD INSECURITY: WITHIN THE PAST 12 MONTHS, YOU WORRIED THAT YOUR FOOD WOULD RUN OUT BEFORE YOU GOT MONEY TO BUY MORE.: NEVER TRUE

## 2020-07-14 SDOH — ECONOMIC STABILITY: FOOD INSECURITY: WITHIN THE PAST 12 MONTHS, THE FOOD YOU BOUGHT JUST DIDN'T LAST AND YOU DIDN'T HAVE MONEY TO GET MORE.: NEVER TRUE

## 2020-07-14 SDOH — ECONOMIC STABILITY: TRANSPORTATION INSECURITY
IN THE PAST 12 MONTHS, HAS THE LACK OF TRANSPORTATION KEPT YOU FROM MEDICAL APPOINTMENTS OR FROM GETTING MEDICATIONS?: NO

## 2020-07-14 ASSESSMENT — LIFESTYLE VARIABLES
AVERAGE NUMBER OF DAYS PER WEEK YOU HAVE A DRINK CONTAINING ALCOHOL: 0
DOES PATIENT WANT TO STOP DRINKING: NO
ON A TYPICAL DAY WHEN YOU DRINK ALCOHOL HOW MANY DRINKS DO YOU HAVE: 0
CONSUMPTION TOTAL: NEGATIVE
EVER FELT BAD OR GUILTY ABOUT YOUR DRINKING: NO
TOTAL SCORE: 0
HAVE YOU EVER FELT YOU SHOULD CUT DOWN ON YOUR DRINKING: NO
TOTAL SCORE: 0
HAVE PEOPLE ANNOYED YOU BY CRITICIZING YOUR DRINKING: NO
TOTAL SCORE: 0
EVER HAD A DRINK FIRST THING IN THE MORNING TO STEADY YOUR NERVES TO GET RID OF A HANGOVER: NO
HOW MANY TIMES IN THE PAST YEAR HAVE YOU HAD 5 OR MORE DRINKS IN A DAY: 0
EVER_SMOKED: NEVER
ALCOHOL_USE: NO

## 2020-07-14 ASSESSMENT — PAIN SCALES - PAIN ASSESSMENT IN ADVANCED DEMENTIA (PAINAD)
BODYLANGUAGE: RELAXED
BODYLANGUAGE: TENSE, DISTRESSED PACING, FIDGETING
FACIALEXPRESSION: FACIAL GRIMACING
CONSOLABILITY: NO NEED TO CONSOLE
CONSOLABILITY: NO NEED TO CONSOLE
CONSOLABILITY: DISTRACTED OR REASSURED BY VOICE/TOUCH
BODYLANGUAGE: TENSE, DISTRESSED PACING, FIDGETING
FACIALEXPRESSION: SMILING OR INEXPRESSIVE
NEGVOCALIZATION: REPEATED TROUBLED CALLING OUT, LOUD MOANING/GROANING, CRYING
BREATHING: NORMAL
FACIALEXPRESSION: SMILING OR INEXPRESSIVE
TOTALSCORE: 0
TOTALSCORE: 6
BREATHING: NORMAL
TOTALSCORE: 1
BREATHING: NORMAL

## 2020-07-14 ASSESSMENT — COGNITIVE AND FUNCTIONAL STATUS - GENERAL
WALKING IN HOSPITAL ROOM: A LOT
EATING MEALS: A LOT
MOVING FROM LYING ON BACK TO SITTING ON SIDE OF FLAT BED: A LOT
SUGGESTED CMS G CODE MODIFIER DAILY ACTIVITY: CL
HELP NEEDED FOR BATHING: A LOT
MOVING TO AND FROM BED TO CHAIR: A LOT
TOILETING: A LOT
MOBILITY SCORE: 11
DRESSING REGULAR LOWER BODY CLOTHING: A LOT
CLIMB 3 TO 5 STEPS WITH RAILING: TOTAL
DRESSING REGULAR UPPER BODY CLOTHING: A LOT
SUGGESTED CMS G CODE MODIFIER MOBILITY: CL
TURNING FROM BACK TO SIDE WHILE IN FLAT BAD: A LITTLE
PERSONAL GROOMING: A LOT
STANDING UP FROM CHAIR USING ARMS: TOTAL
DAILY ACTIVITIY SCORE: 12

## 2020-07-14 ASSESSMENT — PATIENT HEALTH QUESTIONNAIRE - PHQ9
1. LITTLE INTEREST OR PLEASURE IN DOING THINGS: NOT AT ALL
SUM OF ALL RESPONSES TO PHQ9 QUESTIONS 1 AND 2: 0
2. FEELING DOWN, DEPRESSED, IRRITABLE, OR HOPELESS: NOT AT ALL

## 2020-07-14 NOTE — DISCHARGE PLANNING
Outpatient Dialysis Note    Confirmed patient is active at:    [facility]  [address] [place] [address]    Schedule: Monday, Wednesday, Friday  Time: 3:45pm    Spoke with Jory at facility who confirmed.    Forwarded records for review.    Betsy Keith- Dialysis Coordinator  [facility] # [phone]

## 2020-07-14 NOTE — DIETARY
Nutrition Services: wound on nutrition admit screen  Pt transitioned to comfort care last night. No nutrition assessment indicated.     RD available prn - and will monitor per dept policy

## 2020-07-14 NOTE — ASSESSMENT & PLAN NOTE
Patient received 1 unit PRBCs in ED  2 weeks ago had extensive work-up with endoscopy, colonoscopy, capsule endoscopy, enteroscopy  Given the numerous conditions patient currently with and repeated complications with spiral downward family have decided to move towards comfort approach

## 2020-07-14 NOTE — ED NOTES
ADDITIONAL LABS SENT TO LAB. WOUND CARE COMPLETED, ADAPTIC APPLIED AND JOCELYN TO COVER. PT MEDICATED PER MAR. CALL LIGHT WITHIN REACH. WILL CONTINUE TO MONITOR

## 2020-07-14 NOTE — PROGRESS NOTES
2 RN skin check complete with BAKARI Craven.  Devices in place PIV, HD cath  Skin assessed under devices yes.   Confirmed pressure ulcers found on: bilat heels. Wound consult put in by last shift.   New potential pressure ulcers noted on NA.    Interventions in place q2T, 2RN skin check, incont care, waffle in place, mepilex, float heel boots, barrier paste.    Ears pink blanching, protective ear pads in place  Sternum skin tear, bleeding, purple, red, dressing is C/D/I.  LUE purple, red, heeling skin tear, blood blisters scattered  RUE purple, red, heeling skin tear, blood blisters scattered  Pannus red blanching, powered applied  Healing skin tear on ABD.   Rt groin HD cath, dressing C/D/I  Sacrum red blanching, barrier cream applied  BLE open wound, swelling, flaky, fragile, dressing is C/D/I.   Rt foot skin tear, bleeding, purple, red, dressing is C/D/I  Bilat heel DTI, dressing are C/D/I.   Wound noted on right lateral little toe. Wound is closed. Open to air.   All pictures have been taken by last shift RN per report and wound consult is in place.

## 2020-07-14 NOTE — CONSULTS
Critical Care Consultation    Date of consult: 7/13/2020    Referring Physician  Ilana Chowdary D.O.    Reason for Consultation  GI bleed    History of Presenting Illness  87 y.o. female with past medical history of ESRD, HTN, DM, PAD, and recent admission 6/26-7/9 with GI bleed who presented 7/13/2020 again with significant melena and Hb of 5.  On last admission patient had EGD, colonoscopy followed by capsule endoscopy which found possible source and small intestine.  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  Course was further complicated by enterococcus bacteremia secondary to infected dialysis catheter and eventually discharged to skilled facility on 7/9.  Today once again developed significant melena altered mentation and brought back into ED.     Code Status  Prior    Review of Systems  Review of Systems   Unable to perform ROS: Acuity of condition       Past Medical History   has no past medical history on file.    Surgical History   has a past surgical history that includes gastroscopy (N/A, 6/27/2020); pr colonoscopy,diagnostic (N/A, 6/28/2020); capsule endoscopy administration (N/A, 6/28/2020); capsule endoscopy retrieval (6/29/2020); and gastroscopy w/push enterscopy (N/A, 7/2/2020).    Family History  family history is not on file.    Social History   reports that she has never smoked. She has never used smokeless tobacco.    Medications  Home Medications     Reviewed by Anthony Vinson (Pharmacy Tech) on 07/13/20 at 8086  Med List Status: Unable to Obtain   Medication Last Dose Status   acetaminophen (TYLENOL) 500 MG Tab  Active   ALPRAZolam (XANAX) 0.5 MG Tab  Active   amLODIPine (NORVASC) 5 MG Tab  Active   aspirin EC (ECOTRIN) 81 MG Tablet Delayed Response  Active   atorvastatin (LIPITOR) 40 MG Tab  Active   calcium acetate (PHOS-LO) 667 MG Tab tablet  Active   clopidogrel (PLAVIX) 75 MG Tab  Active   Ferric Citrate (AURYXIA) 1  MG(Fe) Tab  Active   furosemide  (LASIX) 80 MG Tab  Active   NS SOLN 100 mL with ampicillin 2 GM SOLR 2,000 mg  Active   omeprazole (PRILOSEC) 20 MG delayed-release capsule  Active   vitamin D, Ergocalciferol, (DRISDOL) 1.25 MG (23429 UT) Cap capsule  Active              No current facility-administered medications for this encounter.      Current Outpatient Medications   Medication Sig Dispense Refill   • NS SOLN 100 mL with ampicillin 2 GM SOLR 2,000 mg 2,000 mg by Intravenous route every 12 hours for 7 days.     • calcium acetate (PHOS-LO) 667 MG Tab tablet Take 1 Tab by mouth 3 times a day, with meals. 180 Tab    • furosemide (LASIX) 80 MG Tab Take 1 Tab by mouth every day. 30 Tab    • omeprazole (PRILOSEC) 20 MG delayed-release capsule Take 1 Cap by mouth every day. 30 Cap    • vitamin D, Ergocalciferol, (DRISDOL) 1.25 MG (13040 UT) Cap capsule Take 1 Cap by mouth every 7 days. 5 Cap    • clopidogrel (PLAVIX) 75 MG Tab Take 75 mg by mouth every day.     • aspirin EC (ECOTRIN) 81 MG Tablet Delayed Response Take 81 mg by mouth every day.     • ALPRAZolam (XANAX) 0.5 MG Tab Take 0.5 mg by mouth 3 times a day as needed for Sleep.     • acetaminophen (TYLENOL) 500 MG Tab Take 500-1,000 mg by mouth every 6 hours as needed.     • amLODIPine (NORVASC) 5 MG Tab Take 5 mg by mouth 2 Times a Day.     • atorvastatin (LIPITOR) 40 MG Tab Take 40 mg by mouth every day.     • Ferric Citrate (AURYXIA) 1  MG(Fe) Tab Take 210 mg by mouth 3 times a day, with meals.         Allergies  Allergies   Allergen Reactions   • Lexapro      Pt states she doesn't know what happens when she takes lexapro         Vital Signs last 24 hours  Temp:  [37.1 °C (98.7 °F)] 37.1 °C (98.7 °F)  Pulse:  [] 98  Resp:  [16-28] 20  BP: ()/(45-57) 130/56  SpO2:  [91 %-100 %] 95 %    Physical Exam  Physical Exam  Vitals signs and nursing note reviewed.   Constitutional:       Appearance: She is obese. She is ill-appearing. She is not diaphoretic.      Comments: Acute on  chronically ill-appearing   HENT:      Head: Normocephalic and atraumatic.   Eyes:      Pupils: Pupils are equal, round, and reactive to light.   Cardiovascular:      Rate and Rhythm: Normal rate.      Pulses: Normal pulses.   Pulmonary:      Breath sounds: No wheezing or rales.   Abdominal:      General: There is no distension.      Palpations: Abdomen is soft.      Tenderness: There is no abdominal tenderness.   Musculoskeletal:         General: Swelling present.   Skin:     Comments: Numerous skin wounds, central chest with bandage, lower extremities, erythema around groin, has dialysis catheter at right groin   Neurological:      Comments: Moving all fours   Psychiatric:      Comments: Unable to fully assess         Fluids  No intake or output data in the 24 hours ending 07/13/20 2214    Laboratory  Recent Results (from the past 48 hour(s))   Lactic acid (lactate)    Collection Time: 07/13/20  7:23 PM   Result Value Ref Range    Lactic Acid 3.7 (H) 0.5 - 2.0 mmol/L   CBC WITH DIFFERENTIAL    Collection Time: 07/13/20  7:23 PM   Result Value Ref Range    WBC 16.1 (H) 4.8 - 10.8 K/uL    RBC 1.77 (L) 4.20 - 5.40 M/uL    Hemoglobin 5.3 (LL) 12.0 - 16.0 g/dL    Hematocrit 16.6 (LL) 37.0 - 47.0 %    MCV 92.1 81.4 - 97.8 fL    MCH 29.4 27.0 - 33.0 pg    MCHC 31.9 (L) 33.6 - 35.0 g/dL    RDW 54.4 (H) 35.9 - 50.0 fL    Platelet Count 224 164 - 446 K/uL    MPV 9.9 9.0 - 12.9 fL    Neutrophils-Polys 87.00 (H) 44.00 - 72.00 %    Lymphocytes 6.10 (L) 22.00 - 41.00 %    Monocytes 4.50 0.00 - 13.40 %    Eosinophils 0.20 0.00 - 6.90 %    Basophils 0.30 0.00 - 1.80 %    Immature Granulocytes 1.90 (H) 0.00 - 0.90 %    Nucleated RBC 0.00 /100 WBC    Neutrophils (Absolute) 14.03 (H) 2.00 - 7.15 K/uL    Lymphs (Absolute) 0.99 (L) 1.00 - 4.80 K/uL    Monos (Absolute) 0.73 0.00 - 0.85 K/uL    Eos (Absolute) 0.03 0.00 - 0.51 K/uL    Baso (Absolute) 0.05 0.00 - 0.12 K/uL    Immature Granulocytes (abs) 0.30 (H) 0.00 - 0.11 K/uL    NRBC  (Absolute) 0.00 K/uL   COMP METABOLIC PANEL    Collection Time: 07/13/20  7:23 PM   Result Value Ref Range    Sodium 138 135 - 145 mmol/L    Potassium 3.5 (L) 3.6 - 5.5 mmol/L    Chloride 95 (L) 96 - 112 mmol/L    Co2 21 20 - 33 mmol/L    Anion Gap 22.0 (H) 7.0 - 16.0    Glucose 169 (H) 65 - 99 mg/dL    Bun 55 (H) 8 - 22 mg/dL    Creatinine 4.20 (H) 0.50 - 1.40 mg/dL    Calcium 7.9 (L) 8.5 - 10.5 mg/dL    AST(SGOT) 13 12 - 45 U/L    ALT(SGPT) <5 2 - 50 U/L    Alkaline Phosphatase 46 30 - 99 U/L    Total Bilirubin 0.4 0.1 - 1.5 mg/dL    Albumin 3.1 (L) 3.2 - 4.9 g/dL    Total Protein 6.5 6.0 - 8.2 g/dL    Globulin 3.4 1.9 - 3.5 g/dL    A-G Ratio 0.9 g/dL   APTT    Collection Time: 07/13/20  7:23 PM   Result Value Ref Range    APTT 51.5 (H) 24.7 - 36.0 sec   PROTHROMBIN TIME (INR)    Collection Time: 07/13/20  7:23 PM   Result Value Ref Range    PT 14.0 12.0 - 14.6 sec    INR 1.05 0.87 - 1.13   COD (ADULT)    Collection Time: 07/13/20  7:23 PM   Result Value Ref Range    ABO Grouping Only A     Rh Grouping Only NEG     Antibody Screen-Cod NEG    ESTIMATED GFR    Collection Time: 07/13/20  7:23 PM   Result Value Ref Range    GFR If  12 (A) >60 mL/min/1.73 m 2    GFR If Non African American 10 (A) >60 mL/min/1.73 m 2   Lactic acid (lactate): Repeat if initial lactic acid result is greater than 2    Collection Time: 07/13/20  9:38 PM   Result Value Ref Range    Lactic Acid 2.9 (H) 0.5 - 2.0 mmol/L       Imaging  DX-CHEST-PORTABLE (1 VIEW)   Final Result      No acute cardiopulmonary abnormality.          Assessment/Plan  Acute GI bleeding- (present on admission)  Assessment & Plan  Patient received 1 unit PRBCs in ED  2 weeks ago had extensive work-up with endoscopy, colonoscopy, capsule endoscopy, enteroscopy  Given the numerous conditions patient currently with and repeated complications with spiral downward family have decided to move towards comfort approach    ESRD (end stage renal disease) (HCC)-  (present on admission)  Assessment & Plan  On dialysis 3 days a week  Moving to comfort care    Bacteremia- (present on admission)  Assessment & Plan  Enterococcus had been on ampicillin  Moving towards comfort care    Encephalopathy acute- (present on admission)  Assessment & Plan  Multifactorial  Moving to comfort care      Discussed patient condition and risk of morbidity and/or mortality with Hospitalist, RN and ERP

## 2020-07-14 NOTE — PROGRESS NOTES
Triage officer note    D/W Dr. Barfield, transferring to medical floor for comfort care.    Symptomatic anemia, hypotension, sepsis.  Hospitalist to  patient in am.

## 2020-07-14 NOTE — ED NOTES
covid swab collected and sent to lab. 1st unit of blood started. Pt medicated per mar. Awaiting waffle mattress to move pt over

## 2020-07-14 NOTE — CARE PLAN
Problem: Safety  Goal: Will remain free from injury  Outcome: PROGRESSING AS EXPECTED     Problem: Safety  Goal: Will remain free from falls  Outcome: PROGRESSING AS EXPECTED     Problem: Pain Management  Goal: Pain level will decrease to patient's comfort goal  Outcome: PROGRESSING AS EXPECTED

## 2020-07-14 NOTE — ED PROVIDER NOTES
ED Provider Note     Scribed for Ilana Chowdary D.O. by Tien Arita. 7/13/2020, 7:25 PM.     Primary care provider: Pcp Unknown  Means of arrival: ambulance         History obtained from: EMS  History limited by: altered mental status    CHIEF COMPLAINT  Chief Complaint   Patient presents with   • Altered Mental Status       HPI  Corine Dela Cruz is a 87 y.o. female with a history of CVA, ESRD, melena, and hemodialysis MWF who presents to the emergency Department for evaluation of altered mental status. She was recently on hemodialysis when she was discovered unresponsive, and nursing staff administered a sternal rub to wake her. She presented to the ER with black stool covering her lower extremities. Nursing staff commented on oral trauma and are concerned the patient may have had a seizure. She currently has a blood sugar of 150. The patient was  Admitted from June 26th and discharged July 9th for evaluation of melena and weakness. Of note, patient had a systolic pressure of 60 upon arrival to the ED. Patient's baseline mental status is unclear. Per records, patient is on Plavix. Further history of present illness cannot be obtained due to the patient's altered mental status.    PPE Note: I personally donned full PPE for all patient encounters during this visit, including wearing an N95 respirator mask, gloves, and eye protection. Scribe remained outside the patient's room and did not have any contact with the patient for the duration of patient encounter.      REVIEW OF SYSTEMS  Pertinent positives include altered mental status. Further review of systems cannot be obtained due to the patient's altered mental status.    PAST MEDICAL HISTORY  The patient has a history of CVA, ESRD, melena, and hemodialysis MWF.    FAMILY HISTORY  None pertinent    SOCIAL HISTORY  Social History     Tobacco Use   • Smoking status: Never Smoker   • Smokeless tobacco: Never Used     SURGICAL HISTORY  Past Surgical History:    Procedure Laterality Date   • GASTROSCOPY W/PUSH ENTERSCOPY N/A 7/2/2020    Procedure: GASTROSCOPY, WITH PUSH ENTEROSCOPY;  Surgeon: Nathan Rich M.D.;  Location: SURGERY SAME DAY [facility];  Service: Gastroenterology   • CAPSULE ENDOSCOPY RETRIEVAL  6/29/2020    Procedure: CAPSULE ENDOSCOPY RETRIEVAL;  Surgeon: Jules Heck M.D.;  Location: ENDOSCOPY [facility];  Service: Gastroenterology   • PB COLONOSCOPY,DIAGNOSTIC N/A 6/28/2020    Procedure: COLONOSCOPY;  Surgeon: Jules Heck M.D.;  Location: SURGERY [facility];  Service: Gastroenterology   • CAPSULE ENDOSCOPY ADMINISTRATION N/A 6/28/2020    Procedure: ADMINISTRATION, CAPSULE, FOR CAPSULE ENDOSCOPY;  Surgeon: Jules Heck M.D.;  Location: ENDOSCOPY [facility];  Service: Gastroenterology   • GASTROSCOPY N/A 6/27/2020    Procedure: GASTROSCOPY;  Surgeon: Jules Heck M.D.;  Location: SURGERY [facility];  Service: Gastroenterology       CURRENT MEDICATIONS  No current facility-administered medications for this encounter.     Current Outpatient Medications:   •  NS SOLN 100 mL with ampicillin 2 GM SOLR 2,000 mg, 2,000 mg by Intravenous route every 12 hours for 7 days., Disp: , Rfl:   •  calcium acetate (PHOS-LO) 667 MG Tab tablet, Take 1 Tab by mouth 3 times a day, with meals., Disp: 180 Tab, Rfl:   •  furosemide (LASIX) 80 MG Tab, Take 1 Tab by mouth every day., Disp: 30 Tab, Rfl:   •  omeprazole (PRILOSEC) 20 MG delayed-release capsule, Take 1 Cap by mouth every day., Disp: 30 Cap, Rfl:   •  vitamin D, Ergocalciferol, (DRISDOL) 1.25 MG (74424 UT) Cap capsule, Take 1 Cap by mouth every 7 days., Disp: 5 Cap, Rfl:   •  clopidogrel (PLAVIX) 75 MG Tab, Take 75 mg by mouth every day., Disp: , Rfl:   •  aspirin EC (ECOTRIN) 81 MG Tablet Delayed Response, Take 81 mg by mouth every day., Disp: , Rfl:   •  ALPRAZolam (XANAX) 0.5 MG Tab, Take 0.5 mg by mouth 3 times a day as needed for Sleep., Disp: , Rfl:   •  acetaminophen (TYLENOL)  "500 MG Tab, Take 500-1,000 mg by mouth every 6 hours as needed., Disp: , Rfl:   •  amLODIPine (NORVASC) 5 MG Tab, Take 5 mg by mouth 2 Times a Day., Disp: , Rfl:   •  atorvastatin (LIPITOR) 40 MG Tab, Take 40 mg by mouth every day., Disp: , Rfl:   •  Ferric Citrate (AURYXIA) 1  MG(Fe) Tab, Take 210 mg by mouth 3 times a day, with meals., Disp: , Rfl:     ALLERGIES  Allergies   Allergen Reactions   • Lexapro      Pt states she doesn't know what happens when she takes lexapro         PHYSICAL EXAM  VITAL SIGNS: BP (!) 68/45   Pulse 100   Temp 37.1 °C (98.7 °F) (Temporal)   Resp 20   Ht 1.499 m (4' 11\")   Wt 83.9 kg (185 lb)   SpO2 95%   BMI 37.37 kg/m²     Constitutional: Patient is acutely confused, moderate distress, Morbidly obese  HENT: Normocephalic, atraumatic Nose normal. Oropharynx moist .  Eyes: PERRL, EOMI, Conjunctiva without erythema or exudates.   Neck: Supple with  Normal range of motion in flexion, extension and lateral rotation. No tenderness along the bony prominences or paraspinal muscles.  Lymphatic: No lymphadenopathy noted.   Cardiovascular: Irregularly irregular rhythm, no murmur, Dialysis catheter in right groin  Thorax & Lungs: Diminished air exchange, No rhonchi or rales  Abdomen: Melanotic stool all over abdomen generalized in her lower extremities,  Skin: Multiple areas of ecchymotic changes on anterior chest wall with active bleeding secondary to skin tear  Back: No cervical, thoracic, or lumbosacral tenderness. No CVA tenderness.   Extremities: Left hand was extremely ecchymotic with soft tissue swelling on dorsal aspect, Multiple other areas of ecchymotic bruised regions on extremities, 2+ lower extremity edema  Musculoskeletal: Normal range of motion in all major joints. No tenderness to palpation or major deformities noted.   Neurologic: Alert but confused    DIAGNOSTICS/PROCEDURES    LABS  Results for orders placed or performed during the hospital encounter of 07/13/20 "   Lactic acid (lactate)   Result Value Ref Range    Lactic Acid 3.7 (H) 0.5 - 2.0 mmol/L   Lactic acid (lactate): Repeat if initial lactic acid result is greater than 2   Result Value Ref Range    Lactic Acid 2.9 (H) 0.5 - 2.0 mmol/L   CBC WITH DIFFERENTIAL   Result Value Ref Range    WBC 16.1 (H) 4.8 - 10.8 K/uL    RBC 1.77 (L) 4.20 - 5.40 M/uL    Hemoglobin 5.3 (LL) 12.0 - 16.0 g/dL    Hematocrit 16.6 (LL) 37.0 - 47.0 %    MCV 92.1 81.4 - 97.8 fL    MCH 29.4 27.0 - 33.0 pg    MCHC 31.9 (L) 33.6 - 35.0 g/dL    RDW 54.4 (H) 35.9 - 50.0 fL    Platelet Count 224 164 - 446 K/uL    MPV 9.9 9.0 - 12.9 fL    Neutrophils-Polys 87.00 (H) 44.00 - 72.00 %    Lymphocytes 6.10 (L) 22.00 - 41.00 %    Monocytes 4.50 0.00 - 13.40 %    Eosinophils 0.20 0.00 - 6.90 %    Basophils 0.30 0.00 - 1.80 %    Immature Granulocytes 1.90 (H) 0.00 - 0.90 %    Nucleated RBC 0.00 /100 WBC    Neutrophils (Absolute) 14.03 (H) 2.00 - 7.15 K/uL    Lymphs (Absolute) 0.99 (L) 1.00 - 4.80 K/uL    Monos (Absolute) 0.73 0.00 - 0.85 K/uL    Eos (Absolute) 0.03 0.00 - 0.51 K/uL    Baso (Absolute) 0.05 0.00 - 0.12 K/uL    Immature Granulocytes (abs) 0.30 (H) 0.00 - 0.11 K/uL    NRBC (Absolute) 0.00 K/uL   COMP METABOLIC PANEL   Result Value Ref Range    Sodium 138 135 - 145 mmol/L    Potassium 3.5 (L) 3.6 - 5.5 mmol/L    Chloride 95 (L) 96 - 112 mmol/L    Co2 21 20 - 33 mmol/L    Anion Gap 22.0 (H) 7.0 - 16.0    Glucose 169 (H) 65 - 99 mg/dL    Bun 55 (H) 8 - 22 mg/dL    Creatinine 4.20 (H) 0.50 - 1.40 mg/dL    Calcium 7.9 (L) 8.5 - 10.5 mg/dL    AST(SGOT) 13 12 - 45 U/L    ALT(SGPT) <5 2 - 50 U/L    Alkaline Phosphatase 46 30 - 99 U/L    Total Bilirubin 0.4 0.1 - 1.5 mg/dL    Albumin 3.1 (L) 3.2 - 4.9 g/dL    Total Protein 6.5 6.0 - 8.2 g/dL    Globulin 3.4 1.9 - 3.5 g/dL    A-G Ratio 0.9 g/dL   APTT   Result Value Ref Range    APTT 51.5 (H) 24.7 - 36.0 sec   PROTHROMBIN TIME (INR)   Result Value Ref Range    PT 14.0 12.0 - 14.6 sec    INR 1.05 0.87 - 1.13    COD (ADULT)   Result Value Ref Range    ABO Grouping Only A     Rh Grouping Only NEG     Antibody Screen-Cod NEG    ESTIMATED GFR   Result Value Ref Range    GFR If  12 (A) >60 mL/min/1.73 m 2    GFR If Non African American 10 (A) >60 mL/min/1.73 m 2     Labs reviewed by me    RADIOLOGY/PROCEDURES  DX-CHEST-PORTABLE (1 VIEW)   Final Result      No acute cardiopulmonary abnormality.        Results and radiologist interpretation reviewed by me.     COURSE & MEDICAL DECISION MAKING  Pertinent Labs & Imaging studies reviewed. (See chart for details)    7:25 PM - Patient seen and evaluated at bedside. Ordered for DX-Chest, Lactic acid once and repeat if initial lactic acid result is greater than 2, APTT, PT/INR, COD, CBC with diff, CMP, Urinalysis, Urine Culture, and Blood culture to evaluate. Differential diagnoses include, but are not limited to, acute GI bleed, chronic kidney disease.    7:49 PM - Informed of the patient's critical HGB and HCT levels as noted above. Patient's POLST paperwork was reviewed which showed the patient has a DNR status. Attempting to contact the patient's family to discuss need for blood transfusion.    8:41 PM - Tried to contact the patient's family to discuss her plan of care. Phone went to voicemail and I left a message to contact me.    8:42 PM - Per RN, the patient appears much more agitated and will be medicated with Ativan 1 mg injection.    9:11 PM - I contacted the patient's family to discuss her labs and radiology results and plan of care. I informed the family of the patient's current status and discussed possibilities for treatment including making patient comfort measures only .Patient's family would like full treatment, including blood transfusion which has been ordered. The patient's son informed me that he will have to consult with his siblings to discuss comfort care measures. I have explained to the patient representative the risks and benefits of  transfusion of blood products.  This includes, as appropriate, the risk of mild allergic reaction, hemolytic reaction, transfusion-associated lung injury, febrile reactions, circulatory or iron overload, and infection.    We discussed possible alternatives and their risks, including directed donation, autologous transfusion, and no transfusion, including IV or oral iron supplementation, as appropriate.  I believe the patient representative understands the risks and benefits and was able to express understanding.    9:44 PM - I discussed the patient's case and the above findings with Dr. Romo (Hospitalist) who advised admission to the ICU. Paged Intensivist.    9:52 PM I discussed the patient's case and the above findings with Dr. Barfield (Intensivist) who accepts the patient for hospitalization.    CRITICAL CARE  The very real possibility of a deterioration of this patient's condition required the highest level of my preparedness for sudden, emergent intervention.  I provided critical care services, which included medication orders, frequent reevaluations of the patient's condition and response to treatment, ordering and reviewing test results, and discussing the case with various consultants.  The critical care time associated with the care of the patient was 35 minutes. Review chart for interventions. This time is exclusive of any other billable procedures.     DISPOSITION:  Patient will be hospitalized by Dr. Barfield in critical condition.    FINAL IMPRESSION  1. Acute GI bleeding    2. Chronic kidney disease on chronic dialysis (HCC)    3. Confusion    4. Symptomatic anemia          The critical care time associated with the care of the patient was 35 minutes.     Tien FUENTES (Jesus), am scribing for, and in the presence of, Ilana Chowdary D.O..    Electronically signed by: Tien Kitchen), 7/13/2020    Ilana FUENTES D.O. personally performed the services described in this documentation, as  scribed by Tien Arita in my presence, and it is both accurate and complete.    The note accurately reflects work and decisions made by me.  Ilana Chowdary D.O.  7/13/2020  11:40 PM

## 2020-07-14 NOTE — ED NOTES
Lab called with critical result of hgb 5.3 and Hct 16.6 at 1945. Critical lab result read back to lab.   Dr. Chowdary notified of critical lab result at 1945.

## 2020-07-14 NOTE — ED TRIAGE NOTES
Chief Complaint   Patient presents with   • Altered Mental Status     Pt bib ems,per report pt was dialysis and unable to arouse, they pulled 2L. Pt altered. Pt arrived covered black stool, cleaned pt. Multiple skin tear and bruising all over pt's body. Sepsis score= 4.protocol ordered

## 2020-07-14 NOTE — PROGRESS NOTES
A&Ox2, disoriented to place and event. VSS on 2LNC. No complaints/signs of pain, meds given per MAR if complaints of pain. Son Mehrdad at bedside, explained visiting hours. Call light in reach, bed locked/lowest position. WCTM.

## 2020-07-14 NOTE — PROGRESS NOTES
[facility] Medicine Daily Progress Note    Date of Service  7/14/2020    Chief Complaint  87 y.o. female admitted 7/13/2020 with GI bleed.     Hospital Course    87 y.o. female with past medical history of ESRD, HTN, DM, PAD, and recent admission 6/26-7/9 with GI bleed who presented 7/13/2020 again with significant melena and Hb of 5.  On last admission patient had EGD, colonoscopy followed by capsule endoscopy which found possible source and small intestine.  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  Course was further complicated by enterococcus bacteremia secondary to infected dialysis catheter and eventually discharged to skilled facility on 7/9.  [facility] mel[facility] and altered mental status. Admitted to ICU and transitioned to comfort care.      Interval Problem Update  Patient placed on comfort care, she is resting comfortably    Consultants/Specialty  Palliative   PMA    Code Status  Comfort care     Disposition  TBD    Review of Systems  Review of Systems   Unable to perform ROS: Mental status change        Physical Exam  Temp:  [35.9 °C (96.7 °F)-37.1 °C (98.7 °F)] 36.4 °C (97.5 °F)  Pulse:  [] 82  Resp:  [12-28] 15  BP: ()/(29-63) 100/29  SpO2:  [91 %-100 %] 97 %    Physical Exam  Vitals signs reviewed.   Constitutional:       Appearance: Normal appearance. She is ill-appearing.   HENT:      Head: Normocephalic and atraumatic.      Nose: No congestion.      Mouth/Throat:      Mouth: Mucous membranes are dry.   Eyes:      Extraocular Movements: Extraocular movements intact.      Pupils: Pupils are equal, round, and reactive to light.   Neck:      Musculoskeletal: Normal range of motion and neck supple. No muscular tenderness.   Cardiovascular:      Rate and Rhythm: Normal rate and regular rhythm.      Pulses: Normal pulses.      Heart sounds: Normal heart sounds. No murmur.   Pulmonary:      Effort: Pulmonary effort is normal. No  respiratory distress.      Breath sounds: Normal breath sounds. No stridor.   Abdominal:      General: Bowel sounds are normal. There is no distension.      Palpations: Abdomen is soft.      Tenderness: There is no abdominal tenderness.   Musculoskeletal:         General: No swelling or deformity.   Skin:     General: Skin is warm and dry.      Capillary Refill: Capillary refill takes 2 to 3 seconds.      Coloration: Skin is pale.   Neurological:      Mental Status: She is alert. She is disoriented.   Psychiatric:      Comments: confused         Fluids    Intake/Output Summary (Last 24 hours) at 7/14/2020 0829  Last data filed at 7/14/2020 0034  Gross per 24 hour   Intake 300 ml   Output --   Net 300 ml       Laboratory  Recent Labs     07/13/20 1923   WBC 16.1*   RBC 1.77*   HEMOGLOBIN 5.3*   HEMATOCRIT 16.6*   MCV 92.1   MCH 29.4   MCHC 31.9*   RDW 54.4*   PLATELETCT 224   MPV 9.9     Recent Labs     07/13/20 1923   SODIUM 138   POTASSIUM 3.5*   CHLORIDE 95*   CO2 21   GLUCOSE 169*   BUN 55*   CREATININE 4.20*   CALCIUM 7.9*     Recent Labs     07/13/20 1923   APTT 51.5*   INR 1.05               Imaging  DX-CHEST-PORTABLE (1 VIEW)   Final Result      No acute cardiopulmonary abnormality.           Assessment/Plan  * Acute GI bleeding- (present on admission)  Assessment & Plan  Recurrent, given 1 unit PRBC yesterday   Extensive workup at last admission     After discussion with family patient transitioned to comfort care approach     Primary hypertension- (present on admission)  Assessment & Plan  Patient now on comfort care, hold home medications    ESRD (end stage renal disease) (HCC)- (present on admission)  Assessment & Plan  Patient on Comfort care    Bacteremia- (present on admission)  Assessment & Plan  No treatment, patient on comfort measures    Encephalopathy acute- (present on admission)  Assessment & Plan  Continued, on comfort care       VTE prophylaxis: none

## 2020-07-14 NOTE — ED NOTES
PER DISCUSSION BETWEEN ERP AND FAMILY, PLAN TO PLACE PT ON COMFORT CARE. VERBAL INSTRUCTION TO COMPLETE UNIT OF BLOOD CURRENTLY ADMIN.

## 2020-07-14 NOTE — PROGRESS NOTES
2 RN skin check complete with BAKARI Churchill.  Devices in place PIV, HD cath  Skin assessed under devices yes.   Confirmed pressure ulcers found on: bilat heels  New potential pressure ulcers noted on NA.    Interventions in place q2T, 2RN skin check, incont care, waffle in place, mepilex, float heel boots, barrier paste.    Ears pink blanching, protective ear   Sternum skin tear, bleeding, purple, red, dressing applied  LUE purple, red, heeling skin tear, blood blisters scattered  RUE purple, red, heeling skin tear, blood blisters scattered  Pannus red blanching, powered applied  Rt groin HD cath, dressing CDI  Sacrum red blanching, barrier cream applied  BLE open wound, swelling, flaky, fragile, dressing applied  Rt foot skin tear, bleeding, purple, red, dressing applied  Bilat heel DTI, dressing applied

## 2020-07-14 NOTE — ASSESSMENT & PLAN NOTE
Recurrent, given 1 unit PRBC yesterday   Extensive workup at last admission     After discussion with family patient transitioned to comfort care approach

## 2020-07-14 NOTE — ED NOTES
Unable to complete med rec.  Pt did not arrive with [facility] from [place].  Attempted to call multi times.

## 2020-07-14 NOTE — DISCHARGE PLANNING
Care Transition Team Assessment    Per MD's Notes:  87 y.o. female with past medical history of ESRD, HTN, DM, PAD, and recent admission 6/26-7/9 with GI bleed who presented 7/13/2020 again with significant melena and Hb of 5.    Patient has been transitioned to comfort care. Prior to comfort care she has been confirmed at [facility].     Patient was an admit from [facility]. She was a re-admit from 6/26-7/9    Information Source  Orientation : Disoriented to Event, Disoriented to Place, Disoriented to Time  Information Given By: Patient  Informant's Name: Corine Dela Cruz  Who is responsible for making decisions for patient? : Adult child    Readmission Evaluation  Is this a readmission?: Yes - unplanned readmission  Why do you think you were readmitted?: (patient is on comfort care)  Was an appointment arranged for you prior to discharge?: (N/A, was at [facility])  Were there new prescriptions you were supposed to fill after you were discharged?: (N/A, was at [facility])  Did you understand your discharge instructions?: Yes  Did you have enough support after your last discharge?: Yes    Elopement Risk  Legal Hold: No  Ambulatory or Self Mobile in Wheelchair: No-Not an Elopement Risk  Elopement Risk: Not at Risk for Elopement    Interdisciplinary Discharge Planning  Does Admitting Nurse Feel This Could be a Complex Discharge?: Yes  Lives with - Patient's Self Care Capacity: Adult Children  Patient or legal guardian wants to designate a caregiver (see row info): No  Support Systems: Family Member(s)  Housing / Facility: 1 Story House  Do You Take your Prescribed Medications Regularly: Yes  Able to Return to Previous ADL's: Future Time w/Therapy  Mobility Issues: Yes  Prior Services: Unable To Determine At This Time  Assistance Needed: Yes  Durable Medical Equipment: Not Applicable    Discharge Preparedness  What is your plan after discharge?: Other (comment)(on comfort  care)  What are your discharge supports?: Child  Prior Functional Level: Needs Assist with Activities of Daily Living, Needs Assist with Medication Management  Difficulity with ADLs: Bathing, Brushing teeth, Dressing, Eating, Toileting, Walking  Difficulty with ADLs Comment: (patient is on comfort care)  Difficulity with IADLs: Cooking, Driving, Keeping track of finances, Laundry, Managing medication, Shopping, Using the telephone or computer    Functional Assesment  Prior Functional Level: Needs Assist with Activities of Daily Living, Needs Assist with Medication Management    Finances  Financial Barriers to Discharge: No  Prescription Coverage: Yes    Vision / Hearing Impairment  Vision Impairment : Yes  Right Eye Vision: Impaired, Wears Glasses  Left Eye Vision: Impaired, Wears Glasses  Hearing Impairment : No      Advance Directive  Advance Directive?: POLST    Domestic Abuse  Have you ever been the victim of abuse or violence?: No  Physical Abuse or Sexual Abuse: No  Verbal Abuse or Emotional Abuse: No  Possible Abuse Reported to:: Not Applicable    Psychological Assessment  History of Substance Abuse: None  History of Psychiatric Problems: No  Non-compliant with Treatment: No  Newly Diagnosed Illness: No    Discharge Risks or Barriers  Discharge risks or barriers?: Other (comment)(comfort care)  Patient risk factors: Other (comment)    Anticipated Discharge Information  Anticipated discharge disposition: Other (comment)(comfort care)  Discharge Address: (N/A)  Discharge Contact Phone Number: son, [phone]    Yessy Nunez RN,

## 2020-07-14 NOTE — ED NOTES
Break Rn:    Patient resting quietly in bed without distress, denies any complaints or needs at this time.  Call bell within reach.

## 2020-07-14 NOTE — ED NOTES
CONSENT SIGNED FOR BLOOD, RELEASED, CALLED BLOOD BANK. PT CONTINUES TO YELL OUT OF ROOM, VERBAL ORDER RECEIVED FOR ADDITIONAL ATIVAN. HOSPITAL BED ORDERED AND WAFFLE MATTRESS FOR COMFORT. WILL CONTINUE TO MONITOR

## 2020-07-14 NOTE — ED NOTES
PT INCONT OF STOOL, BLACK IN COLOR. CLEANED UP, LINENS AND GOWN CHANGED. PT TRANSFERRED TO HOSPITAL BED WITH WAFFLE MATTRESS. FAMILY BACK AT BEDSIDE. CALL LIGHT WITHIN REACH. AWAITING BED ON FLOOR. WILL CONTINUE TO MONITOR

## 2020-07-14 NOTE — ED NOTES
RECEIVED REPORT FROM RAFA VILLEGAS, ASSUMING CARE OF PT. IV X2 PLACED, LABS AND CULTURES SENT TO LAB. MD TO BEDSIDE

## 2020-07-15 VITALS
DIASTOLIC BLOOD PRESSURE: 35 MMHG | SYSTOLIC BLOOD PRESSURE: 110 MMHG | WEIGHT: 185 LBS | HEIGHT: 59 IN | RESPIRATION RATE: 12 BRPM | OXYGEN SATURATION: 83 % | TEMPERATURE: 96.9 F | BODY MASS INDEX: 37.29 KG/M2 | HEART RATE: 66 BPM

## 2020-07-15 PROBLEM — Z51.5 COMFORT MEASURES ONLY STATUS: Status: ACTIVE | Noted: 2020-07-15

## 2020-07-15 PROCEDURE — 700101 HCHG RX REV CODE 250: Performed by: INTERNAL MEDICINE

## 2020-07-15 PROCEDURE — 700111 HCHG RX REV CODE 636 W/ 250 OVERRIDE (IP): Performed by: INTERNAL MEDICINE

## 2020-07-15 PROCEDURE — 99232 SBSQ HOSP IP/OBS MODERATE 35: CPT | Performed by: HOSPITALIST

## 2020-07-15 RX ADMIN — ATROPINE SULFATE 2 DROP: 10 SOLUTION OPHTHALMIC at 19:54

## 2020-07-15 RX ADMIN — MORPHINE SULFATE 10 MG: 10 INJECTION INTRAVENOUS at 09:43

## 2020-07-15 RX ADMIN — MORPHINE SULFATE 10 MG: 10 INJECTION INTRAVENOUS at 19:54

## 2020-07-15 ASSESSMENT — PAIN SCALES - PAIN ASSESSMENT IN ADVANCED DEMENTIA (PAINAD)
FACIALEXPRESSION: SMILING OR INEXPRESSIVE
TOTALSCORE: 0
BODYLANGUAGE: RELAXED
CONSOLABILITY: NO NEED TO CONSOLE
BREATHING: NORMAL

## 2020-07-15 NOTE — CARE PLAN
Problem: Safety  Goal: Will remain free from injury  Outcome: PROGRESSING AS EXPECTED  Bed locked and in lowest position.      Problem: Pain Management  Goal: Pain level will decrease to patient's comfort goal  Outcome: PROGRESSING AS EXPECTED  Administering pain meds as needed.

## 2020-07-15 NOTE — PROGRESS NOTES
[facility] Medicine Daily Progress Note    Date of Service  7/15/2020    Chief Complaint  87 y.o. female admitted 7/13/2020 with GI bleed.     Hospital Course    87 y.o. female with past medical history of ESRD, HTN, DM, PAD, and recent admission 6/26-7/9 with GI bleed who presented 7/13/2020 again with significant melena and Hb of 5.  On last admission patient had EGD, colonoscopy followed by capsule endoscopy which found possible source and small intestine.  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  Course was further complicated by enterococcus bacteremia secondary to infected dialysis catheter and eventually discharged to skilled facility on 7/9.  [facility] mel[facility] and altered mental status. Admitted to ICU and transitioned to comfort care.      Interval Problem Update  Patient placed on comfort care, she is resting comfortably, no dialysis sessions due to comfort care    Consultants/Specialty  Palliative   PMA    Code Status  Comfort care     Disposition  TBD    Review of Systems  Review of Systems   Unable to perform ROS: Mental status change        Physical Exam  Temp:  [36.6 °C (97.8 °F)-37 °C (98.6 °F)] 37 °C (98.6 °F)  Pulse:  [66-88] 66  Resp:  [16] 16  BP: ()/(29-62) 100/62  SpO2:  [98 %-99 %] 99 %    Physical Exam  Vitals signs reviewed.   Constitutional:       Appearance: Normal appearance. She is ill-appearing.   HENT:      Head: Normocephalic and atraumatic.      Nose: No congestion.      Mouth/Throat:      Mouth: Mucous membranes are dry.   Eyes:      Extraocular Movements: Extraocular movements intact.      Pupils: Pupils are equal, round, and reactive to light.   Neck:      Musculoskeletal: Normal range of motion and neck supple. No muscular tenderness.   Cardiovascular:      Rate and Rhythm: Normal rate and regular rhythm.      Pulses: Normal pulses.      Heart sounds: Normal heart sounds. No murmur.   Pulmonary:      Effort:  Pulmonary effort is normal. No respiratory distress.      Breath sounds: Normal breath sounds. No stridor.   Abdominal:      General: Bowel sounds are normal. There is no distension.      Palpations: Abdomen is soft.      Tenderness: There is no abdominal tenderness.   Musculoskeletal:         General: No swelling or deformity.      Comments: Dialysis catheter in place   Skin:     General: Skin is warm and dry.      Capillary Refill: Capillary refill takes 2 to 3 seconds.      Coloration: Skin is pale.   Neurological:      Mental Status: She is alert. She is disoriented.   Psychiatric:      Comments: confused         Fluids    Intake/Output Summary (Last 24 hours) at 7/15/2020 0956  Last data filed at 7/14/2020 1330  Gross per 24 hour   Intake 240 ml   Output --   Net 240 ml       Laboratory  Recent Labs     07/13/20 1923   WBC 16.1*   RBC 1.77*   HEMOGLOBIN 5.3*   HEMATOCRIT 16.6*   MCV 92.1   MCH 29.4   MCHC 31.9*   RDW 54.4*   PLATELETCT 224   MPV 9.9     Recent Labs     07/13/20  1923   SODIUM 138   POTASSIUM 3.5*   CHLORIDE 95*   CO2 21   GLUCOSE 169*   BUN 55*   CREATININE 4.20*   CALCIUM 7.9*     Recent Labs     07/13/20  1923   APTT 51.5*   INR 1.05               Imaging  DX-CHEST-PORTABLE (1 VIEW)   Final Result      No acute cardiopulmonary abnormality.           Assessment/Plan  * Comfort measures only status  Assessment & Plan  Patient transfer from ICU after discussion with family for comfort measures   Cont with comfort care   No dialysis sessions     Acute GI bleeding- (present on admission)  Assessment & Plan  Recurrent, given 1 unit PRBC yesterday   Extensive workup at last admission     After discussion with family patient transitioned to comfort care approach     Primary hypertension- (present on admission)  Assessment & Plan  Patient now on comfort care, hold home medications    ESRD (end stage renal disease) (HCC)- (present on admission)  Assessment & Plan  Patient on Comfort  care    Bacteremia- (present on admission)  Assessment & Plan  No treatment, patient on comfort measures    Encephalopathy acute- (present on admission)  Assessment & Plan  Continued, on comfort care       VTE prophylaxis: none

## 2020-07-15 NOTE — PROGRESS NOTES
"Pt was AxO x2 during AM assessment.  She was responsive during assessment and answered with \"yes\" or \"no\" during.  Her skin is very fragile.  Pt reported no pain.  Providing comfort care.  Q2 position changes.  Spoke to son about pt status this morning as well.  Pt needs met at this time and rounded on consistently.   "

## 2020-07-15 NOTE — PROGRESS NOTES
2 RN skin check complete with BAKARI Novak.  Devices in place PIV, HD cath  Skin assessed under devices yes.   Confirmed pressure ulcers found on: bilat heels. Wound consult put in by last shift.   New potential pressure ulcers noted on NA.    Interventions in place q2T, 2RN skin check, incont care, waffle in place, mepilex, float heel boots, barrier paste.    Ears pink blanching, protective ear pads in place  Sternum skin tear, bleeding, purple, red, dressing is C/D/I.  LUE purple, red, heeling skin tear, blood blisters scattered  RUE purple, red, heeling skin tear, blood blisters scattered  Pannus red blanching, powered applied  Healing skin tear on ABD.   Rt groin HD cath, dressing C/D/I  Sacrum red blanching, barrier cream applied  BLE open wound, swelling, flaky, fragile, dressing is C/D/I.   Rt foot skin tear, bleeding, purple, red, dressing is C/D/I  Bilat heel DTI, dressing are C/D/I.   Wound noted on right lateral little toe. Wound is closed. Open to air.   All pictures have been taken by last shift RN per report and wound consult is in place.

## 2020-07-15 NOTE — PROGRESS NOTES
Upon assessment this morning, pt was calm and sleeping.  Responsive to commands.  Shortly after assessment pt began to call out, administered PRN morphine and pt went back to resting.  Spoke to son this AM and he said he would be in this afternoon.  Will continue to monitor pt condition.

## 2020-07-15 NOTE — PROGRESS NOTES
2 RN skin check complete with BAKARI Duval.  Devices in place PIV, HD cath, heel float boots  Skin assessed under devices yes.   Confirmed pressure ulcers found on: bilat heels  New potential pressure ulcers noted on NA.     Interventions in place Q2 turns, 2RN skin checks, frequent incontinent checks and linen changes as needed, waffle in place, heel float boots, barrier paste     Ears pink blanching, protective gray foam ear pads in place.  Sternum skin tear, bleeding, purple, red. Dressing changed.  LUE dark purple, red, with heeling skin tear and blood blisters scattered.  RUE purple, red with heeling skin tear and scattered blood blisters.  Pannus red, blanching, moist. Powered applied.  Rt groin HD cath, dressing CDI.  Sacrum red blanching, barrier cream applied.  BLE open wounds, swelling, flaky, fragile. Dressing on left CDI. Dressing on right reinforced with non adhesive foam for drainage.  Rt foot skin tear, purple, red. Mepitel in place.  Bialteral heel DTI. Heel float boots in place.

## 2020-07-15 NOTE — PROGRESS NOTES
Received bedside report and assumed care. Family at bedside. Pt AOx 2 to self and place, bed bound, and on 2L O2 NC.  PIV in place on right AC and left EJ. Both flushing and SL. HD catheter on right upper thigh, Dressing CDI. VSS. Skin very fragile- 2 Rn skin checks and Q2 tuns in place. Changed dressing on sternum. Later in shift pt restless legs and crying out in pain. Medicated with morphine per MAR. Bed locked/lowest position, and pt now resting quielty.

## 2020-07-15 NOTE — ASSESSMENT & PLAN NOTE
Patient transfer from ICU after discussion with family for comfort measures   Cont with comfort care   No dialysis sessions

## 2020-07-15 NOTE — CARE PLAN
Problem: Safety  Goal: Will remain free from injury  Outcome: PROGRESSING AS EXPECTED  No injuries sustained on shift.     Problem: Pain Management  Goal: Pain level will decrease to patient's comfort goal  Outcome: PROGRESSING AS EXPECTED  Pt not reporting pain.

## 2020-07-16 LAB
BACTERIA BLD CULT: ABNORMAL
BACTERIA BLD CULT: ABNORMAL
SIGNIFICANT IND 70042: ABNORMAL
SITE SITE: ABNORMAL
SOURCE SOURCE: ABNORMAL

## 2020-07-16 NOTE — PROGRESS NOTES
Received bedside report and assumed care at change of shift. Pt comfort care. Pt was having labored breathing with excessive fluid secretions from mouth.  Medicated with aptropine drops sublingual and 10 mg morphine per MAR. Pt  at  with family, sons, at bedside. 2RNs, this RN and charge Oralia, pronounced per protocol. MD Feliz Tatum notified.  called and notified. Stated this was not a  case. Tissue bank was also notified. Post mortem care provided and pt has no belongings with them. Waiting traction to  body.

## 2020-07-18 LAB
BACTERIA BLD CULT: NORMAL
SIGNIFICANT IND 70042: NORMAL
SITE SITE: NORMAL
SOURCE SOURCE: NORMAL

## 2020-07-18 NOTE — DISCHARGE SUMMARY
Death Summary    Cause of Death  Cardiopulmonary arrest due to bactermia  due to MRSA     Comorbid Conditions at the Time of Death  Principal Problem:    Comfort measures only status POA: Unknown  Active Problems:    Acute GI bleeding POA: Yes    ESRD (end stage renal disease) (HCC) POA: Yes    Primary hypertension POA: Yes    Encephalopathy acute POA: Yes    Bacteremia POA: Yes  Resolved Problems:    * No resolved hospital problems. *      History of Presenting Illness and Hospital Course    87 y.o. female with past medical history of ESRD, HTN, DM, PAD, and recent admission 6/26-7/9 with GI bleed who presented 7/13/2020 again with significant melena and Hb of 5.  On last admission patient had EGD, colonoscopy followed by capsule endoscopy which found possible source and small intestine.  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  Course was further complicated by enterococcus bacteremia secondary to infected dialysis catheter and eventually discharged to skilled facility on 7/9.  she represented to [facility] on 7/13/20 for worsening melena and altered mental status. Admitted to ICU and transitioned to comfort care. She was found to have MRSA sepsis, acute Gi bleeding. Comfort care measures initiated and patient passed comfortably.     Death Date: 07/15/20   Death Time: 2030         Pronounced By (RN1): Jackie Kruse  Pronounced By (RN2): Kimberli Pyle

## 2020-07-21 NOTE — DOCUMENTATION QUERY
[facility]                                                                       Query Response Note      PATIENT:               BECKIE BRITTON  ACCT #:                  [id number]  MRN:                     [id number]  :                      10/26/1932  ADMIT DATE:       2020 10:14 AM  DISCH DATE:        2020 4:46 PM  RESPONDING  PROVIDER #:        [id number]           QUERY TEXT:    Sepsis and bacteremia are documented in the Medical Record.  Only bacteremia is documented on the DC Summary.    Please clarify whether patient had:    NOTE:  If an appropriate response is not listed below, please respond with a new note.    The patient's Clinical Indicators include:  Patient is an 88 y/o female presenting with GI bleed, and found to have acute and chronic epidural / subdural hemorrhages.  Patient has ESRD and had positive blood cultures on 7/10 likely due to GI source which infected her dialysis catheter.  The catheter was removed and the patient treated with IV antibiotics.  Both Enterococcus bacteremia and Enterococcal sepsis are documented in the record, clarification is needed.  Options provided:   -- Enterococcal Sepsis   -- Enterococcus Bacteremia   -- Unable to determine      Query created by: Cherie Cotter on 2020 5:34 AM    RESPONSE TEXT:    Enterococcal Sepsis          Electronically signed by:  BJ CASTILLO MD 2020 11:49 AM

## 2020-07-21 NOTE — DOCUMENTATION QUERY
[facility]                                                                       Query Response Note      PATIENT:               BECKIE BRITTON  ACCT #:                  [id number]  MRN:                     [id number]  :                      10/26/1932  ADMIT DATE:       2020 10:14 AM  DISCH DATE:        2020 4:46 PM  RESPONDING  PROVIDER #:        [id number]           QUERY TEXT:    Please clarify the likely etiology of the patient's epidural and subdural hemorrhages.      NOTE:  If an appropriate response is not listed below, please respond with a new note.    The patient's Clinical Indicators include:  Patient is an 88 y/o female presenting with GI bleed, and found to have acute and chronic epidural / subdural hemorrhages.  Patient has a history of stroke, was on aspirin and Plavix which were held on discovery of the bleeds.     Consult - gastrointestinal bleed with associated weakness, dizziness and falls and development of intracranial hemorrhage, unclear if subdural or epidural by MRI.    Multiple notes with ?patient admits to numerous falls over the past year?  On DAPT with aspirin and Plavix     Nourani - Acute hemorrhage and a chronic 2.1 cm right epidural collection is noted. She is noted to be placed on Plavix. Does not appear to be in hypertensive urgency. A call has been made to the patient's hospitalist for further   evaluation.     Chantelle - gastrointestinal bleed with associated weakness, dizziness and falls resulting in intracranial hemorrhage?Intracranial hemorrhage - Mult falls PTA  Options provided:   -- Likely traumatic exacerbated by anti-coagulation therapy   -- Likely traumatic not exacerbated by anti-coagulation therapy   -- Likely non-traumatic exacerbated by anti-coagulation therapy   -- Likely non-traumatic not  exacerbated by anti-coagulation therapy   -- Unable to determine      Query created by:  Cherie Cotter on 7/21/2020 5:33 AM    RESPONSE TEXT:    Likely traumatic exacerbated by anti-coagulation therapy          Electronically signed by:  BJ CASTILLO MD 7/21/2020 11:47 AM

## 2020-07-22 NOTE — DOCUMENTATION QUERY
[facility]                                                                       Query Response Note      PATIENT:               BECKIE BRITTON  ACCT #:                  [id number]  MRN:                     [id number]  :                      10/26/1932  ADMIT DATE:       2020 6:55 PM  DISCH DATE:        7/15/2020 8:30 PM  RESPONDING  PROVIDER #:        [id number]           QUERY TEXT:    Acute Encephalopathy is documented in the Medical Record. Please specify type.    NOTE:  If an appropriate response is not listed below, please respond with a new note.    The patient's Clinical Indicators include:  Per Progress Notes  -represented to [facility] on 20 for worsening melena and altered mental status.   -Admitted to ICU and transitioned to comfort care.     -Encephalopathy acute- (present on admission)    Treatment:   Transfused 1 unit PRBCs, Ativan & morphine prn & comfort care measures     Risk Factors:   Active GI bleed, & Hx of ESRD, HTN, DM, recent infected dialysis catheter & bacteremia  Options provided:   -- Metabolic encephalopathy   -- Septic encephalopathy   -- Toxic encephalopathy   -- Acute encephalopathy due to other medical condition, (please specify other medical condition)   -- Unable to determine      Query created by: Roxana Valdez on 2020 5:00 PM    RESPONSE TEXT:    Metabolic encephalopathy       QUERY TEXT:    Symptomatic anemia is documented in the Medical Record. Please specify the tyep of the anemia.    NOTE:  If the appropriate response is not listed below, please respond with a new note.    The patient's Clinical Indicators include:  Hg 5.3  Hct 16.6    Per Progress Note:  Transferring to medical floor for comfort care.    Symptomatic anemia, hypotension, sepsis.     Treatment:   CBC, transfused 1 unit PRBCs, Ativan & morphine prn & comfort care measures     Risk Factors:   Active GI bleed, & Hx of ESRD,  HTN, DM, recent infected dialysis catheter, & bacteremia  Options provided:   -- Blood loss anemia, acute   -- Blood loss anemia, chronic   -- Due to/in/with chronic kidney disease   -- Due to/in/with kidney failure   -- Macrocytic anemia   -- Microcytic anemia   -- Normocytic anemia   -- Pernicious anemia   -- Unable to determine      Query created by: Roxana Valdez on 7/17/2020 5:05 PM    RESPONSE TEXT:    Blood loss anemia, acute       QUERY TEXT:    Sepsis is documented in the Medical Record. Please clarify whether:    NOTE:  If an appropriate response is not listed below, please respond with a new note.    The patient's Clinical Indicators include:  HR 88 - 100  RR 16- 28  BP 68/45 - 152/62  WBC 16.1  lactic acid 3.7, 2.9  CXR: No acute cardiopulmonary abnormality.  Blood Cx Pending:   Coagulase-negative Staphylococcus species Possible Contaminant   Isolated from one bottle only, correlate with clinical condition.    Per Progress Notes 7/13  transferring to medical floor for comfort care.    Symptomatic anemia, hypotension, sepsis    Per Progress Notes:  recent admission 6/26-7/9 with GI bleed  complicated by enterococcus bacteremia secondary to infected dialysis catheter     Treatment:   CBC, CMP, lactic acid, blood cultures, CXR, transfused 1 unit PRBCs, & comfort care measures     Risk Factors:   Active GI bleed, & Hx of ESRD, HTN, DM, & recent admission with infected dialysis catheter & bacteremia  Options provided:   -- Sepsis with acute encephalopathy ruled in   -- Sepsis without organ dysfunction ruled in   -- SIRS of non-infectious origin with acute encephalopathy ruled in   -- SIRS of non-infectious origin without acute organ dysfunction ruled in   -- Sepsis/SIRS has been ruled out   -- Unable to determine      Query created by: Roxana Valdez on 7/17/2020 5:18 PM    RESPONSE TEXT:    Sepsis with acute encephalopathy ruled in          Electronically signed by:  COLIN URRUTIA MD 7/22/2020 2:06  PM

## 2020-07-28 NOTE — DOCUMENTATION QUERY
[facility]                                                                       Query Response Note      PATIENT:               BECKIE BRITTON  ACCT #:                  [id number]  MRN:                     [id number]  :                      10/26/1932  ADMIT DATE:       2020 10:14 AM  DISCH DATE:        2020 4:46 PM  RESPONDING  PROVIDER #:        [id number]           QUERY TEXT:    Encephalopathy is documented in the Medical Record. Please specify type(s).  If an appropriate response is not listed below, please respond with a new note.    The patient's Clinical Indicators include:  Patient is an 86 y/o female presenting with GI bleed, and found to have acute and chronic epidural / subdural hemorrhages.  Patient has ESRD and had positive blood cultures on 7/10 likely due to GI source which infected her dialysis catheter and developed Enterococcal sepsis.  After admission patient developed acute encephalopathy.  Can this diagnosis be further specified?    Your PNs from  - : Encephalopathy, acute:  Multifactorial, treating infection with IV antibiotic, will avoid benzos and narcotic as much as possible  Options provided:   -- Due to medications or drugs   -- Hypertensive encephalopathy   -- Metabolic encephalopathy   -- Septic encephalopathy   -- Toxic encephalopathy   -- Other type of encephalopathy   -- Unable to determine      Query created by: Cherie Cotter on 2020 11:05 AM    RESPONSE TEXT:    Other type of encephalopathy          Electronically signed by:  BJ CASTILLO MD 2020 12:27 PM

## 2023-01-11 NOTE — PROGRESS NOTES
[facility], [address]  [address] [place] [address] [phone]      RE:  Goldie Boyle  :  1983      2023        To Whom It May Concern,      There is justification for the Belsomra.  Prior treatment for the insomnia was trazodone up to 200 mg at bedtime but too tiring all day long and not effective as only rendering 2 hours of sleep [id number].  Also hydroxyzine was ineffective .  She has been on quetiapine at bedtime since 2017.  The Belsomra has been effective and very well tolerated since .  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  If an emergency wakes her from sleep she needs to be able to wake up easily and Belsomra has allowed this.  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  The Belsomra does not have risky interactions with medications currently used for her complicated situation.  The hypnotics suggested to be tried 1st do have risk for abuse and addiction.  She has done a very good job of staying abstinent from alcohol. The mentioned hypnotics to try 1st would imperil her abstinence from alcohol and then her job.        Sincerely,            Gianfranco Cadena MD  [facility]         Offered PRN morphine for pain, family present and refused.

## 2023-10-01 NOTE — CARE PLAN
Problem: Pain Management  Goal: Pain level will decrease to patient's comfort goal  Outcome: PROGRESSING AS EXPECTED  Note: Medicate with PRN medications per MAR.      Problem: Skin Integrity  Goal: Risk for impaired skin integrity will decrease  Outcome: PROGRESSING SLOWER THAN EXPECTED  Intervention: Implement precautions to protect skin integrity in collaboration with the interdisciplinary team  Flowsheets  Taken 7/14/2020 2000 by Eda Marshall C.N.A.  Skin Preventative Measures: Pillows in Use for Support / Positioning  Taken 7/14/2020 1040 by Scarlet Abel R.N.  Bed Types: Pressure Redistribution Mattress (Atmosair)  Friction Interventions: Draw Sheet / Pad Used for Repositioning  Moisturizers: Barrier Paste  Note: Waffle, heel float boots, Q2 turns, 2RN skin checks in place.      95

## (undated) DEVICE — CAPSULE VIDEO PILLCAM (10EA/BX)

## (undated) DEVICE — TUBING CLEARLINK DUO-VENT - C-FLO (48EA/CA)

## (undated) DEVICE — GOWN SURGEONS X-LARGE - DISP. (30/CA)

## (undated) DEVICE — BITE BLOCK ADULT 60FR (100EA/CA)

## (undated) DEVICE — SET EXTENSION WITH 2 PORTS (48EA/CA) ***PART #2C8610 IS A SUBSTITUTE*****

## (undated) DEVICE — FORCEP RADIAL JAW 4 STANDARD CAPACITY W/NEEDLE 240CM (40EA/BX)

## (undated) DEVICE — SOD. CHL 10CC SYRINGE PREFILL - W/10 CC (30/BX)

## (undated) DEVICE — SUTURE GENERAL

## (undated) DEVICE — FILM CASSETTE ENDO

## (undated) DEVICE — CANNULA W/ SUPPLY TUBING O2 - (50/CA)

## (undated) DEVICE — KIT CUSTOM PROCEDURE SINGLE FOR ENDO  (15/CA)

## (undated) DEVICE — BITEBLOCK ENDOSCOPIC PEDI. - (25/BX)

## (undated) DEVICE — CONTAINER, SPECIMEN, STERILE

## (undated) DEVICE — LACTATED RINGERS INJ 1000 ML - (14EA/CA 60CA/PF)

## (undated) DEVICE — ELECTRODE 850 FOAM ADHESIVE - HYDROGEL RADIOTRNSPRNT (50/PK)

## (undated) DEVICE — SENSOR SPO2 NEO LNCS ADHESIVE (20/BX) SEE USER NOTES

## (undated) DEVICE — ERBE SIDE FIRE - (10/CA)